# Patient Record
Sex: FEMALE | Race: WHITE | NOT HISPANIC OR LATINO | Employment: OTHER | URBAN - NONMETROPOLITAN AREA
[De-identification: names, ages, dates, MRNs, and addresses within clinical notes are randomized per-mention and may not be internally consistent; named-entity substitution may affect disease eponyms.]

---

## 2023-02-24 LAB
BACTERIA, URINE: ABNORMAL /HPF
RBC, URINE: 2 /HPF (ref 0–5)
SQUAMOUS EPITHELIAL CELLS, URINE: 3 /HPF
WBC, URINE: 2 /HPF (ref 0–5)

## 2023-03-08 LAB
ANION GAP IN SER/PLAS: 14 MMOL/L (ref 10–20)
CALCIUM (MG/DL) IN SER/PLAS: 9.1 MG/DL (ref 8.6–10.6)
CARBON DIOXIDE, TOTAL (MMOL/L) IN SER/PLAS: 29 MMOL/L (ref 21–32)
CHLORIDE (MMOL/L) IN SER/PLAS: 101 MMOL/L (ref 98–107)
CREATININE (MG/DL) IN SER/PLAS: 0.84 MG/DL (ref 0.5–1.05)
GFR FEMALE: 76 ML/MIN/1.73M2
GLUCOSE (MG/DL) IN SER/PLAS: 215 MG/DL (ref 74–99)
POTASSIUM (MMOL/L) IN SER/PLAS: 4.2 MMOL/L (ref 3.5–5.3)
SODIUM (MMOL/L) IN SER/PLAS: 140 MMOL/L (ref 136–145)
UREA NITROGEN (MG/DL) IN SER/PLAS: 17 MG/DL (ref 6–23)

## 2023-03-18 PROBLEM — M25.562 PAIN IN JOINT INVOLVING LEFT LOWER LEG: Status: ACTIVE | Noted: 2023-03-18

## 2023-03-18 PROBLEM — L40.9 PSORIASIS: Status: ACTIVE | Noted: 2023-03-18

## 2023-03-18 PROBLEM — D75.1 POLYCYTHEMIA: Status: ACTIVE | Noted: 2023-03-18

## 2023-03-18 PROBLEM — L71.9 ROSACEA: Status: ACTIVE | Noted: 2023-03-18

## 2023-03-18 PROBLEM — F41.9 RECURRENT MODERATE MAJOR DEPRESSIVE DISORDER WITH ANXIETY (MULTI): Status: ACTIVE | Noted: 2023-03-18

## 2023-03-18 PROBLEM — N39.0 RECURRENT UTI (URINARY TRACT INFECTION): Status: ACTIVE | Noted: 2023-03-18

## 2023-03-18 PROBLEM — M79.605 PAIN OF LEFT LOWER EXTREMITY: Status: ACTIVE | Noted: 2023-03-18

## 2023-03-18 PROBLEM — M17.11 ARTHRITIS OF RIGHT KNEE: Status: ACTIVE | Noted: 2023-03-18

## 2023-03-18 PROBLEM — R41.3 MEMORY LOSS: Status: ACTIVE | Noted: 2023-03-18

## 2023-03-18 PROBLEM — E55.9 VITAMIN D DEFICIENCY: Status: ACTIVE | Noted: 2023-03-18

## 2023-03-18 PROBLEM — M25.569 KNEE PAIN: Status: ACTIVE | Noted: 2023-03-18

## 2023-03-18 PROBLEM — F33.1 RECURRENT MODERATE MAJOR DEPRESSIVE DISORDER WITH ANXIETY (MULTI): Status: ACTIVE | Noted: 2023-03-18
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
## 2023-03-18 PROBLEM — R32 URINARY INCONTINENCE: Status: ACTIVE | Noted: 2023-03-18

## 2023-03-18 PROBLEM — R58 ECCHYMOSIS: Status: ACTIVE | Noted: 2023-03-18

## 2023-03-18 PROBLEM — G47.33 OSA (OBSTRUCTIVE SLEEP APNEA): Status: ACTIVE | Noted: 2023-03-18

## 2023-03-18 PROBLEM — F17.200 NICOTINE DEPENDENCE: Status: ACTIVE | Noted: 2023-03-18

## 2023-03-18 PROBLEM — E78.5 HYPERLIPIDEMIA: Status: ACTIVE | Noted: 2023-03-18

## 2023-03-18 PROBLEM — J44.9 COPD (CHRONIC OBSTRUCTIVE PULMONARY DISEASE) (MULTI): Status: ACTIVE | Noted: 2023-03-18

## 2023-03-18 PROBLEM — I25.10 CAD, MULTIPLE VESSEL: Status: ACTIVE | Noted: 2023-03-18

## 2023-03-18 PROBLEM — R31.21 ASYMPTOMATIC MICROSCOPIC HEMATURIA: Status: ACTIVE | Noted: 2023-03-18

## 2023-03-18 PROBLEM — M25.561 PAIN IN JOINT INVOLVING RIGHT LOWER LEG: Status: ACTIVE | Noted: 2023-03-18

## 2023-03-18 PROBLEM — M85.80 OSTEOPENIA: Status: ACTIVE | Noted: 2023-03-18

## 2023-03-18 PROBLEM — E11.9 DIABETES MELLITUS, TYPE 2 (MULTI): Status: ACTIVE | Noted: 2023-03-18

## 2023-03-18 PROBLEM — Z95.2 H/O AORTIC VALVE REPLACEMENT: Status: ACTIVE | Noted: 2023-03-18

## 2023-03-18 PROBLEM — N32.81 OVERACTIVE BLADDER: Status: ACTIVE | Noted: 2023-03-18

## 2023-03-18 PROBLEM — G91.9 HYDROCEPHALUS (MULTI): Status: ACTIVE | Noted: 2023-03-18

## 2023-03-18 PROBLEM — M25.552 LEFT HIP PAIN: Status: ACTIVE | Noted: 2023-03-18

## 2023-03-18 PROBLEM — I71.9 AORTIC ANEURYSM WITHOUT RUPTURE (CMS-HCC): Status: ACTIVE | Noted: 2023-03-18

## 2023-03-18 PROBLEM — F41.9 ANXIETY: Status: ACTIVE | Noted: 2023-03-18

## 2023-03-18 PROBLEM — G45.9 TIA (TRANSIENT ISCHEMIC ATTACK): Status: ACTIVE | Noted: 2023-03-18

## 2023-03-18 PROBLEM — R91.1 LUNG NODULE: Status: ACTIVE | Noted: 2023-03-18

## 2023-03-18 PROBLEM — I10 BENIGN ESSENTIAL HYPERTENSION: Status: ACTIVE | Noted: 2023-03-18

## 2023-03-18 PROBLEM — E66.811 CLASS 1 OBESITY WITH BODY MASS INDEX (BMI) OF 31.0 TO 31.9 IN ADULT: Status: ACTIVE | Noted: 2023-03-18

## 2023-03-18 PROBLEM — E66.9 CLASS 1 OBESITY WITH BODY MASS INDEX (BMI) OF 31.0 TO 31.9 IN ADULT: Status: ACTIVE | Noted: 2023-03-18

## 2023-03-18 PROBLEM — K86.89 PANCREATIC MASS (HHS-HCC): Status: ACTIVE | Noted: 2023-03-18

## 2023-03-18 PROBLEM — M19.049 CMC ARTHRITIS: Status: ACTIVE | Noted: 2023-03-18

## 2023-03-18 PROBLEM — R16.0 HEPATOMEGALY: Status: ACTIVE | Noted: 2023-03-18

## 2023-03-18 PROBLEM — R31.0 HEMATURIA, GROSS: Status: ACTIVE | Noted: 2023-03-18

## 2023-03-18 PROBLEM — Z98.890 HISTORY OF COLONOSCOPY: Status: ACTIVE | Noted: 2023-03-18

## 2023-03-18 PROBLEM — R47.01 APHASIA OF UNKNOWN ORIGIN: Status: ACTIVE | Noted: 2023-03-18

## 2023-03-18 PROBLEM — R94.2 ABNORMAL PULMONARY FUNCTION TEST: Status: ACTIVE | Noted: 2023-03-18

## 2023-03-18 PROBLEM — R21 PERIANAL RASH: Status: ACTIVE | Noted: 2023-03-18

## 2023-03-18 PROBLEM — N39.3 FEMALE STRESS INCONTINENCE: Status: ACTIVE | Noted: 2023-03-18

## 2023-03-18 RX ORDER — VIBEGRON 75 MG/1
1 TABLET, FILM COATED ORAL DAILY
COMMUNITY
End: 2023-12-18 | Stop reason: ALTCHOICE

## 2023-03-18 RX ORDER — METOPROLOL SUCCINATE 25 MG/1
1 TABLET, EXTENDED RELEASE ORAL DAILY
COMMUNITY
Start: 2021-01-04 | End: 2023-04-03 | Stop reason: SDUPTHER

## 2023-03-18 RX ORDER — FLUOXETINE HYDROCHLORIDE 40 MG/1
1 CAPSULE ORAL DAILY
COMMUNITY
End: 2023-04-03 | Stop reason: SDUPTHER

## 2023-03-18 RX ORDER — WARFARIN 6 MG/1
TABLET ORAL
COMMUNITY
Start: 2017-01-09 | End: 2023-06-05 | Stop reason: SDUPTHER

## 2023-03-18 RX ORDER — METFORMIN HYDROCHLORIDE 500 MG/1
1 TABLET, EXTENDED RELEASE ORAL DAILY
COMMUNITY
Start: 2020-03-17 | End: 2023-04-03 | Stop reason: SDUPTHER

## 2023-03-18 RX ORDER — ROSUVASTATIN CALCIUM 40 MG/1
1 TABLET, COATED ORAL DAILY
COMMUNITY
Start: 2017-05-07 | End: 2023-04-03 | Stop reason: SDUPTHER

## 2023-03-18 RX ORDER — ACETAMINOPHEN 325 MG/1
1-2 TABLET ORAL EVERY 4 HOURS PRN
Status: ON HOLD | COMMUNITY
End: 2023-10-18 | Stop reason: SDUPTHER

## 2023-03-18 RX ORDER — DM/PE/ACETAMINOPHEN/CHLORPHENR 10-5-325-2
1 TABLET, SEQUENTIAL ORAL DAILY
COMMUNITY
End: 2023-07-27 | Stop reason: ALTCHOICE

## 2023-03-18 RX ORDER — LOSARTAN POTASSIUM 100 MG/1
1 TABLET ORAL DAILY
COMMUNITY
Start: 2019-01-07 | End: 2023-04-03 | Stop reason: SDUPTHER

## 2023-03-18 RX ORDER — VIT C/E/ZN/COPPR/LUTEIN/ZEAXAN 250MG-90MG
25 CAPSULE ORAL DAILY
COMMUNITY
End: 2023-07-27 | Stop reason: ALTCHOICE

## 2023-03-18 RX ORDER — AMLODIPINE BESYLATE 5 MG/1
1 TABLET ORAL DAILY
COMMUNITY
End: 2023-04-03 | Stop reason: SDUPTHER

## 2023-03-20 ENCOUNTER — TELEPHONE (OUTPATIENT)
Dept: PRIMARY CARE | Facility: CLINIC | Age: 68
End: 2023-03-20

## 2023-03-20 NOTE — TELEPHONE ENCOUNTER
The patient is calling to request a referral for Dementia testing. The patient states her dementia is getting worse. Please call her at the number listed

## 2023-03-23 ENCOUNTER — APPOINTMENT (OUTPATIENT)
Dept: PRIMARY CARE | Facility: CLINIC | Age: 68
End: 2023-03-23

## 2023-04-28 DIAGNOSIS — I10 BENIGN ESSENTIAL HYPERTENSION: ICD-10-CM

## 2023-05-01 RX ORDER — LOSARTAN POTASSIUM 100 MG/1
100 TABLET ORAL DAILY
Qty: 90 TABLET | Refills: 0 | Status: SHIPPED | OUTPATIENT
Start: 2023-05-01 | End: 2023-06-05 | Stop reason: SDUPTHER

## 2023-05-22 ENCOUNTER — TELEPHONE (OUTPATIENT)
Dept: PRIMARY CARE | Facility: CLINIC | Age: 68
End: 2023-05-22

## 2023-05-22 NOTE — TELEPHONE ENCOUNTER
Please call and tell her I am so sorry to hear this.  Definitely recommend counseling.  We can see her for an office visit to discuss medication change if she would like.  Please schedule.

## 2023-05-22 NOTE — TELEPHONE ENCOUNTER
The patient feels she needs a change in her anti-depressant.  They received news that her fiance will not live to see the end of the year- they received this at the end of last week.  She is seeking counseling as well.

## 2023-06-05 ENCOUNTER — TELEPHONE (OUTPATIENT)
Dept: PRIMARY CARE | Facility: CLINIC | Age: 68
End: 2023-06-05
Payer: MEDICARE

## 2023-06-05 DIAGNOSIS — E66.9 CLASS 1 OBESITY WITH BODY MASS INDEX (BMI) OF 31.0 TO 31.9 IN ADULT, UNSPECIFIED OBESITY TYPE, UNSPECIFIED WHETHER SERIOUS COMORBIDITY PRESENT: ICD-10-CM

## 2023-06-05 DIAGNOSIS — F41.9 ANXIETY: ICD-10-CM

## 2023-06-05 DIAGNOSIS — Z95.2 H/O AORTIC VALVE REPLACEMENT: Primary | ICD-10-CM

## 2023-06-05 DIAGNOSIS — E11.69 TYPE 2 DIABETES MELLITUS WITH OTHER SPECIFIED COMPLICATION, WITH LONG-TERM CURRENT USE OF INSULIN (MULTI): ICD-10-CM

## 2023-06-05 DIAGNOSIS — Z79.4 TYPE 2 DIABETES MELLITUS WITH OTHER SPECIFIED COMPLICATION, WITH LONG-TERM CURRENT USE OF INSULIN (MULTI): ICD-10-CM

## 2023-06-05 DIAGNOSIS — I10 BENIGN ESSENTIAL HYPERTENSION: ICD-10-CM

## 2023-06-05 RX ORDER — AMLODIPINE BESYLATE 5 MG/1
5 TABLET ORAL DAILY
Qty: 90 TABLET | Refills: 1 | Status: SHIPPED | OUTPATIENT
Start: 2023-06-05 | End: 2023-10-18 | Stop reason: HOSPADM

## 2023-06-05 RX ORDER — ROSUVASTATIN CALCIUM 40 MG/1
40 TABLET, COATED ORAL DAILY
Qty: 90 TABLET | Refills: 1 | Status: SHIPPED | OUTPATIENT
Start: 2023-06-05 | End: 2023-10-24

## 2023-06-05 RX ORDER — METFORMIN HYDROCHLORIDE 500 MG/1
500 TABLET, EXTENDED RELEASE ORAL DAILY
Qty: 90 TABLET | Refills: 1 | Status: SHIPPED | OUTPATIENT
Start: 2023-06-05 | End: 2023-07-29 | Stop reason: SDUPTHER

## 2023-06-05 RX ORDER — METOPROLOL SUCCINATE 25 MG/1
25 TABLET, EXTENDED RELEASE ORAL DAILY
Qty: 90 TABLET | Refills: 1 | Status: ON HOLD | OUTPATIENT
Start: 2023-06-05 | End: 2023-10-18 | Stop reason: DRUGHIGH

## 2023-06-05 RX ORDER — WARFARIN 3 MG/1
TABLET ORAL
Qty: 90 TABLET | Refills: 1 | Status: SHIPPED | OUTPATIENT
Start: 2023-06-05 | End: 2023-08-16

## 2023-06-05 RX ORDER — FLUOXETINE HYDROCHLORIDE 40 MG/1
40 CAPSULE ORAL DAILY
Qty: 90 CAPSULE | Refills: 1 | Status: SHIPPED | OUTPATIENT
Start: 2023-06-05 | End: 2023-10-24

## 2023-06-05 RX ORDER — LOSARTAN POTASSIUM 100 MG/1
100 TABLET ORAL DAILY
Qty: 90 TABLET | Refills: 1 | Status: SHIPPED | OUTPATIENT
Start: 2023-06-05 | End: 2023-07-19

## 2023-06-06 ENCOUNTER — APPOINTMENT (OUTPATIENT)
Dept: PRIMARY CARE | Facility: CLINIC | Age: 68
End: 2023-06-06
Payer: MEDICARE

## 2023-06-06 NOTE — TELEPHONE ENCOUNTER
Please call Rayne and let her know I sent in her medication refills.  Specifically for coumadin I changed the tablets from 6 mg to 3 mg tablets since she is mostly taking 3 mg daily now.  She should follow the instructions from the coumadin clinic but on days she is supposed to take 3 mg she will just take 1 pill, if she is supposed to take 6 mg then she will take 2 pills.

## 2023-07-10 PROBLEM — R58 ECCHYMOSIS: Status: RESOLVED | Noted: 2023-03-18 | Resolved: 2023-07-10
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
## 2023-07-10 PROBLEM — I10 HYPERTENSION: Status: ACTIVE | Noted: 2018-08-02

## 2023-07-10 PROBLEM — Z87.898 HISTORY OF SEIZURE: Status: ACTIVE | Noted: 2023-07-10

## 2023-07-10 PROBLEM — I77.810 ASCENDING AORTA DILATATION (CMS-HCC): Status: ACTIVE | Noted: 2018-08-02

## 2023-07-10 PROBLEM — M79.605 PAIN OF LEFT LOWER EXTREMITY: Status: RESOLVED | Noted: 2023-03-18 | Resolved: 2023-07-10

## 2023-07-10 PROBLEM — M25.561 PAIN IN JOINT INVOLVING RIGHT LOWER LEG: Status: RESOLVED | Noted: 2023-03-18 | Resolved: 2023-07-10

## 2023-07-10 PROBLEM — M25.562 PAIN IN JOINT INVOLVING LEFT LOWER LEG: Status: RESOLVED | Noted: 2023-03-18 | Resolved: 2023-07-10

## 2023-07-10 PROBLEM — R21 PERIANAL RASH: Status: RESOLVED | Noted: 2023-03-18 | Resolved: 2023-07-10

## 2023-07-10 PROBLEM — Q23.81 BICUSPID AORTIC VALVE: Status: ACTIVE | Noted: 2018-08-02

## 2023-07-10 PROBLEM — Z86.19 HISTORY OF CLOSTRIDIOIDES DIFFICILE INFECTION: Status: ACTIVE | Noted: 2023-07-10

## 2023-07-10 PROBLEM — Z98.890 HISTORY OF COLONOSCOPY: Status: RESOLVED | Noted: 2023-03-18 | Resolved: 2023-07-10

## 2023-07-10 PROBLEM — Q23.1 BICUSPID AORTIC VALVE (HHS-HCC): Status: ACTIVE | Noted: 2018-08-02

## 2023-07-19 DIAGNOSIS — I10 BENIGN ESSENTIAL HYPERTENSION: ICD-10-CM

## 2023-07-19 RX ORDER — LOSARTAN POTASSIUM 100 MG/1
100 TABLET ORAL DAILY
Qty: 90 TABLET | Refills: 0 | Status: ON HOLD | OUTPATIENT
Start: 2023-07-19 | End: 2023-10-18 | Stop reason: DRUGHIGH

## 2023-07-26 NOTE — PROGRESS NOTES
Subjective   Patient ID: Suze Najera is a 68 y.o. female who presents for Follow-up and Depression (She is here with her daughter ).    HPI  Here for routine follow up-      She was seen in Dec 2022 for memory loss- SLUMS was 25/30   Referred to geriatrics- saw Brent Diaz NP at [facility] 7/19/23- MOCA 20/30- has follow up in 1 month to review results   Labs neg   CT head neg      DM, type 2-  Last A1c 7.5 in Dec 2022  Meds include metformin 500 mg daily   Doesn't think she ever started the tradjenta  She had side effects to jardiance (UTIs)   Eye doctor- needs to schedule      Depression and anxiety- on prozac 40 mg daily   She had formal neuropsychological testing done by Dr. Natty Foreman in 2017 which confirmed major depression, anxiety, anger management issues - and recommended psychotherapy in addition to medication management.    Her boyfriend is an alcoholic (does not live with him) - this causes a lot of the issues   Daughter finally got her into counseling - this is helping quite a bit- [facility]      She has a hx of stroke/ruptured aneurysm / SAH with craniotomy 2014, ventriculostomy placed due to hydrocephalus, seizures, aphasia, and memory loss- was seeing Dr. Raymond previously but she failed to follow up thereafter.   EEG was abnormal 2017; she stopped her seizure meds on her own without recurrence since   MRI showed right frontal large surgical resection cavity 2018      Sleep study showed mild GEOFF     She is on warfarin for mechanical aortic valve replacement for prior bicuspid valve   Cardiology- was seeing Dr. Manzano, now seeing Pricila SOLIS stress neg Dec 2022   Ascending aortic aneurysm 5.3 cm- being followed with serial CT scans by cardiology - last March 2023- needs to see Dr. Hayden to discuss surgery - reports she saw Dr. Shearer in March and told her he didn't want to do the surgery yet      Osteopenia- Dexa 2018- not taking vit D      HTN- on losartan, amlodipine,  "metoprolol  Not checking BP at home   Patient denies symptoms including headaches, dizziness, vision changes, syncope, chest pain, palpitations, and edema.      HPL- on crestor     OAB, Recurrent UTI ; hx microscopic hematuria  Seeing Dr. Gaming    She is on gemtasa  CT urogram and cystoscopy still pending      Seeing hematology for leukocytosis and polycythemia- told can follow up prn      Hx melanoma right arm- [facility] dermatology - also has scalp psoriasis and rosacea      COPD- on trelegy with albuterol prn - maybe uses trelegy once every 3 weeks, not really needing albuterol   Occasional cough and dyspnea   Saw Dr. Russell previously   She quit smoking 2 weeks ago, now vaping      Other medical specialists are involved in patient's care.    Any recent notes that were available were reviewed.    All conditions are monitored, evaluated and assessed regularly.      Review of Systems   Constitutional:  Negative for chills, fatigue and fever.   Respiratory:  Positive for cough. Negative for shortness of breath.    Cardiovascular:  Negative for chest pain and palpitations.       Objective   /83 (BP Location: Right arm, Patient Position: Sitting, BP Cuff Size: Adult)   Pulse 68   Temp 36.3 °C (97.3 °F) (Temporal)   Resp 16   Ht 1.562 m (5' 1.5\")   Wt 80 kg (176 lb 6.4 oz)   SpO2 96%   BMI 32.79 kg/m²     Physical Exam    Constitutional: Well developed, well nourished, alert and in no acute distress   Eyes: Normal external exam. Pupils equally round and reactive to light with normal accommodation and extraocular movements intact.  Neck: Supple, no lymphadenopathy or masses.   Cardiovascular: Regular rate and rhythm, normal S1 and S2, no murmurs, gallops, or rubs. Radial pulses normal. No peripheral edema.  Pulmonary: No respiratory distress, lungs clear to auscultation bilaterally but diminished. No wheezes, rhonchi, rales.  Skin: Warm, well perfused, normal skin turgor and color.   Neurologic: " Cranial nerves II-XII grossly intact.   Psychiatric: Mood calm and affect normal.      Assessment/Plan   Problem List Items Addressed This Visit       Anxiety     Continue prozac 40 mg daily  Great job starting counseling!          Aortic aneurysm without rupture (CMS/HCC)     Follow up with Dr. Romy Cuevas and Dr. Hayden to discuss when surgery will be needed          Benign essential hypertension     Controlled- continue losartan, amlodipine, metoprolol         COPD (chronic obstructive pulmonary disease) (CMS/HCC)     Fantastic job quitting smoking!   Follow up with Dr. Russell on lung function   Continue trelegy with albuterol prn          Diabetes mellitus, type 2 (CMS/HCC)     Check A1c, can increase metformin if needed or add on Tradjenta   Schedule with eye doctor          Relevant Orders    Hemoglobin A1C    Basic Metabolic Panel    H/O mechanical aortic valve replacement     Continue coumadin- managed by coumadin clinic  Cardiology - Pricila Ruth           Hyperlipidemia     Continue crestor          Memory loss - Primary     Follow up with Brent Diaz NP - [facility] geriatrics next month          GEOFF (obstructive sleep apnea)    Osteopenia    Overactive bladder     Due for follow up with Dr Gaming- on vinita          Recurrent moderate major depressive disorder with anxiety (CMS/HCC)    Vitamin D deficiency    Class 1 obesity with body mass index (BMI) of 32.0 to 32.9 in adult    History of seizure    History of stroke    Subarachnoid hemorrhage due to ruptured aneurysm (CMS/HCC)    History of melanoma     Please follow up with dermatology for full skin examination           Other Visit Diagnoses       Screening mammogram, encounter for        Relevant Orders    BI mammo bilateral screening tomosynthesis          Follow up 6 months   Jo Catalan,   7/27/2023

## 2023-07-27 ENCOUNTER — OFFICE VISIT (OUTPATIENT)
Dept: PRIMARY CARE | Facility: CLINIC | Age: 68
End: 2023-07-27
Payer: MEDICARE

## 2023-07-27 ENCOUNTER — LAB (OUTPATIENT)
Dept: LAB | Facility: LAB | Age: 68
End: 2023-07-27
Payer: MEDICARE

## 2023-07-27 VITALS
RESPIRATION RATE: 16 BRPM | HEART RATE: 68 BPM | TEMPERATURE: 97.3 F | DIASTOLIC BLOOD PRESSURE: 83 MMHG | BODY MASS INDEX: 32.46 KG/M2 | HEIGHT: 62 IN | WEIGHT: 176.4 LBS | OXYGEN SATURATION: 96 % | SYSTOLIC BLOOD PRESSURE: 130 MMHG

## 2023-07-27 DIAGNOSIS — F41.9 RECURRENT MODERATE MAJOR DEPRESSIVE DISORDER WITH ANXIETY (MULTI): ICD-10-CM

## 2023-07-27 DIAGNOSIS — Z85.820 HISTORY OF MELANOMA: ICD-10-CM

## 2023-07-27 DIAGNOSIS — R41.3 MEMORY LOSS: Primary | ICD-10-CM

## 2023-07-27 DIAGNOSIS — E11.9 TYPE 2 DIABETES MELLITUS WITHOUT COMPLICATION, WITHOUT LONG-TERM CURRENT USE OF INSULIN (MULTI): ICD-10-CM

## 2023-07-27 DIAGNOSIS — F41.9 ANXIETY: ICD-10-CM

## 2023-07-27 DIAGNOSIS — I71.21 ANEURYSM OF ASCENDING AORTA WITHOUT RUPTURE (CMS-HCC): ICD-10-CM

## 2023-07-27 DIAGNOSIS — Z95.2 H/O MECHANICAL AORTIC VALVE REPLACEMENT: ICD-10-CM

## 2023-07-27 DIAGNOSIS — E55.9 VITAMIN D DEFICIENCY: ICD-10-CM

## 2023-07-27 DIAGNOSIS — Z12.31 SCREENING MAMMOGRAM, ENCOUNTER FOR: ICD-10-CM

## 2023-07-27 DIAGNOSIS — N32.81 OVERACTIVE BLADDER: ICD-10-CM

## 2023-07-27 DIAGNOSIS — I10 BENIGN ESSENTIAL HYPERTENSION: ICD-10-CM

## 2023-07-27 DIAGNOSIS — G47.33 OSA (OBSTRUCTIVE SLEEP APNEA): ICD-10-CM

## 2023-07-27 DIAGNOSIS — E78.5 HYPERLIPIDEMIA, UNSPECIFIED HYPERLIPIDEMIA TYPE: ICD-10-CM

## 2023-07-27 DIAGNOSIS — F33.1 RECURRENT MODERATE MAJOR DEPRESSIVE DISORDER WITH ANXIETY (MULTI): ICD-10-CM

## 2023-07-27 DIAGNOSIS — Z87.898 HISTORY OF SEIZURE: ICD-10-CM

## 2023-07-27 DIAGNOSIS — J44.9 CHRONIC OBSTRUCTIVE PULMONARY DISEASE, UNSPECIFIED COPD TYPE (MULTI): ICD-10-CM

## 2023-07-27 DIAGNOSIS — M85.80 OSTEOPENIA, UNSPECIFIED LOCATION: ICD-10-CM

## 2023-07-27 DIAGNOSIS — Z86.73 HISTORY OF STROKE: ICD-10-CM

## 2023-07-27 DIAGNOSIS — E66.9 CLASS 1 OBESITY WITHOUT SERIOUS COMORBIDITY WITH BODY MASS INDEX (BMI) OF 32.0 TO 32.9 IN ADULT, UNSPECIFIED OBESITY TYPE: ICD-10-CM

## 2023-07-27 DIAGNOSIS — I60.8 SUBARACHNOID HEMORRHAGE DUE TO RUPTURED ANEURYSM (MULTI): ICD-10-CM

## 2023-07-27 PROBLEM — Q23.1 BICUSPID AORTIC VALVE (HHS-HCC): Status: RESOLVED | Noted: 2018-08-02 | Resolved: 2023-07-27

## 2023-07-27 PROBLEM — D75.1 POLYCYTHEMIA: Status: RESOLVED | Noted: 2023-03-18 | Resolved: 2023-07-27

## 2023-07-27 PROBLEM — R32 URINARY INCONTINENCE: Status: RESOLVED | Noted: 2023-03-18 | Resolved: 2023-07-27

## 2023-07-27 PROBLEM — Q23.81 BICUSPID AORTIC VALVE: Status: RESOLVED | Noted: 2018-08-02 | Resolved: 2023-07-27

## 2023-07-27 PROBLEM — I77.810 ASCENDING AORTA DILATATION (CMS-HCC): Status: RESOLVED | Noted: 2018-08-02 | Resolved: 2023-07-27

## 2023-07-27 PROBLEM — F17.200 NICOTINE DEPENDENCE: Status: RESOLVED | Noted: 2023-03-18 | Resolved: 2023-07-27

## 2023-07-27 PROBLEM — R31.0 HEMATURIA, GROSS: Status: RESOLVED | Noted: 2023-03-18 | Resolved: 2023-07-27

## 2023-07-27 PROCEDURE — 36415 COLL VENOUS BLD VENIPUNCTURE: CPT

## 2023-07-27 PROCEDURE — 3008F BODY MASS INDEX DOCD: CPT | Performed by: FAMILY MEDICINE

## 2023-07-27 PROCEDURE — 83036 HEMOGLOBIN GLYCOSYLATED A1C: CPT

## 2023-07-27 PROCEDURE — 80048 BASIC METABOLIC PNL TOTAL CA: CPT

## 2023-07-27 PROCEDURE — 99215 OFFICE O/P EST HI 40 MIN: CPT | Performed by: FAMILY MEDICINE

## 2023-07-27 PROCEDURE — 1160F RVW MEDS BY RX/DR IN RCRD: CPT | Performed by: FAMILY MEDICINE

## 2023-07-27 PROCEDURE — 1125F AMNT PAIN NOTED PAIN PRSNT: CPT | Performed by: FAMILY MEDICINE

## 2023-07-27 PROCEDURE — 3079F DIAST BP 80-89 MM HG: CPT | Performed by: FAMILY MEDICINE

## 2023-07-27 PROCEDURE — 3075F SYST BP GE 130 - 139MM HG: CPT | Performed by: FAMILY MEDICINE

## 2023-07-27 PROCEDURE — 4010F ACE/ARB THERAPY RXD/TAKEN: CPT | Performed by: FAMILY MEDICINE

## 2023-07-27 PROCEDURE — 1159F MED LIST DOCD IN RCRD: CPT | Performed by: FAMILY MEDICINE

## 2023-07-27 ASSESSMENT — ENCOUNTER SYMPTOMS
PALPITATIONS: 0
FATIGUE: 0
CHILLS: 0
FEVER: 0
SHORTNESS OF BREATH: 0
COUGH: 1

## 2023-07-27 ASSESSMENT — PATIENT HEALTH QUESTIONNAIRE - PHQ9
4. FEELING TIRED OR HAVING LITTLE ENERGY: MORE THAN HALF THE DAYS
2. FEELING DOWN, DEPRESSED OR HOPELESS: MORE THAN HALF THE DAYS
6. FEELING BAD ABOUT YOURSELF - OR THAT YOU ARE A FAILURE OR HAVE LET YOURSELF OR YOUR FAMILY DOWN: SEVERAL DAYS
7. TROUBLE CONCENTRATING ON THINGS, SUCH AS READING THE NEWSPAPER OR WATCHING TELEVISION: SEVERAL DAYS
SUM OF ALL RESPONSES TO PHQ9 QUESTIONS 1 AND 2: 5
3. TROUBLE FALLING OR STAYING ASLEEP OR SLEEPING TOO MUCH: MORE THAN HALF THE DAYS
9. THOUGHTS THAT YOU WOULD BE BETTER OFF DEAD, OR OF HURTING YOURSELF: NOT AT ALL
10. IF YOU CHECKED OFF ANY PROBLEMS, HOW DIFFICULT HAVE THESE PROBLEMS MADE IT FOR YOU TO DO YOUR WORK, TAKE CARE OF THINGS AT HOME, OR GET ALONG WITH OTHER PEOPLE: SOMEWHAT DIFFICULT
1. LITTLE INTEREST OR PLEASURE IN DOING THINGS: NEARLY EVERY DAY
5. POOR APPETITE OR OVEREATING: MORE THAN HALF THE DAYS
8. MOVING OR SPEAKING SO SLOWLY THAT OTHER PEOPLE COULD HAVE NOTICED. OR THE OPPOSITE, BEING SO FIGETY OR RESTLESS THAT YOU HAVE BEEN MOVING AROUND A LOT MORE THAN USUAL: SEVERAL DAYS
SUM OF ALL RESPONSES TO PHQ QUESTIONS 1-9: 14

## 2023-07-27 ASSESSMENT — ANXIETY QUESTIONNAIRES
3. WORRYING TOO MUCH ABOUT DIFFERENT THINGS: MORE THAN HALF THE DAYS
5. BEING SO RESTLESS THAT IT IS HARD TO SIT STILL: SEVERAL DAYS
6. BECOMING EASILY ANNOYED OR IRRITABLE: SEVERAL DAYS
1. FEELING NERVOUS, ANXIOUS, OR ON EDGE: SEVERAL DAYS
7. FEELING AFRAID AS IF SOMETHING AWFUL MIGHT HAPPEN: SEVERAL DAYS
4. TROUBLE RELAXING: SEVERAL DAYS
2. NOT BEING ABLE TO STOP OR CONTROL WORRYING: MORE THAN HALF THE DAYS
GAD7 TOTAL SCORE: 9
IF YOU CHECKED OFF ANY PROBLEMS ON THIS QUESTIONNAIRE, HOW DIFFICULT HAVE THESE PROBLEMS MADE IT FOR YOU TO DO YOUR WORK, TAKE CARE OF THINGS AT HOME, OR GET ALONG WITH OTHER PEOPLE: SOMEWHAT DIFFICULT

## 2023-07-27 NOTE — PATIENT INSTRUCTIONS
Team Member Role and Specialty Contact Info Address Start End Comments   Christopher Hayden MD Surgeon (Cardiothoracic Surgery) Phone: [phone]  Fax: [phone] [address]  Department of Surgery-Cardiac  [facility] [address] 7/26/2023 - -     Team Member Role and Specialty Contact Info Address Start End Comments   Martin Cuevas MD Surgeon (Cardiothoracic Surgery) Phone: [phone] [address]  [facility] [address]  [facility] [address] 7/27/2023 - -     Team Member Role and Specialty Contact Info Address Start End Comments   Elise Gaming MD Surgeon (Obstetrics and Gynecology) Phone: [phone]  Fax: [phone] [address] [facility] [address]  [facility] [address] 7/10/2023 - -     Follow up with Dr. Gaming - needs CT urogram and cystoscopy

## 2023-07-27 NOTE — ASSESSMENT & PLAN NOTE
Fantastic job quitting smoking!   Follow up with Dr. Russell on lung function   Continue trelegy with albuterol prn

## 2023-07-28 LAB
ANION GAP IN SER/PLAS: 17 MMOL/L (ref 10–20)
CALCIUM (MG/DL) IN SER/PLAS: 9.5 MG/DL (ref 8.6–10.6)
CARBON DIOXIDE, TOTAL (MMOL/L) IN SER/PLAS: 24 MMOL/L (ref 21–32)
CHLORIDE (MMOL/L) IN SER/PLAS: 102 MMOL/L (ref 98–107)
CREATININE (MG/DL) IN SER/PLAS: 0.93 MG/DL (ref 0.5–1.05)
GFR FEMALE: 67 ML/MIN/1.73M2
GLUCOSE (MG/DL) IN SER/PLAS: 238 MG/DL (ref 74–99)
POTASSIUM (MMOL/L) IN SER/PLAS: 4.5 MMOL/L (ref 3.5–5.3)
SODIUM (MMOL/L) IN SER/PLAS: 138 MMOL/L (ref 136–145)
UREA NITROGEN (MG/DL) IN SER/PLAS: 16 MG/DL (ref 6–23)

## 2023-07-29 DIAGNOSIS — E11.69 TYPE 2 DIABETES MELLITUS WITH OTHER SPECIFIED COMPLICATION, WITH LONG-TERM CURRENT USE OF INSULIN (MULTI): ICD-10-CM

## 2023-07-29 DIAGNOSIS — Z79.4 TYPE 2 DIABETES MELLITUS WITH OTHER SPECIFIED COMPLICATION, WITH LONG-TERM CURRENT USE OF INSULIN (MULTI): ICD-10-CM

## 2023-07-29 LAB
ESTIMATED AVERAGE GLUCOSE FOR HBA1C: 209 MG/DL
HEMOGLOBIN A1C/HEMOGLOBIN TOTAL IN BLOOD: 8.9 %

## 2023-07-29 RX ORDER — LINAGLIPTIN 5 MG/1
5 TABLET, FILM COATED ORAL DAILY
Qty: 90 TABLET | Refills: 3 | Status: SHIPPED | OUTPATIENT
Start: 2023-07-29 | End: 2023-09-14

## 2023-07-29 RX ORDER — METFORMIN HYDROCHLORIDE 500 MG/1
500 TABLET, EXTENDED RELEASE ORAL 2 TIMES DAILY
Qty: 180 TABLET | Refills: 3 | Status: SHIPPED | OUTPATIENT
Start: 2023-07-29 | End: 2024-07-28

## 2023-08-11 ENCOUNTER — TELEPHONE (OUTPATIENT)
Dept: PRIMARY CARE | Facility: CLINIC | Age: 68
End: 2023-08-11
Payer: MEDICARE

## 2023-08-11 DIAGNOSIS — I10 BENIGN ESSENTIAL HYPERTENSION: Primary | ICD-10-CM

## 2023-08-11 NOTE — TELEPHONE ENCOUNTER
Pt states she has a bladder ct scheduled for 8/15. Was told she needed to have labs done prior to getting IV contrast. Needs to have at least 4 days prior to  the scan. Can you please place order .    No call back necessary

## 2023-08-15 LAB
CREATININE (MG/DL) IN SER/PLAS: 0.93 MG/DL (ref 0.5–1.05)
GFR FEMALE: 67 ML/MIN/1.73M2
UREA NITROGEN (MG/DL) IN SER/PLAS: 15 MG/DL (ref 6–23)

## 2023-08-16 DIAGNOSIS — Z95.2 H/O AORTIC VALVE REPLACEMENT: ICD-10-CM

## 2023-08-16 RX ORDER — WARFARIN 3 MG/1
TABLET ORAL
Qty: 90 TABLET | Refills: 1 | Status: SHIPPED | OUTPATIENT
Start: 2023-08-16 | End: 2023-09-07 | Stop reason: SDUPTHER

## 2023-08-27 ENCOUNTER — TELEPHONE (OUTPATIENT)
Dept: PRIMARY CARE | Facility: CLINIC | Age: 68
End: 2023-08-27
Payer: MEDICARE

## 2023-08-28 ENCOUNTER — TELEPHONE (OUTPATIENT)
Dept: PRIMARY CARE | Facility: CLINIC | Age: 68
End: 2023-08-28

## 2023-08-28 NOTE — TELEPHONE ENCOUNTER
Records have been obtained. PCP reviewed.  PCP and I spoke, she needs to have information on Coumadin per previous msg.  PCP states she needs to either go to ED if there are concerns of DVT on patients behalf or make a follow up with any provider in the office for UC visit.  Coumadin needs addressed as well.    I called and left a message for pt to call the office

## 2023-08-28 NOTE — TELEPHONE ENCOUNTER
The patient spoke to the office in regards to swelling on her left side.  The patient was directed to Urgent Care as the office was full.    The patient was seen at [facility] ( records have been requested) and called to request advice on follow up.  The patient states she was given the wrong advice- that the provider at Urgent Care states that for a DVT she should have gone to the ED.  I explained I spoke with her this morning and that if she did indeed think she had/has a DVT we would have told her the ED, but she spoke of swelling.    I explained I would obtain records- as she states she was told to watch the swelling and if it didn't get better to go to the ED. She wants to know what her PCP suggests.

## 2023-08-31 ENCOUNTER — APPOINTMENT (OUTPATIENT)
Dept: PRIMARY CARE | Facility: CLINIC | Age: 68
End: 2023-08-31
Payer: MEDICARE

## 2023-09-05 NOTE — PROGRESS NOTES
Subjective        Suze Najera. Is 68 y.o.. female. Here for follow up office visit-       Chief Complaint   Patient presents with    Hospital Follow-up     INR 4.9    Diarrhea     X approx 3 days    Medication Question     Tradjenta    Immunizations     PT agreed to flu vaccine   On tradjenta- had 90 days in July with 3 refills      Dr Catalan pt       HPI:    Diarrhea- on and off in am mostly    - mild - no fever, no blood in stool , no nausea and vomiting , no abd pain   Continue po fluids  No stool studies at this time       How is patient doing today? OK  PT saw cardiologist (Dr. Hayden)  09/06/2023-aortic aneurism repair planned and metal valve evaluation      Follow up for ED/UC/Hospital admission:  Date(s):  8/29/23      Dx:  Edema LUE/LLE  CVA  On Coumadin- managed by coumadin clinic - 3.7- 2010- cardiac valve placed - metal   HTN  HLD  COPD  Dilated ascending aorta- planned surgery per pt       Testing:  Ultrasound LUE/LLE- neg for DVT      Seeing coumadin clinic       Pt on prozac per Dr Catalan     Patient Active Problem List    Diagnosis Date Noted    History of stroke 07/27/2023    Subarachnoid hemorrhage due to ruptured aneurysm (CMS/HCC) 07/27/2023    History of melanoma 07/27/2023    History of seizure 07/10/2023    Abnormal pulmonary function test 03/18/2023    Anxiety 03/18/2023    Aortic aneurysm without rupture (CMS/HCC) 03/18/2023    Aphasia of unknown origin 03/18/2023    Arthritis of right knee 03/18/2023    Asymptomatic microscopic hematuria 03/18/2023    Benign essential hypertension 03/18/2023    CAD, multiple vessel 03/18/2023    CMC arthritis 03/18/2023    COPD (chronic obstructive pulmonary disease) (CMS/HCC) 03/18/2023    Diabetes mellitus, type 2 (CMS/HCC) 03/18/2023    Female stress incontinence 03/18/2023    Hepatomegaly 03/18/2023    H/O mechanical aortic valve replacement 03/18/2023    Hydrocephalus (CMS/HCC) 03/18/2023    Hyperlipidemia 03/18/2023    Knee pain 03/18/2023  "   Left hip pain 03/18/2023    Lung nodule 03/18/2023    Memory loss 03/18/2023    GEOFF (obstructive sleep apnea) 03/18/2023    Osteopenia 03/18/2023    Overactive bladder 03/18/2023    Pancreatic mass 03/18/2023    Psoriasis 03/18/2023    Recurrent moderate major depressive disorder with anxiety (CMS/HCC) 03/18/2023    Recurrent UTI (urinary tract infection) 03/18/2023    Rosacea 03/18/2023    TIA (transient ischemic attack) 03/18/2023    Vitamin D deficiency 03/18/2023    Class 1 obesity with body mass index (BMI) of 32.0 to 32.9 in adult 03/18/2023                     Patient Care Team:  Jo Catalan DO as PCP - General  CORWIN Reid as Nurse Practitioner (Hematology and Oncology)  Elise Gaming MD as Surgeon (Obstetrics and Gynecology)  Darrel Russell MD as Referring Physician (Internal Medicine)  CORWIN Sidhu as Referring Physician  Christopher Hayden MD as Surgeon (Cardiothoracic Surgery)  Martin Cuevas MD as Surgeon (Cardiothoracic Surgery)    REVIEW OF SYSTEMS:        Objective      Vitals:    09/07/23 0641   BP: 127/78   BP Location: Left arm   Patient Position: Sitting   BP Cuff Size: Adult   Pulse: 75   Temp: 36.4 °C (97.5 °F)   SpO2: 96%   Weight: 80.8 kg (178 lb 1.6 oz)   Height: 1.568 m (5' 1.75\")       PHYSICAL:      Patient is alert and oriented x 3 , NAD  HEAD- normocephalic and atraumatic   EYES-conjunctiva-normal, lids - normal  EARS/NOSE- normal external exam   NECK-supple,FROM  CV- RRR , click heard from artificial valve   PULM- CTA bilaterally, normal respiratory effort  RESPIRATORY EFFORT- normal , no retractions or nasal flaring   ABD- normoactive BS's   EXT- LLE slight edema compare to  RLE at ankle ,NT  SKIN- no abnormal skin lesions noted  NEURO- no focal deficits  PSYCH- pleasant, normal judgement and insight      BP Readings from Last 3 Encounters:   09/07/23 127/78   07/27/23 130/83   02/24/23 147/88         Wt Readings from Last " 3 Encounters:   09/07/23 80.8 kg (178 lb 1.6 oz)   07/27/23 80 kg (176 lb 6.4 oz)   02/24/23 77.1 kg (170 lb)       BMI Readings from Last 3 Encounters:   09/07/23 32.84 kg/m²   07/27/23 32.79 kg/m²   02/24/23 31.60 kg/m²           The number and complexity of problems addressed is considered moderate.  The amount and/or complexity of data reviewed and analyzed is considered moderate. The risk of complications and/or morbidity/mortality of patient is considered moderate. Overall, this patient encounter is considered a moderate risk visit.    The patient is here for a hospital follow up.  Hospital records were reviewed prior to visit, including relevant labs, imaging findings, consultant notes, and discharge summary.  Medications were reconciled and are current, reviewed today.    Time spent reviewing hospital records prior to today's face-to-face office visit=    10     minutes    I             Assessment/Plan      Problem List Items Addressed This Visit          Active Problems    Benign essential hypertension    COPD (chronic obstructive pulmonary disease) (CMS/[facility])     Other Visit Diagnoses       Hospital discharge follow-up    -  Primary    Edema, unspecified type        Cerebrovascular accident (CVA), unspecified mechanism (CMS/[facility])        Anticoagulant long-term use        Ascending aorta dilatation (CMS/[facility])        H/O aortic valve replacement        Relevant Medications    warfarin (Coumadin) 3 mg tablet            Orders Placed This Encounter   Procedures    Flu vaccine, quadrivalent, high-dose, preservative free, age 65y+ (FLUZONE)               Continue medication      Current Outpatient Medications:     ACETAMINOPHEN ORAL, Take 1-2 tablets by mouth every 4 hours if needed., Disp: , Rfl:     amLODIPine (Norvasc) 5 mg tablet, Take 1 tablet (5 mg) by mouth once daily., Disp: 90 tablet, Rfl: 1    FLUoxetine (PROzac) 40 mg capsule, Take 1 capsule (40 mg) by mouth once daily., Disp: 90 capsule, Rfl: 1     fluticasone-umeclidin-vilanter (TRELEGY-ELLIPTA) 100-62.5-25 mcg blister with device, Inhale 1 puff once daily., Disp: , Rfl:     linaGLIPtin (Tradjenta) 5 mg tablet, Take 1 tablet (5 mg) by mouth once daily., Disp: 90 tablet, Rfl: 3    losartan (Cozaar) 100 mg tablet, Take 1 tablet (100 mg) by mouth once daily., Disp: 90 tablet, Rfl: 0    metFORMIN XR (Glucophage-XR) 500 mg 24 hr tablet, Take 1 tablet (500 mg) by mouth 2 times a day., Disp: 180 tablet, Rfl: 3    metoprolol succinate XL (Toprol-XL) 25 mg 24 hr tablet, Take 1 tablet (25 mg) by mouth once daily., Disp: 90 tablet, Rfl: 1    rosuvastatin (Crestor) 40 mg tablet, Take 1 tablet (40 mg) by mouth once daily., Disp: 90 tablet, Rfl: 1    vibegron (Gemtesa) 75 mg tablet, Take 1 tablet (75 mg) by mouth once daily., Disp: , Rfl:     warfarin (Coumadin) 3 mg tablet, TAKE 1.5 TABLETS BY MOUTH DAILY AS DIRECTED, Disp: 90 tablet, Rfl: 1    Go to ED if diarrhea worsens, abdominal pain, fever, vomiting, unable to drink fluids, blood in stool         Follow up with PCP Dr Catalan

## 2023-09-07 ENCOUNTER — OFFICE VISIT (OUTPATIENT)
Dept: PRIMARY CARE | Facility: CLINIC | Age: 68
End: 2023-09-07
Payer: MEDICARE

## 2023-09-07 VITALS
DIASTOLIC BLOOD PRESSURE: 78 MMHG | TEMPERATURE: 97.5 F | HEIGHT: 62 IN | WEIGHT: 178.1 LBS | SYSTOLIC BLOOD PRESSURE: 127 MMHG | HEART RATE: 75 BPM | BODY MASS INDEX: 32.77 KG/M2 | OXYGEN SATURATION: 96 %

## 2023-09-07 DIAGNOSIS — I77.810 ASCENDING AORTA DILATATION (CMS-HCC): ICD-10-CM

## 2023-09-07 DIAGNOSIS — R19.7 DIARRHEA, UNSPECIFIED TYPE: ICD-10-CM

## 2023-09-07 DIAGNOSIS — I10 BENIGN ESSENTIAL HYPERTENSION: ICD-10-CM

## 2023-09-07 DIAGNOSIS — Z79.01 ANTICOAGULANT LONG-TERM USE: ICD-10-CM

## 2023-09-07 DIAGNOSIS — J44.9 CHRONIC OBSTRUCTIVE PULMONARY DISEASE, UNSPECIFIED COPD TYPE (MULTI): ICD-10-CM

## 2023-09-07 DIAGNOSIS — Z09 HOSPITAL DISCHARGE FOLLOW-UP: Primary | ICD-10-CM

## 2023-09-07 DIAGNOSIS — Z95.2 H/O AORTIC VALVE REPLACEMENT: ICD-10-CM

## 2023-09-07 DIAGNOSIS — I63.9 CEREBROVASCULAR ACCIDENT (CVA), UNSPECIFIED MECHANISM (MULTI): ICD-10-CM

## 2023-09-07 DIAGNOSIS — R60.9 EDEMA, UNSPECIFIED TYPE: ICD-10-CM

## 2023-09-07 PROCEDURE — 99214 OFFICE O/P EST MOD 30 MIN: CPT | Performed by: FAMILY MEDICINE

## 2023-09-07 PROCEDURE — 3052F HG A1C>EQUAL 8.0%<EQUAL 9.0%: CPT | Performed by: FAMILY MEDICINE

## 2023-09-07 PROCEDURE — 90662 IIV NO PRSV INCREASED AG IM: CPT | Performed by: FAMILY MEDICINE

## 2023-09-07 PROCEDURE — 1160F RVW MEDS BY RX/DR IN RCRD: CPT | Performed by: FAMILY MEDICINE

## 2023-09-07 PROCEDURE — 3078F DIAST BP <80 MM HG: CPT | Performed by: FAMILY MEDICINE

## 2023-09-07 PROCEDURE — 1159F MED LIST DOCD IN RCRD: CPT | Performed by: FAMILY MEDICINE

## 2023-09-07 PROCEDURE — G0008 ADMIN INFLUENZA VIRUS VAC: HCPCS | Performed by: FAMILY MEDICINE

## 2023-09-07 PROCEDURE — 1125F AMNT PAIN NOTED PAIN PRSNT: CPT | Performed by: FAMILY MEDICINE

## 2023-09-07 PROCEDURE — 4010F ACE/ARB THERAPY RXD/TAKEN: CPT | Performed by: FAMILY MEDICINE

## 2023-09-07 PROCEDURE — 3008F BODY MASS INDEX DOCD: CPT | Performed by: FAMILY MEDICINE

## 2023-09-07 PROCEDURE — 3074F SYST BP LT 130 MM HG: CPT | Performed by: FAMILY MEDICINE

## 2023-09-07 RX ORDER — LANCETS 30 GAUGE
EACH MISCELLANEOUS
COMMUNITY
Start: 2023-07-31 | End: 2023-09-07 | Stop reason: WASHOUT

## 2023-09-07 RX ORDER — BLOOD-GLUCOSE CONTROL, NORMAL
EACH MISCELLANEOUS
COMMUNITY
Start: 2023-07-31 | End: 2023-09-07 | Stop reason: WASHOUT

## 2023-09-07 RX ORDER — MEDICAL SUPPLY, MISCELLANEOUS
EACH MISCELLANEOUS
COMMUNITY
Start: 2023-07-31 | End: 2023-09-07 | Stop reason: WASHOUT

## 2023-09-07 RX ORDER — WARFARIN 3 MG/1
TABLET ORAL
Qty: 90 TABLET | Refills: 1 | Status: ON HOLD | OUTPATIENT
Start: 2023-09-07 | End: 2023-10-06 | Stop reason: SDUPTHER

## 2023-09-07 RX ORDER — BLOOD SUGAR DIAGNOSTIC
STRIP MISCELLANEOUS
COMMUNITY
Start: 2023-07-31 | End: 2023-09-07 | Stop reason: WASHOUT

## 2023-09-07 RX ORDER — BLOOD-GLUCOSE METER
EACH MISCELLANEOUS
COMMUNITY
Start: 2023-07-31 | End: 2023-09-07 | Stop reason: WASHOUT

## 2023-09-07 ASSESSMENT — PATIENT HEALTH QUESTIONNAIRE - PHQ9
SUM OF ALL RESPONSES TO PHQ9 QUESTIONS 1 AND 2: 5
8. MOVING OR SPEAKING SO SLOWLY THAT OTHER PEOPLE COULD HAVE NOTICED. OR THE OPPOSITE, BEING SO FIGETY OR RESTLESS THAT YOU HAVE BEEN MOVING AROUND A LOT MORE THAN USUAL: NOT AT ALL
2. FEELING DOWN, DEPRESSED OR HOPELESS: MORE THAN HALF THE DAYS
4. FEELING TIRED OR HAVING LITTLE ENERGY: NEARLY EVERY DAY
SUM OF ALL RESPONSES TO PHQ QUESTIONS 1-9: 12
5. POOR APPETITE OR OVEREATING: SEVERAL DAYS
6. FEELING BAD ABOUT YOURSELF - OR THAT YOU ARE A FAILURE OR HAVE LET YOURSELF OR YOUR FAMILY DOWN: SEVERAL DAYS
10. IF YOU CHECKED OFF ANY PROBLEMS, HOW DIFFICULT HAVE THESE PROBLEMS MADE IT FOR YOU TO DO YOUR WORK, TAKE CARE OF THINGS AT HOME, OR GET ALONG WITH OTHER PEOPLE: NOT DIFFICULT AT ALL
7. TROUBLE CONCENTRATING ON THINGS, SUCH AS READING THE NEWSPAPER OR WATCHING TELEVISION: NOT AT ALL
9. THOUGHTS THAT YOU WOULD BE BETTER OFF DEAD, OR OF HURTING YOURSELF: NOT AT ALL
3. TROUBLE FALLING OR STAYING ASLEEP OR SLEEPING TOO MUCH: MORE THAN HALF THE DAYS
1. LITTLE INTEREST OR PLEASURE IN DOING THINGS: NEARLY EVERY DAY

## 2023-09-14 ENCOUNTER — TELEPHONE (OUTPATIENT)
Dept: PRIMARY CARE | Facility: CLINIC | Age: 68
End: 2023-09-14
Payer: MEDICARE

## 2023-09-14 DIAGNOSIS — E11.9 TYPE 2 DIABETES MELLITUS WITHOUT COMPLICATION, WITHOUT LONG-TERM CURRENT USE OF INSULIN (MULTI): Primary | ICD-10-CM

## 2023-09-14 RX ORDER — GLIPIZIDE 5 MG/1
5 TABLET ORAL DAILY
Qty: 90 TABLET | Refills: 3 | Status: SHIPPED | OUTPATIENT
Start: 2023-09-14 | End: 2023-10-07 | Stop reason: HOSPADM

## 2023-09-14 NOTE — TELEPHONE ENCOUNTER
Patient states Tradjenta is too expensive at $148 for a 30 day supply    She wants to know if something more affordable can be sent in to [facility]    Please call her with response

## 2023-09-14 NOTE — TELEPHONE ENCOUNTER
I will send in glipizide instead.  This should be cheap.  It needs taken with FOOD daily in morning with breakfast.

## 2023-09-26 DIAGNOSIS — Z95.2 AORTIC VALVE REPLACED: ICD-10-CM

## 2023-09-26 DIAGNOSIS — Z79.01 LONG TERM (CURRENT) USE OF ANTICOAGULANTS: ICD-10-CM

## 2023-09-26 LAB
POC INR: 2.9
POC PROTHROMBIN TIME: NORMAL

## 2023-09-28 ENCOUNTER — HOSPITAL ENCOUNTER (INPATIENT)
Dept: DATA CONVERSION | Facility: HOSPITAL | Age: 68
LOS: 7 days | Discharge: HOME | DRG: 287 | End: 2023-10-07
Attending: INTERNAL MEDICINE | Admitting: NURSE PRACTITIONER
Payer: MEDICARE

## 2023-09-28 ENCOUNTER — ANTICOAGULATION - WARFARIN VISIT (OUTPATIENT)
Dept: CARDIOLOGY | Facility: CLINIC | Age: 68
End: 2023-09-28
Payer: MEDICARE

## 2023-09-28 DIAGNOSIS — T82.09XD: ICD-10-CM

## 2023-09-28 DIAGNOSIS — I71.21 ANEURYSM OF ASCENDING AORTA WITHOUT RUPTURE (CMS-HCC): ICD-10-CM

## 2023-09-28 DIAGNOSIS — I25.10 ATHEROSCLEROTIC HEART DISEASE OF NATIVE CORONARY ARTERY WITHOUT ANGINA PECTORIS: ICD-10-CM

## 2023-09-28 DIAGNOSIS — Z95.2 AORTIC VALVE REPLACED: ICD-10-CM

## 2023-09-28 DIAGNOSIS — T82.09XA OTHER MECHANICAL COMPLICATION OF HEART VALVE PROSTHESIS, INITIAL ENCOUNTER: Primary | ICD-10-CM

## 2023-09-28 DIAGNOSIS — Z95.2 H/O AORTIC VALVE REPLACEMENT: ICD-10-CM

## 2023-09-28 DIAGNOSIS — Z79.01 LONG TERM (CURRENT) USE OF ANTICOAGULANTS: ICD-10-CM

## 2023-09-28 LAB
ACTIVATED PARTIAL THROMBOPLASTIN TIME IN PPP BY COAGULATION ASSAY: 39 SEC (ref 27–38)
ALBUMIN (G/DL) IN SER/PLAS: 3.7 G/DL (ref 3.4–5)
ANION GAP IN SER/PLAS: 12 MMOL/L (ref 10–20)
ANION GAP IN SER/PLAS: 14 MMOL/L (ref 10–20)
CALCIUM (MG/DL) IN SER/PLAS: 9 MG/DL (ref 8.6–10.6)
CALCIUM (MG/DL) IN SER/PLAS: 9.1 MG/DL (ref 8.6–10.6)
CARBON DIOXIDE, TOTAL (MMOL/L) IN SER/PLAS: 23 MMOL/L (ref 21–32)
CARBON DIOXIDE, TOTAL (MMOL/L) IN SER/PLAS: 25 MMOL/L (ref 21–32)
CHLORIDE (MMOL/L) IN SER/PLAS: 106 MMOL/L (ref 98–107)
CHLORIDE (MMOL/L) IN SER/PLAS: 107 MMOL/L (ref 98–107)
CREATININE (MG/DL) IN SER/PLAS: 0.72 MG/DL (ref 0.5–1.05)
CREATININE (MG/DL) IN SER/PLAS: 0.78 MG/DL (ref 0.5–1.05)
ERYTHROCYTE DISTRIBUTION WIDTH (RATIO) BY AUTOMATED COUNT: 14.5 % (ref 11.5–14.5)
ERYTHROCYTE DISTRIBUTION WIDTH (RATIO) BY AUTOMATED COUNT: 14.6 % (ref 11.5–14.5)
ERYTHROCYTE MEAN CORPUSCULAR HEMOGLOBIN CONCENTRATION (G/DL) BY AUTOMATED: 31 G/DL (ref 32–36)
ERYTHROCYTE MEAN CORPUSCULAR HEMOGLOBIN CONCENTRATION (G/DL) BY AUTOMATED: 31.1 G/DL (ref 32–36)
ERYTHROCYTE MEAN CORPUSCULAR VOLUME (FL) BY AUTOMATED COUNT: 93 FL (ref 80–100)
ERYTHROCYTE MEAN CORPUSCULAR VOLUME (FL) BY AUTOMATED COUNT: 96 FL (ref 80–100)
ERYTHROCYTES (10*6/UL) IN BLOOD BY AUTOMATED COUNT: 4.36 X10E12/L (ref 4–5.2)
ERYTHROCYTES (10*6/UL) IN BLOOD BY AUTOMATED COUNT: 4.77 X10E12/L (ref 4–5.2)
ESTIMATED AVERAGE GLUCOSE FOR HBA1C: 192 MG/DL
GFR FEMALE: 83 ML/MIN/1.73M2
GFR FEMALE: >90 ML/MIN/1.73M2
GLUCOSE (MG/DL) IN SER/PLAS: 131 MG/DL (ref 74–99)
GLUCOSE (MG/DL) IN SER/PLAS: 183 MG/DL (ref 74–99)
HEMATOCRIT (%) IN BLOOD BY AUTOMATED COUNT: 41.9 % (ref 36–46)
HEMATOCRIT (%) IN BLOOD BY AUTOMATED COUNT: 44.4 % (ref 36–46)
HEMOGLOBIN (G/DL) IN BLOOD: 13 G/DL (ref 12–16)
HEMOGLOBIN (G/DL) IN BLOOD: 13.8 G/DL (ref 12–16)
HEMOGLOBIN A1C/HEMOGLOBIN TOTAL IN BLOOD: 8.3 %
INR IN PPP BY COAGULATION ASSAY: 2.5 (ref 0.9–1.1)
LEUKOCYTES (10*3/UL) IN BLOOD BY AUTOMATED COUNT: 10.1 X10E9/L (ref 4.4–11.3)
LEUKOCYTES (10*3/UL) IN BLOOD BY AUTOMATED COUNT: 11 X10E9/L (ref 4.4–11.3)
NRBC (PER 100 WBCS) BY AUTOMATED COUNT: 0 /100 WBC (ref 0–0)
NRBC (PER 100 WBCS) BY AUTOMATED COUNT: 0 /100 WBC (ref 0–0)
PHOSPHATE (MG/DL) IN SER/PLAS: 3 MG/DL (ref 2.5–4.9)
PLATELETS (10*3/UL) IN BLOOD AUTOMATED COUNT: 234 X10E9/L (ref 150–450)
PLATELETS (10*3/UL) IN BLOOD AUTOMATED COUNT: 267 X10E9/L (ref 150–450)
POCT GLUCOSE: 144 MG/DL (ref 74–99)
POCT GLUCOSE: 156 MG/DL (ref 74–99)
POCT GLUCOSE: 191 MG/DL (ref 74–99)
POTASSIUM (MMOL/L) IN SER/PLAS: 4.1 MMOL/L (ref 3.5–5.3)
POTASSIUM (MMOL/L) IN SER/PLAS: 4.2 MMOL/L (ref 3.5–5.3)
PROTHROMBIN TIME (PT) IN PPP BY COAGULATION ASSAY: 28.4 SEC (ref 9.8–12.8)
SODIUM (MMOL/L) IN SER/PLAS: 139 MMOL/L (ref 136–145)
SODIUM (MMOL/L) IN SER/PLAS: 140 MMOL/L (ref 136–145)
THYROTROPIN (MIU/L) IN SER/PLAS BY DETECTION LIMIT <= 0.05 MIU/L: 2.21 MIU/L (ref 0.44–3.98)
UREA NITROGEN (MG/DL) IN SER/PLAS: 16 MG/DL (ref 6–23)
UREA NITROGEN (MG/DL) IN SER/PLAS: 17 MG/DL (ref 6–23)

## 2023-09-28 PROCEDURE — 80069 RENAL FUNCTION PANEL: CPT

## 2023-09-28 PROCEDURE — 83036 HEMOGLOBIN GLYCOSYLATED A1C: CPT

## 2023-09-28 PROCEDURE — 93458 L HRT ARTERY/VENTRICLE ANGIO: CPT | Mod: 53

## 2023-09-28 PROCEDURE — 85730 THROMBOPLASTIN TIME PARTIAL: CPT

## 2023-09-28 PROCEDURE — 9990 CHARGE CONVERSION

## 2023-09-28 PROCEDURE — 82947 ASSAY GLUCOSE BLOOD QUANT: CPT | Mod: 91

## 2023-09-28 PROCEDURE — C1887 CATHETER, GUIDING: HCPCS

## 2023-09-28 PROCEDURE — 84443 ASSAY THYROID STIM HORMONE: CPT

## 2023-09-28 PROCEDURE — 93005 ELECTROCARDIOGRAM TRACING: CPT

## 2023-09-28 PROCEDURE — 87081 CULTURE SCREEN ONLY: CPT

## 2023-09-28 PROCEDURE — 36415 COLL VENOUS BLD VENIPUNCTURE: CPT | Mod: 91

## 2023-09-28 PROCEDURE — C1894 INTRO/SHEATH, NON-LASER: HCPCS

## 2023-09-28 PROCEDURE — 85610 PROTHROMBIN TIME: CPT

## 2023-09-28 PROCEDURE — 85027 COMPLETE CBC AUTOMATED: CPT

## 2023-09-28 NOTE — Clinical Note
Patient Clipped and Prepped: groin. Prepped with ChloraPrep, a minimum of 3 minute dry time, longer if needed, no pooling noted, patient draped in sterile fashion.

## 2023-09-29 LAB
ACTIVATED PARTIAL THROMBOPLASTIN TIME IN PPP BY COAGULATION ASSAY: 36 SEC (ref 27–38)
ALBUMIN (G/DL) IN SER/PLAS: 3.8 G/DL (ref 3.4–5)
ANION GAP IN SER/PLAS: 12 MMOL/L (ref 10–20)
CALCIUM (MG/DL) IN SER/PLAS: 9.1 MG/DL (ref 8.6–10.6)
CARBON DIOXIDE, TOTAL (MMOL/L) IN SER/PLAS: 25 MMOL/L (ref 21–32)
CHLORIDE (MMOL/L) IN SER/PLAS: 105 MMOL/L (ref 98–107)
CREATININE (MG/DL) IN SER/PLAS: 0.82 MG/DL (ref 0.5–1.05)
GFR FEMALE: 78 ML/MIN/1.73M2
GLUCOSE (MG/DL) IN SER/PLAS: 164 MG/DL (ref 74–99)
HEPARIN UNFRACTIONATED IN PPP BY CHROMOGENIC MET: 0.3 IU/ML
HEPARIN UNFRACTIONATED IN PPP BY CHROMOGENIC MET: 0.5 IU/ML
INR IN PPP BY COAGULATION ASSAY: 1.6 (ref 0.9–1.1)
INR IN PPP BY COAGULATION ASSAY: 1.8 (ref 0.9–1.1)
MAGNESIUM (MG/DL) IN SER/PLAS: 2.05 MG/DL (ref 1.6–2.4)
PATIENT TEMPERATURE: 37 DEGREES C
PHOSPHATE (MG/DL) IN SER/PLAS: 3.3 MG/DL (ref 2.5–4.9)
POCT BASE EXCESS, ARTERIAL: -0.7 MMOL/L (ref -2–3)
POCT CARBOXYHEMOGLOBIN, ARTERIAL: 1.6 %
POCT DEOXY HEMOGLOBIN, ARTERIAL: 1.2 % (ref 0–5)
POCT GLUCOSE: 119 MG/DL (ref 74–99)
POCT GLUCOSE: 129 MG/DL (ref 74–99)
POCT GLUCOSE: 149 MG/DL (ref 74–99)
POCT GLUCOSE: 159 MG/DL (ref 74–99)
POCT GLUCOSE: 211 MG/DL (ref 74–99)
POCT HCO3, ARTERIAL: 23.4 MMOL/L (ref 22–26)
POCT HEMOGLOBIN, ARTERIAL: 14.2 G/DL (ref 12–16)
POCT METHEMOGLOBIN, ARTERIAL: 0.8 % (ref 0–1.5)
POCT OXY HEMOGLOBIN, ARTERIAL: 96.4 % (ref 94–98)
POCT PCO2, ARTERIAL: 36 MMHG (ref 38–42)
POCT PH, ARTERIAL: 7.42 (ref 7.38–7.42)
POCT PO2, ARTERIAL: 99 MMHG (ref 85–95)
POCT SO2, ARTERIAL: 99 % (ref 94–100)
POTASSIUM (MMOL/L) IN SER/PLAS: 4.4 MMOL/L (ref 3.5–5.3)
PROTHROMBIN TIME (PT) IN PPP BY COAGULATION ASSAY: 17.9 SEC (ref 9.8–12.8)
PROTHROMBIN TIME (PT) IN PPP BY COAGULATION ASSAY: 20.7 SEC (ref 9.8–12.8)
SODIUM (MMOL/L) IN SER/PLAS: 138 MMOL/L (ref 136–145)
UREA NITROGEN (MG/DL) IN SER/PLAS: 16 MG/DL (ref 6–23)

## 2023-09-29 PROCEDURE — 82947 ASSAY GLUCOSE BLOOD QUANT: CPT | Mod: 91

## 2023-09-29 PROCEDURE — 93005 ELECTROCARDIOGRAM TRACING: CPT | Mod: 59

## 2023-09-29 PROCEDURE — 85018 HEMOGLOBIN: CPT

## 2023-09-29 PROCEDURE — 93880 EXTRACRANIAL BILAT STUDY: CPT

## 2023-09-29 PROCEDURE — 85730 THROMBOPLASTIN TIME PARTIAL: CPT

## 2023-09-29 PROCEDURE — 82375 ASSAY CARBOXYHB QUANT: CPT

## 2023-09-29 PROCEDURE — C1894 INTRO/SHEATH, NON-LASER: HCPCS

## 2023-09-29 PROCEDURE — 93458 L HRT ARTERY/VENTRICLE ANGIO: CPT | Mod: 53

## 2023-09-29 PROCEDURE — 85610 PROTHROMBIN TIME: CPT

## 2023-09-29 PROCEDURE — 83050 HGB METHEMOGLOBIN QUAN: CPT

## 2023-09-29 PROCEDURE — 84443 ASSAY THYROID STIM HORMONE: CPT

## 2023-09-29 PROCEDURE — 87081 CULTURE SCREEN ONLY: CPT

## 2023-09-29 PROCEDURE — 9990 CHARGE CONVERSION: Mod: 53

## 2023-09-29 PROCEDURE — 83036 HEMOGLOBIN GLYCOSYLATED A1C: CPT

## 2023-09-29 PROCEDURE — 85520 HEPARIN ASSAY: CPT | Mod: 91

## 2023-09-29 PROCEDURE — 36415 COLL VENOUS BLD VENIPUNCTURE: CPT

## 2023-09-29 PROCEDURE — 82805 BLOOD GASES W/O2 SATURATION: CPT

## 2023-09-29 PROCEDURE — 36600 WITHDRAWAL OF ARTERIAL BLOOD: CPT

## 2023-09-29 PROCEDURE — C1887 CATHETER, GUIDING: HCPCS

## 2023-09-29 PROCEDURE — 83735 ASSAY OF MAGNESIUM: CPT

## 2023-09-29 PROCEDURE — 94010 BREATHING CAPACITY TEST: CPT

## 2023-09-29 PROCEDURE — 85027 COMPLETE CBC AUTOMATED: CPT | Mod: 91

## 2023-09-29 PROCEDURE — 80069 RENAL FUNCTION PANEL: CPT

## 2023-09-29 RX ORDER — FLUOXETINE HYDROCHLORIDE 20 MG/1
40 CAPSULE ORAL DAILY
Status: DISCONTINUED | OUTPATIENT
Start: 2023-09-30 | End: 2023-10-07 | Stop reason: HOSPADM

## 2023-09-29 RX ORDER — ACETAMINOPHEN 325 MG/1
650 TABLET ORAL EVERY 6 HOURS PRN
Status: DISCONTINUED | OUTPATIENT
Start: 2023-09-30 | End: 2023-10-07 | Stop reason: HOSPADM

## 2023-09-29 RX ORDER — METOPROLOL SUCCINATE 25 MG/1
25 TABLET, EXTENDED RELEASE ORAL DAILY
Status: DISCONTINUED | OUTPATIENT
Start: 2023-09-30 | End: 2023-10-07 | Stop reason: HOSPADM

## 2023-09-29 RX ORDER — NICOTINE 7MG/24HR
1 PATCH, TRANSDERMAL 24 HOURS TRANSDERMAL DAILY
Status: DISCONTINUED | OUTPATIENT
Start: 2023-09-30 | End: 2023-10-07 | Stop reason: HOSPADM

## 2023-09-29 RX ORDER — ALBUTEROL SULFATE 90 UG/1
2 AEROSOL, METERED RESPIRATORY (INHALATION) EVERY 6 HOURS PRN
Status: DISCONTINUED | OUTPATIENT
Start: 2023-09-30 | End: 2023-10-07 | Stop reason: HOSPADM

## 2023-09-29 RX ORDER — OXYCODONE HYDROCHLORIDE 5 MG/1
5 TABLET ORAL EVERY 4 HOURS PRN
Status: DISCONTINUED | OUTPATIENT
Start: 2023-09-30 | End: 2023-10-06

## 2023-09-29 RX ORDER — ROSUVASTATIN CALCIUM 40 MG/1
40 TABLET, COATED ORAL DAILY
Status: DISCONTINUED | OUTPATIENT
Start: 2023-09-30 | End: 2023-10-07 | Stop reason: HOSPADM

## 2023-09-29 RX ORDER — AMLODIPINE BESYLATE 5 MG/1
5 TABLET ORAL DAILY
Status: DISCONTINUED | OUTPATIENT
Start: 2023-09-30 | End: 2023-10-07 | Stop reason: HOSPADM

## 2023-09-29 RX ORDER — INSULIN LISPRO 100 [IU]/ML
0-10 INJECTION, SOLUTION INTRAVENOUS; SUBCUTANEOUS
Status: DISCONTINUED | OUTPATIENT
Start: 2023-09-30 | End: 2023-09-30

## 2023-09-29 RX ORDER — OXYCODONE HYDROCHLORIDE 5 MG/1
2.5 TABLET ORAL EVERY 4 HOURS PRN
Status: DISCONTINUED | OUTPATIENT
Start: 2023-09-30 | End: 2023-10-06

## 2023-09-29 RX ORDER — PANTOPRAZOLE SODIUM 40 MG/1
40 TABLET, DELAYED RELEASE ORAL
Status: DISCONTINUED | OUTPATIENT
Start: 2023-09-30 | End: 2023-10-07 | Stop reason: HOSPADM

## 2023-09-29 RX ORDER — LOSARTAN POTASSIUM 50 MG/1
100 TABLET ORAL DAILY
Status: DISCONTINUED | OUTPATIENT
Start: 2023-09-30 | End: 2023-10-07 | Stop reason: HOSPADM

## 2023-09-29 RX ORDER — DEXTROSE 50 % IN WATER (D50W) INTRAVENOUS SYRINGE
25
Status: DISCONTINUED | OUTPATIENT
Start: 2023-09-30 | End: 2023-10-07 | Stop reason: HOSPADM

## 2023-09-29 RX ORDER — HEPARIN SODIUM 5000 [USP'U]/ML
2000-4000 INJECTION, SOLUTION INTRAVENOUS; SUBCUTANEOUS EVERY 4 HOURS PRN
Status: DISCONTINUED | OUTPATIENT
Start: 2023-09-30 | End: 2023-10-07

## 2023-09-29 RX ORDER — HEPARIN SODIUM 10000 [USP'U]/100ML
0-4000 INJECTION, SOLUTION INTRAVENOUS CONTINUOUS
Status: DISCONTINUED | OUTPATIENT
Start: 2023-09-30 | End: 2023-10-07

## 2023-09-30 LAB
ALBUMIN SERPL BCP-MCNC: 3.7 G/DL (ref 3.4–5)
ANION GAP SERPL CALC-SCNC: 17 MMOL/L (ref 10–20)
BASOPHILS # BLD AUTO: 0.09 X10*3/UL (ref 0–0.1)
BASOPHILS NFR BLD AUTO: 1 %
BUN SERPL-MCNC: 20 MG/DL (ref 6–23)
CALCIUM SERPL-MCNC: 9.2 MG/DL (ref 8.6–10.6)
CHLORIDE SERPL-SCNC: 104 MMOL/L (ref 98–107)
CO2 SERPL-SCNC: 20 MMOL/L (ref 21–32)
CREAT SERPL-MCNC: 0.89 MG/DL (ref 0.5–1.05)
EOSINOPHIL # BLD AUTO: 0.2 X10*3/UL (ref 0–0.7)
EOSINOPHIL NFR BLD AUTO: 2.2 %
ERYTHROCYTE [DISTWIDTH] IN BLOOD BY AUTOMATED COUNT: 14.1 % (ref 11.5–14.5)
ERYTHROCYTE [DISTWIDTH] IN BLOOD BY AUTOMATED COUNT: 14.6 % (ref 11.5–14.5)
GFR SERPL CREATININE-BSD FRML MDRD: 71 ML/MIN/1.73M*2
GLUCOSE BLD MANUAL STRIP-MCNC: 182 MG/DL (ref 74–99)
GLUCOSE BLD MANUAL STRIP-MCNC: 184 MG/DL (ref 74–99)
GLUCOSE BLD MANUAL STRIP-MCNC: 186 MG/DL (ref 74–99)
GLUCOSE SERPL-MCNC: 230 MG/DL (ref 74–99)
HCT VFR BLD AUTO: 41 % (ref 36–46)
HCT VFR BLD AUTO: 43.9 % (ref 36–46)
HGB BLD-MCNC: 12.9 G/DL (ref 12–16)
HGB BLD-MCNC: 14.1 G/DL (ref 12–16)
IMM GRANULOCYTES # BLD AUTO: 0.02 X10*3/UL (ref 0–0.7)
IMM GRANULOCYTES NFR BLD AUTO: 0.2 % (ref 0–0.9)
LYMPHOCYTES # BLD AUTO: 2.39 X10*3/UL (ref 1.2–4.8)
LYMPHOCYTES NFR BLD AUTO: 26 %
MCH RBC QN AUTO: 29.9 PG (ref 26–34)
MCH RBC QN AUTO: 30.3 PG (ref 26–34)
MCHC RBC AUTO-ENTMCNC: 31.5 G/DL (ref 32–36)
MCHC RBC AUTO-ENTMCNC: 32.1 G/DL (ref 32–36)
MCV RBC AUTO: 94 FL (ref 80–100)
MCV RBC AUTO: 95 FL (ref 80–100)
MONOCYTES # BLD AUTO: 0.64 X10*3/UL (ref 0.1–1)
MONOCYTES NFR BLD AUTO: 7 %
NEUTROPHILS # BLD AUTO: 5.85 X10*3/UL (ref 1.2–7.7)
NEUTROPHILS NFR BLD AUTO: 63.6 %
NRBC BLD-RTO: 0 /100 WBCS (ref 0–0)
NRBC BLD-RTO: 0 /100 WBCS (ref 0–0)
PHOSPHATE SERPL-MCNC: 4.2 MG/DL (ref 2.5–4.9)
PLATELET # BLD AUTO: 237 X10*3/UL (ref 150–450)
PLATELET # BLD AUTO: 267 X10*3/UL (ref 150–450)
PMV BLD AUTO: 10.8 FL (ref 7.5–11.5)
PMV BLD AUTO: 10.9 FL (ref 7.5–11.5)
POTASSIUM SERPL-SCNC: 3.9 MMOL/L (ref 3.5–5.3)
RBC # BLD AUTO: 4.31 X10*6/UL (ref 4–5.2)
RBC # BLD AUTO: 4.65 X10*6/UL (ref 4–5.2)
SODIUM SERPL-SCNC: 137 MMOL/L (ref 136–145)
STAPH/MRSA SCREEN, CULTURE: NORMAL
WBC # BLD AUTO: 9.1 X10*3/UL (ref 4.4–11.3)
WBC # BLD AUTO: 9.2 X10*3/UL (ref 4.4–11.3)

## 2023-09-30 PROCEDURE — 85025 COMPLETE CBC W/AUTO DIFF WBC: CPT | Performed by: NURSE PRACTITIONER

## 2023-09-30 PROCEDURE — 82947 ASSAY GLUCOSE BLOOD QUANT: CPT | Mod: 91

## 2023-09-30 PROCEDURE — 2500000004 HC RX 250 GENERAL PHARMACY W/ HCPCS (ALT 636 FOR OP/ED): Performed by: INTERNAL MEDICINE

## 2023-09-30 PROCEDURE — 85018 HEMOGLOBIN: CPT

## 2023-09-30 PROCEDURE — 85027 COMPLETE CBC AUTOMATED: CPT | Performed by: INTERNAL MEDICINE

## 2023-09-30 PROCEDURE — 9990 CHARGE CONVERSION: Mod: 91

## 2023-09-30 PROCEDURE — 2500000002 HC RX 250 W HCPCS SELF ADMINISTERED DRUGS (ALT 637 FOR MEDICARE OP, ALT 636 FOR OP/ED): Performed by: INTERNAL MEDICINE

## 2023-09-30 PROCEDURE — 80069 RENAL FUNCTION PANEL: CPT

## 2023-09-30 PROCEDURE — 85027 COMPLETE CBC AUTOMATED: CPT

## 2023-09-30 PROCEDURE — 83050 HGB METHEMOGLOBIN QUAN: CPT

## 2023-09-30 PROCEDURE — 82947 ASSAY GLUCOSE BLOOD QUANT: CPT

## 2023-09-30 PROCEDURE — 82805 BLOOD GASES W/O2 SATURATION: CPT

## 2023-09-30 PROCEDURE — 80069 RENAL FUNCTION PANEL: CPT | Performed by: NURSE PRACTITIONER

## 2023-09-30 PROCEDURE — 1200000002 HC GENERAL ROOM WITH TELEMETRY DAILY

## 2023-09-30 PROCEDURE — 83735 ASSAY OF MAGNESIUM: CPT

## 2023-09-30 PROCEDURE — 36415 COLL VENOUS BLD VENIPUNCTURE: CPT

## 2023-09-30 PROCEDURE — 85520 HEPARIN ASSAY: CPT | Mod: 91

## 2023-09-30 PROCEDURE — S4991 NICOTINE PATCH NONLEGEND: HCPCS | Performed by: INTERNAL MEDICINE

## 2023-09-30 PROCEDURE — 36415 COLL VENOUS BLD VENIPUNCTURE: CPT | Performed by: INTERNAL MEDICINE

## 2023-09-30 PROCEDURE — 85730 THROMBOPLASTIN TIME PARTIAL: CPT

## 2023-09-30 PROCEDURE — 99233 SBSQ HOSP IP/OBS HIGH 50: CPT | Performed by: STUDENT IN AN ORGANIZED HEALTH CARE EDUCATION/TRAINING PROGRAM

## 2023-09-30 PROCEDURE — 2500000001 HC RX 250 WO HCPCS SELF ADMINISTERED DRUGS (ALT 637 FOR MEDICARE OP): Performed by: INTERNAL MEDICINE

## 2023-09-30 PROCEDURE — 82375 ASSAY CARBOXYHB QUANT: CPT

## 2023-09-30 PROCEDURE — 85610 PROTHROMBIN TIME: CPT | Mod: 91

## 2023-09-30 RX ORDER — INSULIN LISPRO 100 [IU]/ML
0-5 INJECTION, SOLUTION INTRAVENOUS; SUBCUTANEOUS
Status: DISCONTINUED | OUTPATIENT
Start: 2023-09-30 | End: 2023-10-07 | Stop reason: HOSPADM

## 2023-09-30 RX ORDER — ACETAMINOPHEN 500 MG
1 TABLET ORAL DAILY
Status: ON HOLD | COMMUNITY
End: 2023-10-13 | Stop reason: ALTCHOICE

## 2023-09-30 RX ORDER — GLUCOSAMINE HCL/CHONDROITIN SU 500-400 MG
1 CAPSULE ORAL DAILY
COMMUNITY

## 2023-09-30 RX ADMIN — LOSARTAN POTASSIUM 100 MG: 100 TABLET, FILM COATED ORAL at 09:07

## 2023-09-30 RX ADMIN — ROSUVASTATIN CALCIUM 40 MG: 40 TABLET, FILM COATED ORAL at 21:58

## 2023-09-30 RX ADMIN — FLUOXETINE HYDROCHLORIDE 40 MG: 40 CAPSULE ORAL at 09:07

## 2023-09-30 RX ADMIN — NICOTINE 7 MG/24 HR DAILY TRANSDERMAL PATCH 1 PATCH: at 21:56

## 2023-09-30 RX ADMIN — HEPARIN SODIUM 1000 UNITS/HR: 10000 INJECTION, SOLUTION INTRAVENOUS at 09:16

## 2023-09-30 RX ADMIN — METOPROLOL SUCCINATE 25 MG: 25 TABLET, EXTENDED RELEASE ORAL at 09:08

## 2023-09-30 RX ADMIN — PANTOPRAZOLE SODIUM 40 MG: 40 TABLET, DELAYED RELEASE ORAL at 09:06

## 2023-09-30 RX ADMIN — AMLODIPINE BESYLATE 5 MG: 5 TABLET ORAL at 09:07

## 2023-09-30 NOTE — H&P
History of Present Illness:   HPI:    SHANEL NAJERA is a 68 year old Female with PMH significant for aortic valve replacement in 2014 for bicuspid valve, HTN, DM, COPD, and brain aneurysm rupture  who presents as a direct admit from the Cath Lab after pre-operative LHC was aborted. Plan was for a radial approach however physician unable to pass guide wire, unable to attempt femoral approach  secondary to INR 2.7. Pt is anticoagulated on coumadin for mechanical aortic valve and is being worked up by Dr. Hayden for ascending aortic aneurysm and possible repeat valve replacement. She was admitted  to allow INR to drift down so we can initiate heparin bridge and perform femoral approach LHC. She denies chest pain, SOB, palpitations, syncope.     Quit smoking 1 month ago  Denies alcohol use   Smokes marijuana 2x/week     Confirms full code on admission    Emergency contact Veronica Najera (daughter) [phone]    Comorbidities:   Comorbidites:  ·  Comorbid Conditions chronic obstructive pulmonary disease, diabetes, hypertension   ·  COPD unknown   ·  Diabetes Type Type 2   ·  Insulin Dependent no   ·  DM Acuity or Status unknown   ·  DM Complications unknown     Social History:   Social History:  Smoking Status former smoker (1)              Allergies:  ·  penicillin : Unknown  ·  acetaZOLAMIDE : Unknown  ·  cefuroxime : Unknown  ·  ibuprofen : Unknown  ·  ceftizoxime : Hives/Urticaria  ·  erythromycin : Other  ·  sulfa  drugs: Hives/Urticaria    Medications Prior to Admission:     metoprolol succinate 25 mg oral tablet, extended release: 1 tab(s) orally once a day  amLODIPine 5 mg oral tablet: 1 tab(s) orally once a day  losartan 100 mg oral tablet: 1 tab(s) orally once a day  rosuvastatin 40 mg oral tablet: 1 tab(s) orally once a day  Trelegy Ellipta 100 mcg-62.5 mcg-25 mcg/inh inhalation powder: 1 puff(s) inhaled once a day, As Needed  metFORMIN 500 mg oral tablet, extended release: 1 tab(s) orally 2 times a  day  Gemtesa 75 mg oral tablet: 1 tab(s) orally once a day  acetaminophen 500 mg oral tablet: 1 tab(s) orally every 6 hours, As Needed pain/headache  warfarin 3 mg oral tablet: 1 tab(s) orally once a day on monday, wednesday, and saturday. 1.5 tablets (4.5 mg) all other days  FLUoxetine 40 mg oral capsule: 1 cap(s) orally once a day  cetirizine 10 mg oral tablet: 1 tab(s) orally once a day.    Review of Systems:   Constitutional: NEGATIVE: Fever, Chills, Anorexia,  Weight Loss, Malaise     Eyes: NEGATIVE: Blurry Vision, Drainage, Diploplia,  Redness, Vision Loss/ Change     ENMT: NEGATIVE: Nasal Discharge, Nasal Congestion,  Ear Pain, Mouth Pain, Throat Pain     Respiratory: NEGATIVE: Dry Cough, Productive Cough,  Hemoptysis, Wheezing, Shortness of Breath     Cardiac: NEGATIVE: Chest Pain, Dyspnea on Exertion,  Orthopnea, Palpitations, Syncope     Gastrointestinal: NEGATIVE: Nausea, Vomiting, Diarrhea,  Constipation, Abdominal Pain     Genitourinary: NEGATIVE: Discharge, Dysuria, Flank  Pain, Frequency, Hematuria     Musculoskeletal: NEGATIVE: Decreased ROM, Pain, Swelling,  Stiffness, Weakness     Neurological: NEGATIVE: Dizziness, Confusion, Headache,  Seizures, Syncope     Psychiatric: NEGATIVE: Mood Changes, Anxiety, Hallucinations,  Sleep Changes, Suicidal Ideas     Skin: NEGATIVE: Mass, Pain, Pruritus, Rash, Ulcer     Endocrine: NEGATIVE: Heat Intolerance, Cold Intolerance,  Sweat, Polyuria, Thirst     Hematologic/Lymph: NEGATIVE: Anemia, Bruising, Easy  Bleeding, Night Sweats, Petechiae     Allergic/Immunologic: NEGATIVE: Anaphylaxis, Itchy/  Teary Eyes, Itching, Sneezing, Swelling     Breast: NEGATIVE: Pain, Mass, Discharge, Nipple Itching,  Gynecomastia       Objective:     Objective Information:        T   P  R  BP   MAP  SpO2   Value  36.8  64  18  146/86      94%  Date/Time 9/28 12:21 9/28 12:21 9/28 12:21 9/28 12:21    9/28 12:21  Range  (36.8C - 36.8C )  (64 - 64 )  (18 - 18 )  (146 - 146 )/ (86 - 86  )    (94% - 94% )         Weights   9/28 12:25: Weight in kg (Weight (kg))  77.3  9/28 12:25: Weight in lbs ((lbs))  170.4  9/28 12:25: BMI (kg/m2) (BMI (kg/m2))  31.201    Physical Exam by System:    Constitutional: laying in bed, NAD   Eyes: clear sclera   Head/Neck: unable to appreciate JVD   Respiratory/Thorax: CTA   Cardiovascular: S1S2   Gastrointestinal: soft, NT   Musculoskeletal: LOZADA x4   Extremities: no LE edema   Neurological: A/Ox3, no focal deficits   Psychological: Appropriate mood and behavior   Skin: warm and dry     Medications:    Medications:          Continuous Medications       --------------------------------  No continuous medications are active       Scheduled Medications       --------------------------------    1. amLODIPine (NORVASC):  5  mg  Oral  Daily    2. FLUoxetine:  40  mg  Oral  Daily    3. Losartan:  100  mg  Oral  Daily    4. Metoprolol Succinate Extended Release:  25  mg  Oral  Daily    5. Nicotine 7 mg/ 24 hour TransDermal:  1  patch  TransDermal  Every 24 Hours    6. Rosuvastatin:  40  mg  Oral  Daily         PRN Medications       --------------------------------    1. Acetaminophen:  650  mg  Oral  Every 6 Hours    2. Sodium Chloride 0.9% Injectable Flush:  10  mL  IntraVenous Flush  Every 8 Hours and as Needed        Recent Lab Results:    Results:        I have reviewed these laboratory results:    Complete Blood Count  28-Sep-2023 08:44:00      Result Value    White Blood Cell Count  10.1    Nucleated Erythrocyte Count  0.0    Red Blood Cell Count  4.77    HGB  13.8    HCT  44.4    MCV  93    MCHC  31.1   L   PLT  267    RDW-CV  14.5      Basic Metabolic Panel  28-Sep-2023 08:44:00      Result Value    Glucose, Serum  183   H   NA  139    K  4.1    CL  106    Bicarbonate, Serum  23    Anion Gap, Serum  14    BUN  16    CREAT  0.78    GFR Female  83    Calcium, Serum  9.1      Glucose_POCT  28-Sep-2023 08:40:00      Result Value    Glucose-POCT  191   H       Radiology  Results:    Results:        Conclusion:  CONCLUSIONS:  1. Unsuccessful attempt at left heart catheterization from a right radial arterial approach.    ____________________________________________________________________________________  CPT Codes:  Left Heart Cath (visualization of coronaries) and LV-93543; Moderate Sedation Services initial 15 minutes patient >5 years-89550; Moderate Sedation Services 1st additional 15 minutes patient >5 years-53160    ICD 10 Codes:  I25.10-Atherosclerotic heart disease of native coronary artery without angina pectoris; I71.20-Thoracic aortic aneurysm, without rupture, unspecified    [id number] Piyush Jordan MD  Performing Physician  Electronically signed by [id number] Piyush Jordan MD on 9/28/2023 at 11:18:41 AM      cc Report to: Yoseph Mcadams MD    cc Report to: [id number] Gian Lr MD    cc Report to: [id number] Christopher Hayden MD          *** Final ***     Cardiac Catheterization Lab Procedures [Sep 28 2023 11:18AM]      Assessment and Plan:   Assessment:    SHANEL ESCOBEDO is a 68 year old Female with PMH significant for aortic valve replacement in 2014 for bicuspid valve, HTN, DM, COPD, and brain aneurysm rupture  who presents as a direct admit from the Cath Lab after pre-operative LHC was aborted. Plan was for a radial approach however physician unable to pass guide wire, unable to attempt femoral approach secondary to INR 2.7. Pt is anticoagulated on coumadin  for mechanical aortic valve and is being worked up by Dr. Hayden for ascending aortic aneurysm and possible repeat valve replacement. She was admitted to allow INR to drift down so we can initiate heparin bridge and perform femoral approach LHC.    Mechanical aortic valve  Ascending aortic aneurysm   - hx of bicuspid valve, s/p AVR 2014   - holding home coumadin, goal INR 2.5-3.5   - start heparin gtt when INR <2.5   - CT chest 3/2023 measured aneurysm at 5.3 x 5.2cm   - OP w/u with Catracho for possible aneurysm repair and  valve replacement   - LHC pending, need INR <2 for femoral approach   - PFTs, TSH, MRSA screen, carotids ordered as pre-op  - CTS and Dr. Hayden notified of admission, not a formal consult but update after LHC     HTN  - admit /86  - cont home amlodipine, losartan     DM  - HgbA1c pending   - hold home metformin   - accuchecks, SSI, hypoglycemia protocol  - diabetic diet     COPD  - no home 02, uses Trilogy PRN   - albuterol PRN while in hospital     Dispo  Pending INR <2.0 for femoral approach LHC       To be discussed with Dr. Lr     Attestation:   Note Completion:  Provider/Team Pager # [phone]   I am a:  Advanced Practice Provider   Attending Only - Shared Visit with Advanced Practice Provider This is a shared visit.  I have reviewed the Advanced Practice Provider?s encounter note, approve the Advanced Practice Provider?s documentation,  and provide the following additional information from my personal encounter.    Comments/ Additional Findings    Patient seen and examined. Agree with the assessment and plan as stated.   67yo F direct admission from the cath lab, was to have a routine LHC via radial approach (as prior mAVR for bicuspid aortic valve and INR >2) ahead of planned aortic surgery, however, could not engage beyond subclavian. Hold coumadin and commence heparin  when INR<2.5. For attempt at femoral-approach LHC when INR satisfactory. Patient has no concerning symptoms. On examination she is euvolemic, crisp 2nd heart sound/nil added, warm/well perfused, stable hemodynamics. Right radial access site is clear.               Electronic Signatures:  Karissa Vera (APRN-CNP)  (Signed 28-Sep-2023 16:29)   Authored: History of Present Illness, Comorbidities,  Social History, Allergies, Medications Prior to Admission, Review of Systems, Objective, Assessment and Plan, Note Completion  Gian Lr)  (Signed 29-Sep-2023 15:24)   Authored: Note Completion   Co-Signer: History of Present Illness,  "Comorbidities, Social History, Allergies, Medications Prior to Admission, Review of Systems, Objective,  Assessment and Plan, Note Completion      Last Updated: 29-Sep-2023 15:24 by Gian Lr)    References:  1.  Data Referenced From \"Patient Profile - Adult v2\" 28-Sep-2023 12:25   "

## 2023-09-30 NOTE — PROGRESS NOTES
Pharmacy Medication History Review    Suze Najera is a 68 y.o. female admitted for No Principal Problem: There is no principal problem currently on the Problem List. Please update the Problem List and refresh.. Pharmacy reviewed the patient's [id number] medications and allergies for accuracy.    The list below reflectives the updated PTA list. Please review each medication in order reconciliation for additional clarification and justification.  Medications Prior to Admission   Medication Sig Dispense Refill Last Dose    acetaminophen (Tylenol) 325 mg tablet Take 1-2 tablets (325-650 mg) by mouth every 4 hours if needed.   Past Week    amLODIPine (Norvasc) 5 mg tablet Take 1 tablet (5 mg) by mouth once daily. 90 tablet 1 Past Week    FLUoxetine (PROzac) 40 mg capsule Take 1 capsule (40 mg) by mouth once daily. 90 capsule 1 Past Week    fluticasone-umeclidin-vilanter (TRELEGY-ELLIPTA) 100-62.5-25 mcg blister with device Inhale 1 puff once daily.   Past Week    glipiZIDE (Glucotrol) 5 mg tablet Take 1 tablet (5 mg) by mouth once daily. (Patient not taking: Reported on 9/30/2023) 90 tablet 3 Unknown    glucosamine sulfate 1,000 mg tablet Take 1 tablet by mouth once daily.   More than a month    losartan (Cozaar) 100 mg tablet Take 1 tablet (100 mg) by mouth once daily. 90 tablet 0 Past Week    metFORMIN XR (Glucophage-XR) 500 mg 24 hr tablet Take 1 tablet (500 mg) by mouth 2 times a day. 180 tablet 3 Past Week    metoprolol succinate XL (Toprol-XL) 25 mg 24 hr tablet Take 1 tablet (25 mg) by mouth once daily. 90 tablet 1 Past Week    omega-3 (Fish Oil) 300-1,000 mg capsule Take 1 capsule (1,000 mg) by mouth once daily.   More than a month    rosuvastatin (Crestor) 40 mg tablet Take 1 tablet (40 mg) by mouth once daily. 90 tablet 1 Past Week    vibegron (Gemtesa) 75 mg tablet Take 1 tablet (75 mg) by mouth once daily.   Past Week    warfarin (Coumadin) 3 mg tablet TAKE 1.5 TABLETS BY MOUTH DAILY AS DIRECTED  90 tablet 1 Past Week        The list below reflectives the updated allergy list. Please review each documented allergy for additional clarification and justification.  Allergies  Reviewed by Lucretia Martinez, SammiD on 9/29/2023        Severity Reactions Comments    Acetazolamide Not Specified Unknown     Ceftizoxime Not Specified Hives     Cefuroxime Not Specified Hives     Erythromycin Not Specified Other Vomiting     Ibuprofen Not Specified Hives     Penicillins Not Specified Hives     Sulfa (sulfonamide Antibiotics) Not Specified Hives         The patient was a great historian who knew medications and dosages . The sure scripts and oarrs used also. The clinic pharmacy note from 9/28 also used    Below are additional concerns with the patient's PTA list.      Donovan Mcrae [facility]

## 2023-09-30 NOTE — PROGRESS NOTES
Service: Cardiology     Subjective Data:   SHANEL ESCOBEDO is a 68 year old Female who is Hospital Day # 2.    Additional Information:    - INR decreased to 1.8 this am  - started heparin gtt bridging for AVR  - NPO this afternoon in case can be added on for cardiac cath  - PFTs completed in case CTS srurgery    Objective Data:     Objective Information:      T   P  R  BP   MAP  SpO2   Value  36.6  58  12  130/73   92  95%  Date/Time 9/29 15:06 9/29 15:06 9/29 15:06 9/29 15:06  9/29 15:06 9/29 15:06  Range  (36.3C - 36.8C )  (58 - 68 )  (12 - 18 )  (130 - 151 )/ (73 - 95 )  (92 - 110 )  (93% - 96% )      Pain reported at 9/29 9:15: 1 = Mild    ---- Intake and Output  -----  Mn/Dy/Year Time  Intake   Output  Net  Sep 29, 2023 2:00 pm  270   400  -130           Weights   9/29 5:52: Weight in kg (Weight (kg))  81.3  9/29 5:52: Weight in lbs ((lbs))  179.2  9/28 12:25: BMI (kg/m2) (BMI (kg/m2))  31.201    Physical Exam by System:    Constitutional: pt laying in bed in no apparent distress   Eyes: sclera non icteric   ENMT: tongue moist, no oral lesions seen   Head/Neck: no evidence of JVD   Respiratory/Thorax: respiration even, unlabored on  room air;  lungs CTA   Cardiovascular: RRR S1S2   Gastrointestinal: ND. NT + bs   Extremities: no edema of lower ext; extremities are  warm to touch with 2+ radial  and pedal pulses;  right wrist without ecchymosis or hematoma   Neurological: speech clear, A & O x3   Psychological: pleasant and cooperative   Skin: warm and dry, visible skin intact     Medication:    Medications:          Continuous Medications       --------------------------------    1. Heparin 25,000 units/ D5W 250 mL Infusion:  1000  units/hr  IntraVenous  <Continuous>         Scheduled Medications       --------------------------------    1. amLODIPine (NORVASC):  5  mg  Oral  Daily    2. FLUoxetine:  40  mg  Oral  Daily    3. Heparin (Repeat Bolus) Injectable:  4000  unit(s)  IntraVenous Push  Every 4  Hours    4. Insulin Lispro Mild Corrective Scale:  unit(s)  SubCutaneous  3 Times a Day Before Meals    5. Losartan:  100  mg  Oral  Daily    6. Metoprolol Succinate Extended Release:  25  mg  Oral  Daily    7. Nicotine 7 mg/ 24 hour TransDermal:  1  patch  TransDermal  Every 24 Hours    8. Rosuvastatin:  40  mg  Oral  Daily         PRN Medications       --------------------------------    1. Acetaminophen:  650  mg  Oral  Every 6 Hours    2. Albuterol 90 micrograms/ Inhalation MDI:  2  inhalation  Inhalation  Every 6 Hours    3. Dextrose 50% in Water Injectable:  25  gram(s)  IntraVenous Push  Every 15 Minutes    4. Glucagon Injectable:  1  mg  IntraMuscular  Every 15 Minutes    5. Sodium Chloride 0.9% Injectable Flush:  10  mL  IntraVenous Flush  Every 8 Hours and as Needed        Recent Lab Results:    Results:    CBC: 9/28/2023 17:32              \     Hgb     /                              \     13.0       /  WBC  ----------------  Plt               11.0       ----------------    234              /     Hct     \                              /     41.9       \            RBC: 4.36     MCV: 96           RFP: 9/29/2023 07:56  NA+        Cl-     BUN  /                         138    105    16  /  --------------------------------  Glucose                ---------------------------  164 H    K+     HCO3-   Creat \                         4.4    25    0.82  \  Calcium : 9.1Anion Gap : 12          Albumin : 3.8     Phos : 3.3      Coagulation: 9/29/2023 07:56  PT  /                    20.7 H /  -------<    INR          ----------<      1.8 H  PTT\                    36  \                       ---------- Recent Arterial Blood Gas Results----------     9/29/2023 11:48  pO2 99  pH 7.42  pCO2 36  SO2 99  Base Excess -0.7null      I have reviewed these laboratory results:    TSH with Reflex to Free T4 if Abnormal  28-Sep-2023 17:32:00      Result Value    Thyroid Stimulating Hormone, Serum  2.21      Hemoglobin  A1C, Level  28-Sep-2023 17:32:00      Result Value    Estimated Average Glucose  192    Hemoglobin A1C, Level  8.3 Diagnosis of Diabetes-Adults   Non-Diabetic: < or = 5.6%   Increased risk for developing diabetes: 5.7-6.4%   Diagnostic of diabetes: > or = 6.5%  .       Monitoring of Diabetes                Age (y)     Therapeutic Goal (%)   Adults:          >1   A         I have reviewed these laboratory results:    Glucose_POCT  Trending View      Result 29-Sep-2023 14:10:00  29-Sep-2023 08:35:00    Glucose-POCT 149   H   211   H          Assessment and Plan:   Code Status:  ·  Code Status Full Code     Assessment:    SHANEL ESCOBEDO is a 68 year old Female with PMH significant for aortic valve replacement in 2014 for bicuspid valve, HTN, DM, COPD, and brain aneurysm rupture  who presents as a direct admit from the Cath Lab after pre-operative LHC was aborted. Plan was for a radial approach however physician unable to pass guide wire, unable to attempt femoral approach secondary to INR 2.7. Pt is anticoagulated on coumadin  for mechanical aortic valve and is being worked up by Dr. Hayden for ascending aortic aneurysm and possible repeat valve replacement. She was admitted to allow INR to drift down so we can initiate heparin bridge and perform femoral approach LHC.    Mechanical aortic valve  Ascending aortic aneurysm   - hx of bicuspid valve, s/p AVR 2014   - holding home coumadin, goal INR 2.5-3.5   - start heparin gtt when INR <2.5   - yesterday evening INR was 2.5; today 1.8  - heparin gtt started  - CT chest 3/2023 measured aneurysm at 5.3 x 5.2cm   - OP w/u with Catracho for possible aneurysm repair and valve replacement   - [facility] pending, need INR <2 for femoral approach  - Pt is NPO; cardiology fellow in cath lab notified of INR and NPO in case time for [facility] today  - PFTs done, pending; TSH =2.21; YAAV6C=9.3  - CTS and Dr. Hayden notified of admission, not a formal consult but update after C     HTN  - admit BP  146/86  - SBP range = 134-141  - cont home amlodipine, losartan, metop     DM  - HgbA1c 8.3  - hold home metformin   - accuchecks, SSI, hypoglycemia protocol  - cont sliding scale insulin  - diabetic diet     COPD  - no home 02, uses Trilogy PRN   - albuterol PRN while in hospital     Cognitive decline  hx SAH/ aneurysm rupture    Dispo  will need Pt/OT eval  pt lives with daughter who help make sure pt takes meds    Daughter Veronica updated       pt seen and examined and plan d/w Dr. Lr     Attestation:   Note Completion:  Provider/Team Pager # [phone]   I am a:  Advanced Practice Provider   Attending Only - Shared Visit with Advanced Practice Provider This is a shared visit.  I have reviewed the Advanced Practice Provider?s encounter note, approve the Advanced Practice Provider?s documentation,  and provide the following additional information from my personal encounter.    Comments/ Additional Findings    Patient seen and examined. Agree with the assessment and plan as stated.   INR 1.8, commenced heparin gtt for mAVR.   Clinically euvolemic, R radial access site satisfactory.   For [facility] with femoral access.           Electronic Signatures:  Preeti Sood (APRN-CNP)  (Signed 29-Sep-2023 15:54)   Authored: Service, Subjective Data, Objective Data, Assessment  and Plan, Note Completion  Gian Lr)  (Signed 29-Sep-2023 23:04)   Authored: Note Completion   Co-Signer: Service, Subjective Data, Objective Data, Assessment and Plan, Note Completion      Last Updated: 29-Sep-2023 23:04 by Gian Lr)

## 2023-09-30 NOTE — PROGRESS NOTES
Subjective Data:  No complaints though per daughter pt is unhappy and missed breakfast    Overnight Events:      Uneventful night     Objective Data:  Last Recorded Vitals:  Vitals:    09/30/23 0515 09/30/23 0744 09/30/23 0858 09/30/23 1115   BP: 131/69 164/89 118/68 140/83   Pulse: 70 63  67   Resp: 18 18  18   Temp: 36.6 °C (97.9 °F) 36.7 °C (98.1 °F)  36.5 °C (97.7 °F)   SpO2: 95% 93%  95%   Weight:       Height:           Last Labs:  CBC - 9/30/2023:  8:58 AM  9.1 14.1 267    43.9      CMP - 9/29/2023:  7:56 AM  9.1 7.1 32 --- 0.4   3.3 3.8 38 102      PTT - 9/29/2023:  7:56 AM  1.6   17.9 36     HGBA1C   Date/Time Value Ref Range Status   09/28/2023 05:32 PM 8.3 % Final     Comment:          Diagnosis of Diabetes-Adults   Non-Diabetic: < or = 5.6%   Increased risk for developing diabetes: 5.7-6.4%   Diagnostic of diabetes: > or = 6.5%  .       Monitoring of Diabetes                Age (y)     Therapeutic Goal (%)   Adults:          >18           <7.0   Pediatrics:    13-18           <7.5                   7-12           <8.0                   0- 6            7.5-8.5   American Diabetes Association. Diabetes Care 33(S1), Jan 2010.     07/27/2023 02:31 PM 8.9 % Final     Comment:          Diagnosis of Diabetes-Adults   Non-Diabetic: < or = 5.6%   Increased risk for developing diabetes: 5.7-6.4%   Diagnostic of diabetes: > or = 6.5%  .       Monitoring of Diabetes                Age (y)     Therapeutic Goal (%)   Adults:          >18           <7.0   Pediatrics:    13-18           <7.5                   7-12           <8.0                   0- 6            7.5-8.5   American Diabetes Association. Diabetes Care 33(S1), Jan 2010.       VLDL   Date/Time Value Ref Range Status   10/28/2022 09:19 AM 43 0 - 40 mg/dL Final   12/29/2021 01:41 PM SEE COMMENT 0 - 40 mg/dL Final     Comment:       Unable to calculate VLDL.   08/14/2020 11:29 AM 39 0 - 40 mg/dL Final      Last I/O:  No intake/output data recorded.    Past  "Cardiology Tests (Last 3 Years):  EKG:  No results found for this or any previous visit from the past 1095 days.    Echo:  No results found for this or any previous visit from the past 1095 days.    Ejection Fractions:  No results found for: \"EF\"  Cath:  No results found for this or any previous visit from the past 1095 days.    Stress Test:  Nuclear Stress Test 3/7/2023    Cardiac Imaging:  No results found for this or any previous visit from the past 1095 days.      Inpatient Medications:  Scheduled medications   Medication Dose Route Frequency    amLODIPine  5 mg oral Daily    flu vacc [id number] (65yr up)-PF  0.7 mL intramuscular Once    FLUoxetine  40 mg oral Daily    losartan  100 mg oral Daily    metoprolol succinate XL  25 mg oral Daily    nicotine  1 patch transdermal Daily    pantoprazole  40 mg oral Daily before breakfast    rosuvastatin  40 mg oral Daily     PRN medications   Medication    acetaminophen    albuterol    dextrose    glucagon    heparin    oxyCODONE    oxyCODONE     Continuous Medications   Medication Dose Last Rate    heparin  0-4,000 Units/hr 1,000 Units/hr (09/30/23 0916)       Physical Exam:  Gen: no apparent distress  Neuro: A&O but some cognitive issues per conversation, no focal deficits  Skin: warm and dry, visible skin intact  Lungs respirations even unlabored on room air, lungs CTA  Cardiac: RRR S1S2, no murmur or gallop noted  Abd: soft, NT, ND, + BS  Ext: 2+ radial amd pedal pulses, warm to touch       Assessment/Plan     SHANEL ESCOBEDO is a 68 year old Female with PMH significant for aortic valve replacement in 2014 for bicuspid valve, HTN, DM, COPD, and brain aneurysm rupture who presents as a direct admit from the Cath Lab after pre-operative LHC was aborted. Plan was for a radial approach however physician unable to pass guide wire, unable to attempt femoral approach secondary to INR 2.7. Pt is anticoagulated on coumadin for mechanical aortic valve and is being worked up " by Dr. Hayden for ascending aortic aneurysm and possible repeat valve replacement. She was admitted to allow INR to drift down so we can initiate heparin bridge and perform femoral approach LHC.    Mechanical aortic valve  Ascending aortic aneurysm   - hx of bicuspid valve, s/p AVR 2014   - holding home coumadin, goal INR 2.5-3.5   - start heparin gtt when INR <2.5   - yesterday evening INR was 2.5; yesterday 1.8 so heparin gtt started  - CT chest 3/2023 measured aneurysm at 5.3 x 5.2cm   - OP w/u with Catracho for possible aneurysm repair and valve replacement   - LHC pending, need INR <2 for femoral approach  - LHC was unable to be done yesterday so plan for monday  - PFTs done, pending; TSH =2.21; ZURA3T=9.3  - CTS and Dr. Hayden notified of admission, not a formal consult but update after LHC     HTN  - admit /86  - SBP range = 118-164   - cont home amlodipine, losartan, metop     DM  - HgbA1c 8.3  - hold home metformin   - accuchecks, SSI, hypoglycemia protocol  - cont sliding scale insulin  - diabetic diet     COPD  - no home 02, uses Trilogy PRN   - albuterol PRN while in hospital     Cognitive decline  hx SAH/ aneurysm rupture  - no current issues    Dispo  will need Pt/OT eval  pt lives with daughter who help make sure pt takes meds    Daughter Veronica updated       pt seen and examined and plan d/w Dr. Sanchez  Peripheral IV 09/27/23 20 G Right Antecubital (Active)   Site Assessment Clean 09/30/23 1305   Dressing Status Clean 09/30/23 1305   Number of days: 3       Peripheral IV 09/29/23 22 G Right;Posterior Hand (Active)   Site Assessment Clean 09/30/23 1306   Dressing Status Clean 09/30/23 1306   Number of days: 1       Code Status:  Full code    I spent 35 minutes in the professional and overall care of this patient.        Preeti Sood, APRN-CNP

## 2023-09-30 NOTE — H&P
History & Physical Reviewed:   I have reviewed the History and Physical dated:  06-Sep-2023   History and Physical reviewed and relevant findings noted. Patient examined to review pertinent physical  findings.: No significant changes   Home Medications Reviewed: no changes noted   Allergies Reviewed: no changes noted       Airway/Sedation Assessment:  ·  Emotional Status calm   ·  Neurologic alert & oriented x 3   ·  Respiratory clear to auscultation   ·  Cardiovascular rhythm & rate regular  mechanical valve click heard   ·  GI/ soft, nontender     · Pulses present: Pedal Left, Pedal Right, Radial Left, Radial Right     ·  Mouth Opening OK yes   ·  Neck Flexibility OK yes   ·  Loose Teeth no   ·  Oropharyngeal Classification Class II   ·  ASA PS Classification ASA II   ·  Sedation Plan moderate sedation       ERAS (Enhanced Recovery After Surgery):  ·  ERAS Patient: no     Consent:   COVID-19 Consent:  ·  COVID-19 Risk Consent Surgeon has reviewed key risks related to the risk of edward COVID-19 and if they contract COVID-19 what the risks are.     Coronavirus Attestation of Need for Surgery:  ·  COVID-19 Surgery / Procedure Attestation: Select all criteria that apply Medically necessary with no anticipated overnight stay       Electronic Signatures:  Jayshree Bales (PAC)  (Signed 28-Sep-2023 09:05)   Authored: History & Physical Reviewed, Airway/Sedation,  ERAS, Consent, Note Completion      Last Updated: 28-Sep-2023 09:05 by Jayshree Bales (PAC)

## 2023-10-01 LAB
ALBUMIN SERPL BCP-MCNC: 4 G/DL (ref 3.4–5)
ANION GAP SERPL CALC-SCNC: 12 MMOL/L (ref 10–20)
BUN SERPL-MCNC: 18 MG/DL (ref 6–23)
CALCIUM SERPL-MCNC: 9.1 MG/DL (ref 8.6–10.6)
CHLORIDE SERPL-SCNC: 107 MMOL/L (ref 98–107)
CO2 SERPL-SCNC: 22 MMOL/L (ref 21–32)
CREAT SERPL-MCNC: 0.96 MG/DL (ref 0.5–1.05)
ERYTHROCYTE [DISTWIDTH] IN BLOOD BY AUTOMATED COUNT: 14.3 % (ref 11.5–14.5)
GFR SERPL CREATININE-BSD FRML MDRD: 65 ML/MIN/1.73M*2
GLUCOSE BLD MANUAL STRIP-MCNC: 178 MG/DL (ref 74–99)
GLUCOSE BLD MANUAL STRIP-MCNC: 178 MG/DL (ref 74–99)
GLUCOSE BLD MANUAL STRIP-MCNC: 189 MG/DL (ref 74–99)
GLUCOSE BLD MANUAL STRIP-MCNC: 217 MG/DL (ref 74–99)
GLUCOSE SERPL-MCNC: 187 MG/DL (ref 74–99)
HCT VFR BLD AUTO: 44.4 % (ref 36–46)
HGB BLD-MCNC: 13.4 G/DL (ref 12–16)
MCH RBC QN AUTO: 28.9 PG (ref 26–34)
MCHC RBC AUTO-ENTMCNC: 30.2 G/DL (ref 32–36)
MCV RBC AUTO: 96 FL (ref 80–100)
NRBC BLD-RTO: 0 /100 WBCS (ref 0–0)
PHOSPHATE SERPL-MCNC: 3.4 MG/DL (ref 2.5–4.9)
PLATELET # BLD AUTO: 277 X10*3/UL (ref 150–450)
PMV BLD AUTO: 10.5 FL (ref 7.5–11.5)
POTASSIUM SERPL-SCNC: 4.1 MMOL/L (ref 3.5–5.3)
RBC # BLD AUTO: 4.64 X10*6/UL (ref 4–5.2)
SODIUM SERPL-SCNC: 137 MMOL/L (ref 136–145)
UFH PPP CHRO-ACNC: 0.5 IU/ML
UFH PPP CHRO-ACNC: 0.6 IU/ML
UFH PPP CHRO-ACNC: 0.6 IU/ML
UFH PPP CHRO-ACNC: 0.9 IU/ML
WBC # BLD AUTO: 10.3 X10*3/UL (ref 4.4–11.3)

## 2023-10-01 PROCEDURE — 2500000001 HC RX 250 WO HCPCS SELF ADMINISTERED DRUGS (ALT 637 FOR MEDICARE OP): Performed by: INTERNAL MEDICINE

## 2023-10-01 PROCEDURE — 85520 HEPARIN ASSAY: CPT | Performed by: STUDENT IN AN ORGANIZED HEALTH CARE EDUCATION/TRAINING PROGRAM

## 2023-10-01 PROCEDURE — 36415 COLL VENOUS BLD VENIPUNCTURE: CPT | Performed by: INTERNAL MEDICINE

## 2023-10-01 PROCEDURE — 85027 COMPLETE CBC AUTOMATED: CPT | Performed by: NURSE PRACTITIONER

## 2023-10-01 PROCEDURE — 2500000004 HC RX 250 GENERAL PHARMACY W/ HCPCS (ALT 636 FOR OP/ED): Performed by: INTERNAL MEDICINE

## 2023-10-01 PROCEDURE — S4991 NICOTINE PATCH NONLEGEND: HCPCS | Performed by: INTERNAL MEDICINE

## 2023-10-01 PROCEDURE — 85520 HEPARIN ASSAY: CPT | Performed by: INTERNAL MEDICINE

## 2023-10-01 PROCEDURE — 82947 ASSAY GLUCOSE BLOOD QUANT: CPT

## 2023-10-01 PROCEDURE — 1200000002 HC GENERAL ROOM WITH TELEMETRY DAILY

## 2023-10-01 PROCEDURE — 80069 RENAL FUNCTION PANEL: CPT | Performed by: NURSE PRACTITIONER

## 2023-10-01 PROCEDURE — 2500000002 HC RX 250 W HCPCS SELF ADMINISTERED DRUGS (ALT 637 FOR MEDICARE OP, ALT 636 FOR OP/ED): Performed by: INTERNAL MEDICINE

## 2023-10-01 PROCEDURE — 2500000002 HC RX 250 W HCPCS SELF ADMINISTERED DRUGS (ALT 637 FOR MEDICARE OP, ALT 636 FOR OP/ED): Performed by: STUDENT IN AN ORGANIZED HEALTH CARE EDUCATION/TRAINING PROGRAM

## 2023-10-01 PROCEDURE — 99233 SBSQ HOSP IP/OBS HIGH 50: CPT | Performed by: STUDENT IN AN ORGANIZED HEALTH CARE EDUCATION/TRAINING PROGRAM

## 2023-10-01 RX ADMIN — METOPROLOL SUCCINATE 25 MG: 25 TABLET, EXTENDED RELEASE ORAL at 09:18

## 2023-10-01 RX ADMIN — INSULIN LISPRO 2 UNITS: 100 INJECTION, SOLUTION INTRAVENOUS; SUBCUTANEOUS at 22:40

## 2023-10-01 RX ADMIN — INSULIN LISPRO 1 UNITS: 100 INJECTION, SOLUTION INTRAVENOUS; SUBCUTANEOUS at 10:37

## 2023-10-01 RX ADMIN — HEPARIN SODIUM 800 UNITS/HR: 10000 INJECTION, SOLUTION INTRAVENOUS at 10:53

## 2023-10-01 RX ADMIN — INSULIN LISPRO 1 UNITS: 100 INJECTION, SOLUTION INTRAVENOUS; SUBCUTANEOUS at 18:12

## 2023-10-01 RX ADMIN — INSULIN LISPRO 1 UNITS: 100 INJECTION, SOLUTION INTRAVENOUS; SUBCUTANEOUS at 12:42

## 2023-10-01 RX ADMIN — PANTOPRAZOLE SODIUM 40 MG: 40 TABLET, DELAYED RELEASE ORAL at 09:17

## 2023-10-01 RX ADMIN — FLUOXETINE HYDROCHLORIDE 40 MG: 40 CAPSULE ORAL at 09:18

## 2023-10-01 RX ADMIN — NICOTINE 7 MG/24 HR DAILY TRANSDERMAL PATCH 1 PATCH: at 22:47

## 2023-10-01 RX ADMIN — AMLODIPINE BESYLATE 5 MG: 5 TABLET ORAL at 09:19

## 2023-10-01 RX ADMIN — ACETAMINOPHEN 650 MG: 325 TABLET, FILM COATED ORAL at 22:51

## 2023-10-01 RX ADMIN — LOSARTAN POTASSIUM 100 MG: 100 TABLET, FILM COATED ORAL at 09:18

## 2023-10-01 ASSESSMENT — PAIN DESCRIPTION - DESCRIPTORS: DESCRIPTORS: HEADACHE

## 2023-10-01 ASSESSMENT — COGNITIVE AND FUNCTIONAL STATUS - GENERAL
DAILY ACTIVITIY SCORE: 24
MOBILITY SCORE: 24

## 2023-10-01 ASSESSMENT — PAIN - FUNCTIONAL ASSESSMENT
PAIN_FUNCTIONAL_ASSESSMENT: 0-10
PAIN_FUNCTIONAL_ASSESSMENT: 0-10

## 2023-10-01 ASSESSMENT — PAIN SCALES - GENERAL
PAINLEVEL_OUTOF10: 0 - NO PAIN
PAINLEVEL_OUTOF10: 5 - MODERATE PAIN
PAINLEVEL_OUTOF10: 0 - NO PAIN
PAINLEVEL_OUTOF10: 0 - NO PAIN

## 2023-10-01 NOTE — PROGRESS NOTES
Subjective Data:  No complaints this am    Overnight Events:    Uneventful night     Objective Data:  Last Recorded Vitals:  Vitals:    10/01/23 0515 10/01/23 0537 10/01/23 0818 10/01/23 1106   BP: 116/65  135/71 123/60   Pulse: 58  63 68   Resp: 19  18 18   Temp: 36.5 °C (97.7 °F)  36.5 °C (97.7 °F) 36.6 °C (97.9 °F)   TempSrc:       SpO2: 95%  96% 93%   Weight:  80.5 kg (177 lb 7.5 oz)     Height:           Last Labs:  CBC - 9/30/2023:  2:53 PM  9.2 12.9 237    41.0      CMP - 9/30/2023:  2:53 PM  9.2 7.1 32 --- 0.4   4.2 3.7 38 102      PTT - 9/29/2023:  7:56 AM  1.6   17.9 36     RFP: 137/3.9/104/28/20/0.89/ glu 230    HGBA1C   Date/Time Value Ref Range Status   09/28/2023 05:32 PM 8.3 % Final     Comment:          Diagnosis of Diabetes-Adults   Non-Diabetic: < or = 5.6%   Increased risk for developing diabetes: 5.7-6.4%   Diagnostic of diabetes: > or = 6.5%  .       Monitoring of Diabetes                Age (y)     Therapeutic Goal (%)   Adults:          >18           <7.0   Pediatrics:    13-18           <7.5                   7-12           <8.0                   0- 6            7.5-8.5   American Diabetes Association. Diabetes Care 33(S1), Jan 2010.     07/27/2023 02:31 PM 8.9 % Final     Comment:          Diagnosis of Diabetes-Adults   Non-Diabetic: < or = 5.6%   Increased risk for developing diabetes: 5.7-6.4%   Diagnostic of diabetes: > or = 6.5%  .       Monitoring of Diabetes                Age (y)     Therapeutic Goal (%)   Adults:          >18           <7.0   Pediatrics:    13-18           <7.5                   7-12           <8.0                   0- 6            7.5-8.5   American Diabetes Association. Diabetes Care 33(S1), Jan 2010.       VLDL   Date/Time Value Ref Range Status   10/28/2022 09:19 AM 43 0 - 40 mg/dL Final   12/29/2021 01:41 PM SEE COMMENT 0 - 40 mg/dL Final     Comment:       Unable to calculate VLDL.   08/14/2020 11:29 AM 39 0 - 40 mg/dL Final      Last I/O:  I/O last 3 completed  "shifts:  In: 1116 (13.9 mL/kg) [P.O.:900; I.V.:216 (2.7 mL/kg)]  Out: 1102 (13.7 mL/kg) [Urine:1100 (0.4 mL/kg/hr); Stool:2]  Weight: 80.5 kg     Past Cardiology Tests (Last 3 Years):  EKG:  No results found for this or any previous visit from the past 1095 days.    Echo:  No results found for this or any previous visit from the past 1095 days.    Ejection Fractions:  No results found for: \"EF\"  Cath:  No results found for this or any previous visit from the past 1095 days.    Stress Test:  Nuclear Stress Test 3/7/2023    Cardiac Imaging:  No results found for this or any previous visit from the past 1095 days.      Inpatient Medications:  Scheduled medications   Medication Dose Route Frequency    amLODIPine  5 mg oral Daily    flu vacc [id number] (65yr up)-PF  0.7 mL intramuscular Once    FLUoxetine  40 mg oral Daily    insulin lispro  0-5 Units subcutaneous With meals & nightly    losartan  100 mg oral Daily    metoprolol succinate XL  25 mg oral Daily    nicotine  1 patch transdermal Daily    pantoprazole  40 mg oral Daily before breakfast    rosuvastatin  40 mg oral Daily     PRN medications   Medication    acetaminophen    albuterol    dextrose    glucagon    heparin    oxyCODONE    oxyCODONE     Continuous Medications   Medication Dose Last Rate    heparin  0-4,000 Units/hr 800 Units/hr (10/01/23 1053)       Physical Exam:  Gen: no apparent distress  Neuro: A&O but some cognitive issues per conversation, no focal deficits  Skin: warm and dry, visible skin intact  Lungs respirations even unlabored on room air, lungs CTA  Cardiac: RRR S1S2, no murmur or gallop noted  Abd: soft, NT, ND, + BS  Ext: 2+ radial amd pedal pulses, warm to touch       Assessment/Plan     SHANEL ESCOBEDO is a 68 year old Female with PMH significant for aortic valve replacement in 2014 for bicuspid valve, HTN, DM, COPD, and brain aneurysm rupture who presents as a direct admit from the Cath Lab after pre-operative LHC was aborted. Plan " was for a radial approach however physician unable to pass guide wire, unable to attempt femoral approach secondary to INR 2.7. Pt is anticoagulated on coumadin for mechanical aortic valve and is being worked up by Dr. Hayden for ascending aortic aneurysm and possible repeat valve replacement. She was admitted to allow INR to drift down so we can initiate heparin bridge and perform femoral approach [facility] on Monday 10/2    Mechanical aortic valve  Ascending aortic aneurysm   - hx of bicuspid valve, s/p AVR 2014   - holding home coumadin, goal INR 2.5-3.5   - cont heparin gtt  - CT chest 3/2023 measured aneurysm at 5.3 x 5.2cm   - OP w/u with Catracho for possible aneurysm repair and valve replacement   - [facility] plan for Monday as unable to do 9/30  - NPO after mid  - PFTs done, pending; TSH =2.21; WEEM4I=6.3  - CTS and Dr. Hayden notified of admission, not a formal consult but update after [facility]     HTN  - admit /86  - SBP range =123-135  - cont home amlodipine, losartan, metop     DM  - HgbA1c 8.3  - today finger glucose sticks = 178-189  - yesterday 182-186  - hold home metformin   - accuchecks, SSI, hypoglycemia protocol  - cont sliding scale insulin  - diabetic diet     COPD  - no home 02, uses Trilogy PRN   - albuterol PRN while in hospital     Cognitive decline  hx SAH/ aneurysm rupture  - no current issues    Dispo  will need Pt/OT eval  pt lives with daughter who help make sure pt takes meds      pt seen and examined and plan d/w Dr. Sanchez  Peripheral IV 09/27/23 20 G Right Antecubital (Active)   Site Assessment Clean 09/30/23 1305   Dressing Status Clean 09/30/23 1305   Number of days: 3       Peripheral IV 09/29/23 22 G Right;Posterior Hand (Active)   Site Assessment Clean 09/30/23 1306   Dressing Status Clean 09/30/23 1306   Number of days: 1       Code Status:  Full code        Preeti Sood, APRN-CNP

## 2023-10-02 PROBLEM — T82.09XA: Status: ACTIVE | Noted: 2023-10-02

## 2023-10-02 PROBLEM — T82.09XA: Status: ACTIVE | Noted: 2023-09-28

## 2023-10-02 LAB
ALBUMIN SERPL BCP-MCNC: 3.6 G/DL (ref 3.4–5)
ANION GAP SERPL CALC-SCNC: 14 MMOL/L (ref 10–20)
ATRIAL RATE: 61 BPM
BUN SERPL-MCNC: 16 MG/DL (ref 6–23)
CALCIUM SERPL-MCNC: 8.5 MG/DL (ref 8.6–10.6)
CHLORIDE SERPL-SCNC: 107 MMOL/L (ref 98–107)
CO2 SERPL-SCNC: 25 MMOL/L (ref 21–32)
CREAT SERPL-MCNC: 0.7 MG/DL (ref 0.5–1.05)
ERYTHROCYTE [DISTWIDTH] IN BLOOD BY AUTOMATED COUNT: 14.5 % (ref 11.5–14.5)
GFR SERPL CREATININE-BSD FRML MDRD: >90 ML/MIN/1.73M*2
GLUCOSE BLD MANUAL STRIP-MCNC: 127 MG/DL (ref 74–99)
GLUCOSE BLD MANUAL STRIP-MCNC: 178 MG/DL (ref 74–99)
GLUCOSE BLD MANUAL STRIP-MCNC: 197 MG/DL (ref 74–99)
GLUCOSE BLD MANUAL STRIP-MCNC: 225 MG/DL (ref 74–99)
GLUCOSE SERPL-MCNC: 154 MG/DL (ref 74–99)
HCT VFR BLD AUTO: 41 % (ref 36–46)
HGB BLD-MCNC: 12.8 G/DL (ref 12–16)
MCH RBC QN AUTO: 29 PG (ref 26–34)
MCHC RBC AUTO-ENTMCNC: 31.2 G/DL (ref 32–36)
MCV RBC AUTO: 93 FL (ref 80–100)
NRBC BLD-RTO: 0 /100 WBCS (ref 0–0)
P AXIS: 65 DEGREES
P OFFSET: 174 MS
P ONSET: 120 MS
PHOSPHATE SERPL-MCNC: 3.6 MG/DL (ref 2.5–4.9)
PLATELET # BLD AUTO: 222 X10*3/UL (ref 150–450)
PMV BLD AUTO: 10.9 FL (ref 7.5–11.5)
POTASSIUM SERPL-SCNC: 4 MMOL/L (ref 3.5–5.3)
PR INTERVAL: 208 MS
Q ONSET: 224 MS
QRS COUNT: 10 BEATS
QRS DURATION: 100 MS
QT INTERVAL: 436 MS
QTC CALCULATION(BAZETT): 438 MS
QTC FREDERICIA: 438 MS
R AXIS: 67 DEGREES
RBC # BLD AUTO: 4.42 X10*6/UL (ref 4–5.2)
SODIUM SERPL-SCNC: 142 MMOL/L (ref 136–145)
T AXIS: 57 DEGREES
T OFFSET: 442 MS
UFH PPP CHRO-ACNC: 0.2 IU/ML
UFH PPP CHRO-ACNC: 0.9 IU/ML
VENTRICULAR RATE: 61 BPM
WBC # BLD AUTO: 7.9 X10*3/UL (ref 4.4–11.3)

## 2023-10-02 PROCEDURE — S4991 NICOTINE PATCH NONLEGEND: HCPCS | Performed by: INTERNAL MEDICINE

## 2023-10-02 PROCEDURE — 36415 COLL VENOUS BLD VENIPUNCTURE: CPT | Performed by: INTERNAL MEDICINE

## 2023-10-02 PROCEDURE — 36415 COLL VENOUS BLD VENIPUNCTURE: CPT | Performed by: STUDENT IN AN ORGANIZED HEALTH CARE EDUCATION/TRAINING PROGRAM

## 2023-10-02 PROCEDURE — 99153 MOD SED SAME PHYS/QHP EA: CPT | Performed by: INTERNAL MEDICINE

## 2023-10-02 PROCEDURE — 1200000002 HC GENERAL ROOM WITH TELEMETRY DAILY

## 2023-10-02 PROCEDURE — 2500000004 HC RX 250 GENERAL PHARMACY W/ HCPCS (ALT 636 FOR OP/ED): Performed by: INTERNAL MEDICINE

## 2023-10-02 PROCEDURE — 93454 CORONARY ARTERY ANGIO S&I: CPT | Performed by: INTERNAL MEDICINE

## 2023-10-02 PROCEDURE — 85347 COAGULATION TIME ACTIVATED: CPT

## 2023-10-02 PROCEDURE — 2500000001 HC RX 250 WO HCPCS SELF ADMINISTERED DRUGS (ALT 637 FOR MEDICARE OP): Performed by: INTERNAL MEDICINE

## 2023-10-02 PROCEDURE — 2720000007 HC OR 272 NO HCPCS: Performed by: INTERNAL MEDICINE

## 2023-10-02 PROCEDURE — 96372 THER/PROPH/DIAG INJ SC/IM: CPT | Performed by: STUDENT IN AN ORGANIZED HEALTH CARE EDUCATION/TRAINING PROGRAM

## 2023-10-02 PROCEDURE — B2111ZZ FLUOROSCOPY OF MULTIPLE CORONARY ARTERIES USING LOW OSMOLAR CONTRAST: ICD-10-PCS | Performed by: INTERNAL MEDICINE

## 2023-10-02 PROCEDURE — 80069 RENAL FUNCTION PANEL: CPT | Performed by: NURSE PRACTITIONER

## 2023-10-02 PROCEDURE — 99152 MOD SED SAME PHYS/QHP 5/>YRS: CPT | Performed by: INTERNAL MEDICINE

## 2023-10-02 PROCEDURE — 4A023N7 MEASUREMENT OF CARDIAC SAMPLING AND PRESSURE, LEFT HEART, PERCUTANEOUS APPROACH: ICD-10-PCS | Performed by: INTERNAL MEDICINE

## 2023-10-02 PROCEDURE — 85520 HEPARIN ASSAY: CPT | Performed by: INTERNAL MEDICINE

## 2023-10-02 PROCEDURE — 85347 COAGULATION TIME ACTIVATED: CPT | Performed by: INTERNAL MEDICINE

## 2023-10-02 PROCEDURE — 99233 SBSQ HOSP IP/OBS HIGH 50: CPT | Performed by: STUDENT IN AN ORGANIZED HEALTH CARE EDUCATION/TRAINING PROGRAM

## 2023-10-02 PROCEDURE — 2500000001 HC RX 250 WO HCPCS SELF ADMINISTERED DRUGS (ALT 637 FOR MEDICARE OP): Performed by: NURSE PRACTITIONER

## 2023-10-02 PROCEDURE — C1894 INTRO/SHEATH, NON-LASER: HCPCS | Performed by: INTERNAL MEDICINE

## 2023-10-02 PROCEDURE — 85520 HEPARIN ASSAY: CPT | Performed by: STUDENT IN AN ORGANIZED HEALTH CARE EDUCATION/TRAINING PROGRAM

## 2023-10-02 PROCEDURE — 82947 ASSAY GLUCOSE BLOOD QUANT: CPT

## 2023-10-02 PROCEDURE — 2500000005 HC RX 250 GENERAL PHARMACY W/O HCPCS: Performed by: INTERNAL MEDICINE

## 2023-10-02 PROCEDURE — 2550000001 HC RX 255 CONTRASTS: Performed by: INTERNAL MEDICINE

## 2023-10-02 PROCEDURE — 2500000002 HC RX 250 W HCPCS SELF ADMINISTERED DRUGS (ALT 637 FOR MEDICARE OP, ALT 636 FOR OP/ED): Performed by: INTERNAL MEDICINE

## 2023-10-02 PROCEDURE — 2500000004 HC RX 250 GENERAL PHARMACY W/ HCPCS (ALT 636 FOR OP/ED): Performed by: NURSE PRACTITIONER

## 2023-10-02 PROCEDURE — 2500000002 HC RX 250 W HCPCS SELF ADMINISTERED DRUGS (ALT 637 FOR MEDICARE OP, ALT 636 FOR OP/ED): Performed by: STUDENT IN AN ORGANIZED HEALTH CARE EDUCATION/TRAINING PROGRAM

## 2023-10-02 PROCEDURE — 85027 COMPLETE CBC AUTOMATED: CPT | Performed by: NURSE PRACTITIONER

## 2023-10-02 RX ORDER — ATROPINE SULFATE 0.1 MG/ML
INJECTION INTRAVENOUS AS NEEDED
Status: DISCONTINUED | OUTPATIENT
Start: 2023-10-02 | End: 2023-10-02 | Stop reason: HOSPADM

## 2023-10-02 RX ORDER — FENTANYL CITRATE 50 UG/ML
INJECTION, SOLUTION INTRAMUSCULAR; INTRAVENOUS AS NEEDED
Status: DISCONTINUED | OUTPATIENT
Start: 2023-10-02 | End: 2023-10-02 | Stop reason: HOSPADM

## 2023-10-02 RX ORDER — LIDOCAINE HYDROCHLORIDE 10 MG/ML
INJECTION, SOLUTION EPIDURAL; INFILTRATION; INTRACAUDAL; PERINEURAL AS NEEDED
Status: DISCONTINUED | OUTPATIENT
Start: 2023-10-02 | End: 2023-10-02 | Stop reason: HOSPADM

## 2023-10-02 RX ORDER — MIDAZOLAM HYDROCHLORIDE 1 MG/ML
INJECTION INTRAMUSCULAR; INTRAVENOUS AS NEEDED
Status: DISCONTINUED | OUTPATIENT
Start: 2023-10-02 | End: 2023-10-02 | Stop reason: HOSPADM

## 2023-10-02 RX ORDER — WARFARIN SODIUM 5 MG/1
5 TABLET ORAL DAILY
Status: COMPLETED | OUTPATIENT
Start: 2023-10-02 | End: 2023-10-04

## 2023-10-02 RX ADMIN — FLUOXETINE HYDROCHLORIDE 40 MG: 40 CAPSULE ORAL at 09:09

## 2023-10-02 RX ADMIN — INSULIN LISPRO 1 UNITS: 100 INJECTION, SOLUTION INTRAVENOUS; SUBCUTANEOUS at 13:49

## 2023-10-02 RX ADMIN — METOPROLOL SUCCINATE 25 MG: 25 TABLET, EXTENDED RELEASE ORAL at 09:08

## 2023-10-02 RX ADMIN — PANTOPRAZOLE SODIUM 40 MG: 40 TABLET, DELAYED RELEASE ORAL at 06:46

## 2023-10-02 RX ADMIN — ACETAMINOPHEN 650 MG: 325 TABLET, FILM COATED ORAL at 21:49

## 2023-10-02 RX ADMIN — AMLODIPINE BESYLATE 5 MG: 5 TABLET ORAL at 09:08

## 2023-10-02 RX ADMIN — WARFARIN SODIUM 5 MG: 5 TABLET ORAL at 18:11

## 2023-10-02 RX ADMIN — ROSUVASTATIN CALCIUM 40 MG: 40 TABLET, FILM COATED ORAL at 09:08

## 2023-10-02 RX ADMIN — NICOTINE 7 MG/24 HR DAILY TRANSDERMAL PATCH 1 PATCH: at 09:00

## 2023-10-02 RX ADMIN — HEPARIN SODIUM 2000 UNITS: 5000 INJECTION, SOLUTION INTRAVENOUS; SUBCUTANEOUS at 21:21

## 2023-10-02 RX ADMIN — INSULIN LISPRO 2 UNITS: 100 INJECTION, SOLUTION INTRAVENOUS; SUBCUTANEOUS at 18:12

## 2023-10-02 RX ADMIN — HEPARIN SODIUM 600 UNITS/HR: 10000 INJECTION, SOLUTION INTRAVENOUS at 16:17

## 2023-10-02 RX ADMIN — LOSARTAN POTASSIUM 100 MG: 100 TABLET, FILM COATED ORAL at 09:08

## 2023-10-02 ASSESSMENT — COGNITIVE AND FUNCTIONAL STATUS - GENERAL
MOBILITY SCORE: 24
DAILY ACTIVITIY SCORE: 24

## 2023-10-02 ASSESSMENT — PAIN SCALES - GENERAL
PAINLEVEL_OUTOF10: 5 - MODERATE PAIN
PAINLEVEL_OUTOF10: 0 - NO PAIN
PAINLEVEL_OUTOF10: 0 - NO PAIN

## 2023-10-02 ASSESSMENT — PAIN - FUNCTIONAL ASSESSMENT: PAIN_FUNCTIONAL_ASSESSMENT: 0-10

## 2023-10-02 NOTE — PRE-SEDATION DOCUMENTATION
Patient: Suze Najera  MRN: [id number]    Pre-sedation Evaluation:  Sedation necessary for: Immobility and Analgesia  Requesting service: [facility]    History of Present Illness: see admission H&P    Past Medical History:   Diagnosis Date    Bicuspid aortic valve 08/02/2018    Concussion with loss of consciousness status unknown, initial encounter 08/10/2018    Closed head injury with concussion    Conjunctival hemorrhage, left eye 01/04/2021    Subconjunctival hemorrhage of left eye    History of colonoscopy 01/10/2017    1/10/17 Dr. Donaldson-Normal colonoscopy     History of mammogram 09/09/2023 9/9/23 neg    Major depressive disorder, single episode, moderate (CMS/HCC) 12/23/2019    Depression, major, single episode, moderate    Nicotine dependence 03/18/2023    Nontraumatic subarachnoid hemorrhage, unspecified (CMS/HCC) 09/20/2017    Subarachnoid hemorrhage    Other amnesia 10/18/2017    Memory change    Other hypersomnia 11/13/2017    Excessive daytime sleepiness    Other specified disorders of adrenal gland (CMS/[facility]) 06/15/2017    Adrenal hyperplasia    Personal history of (corrected) 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     History of bicuspid aortic valve    Personal history of COVID-19 04/22/2021    History of severe acute respiratory syndrome coronavirus 2 (SARS-CoV-2) disease    Personal history of diseases of the skin and subcutaneous tissue 06/26/2017    History of cellulitis    Personal history of malignant melanoma of skin 01/18/2018    History of malignant melanoma    Personal history of other benign neoplasm 08/21/2018    History of meningioma    Personal history of other diseases of the circulatory system     History of intracranial hemorrhage    Personal history of other diseases of urinary system 01/04/2021    History of hematuria    Personal history of other diseases of urinary system 01/26/2018    History of hematuria    Personal history of other infectious and parasitic diseases  05/21/2022    History of Clostridioides difficile infection    Personal history of other specified conditions 12/15/2022    History of seizure    Personal history of transient ischemic attack (TIA), and cerebral infarction without residual deficits 01/18/2018    History of stroke    Personal history of urinary (tract) infections 03/27/2018    History of urinary tract infection    Polycythemia 03/18/2023    [facility]- Dr. Anne     Screening for cervical cancer 07/29/2020 7/29/20 NILM HPV neg    Unspecified speech disturbances 09/20/2017    Transient speech disturbance    Urinary tract infection, site not specified 09/29/2020    Acute UTI       Principle problems:  Patient Active Problem List    Diagnosis Date Noted    Other mechanical complication of heart valve prosthesis, initial encounter 10/02/2023    Long term (current) use of anticoagulants 09/26/2023    History of stroke 07/27/2023    Subarachnoid hemorrhage due to ruptured aneurysm (CMS/HCC) 07/27/2023    History of melanoma 07/27/2023    History of seizure 07/10/2023    Abnormal pulmonary function test 03/18/2023    Anxiety 03/18/2023    Aortic aneurysm without rupture (CMS/HCC) 03/18/2023    Aphasia of unknown origin 03/18/2023    Arthritis of right knee 03/18/2023    Asymptomatic microscopic hematuria 03/18/2023    Benign essential hypertension 03/18/2023    CAD, multiple vessel 03/18/2023    CMC arthritis 03/18/2023    COPD (chronic obstructive pulmonary disease) (CMS/HCC) 03/18/2023    Diabetes mellitus, type 2 (CMS/HCC) 03/18/2023    Female stress incontinence 03/18/2023    Hepatomegaly 03/18/2023    Aortic valve replaced 03/18/2023    Hydrocephalus (CMS/HCC) 03/18/2023    Hyperlipidemia 03/18/2023    Knee pain 03/18/2023    Left hip pain 03/18/2023    Lung nodule 03/18/2023    Memory loss 03/18/2023    GEOFF (obstructive sleep apnea) 03/18/2023    Osteopenia 03/18/2023    Overactive bladder 03/18/2023    Pancreatic mass 03/18/2023    Psoriasis 03/18/2023     Recurrent moderate major depressive disorder with anxiety (CMS/[facility]) 03/18/2023    Recurrent UTI (urinary tract infection) 03/18/2023    Rosacea 03/18/2023    TIA (transient ischemic attack) 03/18/2023    Vitamin D deficiency 03/18/2023    Class 1 obesity with body mass index (BMI) of 32.0 to 32.9 in adult 03/18/2023    Mechanical complication of heart valve prosthesis 09/28/2023     Allergies:  Allergies   Allergen Reactions    Acetazolamide Unknown    Ceftizoxime Hives    Cefuroxime Hives    Erythromycin Other     Vomiting     Ibuprofen Hives    Penicillins Hives    Sulfa (Sulfonamide Antibiotics) Hives     PTA/Current Medications:  Medications Prior to Admission   Medication Sig Dispense Refill Last Dose    acetaminophen (Tylenol) 325 mg tablet Take 1-2 tablets (325-650 mg) by mouth every 4 hours if needed.   Past Week    amLODIPine (Norvasc) 5 mg tablet Take 1 tablet (5 mg) by mouth once daily. 90 tablet 1 Past Week    FLUoxetine (PROzac) 40 mg capsule Take 1 capsule (40 mg) by mouth once daily. 90 capsule 1 Past Week    fluticasone-umeclidin-vilanter (TRELEGY-ELLIPTA) 100-62.5-25 mcg blister with device Inhale 1 puff once daily.   Past Week    glipiZIDE (Glucotrol) 5 mg tablet Take 1 tablet (5 mg) by mouth once daily. (Patient not taking: Reported on 9/30/2023) 90 tablet 3 Unknown    glucosamine sulfate 1,000 mg tablet Take 1 tablet by mouth once daily.   More than a month    losartan (Cozaar) 100 mg tablet Take 1 tablet (100 mg) by mouth once daily. 90 tablet 0 Past Week    metFORMIN XR (Glucophage-XR) 500 mg 24 hr tablet Take 1 tablet (500 mg) by mouth 2 times a day. 180 tablet 3 Past Week    metoprolol succinate XL (Toprol-XL) 25 mg 24 hr tablet Take 1 tablet (25 mg) by mouth once daily. 90 tablet 1 Past Week    omega-3 (Fish Oil) 300-1,000 mg capsule Take 1 capsule (1,000 mg) by mouth once daily.   More than a month    rosuvastatin (Crestor) 40 mg tablet Take 1 tablet (40 mg) by mouth once daily. 90  tablet 1 Past Week    vibegron (Gemtesa) 75 mg tablet Take 1 tablet (75 mg) by mouth once daily.   Past Week    warfarin (Coumadin) 3 mg tablet TAKE 1.5 TABLETS BY MOUTH DAILY AS DIRECTED 90 tablet 1 Past Week     Current Facility-Administered Medications   Medication Dose Route Frequency Provider Last Rate Last Admin    acetaminophen (Tylenol) tablet 650 mg  650 mg oral q6h PRN Gian Lr MD   650 mg at 10/01/23 2251    albuterol 90 mcg/actuation inhaler 2 puff  2 puff inhalation q6h PRN Gian Lr MD        amLODIPine (Norvasc) tablet 5 mg  5 mg oral Daily Gian Lr MD   5 mg at 10/01/23 0919    dextrose 50 % injection 25 g  25 g intravenous q15 min PRN Gian Lr MD        flu vacc [id number] (65yr up)-PF (Fluzone High-Dose Quad) syringe 0.7 mL  0.7 mL intramuscular Once Gian Lr MD        FLUoxetine (PROzac) capsule 40 mg  40 mg oral Daily Gian Lr MD   40 mg at 10/01/23 0918    glucagon (Glucagen) injection 1 mg  1 mg intramuscular q15 min PRN Gian Lr MD        heparin (porcine) injection 2,000-4,000 Units  2,000-4,000 Units intravenous q4h PRN Gian Lr MD        heparin 25,000 Units in dextrose 5% 250 mL (100 Units/mL) infusion (premix)  0-4,000 Units/hr intravenous Continuous Gian Lr MD 0.1 mL/hr at 10/02/23 0753 6 Units/hr at 10/02/23 0753    insulin lispro (HumaLOG) injection 0-5 Units  0-5 Units subcutaneous With meals & nightly Andre Blue MD   2 Units at 10/01/23 2240    losartan (Cozaar) tablet 100 mg  100 mg oral Daily Gian Lr MD   100 mg at 10/01/23 0918    metoprolol succinate XL (Toprol-XL) 24 hr tablet 25 mg  25 mg oral Daily Gian Lr MD   25 mg at 10/01/23 0918    nicotine (Nicoderm CQ) 7 mg/24 hr patch 1 patch  1 patch transdermal Daily Gian Lr MD   1 patch at 10/01/23 5278    oxyCODONE (Roxicodone) immediate release tablet 2.5 mg  2.5 mg oral q4h PRN Gian Lr MD        oxyCODONE (Roxicodone) immediate release tablet 5 mg  5 mg oral  q4h PRN Gian Lr MD        pantoprazole (ProtoNix) EC tablet 40 mg  40 mg oral Daily before breakfast Gian Lr MD   40 mg at 10/02/23 0646    rosuvastatin (Crestor) tablet 40 mg  40 mg oral Daily Gian Lr MD   40 mg at 09/30/23 5308     Past Surgical History:   has a past surgical history that includes Cardiac surgery (01/09/2017); Other surgical history (03/13/2017); Other surgical history (01/28/2019); Aortic valve replacement (04/20/2017); Tubal ligation (03/13/2017); and Other surgical history (03/13/2017).    Recent sedation/surgery (24 hours) No    Review of Systems:  Please check all that apply: Cardiac Disease and Obesity    Pregnancy test completed prior to procedure on any menstruating female: none        NPO guidelines met: Yes    Physical Exam    Airway  Mallampati: I  Neck ROM: full     Cardiovascular   Rhythm: regular  Rate: normal     Dental    Pulmonary   Breath sounds clear to auscultation         Plan    ASA 3     Moderate

## 2023-10-02 NOTE — POST-PROCEDURE NOTE
Procedure:  [facility]    Indication:  Preop TAA +/- AVR    Attending:  Dr. Rebolledo    [place]:  Michael Glidden Bart Gillombardo    Access:  Arterial: 6F RCFA->manual    Findings:  [facility]  LM patent  LAD 20% prox, 50-60% mid   Lcx small nondominant  RCA dominant 90% prox, 40% mid    Minimal blood loss, no procedural complications.    Recommendations:  -Routing post-cath care  -Continued workup for TAA +/- AVR per CTS. Consider bypass of RCA  -Maximized medical management of CAD (ASA, statin, beta blocker)  -Rest of care per primary team

## 2023-10-02 NOTE — PROGRESS NOTES
Physical Therapy                 Therapy Communication Note    Patient Name: Suze Najera  MRN: [id number]  Today's Date: 10/2/2023     Discipline: Physical Therapy    Missed Visit Reason: Missed Visit Reason:  (pt off floor)    Missed Time: Attempt    Comment:

## 2023-10-02 NOTE — PROGRESS NOTES
Subjective Data:      Overnight Events:         Objective Data:  Last Recorded Vitals:  Vitals:    10/02/23 0048 10/02/23 0501 10/02/23 0504 10/02/23 0817   BP: 139/64  138/72 145/83   Pulse: 59  63 64   Resp: 19 19 18   Temp: 36.4 °C (97.5 °F)  36.4 °C (97.5 °F) 36.3 °C (97.3 °F)   TempSrc:       SpO2: 95%  94% 95%   Weight:  80.3 kg (177 lb 0.5 oz)     Height:           Last Labs:  CBC - 10/2/2023:  6:06 AM  7.9 12.8 222    41.0      CMP - 10/2/2023:  6:06 AM  8.5 7.1 32 --- 0.4   3.6 3.6 38 102      PTT - 9/29/2023:  7:56 AM  1.6   17.9 36     HGBA1C   Date/Time Value Ref Range Status   09/28/2023 05:32 PM 8.3 % Final     Comment:          Diagnosis of Diabetes-Adults   Non-Diabetic: < or = 5.6%   Increased risk for developing diabetes: 5.7-6.4%   Diagnostic of diabetes: > or = 6.5%  .       Monitoring of Diabetes                Age (y)     Therapeutic Goal (%)   Adults:          >18           <7.0   Pediatrics:    13-18           <7.5                   7-12           <8.0                   0- 6            7.5-8.5   American Diabetes Association. Diabetes Care 33(S1), Jan 2010.     07/27/2023 02:31 PM 8.9 % Final     Comment:          Diagnosis of Diabetes-Adults   Non-Diabetic: < or = 5.6%   Increased risk for developing diabetes: 5.7-6.4%   Diagnostic of diabetes: > or = 6.5%  .       Monitoring of Diabetes                Age (y)     Therapeutic Goal (%)   Adults:          >18           <7.0   Pediatrics:    13-18           <7.5                   7-12           <8.0                   0- 6            7.5-8.5   American Diabetes Association. Diabetes Care 33(S1), Jan 2010.       VLDL   Date/Time Value Ref Range Status   10/28/2022 09:19 AM 43 0 - 40 mg/dL Final   12/29/2021 01:41 PM SEE COMMENT 0 - 40 mg/dL Final     Comment:       Unable to calculate VLDL.   08/14/2020 11:29 AM 39 0 - 40 mg/dL Final      Last I/O:  I/O last 3 completed shifts:  In: 1433 (17.8 mL/kg) [P.O.:1140; I.V.:293 (3.6 mL/kg)]  Out: 700  "(8.7 mL/kg) [Urine:700 (0.2 mL/kg/hr)]  Weight: 80.3 kg     Past Cardiology Tests (Last 3 Years):  EKG:  No results found for this or any previous visit from the past 1095 days.    Echo:  No results found for this or any previous visit from the past 1095 days.    Ejection Fractions:  No results found for: \"EF\"  Cath:  No results found for this or any previous visit from the past 1095 days.    Stress Test:  Nuclear Stress Test 3/7/2023    Cardiac Imaging:  No results found for this or any previous visit from the past 1095 days.      Inpatient Medications:  Scheduled medications   Medication Dose Route Frequency    amLODIPine  5 mg oral Daily    flu vacc [id number] (65yr up)-PF  0.7 mL intramuscular Once    FLUoxetine  40 mg oral Daily    insulin lispro  0-5 Units subcutaneous With meals & nightly    losartan  100 mg oral Daily    metoprolol succinate XL  25 mg oral Daily    nicotine  1 patch transdermal Daily    pantoprazole  40 mg oral Daily before breakfast    rosuvastatin  40 mg oral Daily     PRN medications   Medication    acetaminophen    albuterol    dextrose    fentaNYL PF    glucagon    heparin    lidocaine PF    midazolam    oxyCODONE    oxyCODONE     Continuous Medications   Medication Dose Last Rate    heparin  0-4,000 Units/hr 6 Units/hr (10/02/23 0753)       Physical Exam:       Assessment/Plan     Peripheral IV 09/27/23 20 G Right Antecubital (Active)   Site Assessment Clean;Dry;Intact 10/02/23 0505   Dressing Status Clean;Dry 10/02/23 0505   Number of days: 5       Peripheral IV 22 G Left;Posterior Hand (Active)   Site Assessment Clean;Dry;Intact 10/02/23 0909   Dressing Status Clean;Dry 10/02/23 0909   Number of days:        Code Status:  Full Code    I spent minutes in the professional and overall care of this patient.        Ramy Rebolledo MD  "

## 2023-10-02 NOTE — CARE PLAN
Problem: Daily Care  Goal: Daily care needs are met  Outcome: Progressing     Problem: Psychosocial Needs  Goal: Demonstrates ability to cope with hospitalization/illness  Outcome: Progressing  Goal: Collaborate with me, my family, and caregiver to identify my specific goals  Outcome: Progressing     Problem: Respiratory  Goal: No signs of respiratory distress (eg. Use of accessory muscles. Peds grunting)  Outcome: Progressing     Problem: Safety - Adult  Goal: Free from fall injury  Outcome: Progressing   The patient's goals for the shift include      The clinical goals for the shift include comfort/sleep

## 2023-10-02 NOTE — NURSING NOTE
Pt is aware of npo after midnight for cardiac cath.  Pt resting in bed, resp even and unlabored, .  Heparin infusion con't at 8 units/hr with daily heparin assay levels (therapuetic)

## 2023-10-02 NOTE — PROGRESS NOTES
Subjective Data:  No complaints this am    Overnight Events:    NPO for [facility]     Objective Data:  Last Recorded Vitals:  Vitals:    10/02/23 0501 10/02/23 0504 10/02/23 0817 10/02/23 1140   BP:  138/72 145/83 105/63   Pulse:  63 64 75   Resp:  19 18 14   Temp:  36.4 °C (97.5 °F) 36.3 °C (97.3 °F)    TempSrc:       SpO2:  94% 95% 98%   Weight: 80.3 kg (177 lb 0.5 oz)      Height:           Last Labs:  Results for orders placed or performed during the hospital encounter of 09/28/23 (from the past 24 hour(s))   POCT GLUCOSE   Result Value Ref Range    POCT Glucose 189 (H) 74 - 99 mg/dL   Heparin Assay   Result Value Ref Range    Heparin Unfractionated 0.6 See Comment Below for Therapeutic Ranges IU/mL   CBC   Result Value Ref Range    WBC 10.3 4.4 - 11.3 x10*3/uL    nRBC 0.0 0.0 - 0.0 /100 WBCs    RBC 4.64 4.00 - 5.20 x10*6/uL    Hemoglobin 13.4 12.0 - 16.0 g/dL    Hematocrit 44.4 36.0 - 46.0 %    MCV 96 80 - 100 fL    MCH 28.9 26.0 - 34.0 pg    MCHC 30.2 (L) 32.0 - 36.0 g/dL    RDW 14.3 11.5 - 14.5 %    Platelets 277 150 - 450 x10*3/uL    MPV 10.5 7.5 - 11.5 fL   Renal Function Panel   Result Value Ref Range    Glucose 187 (H) 74 - 99 mg/dL    Sodium 137 136 - 145 mmol/L    Potassium 4.1 3.5 - 5.3 mmol/L    Chloride 107 98 - 107 mmol/L    Bicarbonate 22 21 - 32 mmol/L    Anion Gap 12 10 - 20 mmol/L    Urea Nitrogen 18 6 - 23 mg/dL    Creatinine 0.96 0.50 - 1.05 mg/dL    eGFR 65 >60 mL/min/1.73m*2    Calcium 9.1 8.6 - 10.6 mg/dL    Phosphorus 3.4 2.5 - 4.9 mg/dL    Albumin 4.0 3.4 - 5.0 g/dL   POCT GLUCOSE   Result Value Ref Range    POCT Glucose 178 (H) 74 - 99 mg/dL   Heparin Assay   Result Value Ref Range    Heparin Unfractionated 0.6 See Comment Below for Therapeutic Ranges IU/mL   POCT GLUCOSE   Result Value Ref Range    POCT Glucose 217 (H) 74 - 99 mg/dL   CBC   Result Value Ref Range    WBC 7.9 4.4 - 11.3 x10*3/uL    nRBC 0.0 0.0 - 0.0 /100 WBCs    RBC 4.42 4.00 - 5.20 x10*6/uL    Hemoglobin 12.8 12.0 - 16.0 g/dL     Hematocrit 41.0 36.0 - 46.0 %    MCV 93 80 - 100 fL    MCH 29.0 26.0 - 34.0 pg    MCHC 31.2 (L) 32.0 - 36.0 g/dL    RDW 14.5 11.5 - 14.5 %    Platelets 222 150 - 450 x10*3/uL    MPV 10.9 7.5 - 11.5 fL   Renal Function Panel   Result Value Ref Range    Glucose 154 (H) 74 - 99 mg/dL    Sodium 142 136 - 145 mmol/L    Potassium 4.0 3.5 - 5.3 mmol/L    Chloride 107 98 - 107 mmol/L    Bicarbonate 25 21 - 32 mmol/L    Anion Gap 14 10 - 20 mmol/L    Urea Nitrogen 16 6 - 23 mg/dL    Creatinine 0.70 0.50 - 1.05 mg/dL    eGFR >90 >60 mL/min/1.73m*2    Calcium 8.5 (L) 8.6 - 10.6 mg/dL    Phosphorus 3.6 2.5 - 4.9 mg/dL    Albumin 3.6 3.4 - 5.0 g/dL   Heparin Assay   Result Value Ref Range    Heparin Unfractionated 0.9 See Comment Below for Therapeutic Ranges IU/mL   POCT GLUCOSE   Result Value Ref Range    POCT Glucose 178 (H) 74 - 99 mg/dL          Last I/O:  I/O last 3 completed shifts:  In: 1433 (17.8 mL/kg) [P.O.:1140; I.V.:293 (3.6 mL/kg)]  Out: 700 (8.7 mL/kg) [Urine:700 (0.2 mL/kg/hr)]  Weight: 80.3 kg     Stress Test:  Nuclear Stress Test 3/7/2023    [facility]  LM patent  % prox. 50-60% mid  Lcx small non dominant   RCA 90% prox, 40% mid      Inpatient Medications:  Scheduled medications   Medication Dose Route Frequency    amLODIPine  5 mg oral Daily    flu vacc [id number] (65yr up)-PF  0.7 mL intramuscular Once    FLUoxetine  40 mg oral Daily    insulin lispro  0-5 Units subcutaneous With meals & nightly    losartan  100 mg oral Daily    metoprolol succinate XL  25 mg oral Daily    nicotine  1 patch transdermal Daily    pantoprazole  40 mg oral Daily before breakfast    rosuvastatin  40 mg oral Daily     PRN medications   Medication    acetaminophen    albuterol    dextrose    glucagon    heparin    oxyCODONE    oxyCODONE     Continuous Medications   Medication Dose Last Rate    heparin  0-4,000 Units/hr 6 Units/hr (10/02/23 8690)       Physical Exam:  Gen: no apparent distress  Neuro: A&O but some cognitive issues  per conversation, no focal deficits  Skin: warm and dry, visible skin intact  Lungs respirations even unlabored on room air, lungs CTA  Cardiac: RRR S1S2, no murmur or gallop noted  Abd: soft, NT, ND, + BS  Ext: 2+ radial amd pedal pulses, warm to touch       Assessment/Plan     SHANEL ESCOBEDO is a 68 year old Female with PMH significant for aortic valve replacement in 2014 for bicuspid valve, HTN, DM, COPD, and brain aneurysm rupture who presents as a direct admit from the Cath Lab after pre-operative LHC was aborted. Plan was for a radial approach however physician unable to pass guide wire, unable to attempt femoral approach secondary to INR 2.7. Pt is anticoagulated on coumadin for mechanical aortic valve and is being worked up by Dr. Hayden for ascending aortic aneurysm and possible repeat valve replacement. She was admitted to allow INR to drift down so we can initiate heparin bridge and perform femoral approach LHC on Monday 10/2.     Mechanical aortic valve  Ascending aortic aneurysm   - hx of bicuspid valve, s/p AVR 2014   - holding home coumadin, ( previous goal INR 2.5-3.5) but restart tonight as LHC complete  - she is usually on 3mg on M/W/S/S; 4 mg on T/Th/F  - cont heparin gtt when able (checking with cath lab fellow)  - CT chest 3/2023 measured aneurysm at 5.3 x 5.2cm   - OP w/u with Catracho for possible aneurysm repair and valve replacement   - C plan done 10/2: LM patent, % prox. 50-60% mid, Lcx small non dominant, RCA 90% prox, 40% mid  - PFTs done, pending; TSH =2.21; TUFF4Y=1.3  - CTS and Dr. Hayden notified of admission, not a formal consult but update after LHC     HTN  - admit /86  - SBP range =105 - 145  - cont home amlodipine, losartan, metop     DM  - HgbA1c 8.3  - today finger glucose sticks = 178.    - yesterday 178-217  - hold home metformin   - accuchecks, SSI, hypoglycemia protocol  - cont sliding scale insulin  - diabetic diet     COPD  - no home 02, uses Trilogy PRN   -  albuterol PRN while in hospital     Cognitive decline  hx SAH/ aneurysm rupture  - no current issues    Dispo:  pt lives with daughter who help make sure pt takes meds  Await PT/OT      Pt seen and examined  and plan discussed with Dr. Sanchez     Code Status:  Full code    Preeti Sood, APRN-CNP

## 2023-10-03 ENCOUNTER — APPOINTMENT (OUTPATIENT)
Dept: CARDIOLOGY | Facility: CLINIC | Age: 68
End: 2023-10-03
Payer: MEDICARE

## 2023-10-03 LAB
ALBUMIN SERPL BCP-MCNC: 3.6 G/DL (ref 3.4–5)
ANION GAP SERPL CALC-SCNC: 13 MMOL/L (ref 10–20)
BUN SERPL-MCNC: 21 MG/DL (ref 6–23)
CALCIUM SERPL-MCNC: 8.9 MG/DL (ref 8.6–10.6)
CHLORIDE SERPL-SCNC: 106 MMOL/L (ref 98–107)
CO2 SERPL-SCNC: 24 MMOL/L (ref 21–32)
CREAT SERPL-MCNC: 0.79 MG/DL (ref 0.5–1.05)
ERYTHROCYTE [DISTWIDTH] IN BLOOD BY AUTOMATED COUNT: 14.4 % (ref 11.5–14.5)
GFR SERPL CREATININE-BSD FRML MDRD: 82 ML/MIN/1.73M*2
GLUCOSE BLD MANUAL STRIP-MCNC: 175 MG/DL (ref 74–99)
GLUCOSE BLD MANUAL STRIP-MCNC: 186 MG/DL (ref 74–99)
GLUCOSE BLD MANUAL STRIP-MCNC: 189 MG/DL (ref 74–99)
GLUCOSE BLD MANUAL STRIP-MCNC: 193 MG/DL (ref 74–99)
GLUCOSE SERPL-MCNC: 168 MG/DL (ref 74–99)
HCT VFR BLD AUTO: 38.5 % (ref 36–46)
HGB BLD-MCNC: 12 G/DL (ref 12–16)
INR PPP: 1.3 (ref 0.9–1.1)
INR PPP: 1.3 (ref 0.9–1.1)
MCH RBC QN AUTO: 29.5 PG (ref 26–34)
MCHC RBC AUTO-ENTMCNC: 31.2 G/DL (ref 32–36)
MCV RBC AUTO: 95 FL (ref 80–100)
NRBC BLD-RTO: 0 /100 WBCS (ref 0–0)
PHOSPHATE SERPL-MCNC: 3.9 MG/DL (ref 2.5–4.9)
PLATELET # BLD AUTO: 230 X10*3/UL (ref 150–450)
PMV BLD AUTO: 10.6 FL (ref 7.5–11.5)
POTASSIUM SERPL-SCNC: 3.8 MMOL/L (ref 3.5–5.3)
PROTHROMBIN TIME: 14.3 SECONDS (ref 9.8–12.8)
PROTHROMBIN TIME: 14.7 SECONDS (ref 9.8–12.8)
RBC # BLD AUTO: 4.07 X10*6/UL (ref 4–5.2)
SODIUM SERPL-SCNC: 139 MMOL/L (ref 136–145)
UFH PPP CHRO-ACNC: 0.3 IU/ML
UFH PPP CHRO-ACNC: 0.4 IU/ML
UFH PPP CHRO-ACNC: 0.6 IU/ML
UFH PPP CHRO-ACNC: 0.7 IU/ML
UFH PPP CHRO-ACNC: 0.7 IU/ML
WBC # BLD AUTO: 9.7 X10*3/UL (ref 4.4–11.3)

## 2023-10-03 PROCEDURE — 80069 RENAL FUNCTION PANEL: CPT | Performed by: NURSE PRACTITIONER

## 2023-10-03 PROCEDURE — 85610 PROTHROMBIN TIME: CPT | Performed by: NURSE PRACTITIONER

## 2023-10-03 PROCEDURE — 2500000002 HC RX 250 W HCPCS SELF ADMINISTERED DRUGS (ALT 637 FOR MEDICARE OP, ALT 636 FOR OP/ED): Performed by: NURSE PRACTITIONER

## 2023-10-03 PROCEDURE — 99233 SBSQ HOSP IP/OBS HIGH 50: CPT | Performed by: STUDENT IN AN ORGANIZED HEALTH CARE EDUCATION/TRAINING PROGRAM

## 2023-10-03 PROCEDURE — 85520 HEPARIN ASSAY: CPT | Performed by: STUDENT IN AN ORGANIZED HEALTH CARE EDUCATION/TRAINING PROGRAM

## 2023-10-03 PROCEDURE — 85520 HEPARIN ASSAY: CPT | Performed by: NURSE PRACTITIONER

## 2023-10-03 PROCEDURE — 36415 COLL VENOUS BLD VENIPUNCTURE: CPT | Performed by: STUDENT IN AN ORGANIZED HEALTH CARE EDUCATION/TRAINING PROGRAM

## 2023-10-03 PROCEDURE — 2500000001 HC RX 250 WO HCPCS SELF ADMINISTERED DRUGS (ALT 637 FOR MEDICARE OP): Performed by: NURSE PRACTITIONER

## 2023-10-03 PROCEDURE — 2500000004 HC RX 250 GENERAL PHARMACY W/ HCPCS (ALT 636 FOR OP/ED): Performed by: NURSE PRACTITIONER

## 2023-10-03 PROCEDURE — S4991 NICOTINE PATCH NONLEGEND: HCPCS | Performed by: NURSE PRACTITIONER

## 2023-10-03 PROCEDURE — 97161 PT EVAL LOW COMPLEX 20 MIN: CPT | Mod: GP

## 2023-10-03 PROCEDURE — 85027 COMPLETE CBC AUTOMATED: CPT | Performed by: NURSE PRACTITIONER

## 2023-10-03 PROCEDURE — 1200000002 HC GENERAL ROOM WITH TELEMETRY DAILY

## 2023-10-03 PROCEDURE — 96372 THER/PROPH/DIAG INJ SC/IM: CPT | Performed by: NURSE PRACTITIONER

## 2023-10-03 PROCEDURE — 82947 ASSAY GLUCOSE BLOOD QUANT: CPT

## 2023-10-03 RX ADMIN — INSULIN LISPRO 1 UNITS: 100 INJECTION, SOLUTION INTRAVENOUS; SUBCUTANEOUS at 18:24

## 2023-10-03 RX ADMIN — PANTOPRAZOLE SODIUM 40 MG: 40 TABLET, DELAYED RELEASE ORAL at 06:03

## 2023-10-03 RX ADMIN — LOSARTAN POTASSIUM 100 MG: 100 TABLET, FILM COATED ORAL at 08:28

## 2023-10-03 RX ADMIN — INSULIN LISPRO 1 UNITS: 100 INJECTION, SOLUTION INTRAVENOUS; SUBCUTANEOUS at 08:33

## 2023-10-03 RX ADMIN — ROSUVASTATIN CALCIUM 40 MG: 40 TABLET, FILM COATED ORAL at 08:28

## 2023-10-03 RX ADMIN — WARFARIN SODIUM 5 MG: 5 TABLET ORAL at 18:24

## 2023-10-03 RX ADMIN — HEPARIN SODIUM 400 UNITS/HR: 10000 INJECTION, SOLUTION INTRAVENOUS at 12:59

## 2023-10-03 RX ADMIN — INSULIN LISPRO 1 UNITS: 100 INJECTION, SOLUTION INTRAVENOUS; SUBCUTANEOUS at 14:15

## 2023-10-03 RX ADMIN — METOPROLOL SUCCINATE 25 MG: 25 TABLET, EXTENDED RELEASE ORAL at 08:28

## 2023-10-03 RX ADMIN — FLUOXETINE HYDROCHLORIDE 40 MG: 40 CAPSULE ORAL at 08:28

## 2023-10-03 RX ADMIN — AMLODIPINE BESYLATE 5 MG: 5 TABLET ORAL at 08:28

## 2023-10-03 RX ADMIN — INSULIN LISPRO 1 UNITS: 100 INJECTION, SOLUTION INTRAVENOUS; SUBCUTANEOUS at 21:42

## 2023-10-03 RX ADMIN — NICOTINE 7 MG/24 HR DAILY TRANSDERMAL PATCH 1 PATCH: at 08:28

## 2023-10-03 ASSESSMENT — COGNITIVE AND FUNCTIONAL STATUS - GENERAL
STANDING UP FROM CHAIR USING ARMS: A LITTLE
MOBILITY SCORE: 22
WALKING IN HOSPITAL ROOM: A LITTLE
MOBILITY SCORE: 21
DAILY ACTIVITIY SCORE: 24
CLIMB 3 TO 5 STEPS WITH RAILING: A LITTLE
CLIMB 3 TO 5 STEPS WITH RAILING: A LITTLE
WALKING IN HOSPITAL ROOM: A LITTLE

## 2023-10-03 ASSESSMENT — ACTIVITIES OF DAILY LIVING (ADL): ADL_ASSISTANCE: INDEPENDENT

## 2023-10-03 ASSESSMENT — PAIN - FUNCTIONAL ASSESSMENT: PAIN_FUNCTIONAL_ASSESSMENT: 0-10

## 2023-10-03 ASSESSMENT — PAIN SCALES - GENERAL: PAINLEVEL_OUTOF10: 0 - NO PAIN

## 2023-10-03 NOTE — PROGRESS NOTES
SW received message from pt's daughter stating pt has Jury Duty, however pt can't attend Jury Duty due to being admitted. SW faex this note to [phone] per daughter's request.    KRYSTIN Arechiga  Socail Worker   Secure Chat

## 2023-10-03 NOTE — CARE PLAN
Problem: Mobility  Goal: STG - Patient will ambulate 200 feet independent  Outcome: Progressing  Goal: STG - Patient will ascend and descend 1 step independent  Outcome: Progressing

## 2023-10-03 NOTE — PROGRESS NOTES
Physical Therapy    Physical Therapy Evaluation    Patient Name: Suze Najera  MRN: [id number]  Today's Date: 10/3/2023   Time Calculation  Start Time: 0851  Stop Time: 0905  Time Calculation (min): 14 min    Assessment/Plan   PT Assessment  PT Assessment Results: Decreased mobility  Rehab Prognosis: Excellent  Evaluation/Treatment Tolerance: Patient tolerated treatment well  Medical Staff Made Aware: Yes  End of Session Communication: Bedside nurse  End of Session Patient Position: Alarm off, not on at start of session (seated EOB)  IP OR SWING BED PT PLAN  Inpatient or Swing Bed: Inpatient  PT Plan  Treatment/Interventions: Gait training, Stair training  PT Plan: Skilled PT  PT Frequency: 2 times per week  PT Discharge Recommendations:  (No needs)      Subjective   General Visit Information:  General  Reason for Referral:  (presents as a direct admit from the Cath Lab after pre-operative LHC was aborted)  Past Medical History Relevant to Rehab:  (aortic valve replacement in 2014 for bicuspid valve, HTN, DM, COPD, and brain aneurysm rupture)  Missed Visit: Yes  Missed Visit Reason:  (pt off floor)  Prior to Session Communication: Bedside nurse  Patient Position Received: Bed, 3 rail up, Alarm off, not on at start of session  Home Living:  Home Living  Type of Home: House  Lives With:  (dtr)  Home Adaptive Equipment: None  Home Layout: Laundry in basement (bed/bath on first floor)  Home Access:  (1 step to enter)  Prior Level of Function:  Prior Function Per Pt/Caregiver Report  Level of [place]:  (independent)  Receives Help From:  (dtr)  ADL Assistance: Independent  Homemaking Assistance: Needs assistance  Laundry:  (dtr assists)  Driving/Transportation: Independent  Ambulatory Assistance: Independent  Precautions:  Precautions  Medical Precautions: Fall precautions  Vital Signs:  Vital Signs  Heart Rate:  (HR 69-80 during session)  Heart Rate Source: Monitor    Objective   Pain:  Pain Assessment  Pain  Assessment: 0-10  Pain Score: 0 - No pain  Cognition:  Cognition  Overall Cognitive Status: Within Functional Limits    General Assessments:         Sensation  Light Touch: No apparent deficits    Strength  Strength Comments:  (BLE 5/5)           Static Sitting Balance  Static Sitting-Level of Assistance: Independent  Dynamic Sitting Balance  Dynamic Sitting-Comments:  (independent)  Static Standing Balance  Static Standing-Level of Assistance:  (SBA)  Dynamic Standing Balance  Dynamic Standing-Comments:  (SBA)  Functional Assessments:     Bed Mobility  Bed Mobility: Yes  Bed Mobility 1  Bed Mobility 1: Supine to sitting  Level of Assistance 1:  (SBA)  Bed Mobility Comments 1:  (HOB elevated)    Transfers  Transfer: Yes  Transfer 1  Technique 1: Sit to stand, Stand to sit  Transfer Level of Assistance 1:  (SBA)  Trials/Comments 1:  (stood 2x)    Ambulation/Gait Training  Ambulation/Gait Training Performed: Yes  Ambulation/Gait Training 1  Assistance 1:  (SBA)  Quality of Gait 1:  (oc decreased)  Comments/Distance (ft) 1:  (150 feet)        Extremity/Trunk Assessments:              RLE   RLE : Within Functional Limits  LLE   LLE : Within Functional Limits  Outcome Measures:  [facility] Basic Mobility  Turning from your back to your side while in a flat bed without using bedrails: None  Moving from lying on your back to sitting on the side of a flat bed without using bedrails: None  Moving to and from bed to chair (including a wheelchair): None  Standing up from a chair using your arms (e.g. wheelchair or bedside chair): A little  To walk in hospital room: A little  Climbing 3-5 steps with railing: A little  Basic Mobility - Total Score: 21    Encounter Problems       Encounter Problems (Active)       Mobility       STG - Patient will ambulate 200 feet independent (Progressing)       Start:  10/03/23    Expected End:  10/17/23            STG - Patient will ascend and descend 1 step independent (Progressing)        Start:  10/03/23    Expected End:  10/17/23                   Education Documentation  Mobility Training, taught by Priti Hernandez, PT at 10/3/2023  9:41 AM.  Learner: Patient  Readiness: Acceptance  Method: Explanation  Response: Verbalizes Understanding    Education Comments  No comments found.

## 2023-10-03 NOTE — CARE PLAN
The patient's goals for the shift include      The clinical goals for the shift include comfort/sleep    Problem: Daily Care  Goal: Daily care needs are met  Outcome: Progressing     Problem: Psychosocial Needs  Goal: Demonstrates ability to cope with hospitalization/illness  Outcome: Progressing  Goal: Collaborate with me, my family, and caregiver to identify my specific goals  Outcome: Progressing     Problem: Respiratory  Goal: No signs of respiratory distress (eg. Use of accessory muscles. Peds grunting)  Outcome: Progressing     Problem: Safety - Adult  Goal: Free from fall injury  Outcome: Progressing

## 2023-10-03 NOTE — HOSPITAL COURSE
SHANEL ESCOBEDO is a 68 year old Female with PMH significant for aortic valve replacement in 2014 for bicuspid valve, HTN, DM, COPD, and brain aneurysm rupture who presents as a direct admit from the Cath Lab after pre-operative LHC was aborted. Plan was for a radial approach however physician unable to pass guide wire, unable to attempt femoral approach secondary to INR 2.7. Pt is anticoagulated on coumadin for mechanical aortic valve and is being worked up by Dr. Hayden for ascending aortic aneurysm and possible repeat valve replacement. Patient was not instructed to stop taking her coumadin prior to scheduled LHC. She was admitted to allow INR to drift down so we can initiate heparin bridge and perform femoral approach LHC.     Floor Course  LHC completed 10/2: LM patent, LAD 20% prox, 50-60% mid, LCx small nondominant, RCA dominant 90% prox, 40% mid    Redo AVR scheduled for 10/13. Preop orders outside of type and screen were collected prior to discharge. Patient instructed to get type and screen drawn next week prior to OR date at any  lab.  Dr. Pacheco saw patient while in house for preop evaluation. He noted that patient is scheduled for a bladder cystoscopy with Dr. Elise Gaming on 10/31. Team reached out to Dr. Gaming who noted that patient's cystoscopy is not urgent. Her suspicion for bladder cancer is low as the cystoscopy was scheduled to work up hematuria as there has been no suspicious findings on imaging. Patient is okay to proceed with redo AVR 10/13. Will require further diabetes education and medication optimization following procedure.   PFTs completed while in house. She remained admitted until INR reached 2.5. Discharge INR 2.6.      Patient was instructed to stop taking her coumadin Tuesday (last dose being evening of 10/9) with anticipation of redo AVR and possible aneurysm repair with Dr. Hayden on 10/13.     Discharge weight: 80.8 kg    After all labs and VS were reviewed the decision  was made that the patient was medically stable for discharge.  The patient was discharged in satisfactory condition.    More than 30 minutes were spent in coordinating patient discharge.

## 2023-10-03 NOTE — PROGRESS NOTES
Subjective Data:  LHC yesterday with 90% prox RCA and 40% mid RCA, LAD 20% proximal and 50-60% mid. Dr. Hayden notified of results. Pending INR 2.5 for discharge.     Overnight Events:         Objective Data:  Last Recorded Vitals:  Vitals:    10/02/23 2345 10/03/23 0408 10/03/23 0732 10/03/23 1152   BP: 125/60 134/68 137/75 132/74   Pulse:  62 62 58   Resp: 16 18 18 18   Temp: 36.3 °C (97.3 °F) 36.3 °C (97.3 °F) 36.3 °C (97.3 °F) 36.2 °C (97.2 °F)   TempSrc:       SpO2: 94% 93% 96% 96%   Weight:       Height:           Last Labs:  Results for orders placed or performed during the hospital encounter of 09/28/23 (from the past 24 hour(s))   POCT GLUCOSE   Result Value Ref Range    POCT Glucose 197 (H) 74 - 99 mg/dL   POCT GLUCOSE   Result Value Ref Range    POCT Glucose 225 (H) 74 - 99 mg/dL   Heparin Assay   Result Value Ref Range    Heparin Unfractionated 0.2 See Comment Below for Therapeutic Ranges IU/mL   POCT GLUCOSE   Result Value Ref Range    POCT Glucose 127 (H) 74 - 99 mg/dL   Heparin Assay   Result Value Ref Range    Heparin Unfractionated 0.6 See Comment Below for Therapeutic Ranges IU/mL   CBC   Result Value Ref Range    WBC 9.7 4.4 - 11.3 x10*3/uL    nRBC 0.0 0.0 - 0.0 /100 WBCs    RBC 4.07 4.00 - 5.20 x10*6/uL    Hemoglobin 12.0 12.0 - 16.0 g/dL    Hematocrit 38.5 36.0 - 46.0 %    MCV 95 80 - 100 fL    MCH 29.5 26.0 - 34.0 pg    MCHC 31.2 (L) 32.0 - 36.0 g/dL    RDW 14.4 11.5 - 14.5 %    Platelets 230 150 - 450 x10*3/uL    MPV 10.6 7.5 - 11.5 fL   Renal Function Panel   Result Value Ref Range    Glucose 168 (H) 74 - 99 mg/dL    Sodium 139 136 - 145 mmol/L    Potassium 3.8 3.5 - 5.3 mmol/L    Chloride 106 98 - 107 mmol/L    Bicarbonate 24 21 - 32 mmol/L    Anion Gap 13 10 - 20 mmol/L    Urea Nitrogen 21 6 - 23 mg/dL    Creatinine 0.79 0.50 - 1.05 mg/dL    eGFR 82 >60 mL/min/1.73m*2    Calcium 8.9 8.6 - 10.6 mg/dL    Phosphorus 3.9 2.5 - 4.9 mg/dL    Albumin 3.6 3.4 - 5.0 g/dL   Heparin Assay   Result  Value Ref Range    Heparin Unfractionated 0.7 See Comment Below for Therapeutic Ranges IU/mL   Protime-INR   Result Value Ref Range    Protime 14.7 (H) 9.8 - 12.8 seconds    INR 1.3 (H) 0.9 - 1.1   POCT GLUCOSE   Result Value Ref Range    POCT Glucose 193 (H) 74 - 99 mg/dL   POCT GLUCOSE   Result Value Ref Range    POCT Glucose 175 (H) 74 - 99 mg/dL          Last I/O:  I/O last 3 completed shifts:  In: 892.5 (11.1 mL/kg) [P.O.:600; I.V.:292.5 (3.6 mL/kg)]  Out: 20 (0.2 mL/kg) [Blood:20]  Weight: 80.3 kg     Stress Test:  Nuclear Stress Test 3/7/2023    [facility]  LM patent  % prox. 50-60% mid  Lcx small non dominant   RCA 90% prox, 40% mid      Inpatient Medications:  Scheduled medications   Medication Dose Route Frequency    amLODIPine  5 mg oral Daily    flu vacc [id number] (65yr up)-PF  0.7 mL intramuscular Once    FLUoxetine  40 mg oral Daily    insulin lispro  0-5 Units subcutaneous With meals & nightly    losartan  100 mg oral Daily    metoprolol succinate XL  25 mg oral Daily    nicotine  1 patch transdermal Daily    pantoprazole  40 mg oral Daily before breakfast    rosuvastatin  40 mg oral Daily    warfarin  5 mg oral Daily     PRN medications   Medication    acetaminophen    albuterol    dextrose    glucagon    heparin    oxyCODONE    oxyCODONE     Continuous Medications   Medication Dose Last Rate    heparin  0-4,000 Units/hr 600 Units/hr (10/03/23 0633)       Physical Exam:  Gen: laying in bed, NAD  Neuro: A/O but some short term memory deficits   Skin: warm and dry  Lungs: respirations even unlabored on room air, lungs CTA  Cardiac: RRR S1S2, no murmur or gallop noted  Abd: soft, NT, ND, + BS  Ext: 2+ radial amd pedal pulses, warm to touch       Assessment/Plan     SHANEL ESCOBEDO BHAVANI is a 68 year old Female with PMH significant for aortic valve replacement in 2014 for bicuspid valve, HTN, DM, COPD, and brain aneurysm rupture who presents as a direct admit from the Cath Lab after pre-operative [facility] was  aborted. Plan was for a radial approach however physician unable to pass guide wire, unable to attempt femoral approach secondary to INR 2.7. Pt is anticoagulated on coumadin for mechanical aortic valve and is being worked up by Dr. Hayden for ascending aortic aneurysm and possible repeat valve replacement. She was admitted to allow INR to drift down so we can initiate heparin bridge and perform femoral approach LHC on Monday 10/2.     Mechanical aortic valve  Ascending aortic aneurysm   - hx of bicuspid valve, s/p AVR 2014   - OP w/u with Catracho for possible aneurysm repair and valve replacement   - home coumadin 3mg on M/W/S/S; 4 mg on T/Th/F  - CT chest 3/2023 measured aneurysm at 5.3 x 5.2cm   - [facility] 10/2: LM patent, % prox. 50-60% mid, Lcx small non dominant, RCA 90% prox, 40% mid  - PFTs completed  - TSH =2.21; RBGZ7T=9.3  - CTS and Dr. Hayden notified of admission, not a formal consult   - Dr. Hayden updated regarding C results   - cont heparin gtt     HTN  - admit /86  - SBP range 105-137   - cont home amlodipine, losartan, metop     DM  - HgbA1c 8.3%  - hold home metformin   - accuchecks, SSI, hypoglycemia protocol  - diabetic diet     COPD  - no home 02, uses Trilogy PRN   - albuterol PRN while in hospital     Cognitive decline  hx SAH/ aneurysm rupture  - no current issues    Dispo  pt lives with daughter who help make sure pt takes meds  PT/OT rec no needs       Seen and discussed with Dr. Sanchez     Code Status:  Full code    Karissa Vera, APRN-CNP

## 2023-10-04 ENCOUNTER — APPOINTMENT (OUTPATIENT)
Dept: PREADMISSION TESTING | Facility: HOSPITAL | Age: 68
End: 2023-10-04
Payer: MEDICARE

## 2023-10-04 LAB
ACT BLD: 155 SEC (ref 89–169)
ALBUMIN (G/DL) IN SER/PLAS: NORMAL
ALBUMIN SERPL BCP-MCNC: 3.9 G/DL (ref 3.4–5)
ANION GAP IN SER/PLAS: NORMAL
ANION GAP SERPL CALC-SCNC: 14 MMOL/L (ref 10–20)
APTT PPP: 43 SECONDS (ref 27–38)
APTT PPP: 68 SECONDS (ref 27–38)
BUN SERPL-MCNC: 13 MG/DL (ref 6–23)
CALCIUM (MG/DL) IN SER/PLAS: NORMAL
CALCIUM SERPL-MCNC: 9.3 MG/DL (ref 8.6–10.6)
CARBON DIOXIDE, TOTAL (MMOL/L) IN SER/PLAS: NORMAL
CHLORIDE (MMOL/L) IN SER/PLAS: NORMAL
CHLORIDE SERPL-SCNC: 106 MMOL/L (ref 98–107)
CO2 SERPL-SCNC: 24 MMOL/L (ref 21–32)
CREAT SERPL-MCNC: 0.85 MG/DL (ref 0.5–1.05)
CREATININE (MG/DL) IN SER/PLAS: NORMAL
ERYTHROCYTE DISTRIBUTION WIDTH (RATIO) BY AUTOMATED COUNT: NORMAL
ERYTHROCYTE MEAN CORPUSCULAR HEMOGLOBIN CONCENTRATION (G/DL) BY AUTOMATED: NORMAL
ERYTHROCYTE MEAN CORPUSCULAR VOLUME (FL) BY AUTOMATED COUNT: NORMAL
ERYTHROCYTE [DISTWIDTH] IN BLOOD BY AUTOMATED COUNT: 14.6 % (ref 11.5–14.5)
ERYTHROCYTES (10*6/UL) IN BLOOD BY AUTOMATED COUNT: NORMAL
GFR FEMALE: NORMAL
GFR MALE: NORMAL
GFR SERPL CREATININE-BSD FRML MDRD: 75 ML/MIN/1.73M*2
GLOMERULAR FILTRATION RATE-AFRICAN AMERICAN: NORMAL ML/MIN/1.73M2
GLOMERULAR FILTRATION RATE-NON AFRICAN AMERICAN: NORMAL ML/MIN/1.73M2
GLUCOSE (MG/DL) IN SER/PLAS: NORMAL
GLUCOSE BLD MANUAL STRIP-MCNC: 166 MG/DL (ref 74–99)
GLUCOSE BLD MANUAL STRIP-MCNC: 186 MG/DL (ref 74–99)
GLUCOSE BLD MANUAL STRIP-MCNC: 221 MG/DL (ref 74–99)
GLUCOSE BLD MANUAL STRIP-MCNC: 239 MG/DL (ref 74–99)
GLUCOSE SERPL-MCNC: 187 MG/DL (ref 74–99)
HCT VFR BLD AUTO: 41.4 % (ref 36–46)
HEMATOCRIT (%) IN BLOOD BY AUTOMATED COUNT: NORMAL
HEMOGLOBIN (G/DL) IN BLOOD: NORMAL
HGB BLD-MCNC: 12.7 G/DL (ref 12–16)
INR PPP: 1.4 (ref 0.9–1.1)
LEUKOCYTES (10*3/UL) IN BLOOD BY AUTOMATED COUNT: NORMAL
MCH RBC QN AUTO: 29.1 PG (ref 26–34)
MCHC RBC AUTO-ENTMCNC: 30.7 G/DL (ref 32–36)
MCV RBC AUTO: 95 FL (ref 80–100)
NRBC (PER 100 WBCS) BY AUTOMATED COUNT: NORMAL
NRBC BLD-RTO: 0 /100 WBCS (ref 0–0)
PHOSPHATE (MG/DL) IN SER/PLAS: NORMAL
PHOSPHATE SERPL-MCNC: 3.3 MG/DL (ref 2.5–4.9)
PLATELET # BLD AUTO: 209 X10*3/UL (ref 150–450)
PLATELETS (10*3/UL) IN BLOOD AUTOMATED COUNT: NORMAL
PMV BLD AUTO: 10.7 FL (ref 7.5–11.5)
POTASSIUM (MMOL/L) IN SER/PLAS: NORMAL
POTASSIUM SERPL-SCNC: 4 MMOL/L (ref 3.5–5.3)
PROTHROMBIN TIME: 15.7 SECONDS (ref 9.8–12.8)
RBC # BLD AUTO: 4.36 X10*6/UL (ref 4–5.2)
SODIUM (MMOL/L) IN SER/PLAS: NORMAL
SODIUM SERPL-SCNC: 140 MMOL/L (ref 136–145)
UFH PPP CHRO-ACNC: 0.2 IU/ML
UFH PPP CHRO-ACNC: 0.4 IU/ML
UREA NITROGEN (MG/DL) IN SER/PLAS: NORMAL
WBC # BLD AUTO: 7.9 X10*3/UL (ref 4.4–11.3)

## 2023-10-04 PROCEDURE — 96372 THER/PROPH/DIAG INJ SC/IM: CPT | Performed by: NURSE PRACTITIONER

## 2023-10-04 PROCEDURE — 2500000002 HC RX 250 W HCPCS SELF ADMINISTERED DRUGS (ALT 637 FOR MEDICARE OP, ALT 636 FOR OP/ED): Performed by: NURSE PRACTITIONER

## 2023-10-04 PROCEDURE — 99233 SBSQ HOSP IP/OBS HIGH 50: CPT | Performed by: STUDENT IN AN ORGANIZED HEALTH CARE EDUCATION/TRAINING PROGRAM

## 2023-10-04 PROCEDURE — 2500000004 HC RX 250 GENERAL PHARMACY W/ HCPCS (ALT 636 FOR OP/ED): Performed by: NURSE PRACTITIONER

## 2023-10-04 PROCEDURE — 85520 HEPARIN ASSAY: CPT | Performed by: NURSE PRACTITIONER

## 2023-10-04 PROCEDURE — 1200000002 HC GENERAL ROOM WITH TELEMETRY DAILY

## 2023-10-04 PROCEDURE — 93005 ELECTROCARDIOGRAM TRACING: CPT

## 2023-10-04 PROCEDURE — 36415 COLL VENOUS BLD VENIPUNCTURE: CPT | Performed by: NURSE PRACTITIONER

## 2023-10-04 PROCEDURE — 2500000001 HC RX 250 WO HCPCS SELF ADMINISTERED DRUGS (ALT 637 FOR MEDICARE OP): Performed by: NURSE PRACTITIONER

## 2023-10-04 PROCEDURE — 80069 RENAL FUNCTION PANEL: CPT | Performed by: NURSE PRACTITIONER

## 2023-10-04 PROCEDURE — 85730 THROMBOPLASTIN TIME PARTIAL: CPT | Performed by: NURSE PRACTITIONER

## 2023-10-04 PROCEDURE — 85027 COMPLETE CBC AUTOMATED: CPT | Performed by: NURSE PRACTITIONER

## 2023-10-04 PROCEDURE — 82947 ASSAY GLUCOSE BLOOD QUANT: CPT

## 2023-10-04 PROCEDURE — S4991 NICOTINE PATCH NONLEGEND: HCPCS | Performed by: NURSE PRACTITIONER

## 2023-10-04 PROCEDURE — 9990 CHARGE CONVERSION

## 2023-10-04 RX ADMIN — HEPARIN SODIUM 600 UNITS/HR: 10000 INJECTION, SOLUTION INTRAVENOUS at 11:15

## 2023-10-04 RX ADMIN — LOSARTAN POTASSIUM 100 MG: 100 TABLET, FILM COATED ORAL at 09:29

## 2023-10-04 RX ADMIN — ROSUVASTATIN CALCIUM 40 MG: 40 TABLET, FILM COATED ORAL at 09:33

## 2023-10-04 RX ADMIN — INSULIN LISPRO 1 UNITS: 100 INJECTION, SOLUTION INTRAVENOUS; SUBCUTANEOUS at 22:19

## 2023-10-04 RX ADMIN — INSULIN LISPRO 1 UNITS: 100 INJECTION, SOLUTION INTRAVENOUS; SUBCUTANEOUS at 07:51

## 2023-10-04 RX ADMIN — AMLODIPINE BESYLATE 5 MG: 5 TABLET ORAL at 09:29

## 2023-10-04 RX ADMIN — HEPARIN SODIUM 400 UNITS/HR: 10000 INJECTION, SOLUTION INTRAVENOUS at 07:48

## 2023-10-04 RX ADMIN — METOPROLOL SUCCINATE 25 MG: 25 TABLET, EXTENDED RELEASE ORAL at 09:30

## 2023-10-04 RX ADMIN — INSULIN LISPRO 2 UNITS: 100 INJECTION, SOLUTION INTRAVENOUS; SUBCUTANEOUS at 12:15

## 2023-10-04 RX ADMIN — NICOTINE 7 MG/24 HR DAILY TRANSDERMAL PATCH 1 PATCH: at 09:30

## 2023-10-04 RX ADMIN — PANTOPRAZOLE SODIUM 40 MG: 40 TABLET, DELAYED RELEASE ORAL at 06:24

## 2023-10-04 RX ADMIN — ACETAMINOPHEN 325 MG: 325 TABLET, FILM COATED ORAL at 22:33

## 2023-10-04 RX ADMIN — FLUOXETINE HYDROCHLORIDE 40 MG: 40 CAPSULE ORAL at 09:30

## 2023-10-04 RX ADMIN — WARFARIN SODIUM 5 MG: 5 TABLET ORAL at 17:37

## 2023-10-04 RX ADMIN — INSULIN LISPRO 2 UNITS: 100 INJECTION, SOLUTION INTRAVENOUS; SUBCUTANEOUS at 17:37

## 2023-10-04 ASSESSMENT — COGNITIVE AND FUNCTIONAL STATUS - GENERAL
DAILY ACTIVITIY SCORE: 24
DAILY ACTIVITIY SCORE: 24
MOBILITY SCORE: 24
MOBILITY SCORE: 24

## 2023-10-04 ASSESSMENT — PAIN - FUNCTIONAL ASSESSMENT
PAIN_FUNCTIONAL_ASSESSMENT: 0-10

## 2023-10-04 ASSESSMENT — PAIN SCALES - GENERAL
PAINLEVEL_OUTOF10: 5 - MODERATE PAIN
PAINLEVEL_OUTOF10: 0 - NO PAIN
PAINLEVEL_OUTOF10: 0 - NO PAIN

## 2023-10-04 NOTE — PROGRESS NOTES
Subjective Data:  Mechanical valve on heparin to coumadin bridge, pending therapeutic INR. Today's 1.4.     Overnight Events:         Objective Data:  Last Recorded Vitals:  Vitals:    10/04/23 0340 10/04/23 0349 10/04/23 0835 10/04/23 1127   BP:  144/74 110/81 142/74   Pulse:  60 62 61   Resp:  18 18 18   Temp:  36.5 °C (97.7 °F) 36.4 °C (97.5 °F) 36 °C (96.8 °F)   TempSrc:       SpO2:  95% 96% 95%   Weight: 81.2 kg (179 lb 0.2 oz)      Height:           Last Labs:  Results for orders placed or performed during the hospital encounter of 09/28/23 (from the past 24 hour(s))   Heparin Assay   Result Value Ref Range    Heparin Unfractionated 0.4 See Comment Below for Therapeutic Ranges IU/mL   POCT GLUCOSE   Result Value Ref Range    POCT Glucose 186 (H) 74 - 99 mg/dL   POCT GLUCOSE   Result Value Ref Range    POCT Glucose 189 (H) 74 - 99 mg/dL   Heparin Assay   Result Value Ref Range    Heparin Unfractionated 0.3 See Comment Below for Therapeutic Ranges IU/mL   CBC   Result Value Ref Range    WBC CANCELED     nRBC CANCELED     RBC CANCELED     Hemoglobin CANCELED     Hematocrit CANCELED     MCV CANCELED     MCHC CANCELED     Platelets CANCELED     RDW CANCELED    Renal Function Panel   Result Value Ref Range    Glucose CANCELED     Sodium CANCELED     Potassium CANCELED     Chloride CANCELED     Bicarbonate CANCELED     Anion Gap CANCELED     Urea Nitrogen CANCELED     Creatinine CANCELED     GFR Female CANCELED     GFR MALE CANCELED     GLOMERULAR FILTRATION RATE-NON  CANCELED mL/min/1.73m2    GLOMERULAR FILTRATION RATE- CANCELED mL/min/1.73m2    Calcium CANCELED     Phosphorus CANCELED     Albumin CANCELED    POCT GLUCOSE   Result Value Ref Range    POCT Glucose 166 (H) 74 - 99 mg/dL   Heparin Assay   Result Value Ref Range    Heparin Unfractionated 0.2 See Comment Below for Therapeutic Ranges IU/mL   CBC   Result Value Ref Range    WBC 7.9 4.4 - 11.3 x10*3/uL    nRBC 0.0 0.0 - 0.0  /100 WBCs    RBC 4.36 4.00 - 5.20 x10*6/uL    Hemoglobin 12.7 12.0 - 16.0 g/dL    Hematocrit 41.4 36.0 - 46.0 %    MCV 95 80 - 100 fL    MCH 29.1 26.0 - 34.0 pg    MCHC 30.7 (L) 32.0 - 36.0 g/dL    RDW 14.6 (H) 11.5 - 14.5 %    Platelets 209 150 - 450 x10*3/uL    MPV 10.7 7.5 - 11.5 fL   Renal Function Panel   Result Value Ref Range    Glucose 187 (H) 74 - 99 mg/dL    Sodium 140 136 - 145 mmol/L    Potassium 4.0 3.5 - 5.3 mmol/L    Chloride 106 98 - 107 mmol/L    Bicarbonate 24 21 - 32 mmol/L    Anion Gap 14 10 - 20 mmol/L    Urea Nitrogen 13 6 - 23 mg/dL    Creatinine 0.85 0.50 - 1.05 mg/dL    eGFR 75 >60 mL/min/1.73m*2    Calcium 9.3 8.6 - 10.6 mg/dL    Phosphorus 3.3 2.5 - 4.9 mg/dL    Albumin 3.9 3.4 - 5.0 g/dL   Coagulation Screen   Result Value Ref Range    Protime 15.7 (H) 9.8 - 12.8 seconds    INR 1.4 (H) 0.9 - 1.1    aPTT 43 (H) 27 - 38 seconds   POCT GLUCOSE   Result Value Ref Range    POCT Glucose 239 (H) 74 - 99 mg/dL          Last I/O:  I/O last 3 completed shifts:  In: 1415.5 (17.4 mL/kg) [P.O.:1190; I.V.:225.5 (2.8 mL/kg)]  Out: 0 (0 mL/kg)   Weight: 81.2 kg     Stress Test:  Nuclear Stress Test 3/7/2023    [facility]  LM patent  % prox. 50-60% mid  Lcx small non dominant   RCA 90% prox, 40% mid      Inpatient Medications:  Scheduled medications   Medication Dose Route Frequency    amLODIPine  5 mg oral Daily    flu vacc [id number] (65yr up)-PF  0.7 mL intramuscular Once    FLUoxetine  40 mg oral Daily    insulin lispro  0-5 Units subcutaneous With meals & nightly    losartan  100 mg oral Daily    metoprolol succinate XL  25 mg oral Daily    nicotine  1 patch transdermal Daily    pantoprazole  40 mg oral Daily before breakfast    rosuvastatin  40 mg oral Daily    warfarin  5 mg oral Daily     PRN medications   Medication    acetaminophen    albuterol    dextrose    glucagon    heparin    oxyCODONE    oxyCODONE     Continuous Medications   Medication Dose Last Rate    heparin  0-4,000 Units/hr 600  Units/hr (10/04/23 1200)       Physical Exam:  General: NAD, lying in bed  Skin: warm and dry   Head/ neck: no JVD seen at 90 degrees  Cardiac: RRR, S1, S2   Pulm: CTAB, room air   GI: soft, nontender   Extremities: no LE edema   Neuro: no focal neuro deficits   Psych: appropriate mod and behavior         Assessment/Plan     SHANEL ESCOBEDO is a 68 year old Female with PMH significant for aortic valve replacement in 2014 for bicuspid valve, HTN, DM, COPD, and brain aneurysm rupture who presents as a direct admit from the Cath Lab after pre-operative LHC was aborted. Plan was for a radial approach however physician unable to pass guide wire, unable to attempt femoral approach secondary to INR 2.7. Pt is anticoagulated on coumadin for mechanical aortic valve and is being worked up by Dr. Hayden for ascending aortic aneurysm and possible repeat valve replacement. She was admitted to allow INR to drift down so we can initiate heparin bridge and perform femoral approach LHC on Monday 10/2.     Mechanical aortic valve  Ascending aortic aneurysm   - hx of bicuspid valve, s/p AVR 2014   - OP w/u with Catracho for possible aneurysm repair and valve replacement   - CT chest 3/2023 measured aneurysm at 5.3 x 5.2cm   - [facility] 10/2: LM patent, % prox. 50-60% mid, Lcx small non dominant, RCA 90% prox, 40% mid  - PFTs completed  - TSH =2.21; EdgT7c=7.3  - CTS and Dr. Hayden notified of admission, not a formal consult   - Dr. Hayden updated regarding C results   - cont heparin gtt   - cont coumadin 5mg for now (home 3mg M/W/S/S, 4mg T/Th/F)   - today's INR 1.4 (goal 2.5-3.5)     HTN  - admit /86  - SBP range 130s-140s   - cont home amlodipine, losartan, metop     DM  - HgbA1c 8.3%  - hold home metformin   - accuchecks, SSI, hypoglycemia protocol  - diabetic diet     COPD  - no home 02, uses Trilogy PRN   - albuterol PRN while in hospital     Cognitive decline  hx SAH/ aneurysm rupture  - no current issues    Dispo  pt lives  with daughter who help make sure pt takes meds  PT/OT rec no needs       Seen and discussed with Dr. Sanchez     Code Status:  Full code    Karissa Vera, APRN-CNP

## 2023-10-05 LAB
APTT PPP: 81 SECONDS (ref 27–38)
GLUCOSE BLD MANUAL STRIP-MCNC: 146 MG/DL (ref 74–99)
GLUCOSE BLD MANUAL STRIP-MCNC: 163 MG/DL (ref 74–99)
GLUCOSE BLD MANUAL STRIP-MCNC: 169 MG/DL (ref 74–99)
GLUCOSE BLD MANUAL STRIP-MCNC: 217 MG/DL (ref 74–99)
INR PPP: 1.8 (ref 0.9–1.1)
PROTHROMBIN TIME: 20.2 SECONDS (ref 9.8–12.8)
UFH PPP CHRO-ACNC: 0.3 IU/ML
UFH PPP CHRO-ACNC: 0.5 IU/ML
UFH PPP CHRO-ACNC: 0.5 IU/ML

## 2023-10-05 PROCEDURE — S4991 NICOTINE PATCH NONLEGEND: HCPCS | Performed by: NURSE PRACTITIONER

## 2023-10-05 PROCEDURE — 2500000004 HC RX 250 GENERAL PHARMACY W/ HCPCS (ALT 636 FOR OP/ED): Performed by: NURSE PRACTITIONER

## 2023-10-05 PROCEDURE — 1200000002 HC GENERAL ROOM WITH TELEMETRY DAILY

## 2023-10-05 PROCEDURE — 99232 SBSQ HOSP IP/OBS MODERATE 35: CPT | Performed by: STUDENT IN AN ORGANIZED HEALTH CARE EDUCATION/TRAINING PROGRAM

## 2023-10-05 PROCEDURE — 2500000001 HC RX 250 WO HCPCS SELF ADMINISTERED DRUGS (ALT 637 FOR MEDICARE OP): Performed by: NURSE PRACTITIONER

## 2023-10-05 PROCEDURE — 2500000002 HC RX 250 W HCPCS SELF ADMINISTERED DRUGS (ALT 637 FOR MEDICARE OP, ALT 636 FOR OP/ED): Performed by: NURSE PRACTITIONER

## 2023-10-05 PROCEDURE — 96372 THER/PROPH/DIAG INJ SC/IM: CPT | Performed by: NURSE PRACTITIONER

## 2023-10-05 PROCEDURE — 85520 HEPARIN ASSAY: CPT | Performed by: NURSE PRACTITIONER

## 2023-10-05 PROCEDURE — 36415 COLL VENOUS BLD VENIPUNCTURE: CPT | Performed by: NURSE PRACTITIONER

## 2023-10-05 PROCEDURE — 82947 ASSAY GLUCOSE BLOOD QUANT: CPT

## 2023-10-05 PROCEDURE — 85730 THROMBOPLASTIN TIME PARTIAL: CPT | Performed by: NURSE PRACTITIONER

## 2023-10-05 RX ORDER — WARFARIN SODIUM 5 MG/1
5 TABLET ORAL DAILY
Status: DISCONTINUED | OUTPATIENT
Start: 2023-10-05 | End: 2023-10-06

## 2023-10-05 RX ADMIN — ROSUVASTATIN CALCIUM 40 MG: 40 TABLET, FILM COATED ORAL at 09:00

## 2023-10-05 RX ADMIN — INSULIN LISPRO 2 UNITS: 100 INJECTION, SOLUTION INTRAVENOUS; SUBCUTANEOUS at 21:01

## 2023-10-05 RX ADMIN — FLUOXETINE HYDROCHLORIDE 40 MG: 40 CAPSULE ORAL at 08:59

## 2023-10-05 RX ADMIN — METOPROLOL SUCCINATE 25 MG: 25 TABLET, EXTENDED RELEASE ORAL at 08:59

## 2023-10-05 RX ADMIN — INSULIN LISPRO 1 UNITS: 100 INJECTION, SOLUTION INTRAVENOUS; SUBCUTANEOUS at 08:00

## 2023-10-05 RX ADMIN — NICOTINE 7 MG/24 HR DAILY TRANSDERMAL PATCH 1 PATCH: at 09:00

## 2023-10-05 RX ADMIN — WARFARIN SODIUM 5 MG: 5 TABLET ORAL at 18:06

## 2023-10-05 RX ADMIN — AMLODIPINE BESYLATE 5 MG: 5 TABLET ORAL at 08:59

## 2023-10-05 RX ADMIN — PANTOPRAZOLE SODIUM 40 MG: 40 TABLET, DELAYED RELEASE ORAL at 06:10

## 2023-10-05 RX ADMIN — INSULIN LISPRO 1 UNITS: 100 INJECTION, SOLUTION INTRAVENOUS; SUBCUTANEOUS at 11:57

## 2023-10-05 RX ADMIN — ACETAMINOPHEN 650 MG: 325 TABLET, FILM COATED ORAL at 21:06

## 2023-10-05 RX ADMIN — LOSARTAN POTASSIUM 100 MG: 100 TABLET, FILM COATED ORAL at 08:59

## 2023-10-05 ASSESSMENT — PAIN SCALES - GENERAL
PAINLEVEL_OUTOF10: 0 - NO PAIN
PAINLEVEL_OUTOF10: 0 - NO PAIN
PAINLEVEL_OUTOF10: 5 - MODERATE PAIN

## 2023-10-05 ASSESSMENT — COGNITIVE AND FUNCTIONAL STATUS - GENERAL
MOBILITY SCORE: 24
MOBILITY SCORE: 24
DAILY ACTIVITIY SCORE: 24
DAILY ACTIVITIY SCORE: 24

## 2023-10-05 ASSESSMENT — PAIN - FUNCTIONAL ASSESSMENT: PAIN_FUNCTIONAL_ASSESSMENT: 0-10

## 2023-10-05 NOTE — PROGRESS NOTES
Subjective Data:  - Mechanical valve on heparin to coumadin bridge, pending therapeutic INR. Today's 1.8 (1.4).  -  Will continue 5mg Coumadin daily.   - Per CTS patient will rich repeat t/s as outpatient (in the outpatient lab) for upcoming procedure, outpatient t/s is good for 30 days. If patient is discharged on a day the lab is closed she will need to come back to have drawn after discharge. Will see if she is able to have outpatient order rec printed and sent to lab from floor.     Overnight Events:         Objective Data:  Last Recorded Vitals:  Vitals:    10/04/23 1716 10/04/23 2018 10/05/23 0131 10/05/23 0738   BP: 118/67 113/73 132/65 151/89   Pulse: 59 62 63 59   Resp: 18 17  18   Temp: 36.5 °C (97.7 °F) 36.4 °C (97.5 °F) 36.4 °C (97.5 °F) 36.6 °C (97.9 °F)   TempSrc:       SpO2: 96% 95% 94% 94%   Weight:       Height:            Last Labs:  Results for orders placed or performed during the hospital encounter of 09/28/23 (from the past 24 hour(s))   POCT GLUCOSE   Result Value Ref Range    POCT Glucose 239 (H) 74 - 99 mg/dL   APTT   Result Value Ref Range    aPTT 68 (H) 27 - 38 seconds   Heparin Assay   Result Value Ref Range    Heparin Unfractionated 0.4 See Comment Below for Therapeutic Ranges IU/mL   POCT GLUCOSE   Result Value Ref Range    POCT Glucose 221 (H) 74 - 99 mg/dL   POCT GLUCOSE   Result Value Ref Range    POCT Glucose 186 (H) 74 - 99 mg/dL   Heparin Assay   Result Value Ref Range    Heparin Unfractionated 0.3 See Comment Below for Therapeutic Ranges IU/mL   Heparin Assay   Result Value Ref Range    Heparin Unfractionated 0.5 See Comment Below for Therapeutic Ranges IU/mL   Coagulation Screen   Result Value Ref Range    Protime 20.2 (H) 9.8 - 12.8 seconds    INR 1.8 (H) 0.9 - 1.1    aPTT 81 (H) 27 - 38 seconds   Heparin Assay   Result Value Ref Range    Heparin Unfractionated 0.5 See Comment Below for Therapeutic Ranges IU/mL   POCT GLUCOSE   Result Value Ref Range    POCT Glucose 169 (H) 74 -  99 mg/dL           Last I/O:  I/O last 3 completed shifts:  In: 848 (10.4 mL/kg) [P.O.:840; I.V.:8 (0.1 mL/kg)]  Out: 1 (0 mL/kg) [Stool:1]  Weight: 81.2 kg     Stress Test:  Nuclear Stress Test 3/7/2023    [facility]  LM patent  % prox. 50-60% mid  Lcx small non dominant   RCA 90% prox, 40% mid      Inpatient Medications:  Scheduled medications   Medication Dose Route Frequency    amLODIPine  5 mg oral Daily    flu vacc [id number] (65yr up)-PF  0.7 mL intramuscular Once    FLUoxetine  40 mg oral Daily    insulin lispro  0-5 Units subcutaneous With meals & nightly    losartan  100 mg oral Daily    metoprolol succinate XL  25 mg oral Daily    nicotine  1 patch transdermal Daily    pantoprazole  40 mg oral Daily before breakfast    rosuvastatin  40 mg oral Daily     PRN medications   Medication    acetaminophen    albuterol    dextrose    glucagon    heparin    oxyCODONE    oxyCODONE     Continuous Medications   Medication Dose Last Rate    heparin  0-4,000 Units/hr 600 Units/hr (10/04/23 1200)       Physical Exam:  General: NAD, lying in bed  Skin: warm and dry   Head/ neck: no JVD seen at 90 degrees  Cardiac: RRR, S1, S2, mechanical valve click   Pulm: CTAB, room air   GI: soft, nontender   Extremities: no LE edema   Neuro: no focal neuro deficits   Psych: appropriate mod and behavior         Assessment/Plan     SHANEL ESCOBEDO is a 68 year old Female with PMH significant for aortic valve replacement in 2014 for bicuspid valve, HTN, DM, COPD, and brain aneurysm rupture who presents as a direct admit from the Cath Lab after pre-operative [facility] was aborted. Plan was for a radial approach however physician unable to pass guide wire, unable to attempt femoral approach secondary to INR 2.7. Pt is anticoagulated on coumadin for mechanical aortic valve and is being worked up by Dr. Hayden for ascending aortic aneurysm and possible repeat valve replacement. She was admitted to allow INR to drift down so we can initiate  heparin bridge and perform femoral approach [facility] on Monday 10/2.     Mechanical aortic valve  Ascending aortic aneurysm   - hx of bicuspid valve, s/p AVR 2014   - OP w/u with Catracho for possible aneurysm repair and valve replacement   - CT chest 3/2023 measured aneurysm at 5.3 x 5.2cm   - [facility] 10/2: LM patent, % prox. 50-60% mid, Lcx small non dominant, RCA 90% prox, 40% mid  - PFTs completed  - TSH =2.21; RwqZ0l=8.3  - CTS and Dr. Hayden notified of admission, not a formal consult   - Dr. Hyaden updated regarding [facility] results   - cont heparin gtt   - cont coumadin 5mg for now (home 3mg M/W/S/S, 4mg T/Th/F)   - today's INR 1.8 (1.4) (goal 2.5-3.5)   - Per CTS patient will rich repeat t/s as outpatient (in the outpatient lab) for upcoming procedure, outpatient t/s is good for 30 days. If patient is discharged on a day the lab is closed she will need to come back to have drawn after discharge. Will see if she is able to have outpatient order rec printed and sent to lab from floor.     HTN  - admit /86  - SBP range 120s-150s last 24 hours   - cont home amlodipine, losartan, metop     DM  - HgbA1c 8.3%  - hold home metformin   - accuchecks, SSI, hypoglycemia protocol  - diabetic diet     COPD  - no home 02, uses Trilogy PRN   - albuterol PRN while in hospital     Cognitive decline  hx SAH/ aneurysm rupture  - no current issues    Dispo  pt lives with daughter who help make sure pt takes meds  PT/OT rec no needs       Seen and discussed with Dr. Sanchez     Code Status:  Full code

## 2023-10-06 ENCOUNTER — APPOINTMENT (OUTPATIENT)
Dept: PRIMARY CARE | Facility: CLINIC | Age: 68
End: 2023-10-06
Payer: MEDICARE

## 2023-10-06 ENCOUNTER — APPOINTMENT (OUTPATIENT)
Dept: PREADMISSION TESTING | Facility: HOSPITAL | Age: 68
End: 2023-10-06
Payer: MEDICARE

## 2023-10-06 LAB
APTT PPP: 98 SECONDS (ref 27–38)
GLUCOSE BLD MANUAL STRIP-MCNC: 161 MG/DL (ref 74–99)
GLUCOSE BLD MANUAL STRIP-MCNC: 176 MG/DL (ref 74–99)
GLUCOSE BLD MANUAL STRIP-MCNC: 188 MG/DL (ref 74–99)
GLUCOSE BLD MANUAL STRIP-MCNC: 206 MG/DL (ref 74–99)
INR PPP: 2.3 (ref 0.9–1.1)
PROTHROMBIN TIME: 25.7 SECONDS (ref 9.8–12.8)
UFH PPP CHRO-ACNC: 0.5 IU/ML

## 2023-10-06 PROCEDURE — 2500000002 HC RX 250 W HCPCS SELF ADMINISTERED DRUGS (ALT 637 FOR MEDICARE OP, ALT 636 FOR OP/ED): Performed by: NURSE PRACTITIONER

## 2023-10-06 PROCEDURE — 2500000004 HC RX 250 GENERAL PHARMACY W/ HCPCS (ALT 636 FOR OP/ED): Performed by: NURSE PRACTITIONER

## 2023-10-06 PROCEDURE — 85610 PROTHROMBIN TIME: CPT | Performed by: NURSE PRACTITIONER

## 2023-10-06 PROCEDURE — 36415 COLL VENOUS BLD VENIPUNCTURE: CPT | Performed by: NURSE PRACTITIONER

## 2023-10-06 PROCEDURE — 1200000002 HC GENERAL ROOM WITH TELEMETRY DAILY

## 2023-10-06 PROCEDURE — 82947 ASSAY GLUCOSE BLOOD QUANT: CPT

## 2023-10-06 PROCEDURE — 96372 THER/PROPH/DIAG INJ SC/IM: CPT | Performed by: NURSE PRACTITIONER

## 2023-10-06 PROCEDURE — 85520 HEPARIN ASSAY: CPT | Performed by: NURSE PRACTITIONER

## 2023-10-06 PROCEDURE — 2500000001 HC RX 250 WO HCPCS SELF ADMINISTERED DRUGS (ALT 637 FOR MEDICARE OP): Performed by: NURSE PRACTITIONER

## 2023-10-06 PROCEDURE — 2500000001 HC RX 250 WO HCPCS SELF ADMINISTERED DRUGS (ALT 637 FOR MEDICARE OP)

## 2023-10-06 PROCEDURE — S4991 NICOTINE PATCH NONLEGEND: HCPCS | Performed by: NURSE PRACTITIONER

## 2023-10-06 PROCEDURE — 99232 SBSQ HOSP IP/OBS MODERATE 35: CPT | Performed by: STUDENT IN AN ORGANIZED HEALTH CARE EDUCATION/TRAINING PROGRAM

## 2023-10-06 RX ORDER — WARFARIN 3 MG/1
TABLET ORAL
Qty: 90 TABLET | Refills: 1
Start: 2023-10-06 | End: 2024-01-09 | Stop reason: SDUPTHER

## 2023-10-06 RX ORDER — WARFARIN 2 MG/1
4 TABLET ORAL DAILY
Status: DISCONTINUED | OUTPATIENT
Start: 2023-10-06 | End: 2023-10-07 | Stop reason: HOSPADM

## 2023-10-06 RX ADMIN — WARFARIN SODIUM 4 MG: 2 TABLET ORAL at 17:53

## 2023-10-06 RX ADMIN — INSULIN LISPRO 1 UNITS: 100 INJECTION, SOLUTION INTRAVENOUS; SUBCUTANEOUS at 13:08

## 2023-10-06 RX ADMIN — NICOTINE 7 MG/24 HR DAILY TRANSDERMAL PATCH 1 PATCH: at 08:55

## 2023-10-06 RX ADMIN — INSULIN LISPRO 1 UNITS: 100 INJECTION, SOLUTION INTRAVENOUS; SUBCUTANEOUS at 21:49

## 2023-10-06 RX ADMIN — AMLODIPINE BESYLATE 5 MG: 5 TABLET ORAL at 08:59

## 2023-10-06 RX ADMIN — METOPROLOL SUCCINATE 25 MG: 25 TABLET, EXTENDED RELEASE ORAL at 09:00

## 2023-10-06 RX ADMIN — LOSARTAN POTASSIUM 100 MG: 100 TABLET, FILM COATED ORAL at 09:00

## 2023-10-06 RX ADMIN — INSULIN LISPRO 1 UNITS: 100 INJECTION, SOLUTION INTRAVENOUS; SUBCUTANEOUS at 09:02

## 2023-10-06 RX ADMIN — PANTOPRAZOLE SODIUM 40 MG: 40 TABLET, DELAYED RELEASE ORAL at 06:18

## 2023-10-06 RX ADMIN — INSULIN LISPRO 2 UNITS: 100 INJECTION, SOLUTION INTRAVENOUS; SUBCUTANEOUS at 17:51

## 2023-10-06 RX ADMIN — ROSUVASTATIN CALCIUM 40 MG: 40 TABLET, FILM COATED ORAL at 09:00

## 2023-10-06 RX ADMIN — FLUOXETINE HYDROCHLORIDE 40 MG: 40 CAPSULE ORAL at 09:00

## 2023-10-06 ASSESSMENT — PAIN SCALES - GENERAL
PAINLEVEL_OUTOF10: 0 - NO PAIN

## 2023-10-06 ASSESSMENT — PAIN - FUNCTIONAL ASSESSMENT
PAIN_FUNCTIONAL_ASSESSMENT: 0-10

## 2023-10-06 NOTE — PROGRESS NOTES
Subjective Data:  Patient continues to feel well, anxiously awaiting discharge. Daughter updated at bedside, would prefer early discharge tomorrow    - Mechanical valve on heparin to coumadin bridge, pending therapeutic INR. Today's 2.3 (1.8, 1.4).  - decreasing this evening's Coumadin to 4mg   - anticipate early discharge tomorrow pending AM INR, to be drawn by night nursing staff for early result in AM  - redo AVR with Dr. Hayden scheduled 10/13-- preop lab work to be completed prior to discharge. Type and screen to be drawn as an outpatient    Overnight Events:    No acute events overnight     Objective Data:  Last Recorded Vitals:  Vitals:    10/05/23 2043 10/06/23 0057 10/06/23 0518 10/06/23 0825   BP: 124/57 142/65 146/78 143/81   BP Location:  Right arm Left arm    Patient Position:  Lying Lying    Pulse: 65 66 72 59   Resp: 18 18 18 18   Temp: 36.8 °C (98.2 °F) 36.3 °C (97.3 °F) 36.4 °C (97.5 °F) 36.5 °C (97.7 °F)   TempSrc: Temporal Temporal Temporal    SpO2: 92% 95% 92% 93%   Weight:   80.4 kg (177 lb 4 oz)    Height:            Last Labs:  Results for orders placed or performed during the hospital encounter of 09/28/23 (from the past 24 hour(s))   POCT GLUCOSE   Result Value Ref Range    POCT Glucose 146 (H) 74 - 99 mg/dL   POCT GLUCOSE   Result Value Ref Range    POCT Glucose 217 (H) 74 - 99 mg/dL   POCT GLUCOSE   Result Value Ref Range    POCT Glucose 161 (H) 74 - 99 mg/dL   Coagulation Screen   Result Value Ref Range    Protime 25.7 (H) 9.8 - 12.8 seconds    INR 2.3 (H) 0.9 - 1.1    aPTT 98 (H) 27 - 38 seconds   Heparin Assay   Result Value Ref Range    Heparin Unfractionated 0.5 See Comment Below for Therapeutic Ranges IU/mL   POCT GLUCOSE   Result Value Ref Range    POCT Glucose 188 (H) 74 - 99 mg/dL           Last I/O:  I/O last 3 completed shifts:  In: 300 (3.7 mL/kg) [P.O.:300]  Out: - (0 mL/kg)   Weight: 80.4 kg     Stress Test:  Nuclear Stress Test 3/7/2023    [facility]  LM patent  % prox.  50-60% mid  Lcx small non dominant   RCA 90% prox, 40% mid      Inpatient Medications:  Scheduled medications   Medication Dose Route Frequency    amLODIPine  5 mg oral Daily    flu vacc [id number] (65yr up)-PF  0.7 mL intramuscular Once    FLUoxetine  40 mg oral Daily    insulin lispro  0-5 Units subcutaneous With meals & nightly    losartan  100 mg oral Daily    metoprolol succinate XL  25 mg oral Daily    nicotine  1 patch transdermal Daily    pantoprazole  40 mg oral Daily before breakfast    rosuvastatin  40 mg oral Daily    warfarin  4 mg oral Daily     PRN medications   Medication    acetaminophen    albuterol    dextrose    glucagon    heparin    oxyCODONE    oxyCODONE     Continuous Medications   Medication Dose Last Rate    heparin  0-4,000 Units/hr 600 Units/hr (10/05/23 1200)       Physical Exam:  General: NAD, lying in bed  Skin: warm and dry   Head/ neck: no JVD seen at 90 degrees  Cardiac: RRR, S1, S2, mechanical valve click   Pulm: CTAB, room air   GI: soft, nontender   Extremities: no LE edema   Neuro: no focal neuro deficits   Psych: appropriate mod and behavior         Assessment/Plan     SHANEL ESCOBEDO is a 68 year old Female with PMH significant for aortic valve replacement in 2014 for bicuspid valve, HTN, DM, COPD, and brain aneurysm rupture who presents as a direct admit from the Cath Lab after pre-operative LHC was aborted. Plan was for a radial approach however physician unable to pass guide wire, unable to attempt femoral approach secondary to INR 2.7. Pt is anticoagulated on coumadin for mechanical aortic valve and is being worked up by Dr. Hayden for ascending aortic aneurysm and possible repeat valve replacement. She was admitted to allow INR to drift down so we can initiate heparin bridge and perform femoral approach LHC on Monday 10/2.     Mechanical aortic valve  Ascending aortic aneurysm   - hx of bicuspid valve, s/p AVR 2014   - OP w/u with Catracho for possible aneurysm repair and  valve replacement   - CT chest 3/2023 measured aneurysm at 5.3 x 5.2cm   - [facility] 10/2: LM patent, % prox. 50-60% mid, Lcx small non dominant, RCA 90% prox, 40% mid  - PFTs completed  - TSH =2.21; KmgV3l=9.3  - CTS and Dr. Hayden notified of admission, not a formal consult   - Dr. Hayden updated regarding [facility] results -- plan for redo AVR 10/13  - cont heparin gtt   - decreasing evening dose to 4mg (home 3mg M/W/Sat, 4.5mg Sun/T/Th/F)   - today's INR 2.3 (1.8, 1.4) (goal 2.5-3.5)   - Per CTS patient will rich repeat t/s as outpatient (in the outpatient lab) for upcoming procedure, outpatient t/s is good for 30 days. If patient is discharged on a day the lab is closed she will need to come back to have drawn after discharge. Will see if she is able to have outpatient order rec printed and sent to lab from floor.   - Dr. Pacheco to see patient today for preop evaluation    HTN  - admit /86  - SBP range 110s-140s last 24 hours   - cont home amlodipine, losartan, metop     DM  - HgbA1c 8.3%  - hold home metformin   - accuchecks, SSI, hypoglycemia protocol  - diabetic diet     COPD  - no home 02, uses Trilogy PRN   - albuterol PRN while in hospital     Cognitive decline  hx SAH/ aneurysm rupture  - no current issues    Dispo  pt lives with daughter who help make sure pt takes meds  PT/OT rec no needs   Likely early discharge tomorrow AM pending therapeutic INR    Seen and discussed with Dr. Sanchez     Code Status:  Full code

## 2023-10-07 ENCOUNTER — DOCUMENTATION (OUTPATIENT)
Dept: ANESTHESIOLOGY | Facility: HOSPITAL | Age: 68
End: 2023-10-07
Payer: MEDICARE

## 2023-10-07 VITALS
DIASTOLIC BLOOD PRESSURE: 92 MMHG | TEMPERATURE: 97.3 F | OXYGEN SATURATION: 93 % | HEART RATE: 70 BPM | RESPIRATION RATE: 16 BRPM | HEIGHT: 62 IN | WEIGHT: 178.13 LBS | SYSTOLIC BLOOD PRESSURE: 145 MMHG | BODY MASS INDEX: 32.78 KG/M2

## 2023-10-07 LAB
ALBUMIN SERPL BCP-MCNC: 4 G/DL (ref 3.4–5)
ALP SERPL-CCNC: 64 U/L (ref 33–136)
ALT SERPL W P-5'-P-CCNC: 36 U/L (ref 7–45)
ANION GAP SERPL CALC-SCNC: 18 MMOL/L (ref 10–20)
APTT PPP: 104 SECONDS (ref 27–38)
APTT PPP: 88 SECONDS (ref 27–38)
AST SERPL W P-5'-P-CCNC: 29 U/L (ref 9–39)
BILIRUB SERPL-MCNC: 0.5 MG/DL (ref 0–1.2)
BUN SERPL-MCNC: 23 MG/DL (ref 6–23)
CALCIUM SERPL-MCNC: 9.4 MG/DL (ref 8.6–10.6)
CHLORIDE SERPL-SCNC: 104 MMOL/L (ref 98–107)
CO2 SERPL-SCNC: 22 MMOL/L (ref 21–32)
CREAT SERPL-MCNC: 1.11 MG/DL (ref 0.5–1.05)
ERYTHROCYTE [DISTWIDTH] IN BLOOD BY AUTOMATED COUNT: 14.4 % (ref 11.5–14.5)
GFR SERPL CREATININE-BSD FRML MDRD: 55 ML/MIN/1.73M*2
GLUCOSE BLD MANUAL STRIP-MCNC: 175 MG/DL (ref 74–99)
GLUCOSE BLD MANUAL STRIP-MCNC: 191 MG/DL (ref 74–99)
GLUCOSE SERPL-MCNC: 185 MG/DL (ref 74–99)
HCT VFR BLD AUTO: 43 % (ref 36–46)
HGB BLD-MCNC: 13.3 G/DL (ref 12–16)
INR PPP: 2.3 (ref 0.9–1.1)
INR PPP: 2.6 (ref 0.9–1.1)
MCH RBC QN AUTO: 29.1 PG (ref 26–34)
MCHC RBC AUTO-ENTMCNC: 30.9 G/DL (ref 32–36)
MCV RBC AUTO: 94 FL (ref 80–100)
NRBC BLD-RTO: 0 /100 WBCS (ref 0–0)
PLATELET # BLD AUTO: 228 X10*3/UL (ref 150–450)
PMV BLD AUTO: 10.6 FL (ref 7.5–11.5)
POTASSIUM SERPL-SCNC: 4.2 MMOL/L (ref 3.5–5.3)
PROT SERPL-MCNC: 6.9 G/DL (ref 6.4–8.2)
PROTHROMBIN TIME: 26.7 SECONDS (ref 9.8–12.8)
PROTHROMBIN TIME: 29.6 SECONDS (ref 9.8–12.8)
RBC # BLD AUTO: 4.57 X10*6/UL (ref 4–5.2)
SODIUM SERPL-SCNC: 140 MMOL/L (ref 136–145)
UFH PPP CHRO-ACNC: 0.4 IU/ML
WBC # BLD AUTO: 8.7 X10*3/UL (ref 4.4–11.3)

## 2023-10-07 PROCEDURE — 80053 COMPREHEN METABOLIC PANEL: CPT

## 2023-10-07 PROCEDURE — 85027 COMPLETE CBC AUTOMATED: CPT

## 2023-10-07 PROCEDURE — 99255 IP/OBS CONSLTJ NEW/EST HI 80: CPT | Performed by: ANESTHESIOLOGY

## 2023-10-07 PROCEDURE — 85520 HEPARIN ASSAY: CPT | Performed by: NURSE PRACTITIONER

## 2023-10-07 PROCEDURE — S4991 NICOTINE PATCH NONLEGEND: HCPCS | Performed by: NURSE PRACTITIONER

## 2023-10-07 PROCEDURE — 85610 PROTHROMBIN TIME: CPT

## 2023-10-07 PROCEDURE — 2500000004 HC RX 250 GENERAL PHARMACY W/ HCPCS (ALT 636 FOR OP/ED): Performed by: NURSE PRACTITIONER

## 2023-10-07 PROCEDURE — 85730 THROMBOPLASTIN TIME PARTIAL: CPT

## 2023-10-07 PROCEDURE — 2500000001 HC RX 250 WO HCPCS SELF ADMINISTERED DRUGS (ALT 637 FOR MEDICARE OP)

## 2023-10-07 PROCEDURE — 2500000002 HC RX 250 W HCPCS SELF ADMINISTERED DRUGS (ALT 637 FOR MEDICARE OP, ALT 636 FOR OP/ED): Performed by: NURSE PRACTITIONER

## 2023-10-07 PROCEDURE — 36415 COLL VENOUS BLD VENIPUNCTURE: CPT

## 2023-10-07 PROCEDURE — 96372 THER/PROPH/DIAG INJ SC/IM: CPT | Performed by: NURSE PRACTITIONER

## 2023-10-07 PROCEDURE — 99239 HOSP IP/OBS DSCHRG MGMT >30: CPT | Performed by: INTERNAL MEDICINE

## 2023-10-07 PROCEDURE — 82947 ASSAY GLUCOSE BLOOD QUANT: CPT

## 2023-10-07 PROCEDURE — 2500000001 HC RX 250 WO HCPCS SELF ADMINISTERED DRUGS (ALT 637 FOR MEDICARE OP): Performed by: NURSE PRACTITIONER

## 2023-10-07 RX ORDER — HYDROXYZINE HYDROCHLORIDE 25 MG/1
50 TABLET, FILM COATED ORAL EVERY 6 HOURS PRN
Status: DISCONTINUED | OUTPATIENT
Start: 2023-10-07 | End: 2023-10-07 | Stop reason: HOSPADM

## 2023-10-07 RX ADMIN — FLUOXETINE HYDROCHLORIDE 40 MG: 40 CAPSULE ORAL at 08:51

## 2023-10-07 RX ADMIN — PANTOPRAZOLE SODIUM 40 MG: 40 TABLET, DELAYED RELEASE ORAL at 06:43

## 2023-10-07 RX ADMIN — ROSUVASTATIN CALCIUM 40 MG: 40 TABLET, FILM COATED ORAL at 08:50

## 2023-10-07 RX ADMIN — LOSARTAN POTASSIUM 100 MG: 100 TABLET, FILM COATED ORAL at 08:51

## 2023-10-07 RX ADMIN — AMLODIPINE BESYLATE 5 MG: 5 TABLET ORAL at 08:52

## 2023-10-07 RX ADMIN — HYDROXYZINE HYDROCHLORIDE 50 MG: 25 TABLET, FILM COATED ORAL at 08:51

## 2023-10-07 RX ADMIN — METOPROLOL SUCCINATE 25 MG: 25 TABLET, EXTENDED RELEASE ORAL at 08:52

## 2023-10-07 RX ADMIN — INSULIN LISPRO 1 UNITS: 100 INJECTION, SOLUTION INTRAVENOUS; SUBCUTANEOUS at 08:52

## 2023-10-07 RX ADMIN — NICOTINE 7 MG/24 HR DAILY TRANSDERMAL PATCH 1 PATCH: at 08:51

## 2023-10-07 ASSESSMENT — PAIN SCALES - GENERAL: PAINLEVEL_OUTOF10: 0 - NO PAIN

## 2023-10-07 NOTE — DISCHARGE SUMMARY
Discharge Diagnosis  Mechanical complication of heart valve prosthesis    Issues Requiring Follow-Up  Pending redo AVR with Dr. Hayden scheduled 10/13  Will require further DM education & medication optimization following AVR    Test Results Pending At Discharge  Pending Labs       Order Current Status    Coagulation Screen Collected (09/30/23 1450)    Heparin Assay Collected (09/30/23 1450)    Heparin Assay Collected (10/01/23 1452)    Magnesium Collected (09/30/23 1450)    Renal Function Panel Collected (09/30/23 1450)            Hospital Course  SHANEL ESCOBEDO is a 68 year old Female with PMH significant for aortic valve replacement in 2014 for bicuspid valve, HTN, DM, COPD, and brain aneurysm rupture who presents as a direct admit from the Cath Lab after pre-operative LHC was aborted. Plan was for a radial approach however physician unable to pass guide wire, unable to attempt femoral approach secondary to INR 2.7. Pt is anticoagulated on coumadin for mechanical aortic valve and is being worked up by Dr. Hayden for ascending aortic aneurysm and possible repeat valve replacement. Patient was not instructed to stop taking her coumadin prior to scheduled LHC. She was admitted to allow INR to drift down so we can initiate heparin bridge and perform femoral approach LHC.     Floor Course  LHC completed 10/2: LM patent, LAD 20% prox, 50-60% mid, LCx small nondominant, RCA dominant 90% prox, 40% mid    Redo AVR scheduled for 10/13. Preop orders outside of type and screen were collected prior to discharge. Patient instructed to get type and screen drawn next week prior to OR date at any  lab.  Dr. Pacheco saw patient while in house for preop evaluation. He noted that patient is scheduled for a bladder cystoscopy with Dr. Elise Gaming on 10/31. Team reached out to Dr. Gaming who noted that patient's cystoscopy is not urgent. Her suspicion for bladder cancer is low as the cystoscopy was scheduled to work up hematuria  as there has been no suspicious findings on imaging. Patient is okay to proceed with redo AVR 10/13. Will require further diabetes education and medication optimization following procedure.   PFTs completed while in house. She remained admitted until INR reached 2.5. Discharge INR 2.6.      Patient was instructed to stop taking her coumadin Tuesday (last dose being evening of 10/9) with anticipation of redo AVR and possible aneurysm repair with Dr. Hayden on 10/13.     Discharge weight: 80.8 kg    After all labs and VS were reviewed the decision was made that the patient was medically stable for discharge.  The patient was discharged in satisfactory condition.    More than 30 minutes were spent in coordinating patient discharge.      Home Medications     Medication List      CHANGE how you take these medications     warfarin 3 mg tablet; Commonly known as: Coumadin; Take as directed. If   you are unsure how to take this medication, talk to your nurse or doctor.;   Original instructions: Take 3 mg (1 tablet) Monday, Wednesday and Saturday   Take 4.5 mg (1.5 tablets) Sunday, Tuesday, Thursday and Friday; What   changed: additional instructions     CONTINUE taking these medications     acetaminophen 325 mg tablet; Commonly known as: Tylenol   amLODIPine 5 mg tablet; Commonly known as: Norvasc; Take 1 tablet (5 mg)   by mouth once daily.   FLUoxetine 40 mg capsule; Commonly known as: PROzac; Take 1 capsule (40   mg) by mouth once daily.   fluticasone-umeclidin-vilanter 100-62.5-25 mcg blister with device;   Commonly known as: TRELEGY-ELLIPTA   Gemtesa 75 mg tablet; Generic drug: vibegron   glucosamine sulfate 1,000 mg tablet   losartan 100 mg tablet; Commonly known as: Cozaar; Take 1 tablet (100   mg) by mouth once daily.   metFORMIN  mg 24 hr tablet; Commonly known as: Glucophage-XR; Take   1 tablet (500 mg) by mouth 2 times a day.   metoprolol succinate XL 25 mg 24 hr tablet; Commonly known as:   Toprol-XL; Take  1 tablet (25 mg) by mouth once daily.   omega-3 300-1,000 mg capsule; Commonly known as: Fish Oil   rosuvastatin 40 mg tablet; Commonly known as: Crestor; Take 1 tablet (40   mg) by mouth once daily.     STOP taking these medications     glipiZIDE 5 mg tablet; Commonly known as: Glucotrol       Outpatient Follow-Up  Future Appointments   Date Time Provider Department [place]   10/31/2023  3:30 PM Elise Gaming MD [id number] [place]   1/26/2024  1:30 PM Jo Catalan DO [id number] [facility]       Brent Navarro PA-C

## 2023-10-07 NOTE — CARE PLAN
The patient's goals for the shift include      The clinical goals for the shift include Would like to remain comfortable by end of shift.    Pt has denied pain this evening. Ambulating around room and in hallway. Therapeutic on heparin drip. Has remained free from fall and injury during the shift.

## 2023-10-07 NOTE — DISCHARGE INSTRUCTIONS
Please get your type and screen drawn at any  lab prior to your procedure.     You last dose of coumadin prior to your procedure will be the evening of 10/9, please stop taking your coumadin starting Tuesday 10/10 with anticipation of your procedure scheduled 10/13 with Dr. Hayden.

## 2023-10-07 NOTE — CONSULTS
Reason For Consult  68yr old lad admitted for enalrging Ascedning arotic aneurysm as noted on follow up CT Chest.    Perioperatice medicine consulted for Preop eval. She was due in [facility] but could not be seen as she was inpatient.    History Of Present Illness  Suze Najera is a 68 y.o. female presenting for enalrging Ascedning arotic aneurysm as noted on follow up CT Chest.    At [facility] ahs the following medicla issues    H/O AVR with Mechanical valve in 2010 on Coumadin  H/o Aneurysmal SAH treated in [facility] 2014  T2DM on Metformin alone recent HbA1C was 8.3%  COPD with normal PFT in 2017 and not O2 dependent  HTN.    She was admitted to allow INR to drift down initiated heparin bridge and perform femoral approach LHC.      LM patent,   LAD 20% prox, 50-60% mid,   LCx small nondominant,   RCA dominant 90% prox, 40% mid    Redo AVR is scheulded in Oct but primary team unsure if she is also going to have CABG given 2 vessel CAD on LHC.  On further questioning pt and her daughter mention that sheis scheduled for a bladder cystoscopy with Dr. Elise Gaming on 10/31. Team reached out to Dr. Gaming who noted that patient's cystoscopy is not urgent. Her suspicion for bladder cancer is low as the cystoscopy was scheduled to work up hematuria as there has been no suspicious findings on imaging.   PFTs completed while in house but no results available     Patient was instructed to stop taking her coumadin Tuesday (last dose being evening of 10/9) with anticipation of redo AVR and possible aneurysm repair with Dr. Hayden on 10/13.             Past Medical History  As above      Surgical History  She has a past surgical history that includes Cardiac surgery (01/09/2017); Other surgical history (03/13/2017); Other surgical history (01/28/2019); Aortic valve replacement (04/20/2017); Tubal ligation (03/13/2017); Other surgical history (03/13/2017); and Cardiac catheterization (N/A, 10/2/2023).     Social  "History  She reports that she quit smoking about 3 months ago. Her smoking use included cigarettes. She has a 51.00 pack-year smoking history. She has been exposed to tobacco smoke. She has never used smokeless tobacco. She reports current alcohol use. She reports that she does not use drugs.    Family History  Family History   Problem Relation Name Age of Onset    Alzheimer's disease Mother      Breast cancer Mother          age 52    Other (Brain tumor) Father      Breast cancer Mother's Sister          Allergies  Acetazolamide, Ceftizoxime, Cefuroxime, Erythromycin, Ibuprofen, Penicillins, and Sulfa (sulfonamide antibiotics)    Review of Systems  Review of Systems - Neg for other symptoms     Physical Exam  AOX 3  No pedal edema, no pallor, no icterus   SIS2 prosthetic S2 sound+  Lungs clear without wheeze  Abd soft nontender  Sternotomy scar+ , no carotid bruits     Last Recorded Vitals  Blood pressure (!) 145/92, pulse 70, temperature 36.3 °C (97.3 °F), resp. rate 16, height 1.574 m (5' 1.97\"), weight 80.8 kg (178 lb 2.1 oz), SpO2 93 %.    Relevant Results       Assessment/Plan     Surgery- Redo AVR + or - CABG  Tming- Elective  On my eval she does not have any evidence of ACS/Acute CHF/Fatal arrhythmias/ Severe valvualr disease  Recent Echo with normal EF and NO WALL MOTION ABN.  [facility] with atleast 2 vessel disease  No prior problems with GETA or bleeding diathesis during prior AVR in 2010  On furtehr communication with her Urologist, the Primary team confirmed that the Urologist has low suspicion for bladder Ca and that t should proceed for Cardiac surgery    Pt is optimal to proceed fr Redo AVR based on my eval at this time    Danny Pacheco MD    "

## 2023-10-10 ENCOUNTER — TELEPHONE (OUTPATIENT)
Dept: CARDIOLOGY | Facility: CLINIC | Age: 68
End: 2023-10-10
Payer: MEDICARE

## 2023-10-10 ENCOUNTER — TELEPHONE (OUTPATIENT)
Dept: PRIMARY CARE | Facility: CLINIC | Age: 68
End: 2023-10-10
Payer: MEDICARE

## 2023-10-10 NOTE — TELEPHONE ENCOUNTER
Patient is calling in and states that she had just tested her glucose and it is 398. Patient states that she had a full bagel with strawberry cream cheese along with ensure (19 grams of sugar but had a 1/4 of it) about an hour ago. Patient was recently discharged from the hospital a few days ago and just restarted the metformin and gipizide when she got home. Please advise

## 2023-10-10 NOTE — TELEPHONE ENCOUNTER
Pt called and confirmed that she is have AVR redo in three days 10/13/23 and is holding warfarin.  She expects a five day admission and she or daughter Veronica will call [facility] next week with update.

## 2023-10-10 NOTE — TELEPHONE ENCOUNTER
Pt informed info not having any of the mentioned symptoms and if she does she will go to the ER. She did start back on her Metformin and she has heart surgery scheduled Dr Livingston on Friday.

## 2023-10-10 NOTE — TELEPHONE ENCOUNTER
Needs hospital follow up scheduled    Restart her home diabetes medications     Unless having vomiting, abdominal pain, confusion, does not need to go to ER

## 2023-10-12 ASSESSMENT — ENCOUNTER SYMPTOMS
MUSCULOSKELETAL NEGATIVE: 1
RESPIRATORY NEGATIVE: 1
ENDOCRINE NEGATIVE: 1
HEMATOLOGIC/LYMPHATIC NEGATIVE: 1
EYES NEGATIVE: 1
GASTROINTESTINAL NEGATIVE: 1
CONSTITUTIONAL NEGATIVE: 1
PSYCHIATRIC NEGATIVE: 1
ALLERGIC/IMMUNOLOGIC NEGATIVE: 1
NEUROLOGICAL NEGATIVE: 1

## 2023-10-12 NOTE — H&P
History Of Present Illness  Suze Najera is a 68 y.o. female presenting with Ascending Aortic aneurism.     Past Medical History  Past Medical History:   Diagnosis Date    Bicuspid aortic valve 08/02/2018    Concussion with loss of consciousness status unknown, initial encounter 08/10/2018    Closed head injury with concussion    Conjunctival hemorrhage, left eye 01/04/2021    Subconjunctival hemorrhage of left eye    History of colonoscopy 01/10/2017    1/10/17 Dr. Donaldson-Normal colonoscopy     History of mammogram 09/09/2023 9/9/23 neg    Major depressive disorder, single episode, moderate (CMS/HCC) 12/23/2019    Depression, major, single episode, moderate    Nicotine dependence 03/18/2023    Nontraumatic subarachnoid hemorrhage, unspecified (CMS/HCC) 09/20/2017    Subarachnoid hemorrhage    Other amnesia 10/18/2017    Memory change    Other hypersomnia 11/13/2017    Excessive daytime sleepiness    Other specified disorders of adrenal gland (CMS/[facility]) 06/15/2017    Adrenal hyperplasia    Personal history of (corrected) 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     History of bicuspid aortic valve    Personal history of COVID-19 04/22/2021    History of severe acute respiratory syndrome coronavirus 2 (SARS-CoV-2) disease    Personal history of diseases of the skin and subcutaneous tissue 06/26/2017    History of cellulitis    Personal history of malignant melanoma of skin 01/18/2018    History of malignant melanoma    Personal history of other benign neoplasm 08/21/2018    History of meningioma    Personal history of other diseases of the circulatory system     History of intracranial hemorrhage    Personal history of other diseases of urinary system 01/04/2021    History of hematuria    Personal history of other diseases of urinary system 01/26/2018    History of hematuria    Personal history of other infectious and parasitic diseases 05/21/2022    History of Clostridioides difficile infection     Personal history of other specified conditions 12/15/2022    History of seizure    Personal history of transient ischemic attack (TIA), and cerebral infarction without residual deficits 01/18/2018    History of stroke    Personal history of urinary (tract) infections 03/27/2018    History of urinary tract infection    Polycythemia 03/18/2023    Heme- Dr. Anne     Screening for cervical cancer 07/29/2020 7/29/20 NILM HPV neg    Unspecified speech disturbances 09/20/2017    Transient speech disturbance    Urinary tract infection, site not specified 09/29/2020    Acute UTI       Surgical History  Past Surgical History:   Procedure Laterality Date    AORTIC VALVE REPLACEMENT  04/20/2017    Aortic Valve Replacement    CARDIAC CATHETERIZATION N/A 10/2/2023    Procedure: Left Heart Cath;  Surgeon: Ramy Rebolledo MD;  Location: [place] [address] Cardiac Cath Lab;  Service: Cardiovascular;  Laterality: N/A;  was unable to thread radial on 9/29 so need today,  on heparin gtt.    CARDIAC SURGERY  01/09/2017    Heart Surgery    OTHER SURGICAL HISTORY  03/13/2017    Brain Surgery    OTHER SURGICAL HISTORY  01/28/2019    Trigger finger repair    OTHER SURGICAL HISTORY  03/13/2017    Inferior Vena Cava Filter Placement W/ Fluorosc Angiogr Guidance    TUBAL LIGATION  03/13/2017    Tubal Ligation        Social History  She reports that she quit smoking about 3 months ago. Her smoking use included cigarettes. She has a 51.00 pack-year smoking history. She has been exposed to tobacco smoke. She has never used smokeless tobacco. She reports current alcohol use. She reports that she does not use drugs.    Family History  Family History   Problem Relation Name Age of Onset    Alzheimer's disease Mother      Breast cancer Mother          age 52    Other (Brain tumor) Father      Breast cancer Mother's Sister          Allergies  Acetazolamide, Ceftizoxime, Cefuroxime, Erythromycin, Ibuprofen, Penicillins, and Sulfa (sulfonamide  antibiotics)    Review of Systems   Constitutional: Negative.    HENT: Negative.     Eyes: Negative.    Respiratory: Negative.     Gastrointestinal: Negative.    Endocrine: Negative.    Genitourinary: Negative.    Musculoskeletal: Negative.    Skin: Negative.    Allergic/Immunologic: Negative.    Neurological: Negative.    Hematological: Negative.    Psychiatric/Behavioral: Negative.     All other systems reviewed and are negative.       Physical Exam  Vitals and nursing note reviewed.   Constitutional:       Appearance: She is normal weight.   HENT:      Head: Normocephalic and atraumatic.      Nose: Nose normal.      Mouth/Throat:      Mouth: Mucous membranes are moist.   Eyes:      Pupils: Pupils are equal, round, and reactive to light.   Pulmonary:      Effort: Pulmonary effort is normal.   Abdominal:      General: Abdomen is flat. Bowel sounds are normal.      Palpations: Abdomen is soft.   Musculoskeletal:         General: Normal range of motion.      Cervical back: Normal range of motion.   Skin:     General: Skin is warm.      Capillary Refill: Capillary refill takes less than 2 seconds.   Neurological:      General: No focal deficit present.      Mental Status: She is alert.   Psychiatric:         Mood and Affect: Mood normal.          Last Recorded Vitals  There were no vitals taken for this visit.    Relevant Results             Assessment/Plan   Active Problems:  There are no active Hospital Problems.             I spent 10 minutes in the professional and overall care of this patient.      Christopher Hayden MD

## 2023-10-13 ENCOUNTER — ANESTHESIA (OUTPATIENT)
Dept: OPERATING ROOM | Facility: HOSPITAL | Age: 68
DRG: 220 | End: 2023-10-13
Payer: MEDICARE

## 2023-10-13 ENCOUNTER — ANESTHESIA EVENT (OUTPATIENT)
Dept: OPERATING ROOM | Facility: HOSPITAL | Age: 68
DRG: 220 | End: 2023-10-13
Payer: MEDICARE

## 2023-10-13 ENCOUNTER — APPOINTMENT (OUTPATIENT)
Dept: RADIOLOGY | Facility: HOSPITAL | Age: 68
DRG: 220 | End: 2023-10-13
Payer: MEDICARE

## 2023-10-13 ENCOUNTER — HOSPITAL ENCOUNTER (INPATIENT)
Facility: HOSPITAL | Age: 68
LOS: 5 days | Discharge: HOME | DRG: 220 | End: 2023-10-18
Attending: THORACIC SURGERY (CARDIOTHORACIC VASCULAR SURGERY) | Admitting: NURSE PRACTITIONER
Payer: MEDICARE

## 2023-10-13 ENCOUNTER — APPOINTMENT (OUTPATIENT)
Dept: OPERATING ROOM | Facility: HOSPITAL | Age: 68
DRG: 220 | End: 2023-10-13
Payer: MEDICARE

## 2023-10-13 DIAGNOSIS — I71.20 THORACIC AORTIC ANEURYSM (CMS-HCC): ICD-10-CM

## 2023-10-13 DIAGNOSIS — I10 BENIGN ESSENTIAL HYPERTENSION: ICD-10-CM

## 2023-10-13 DIAGNOSIS — I71.21 ANEURYSM OF ASCENDING AORTA WITHOUT RUPTURE (CMS-HCC): ICD-10-CM

## 2023-10-13 DIAGNOSIS — Z95.2 AORTIC VALVE REPLACED: ICD-10-CM

## 2023-10-13 DIAGNOSIS — Z95.1 S/P CABG X 1: ICD-10-CM

## 2023-10-13 DIAGNOSIS — Z95.828 S/P ASCENDING AORTIC REPLACEMENT: Primary | ICD-10-CM

## 2023-10-13 DIAGNOSIS — R26.81 UNSTEADY GAIT: ICD-10-CM

## 2023-10-13 DIAGNOSIS — I25.811 ATHEROSCLEROSIS OF NATIVE CORONARY ARTERY OF TRANSPLANTED HEART WITHOUT ANGINA PECTORIS: ICD-10-CM

## 2023-10-13 LAB
ABO GROUP (TYPE) IN BLOOD: NORMAL
ABO GROUP (TYPE) IN BLOOD: NORMAL
ALBUMIN SERPL BCP-MCNC: 3.2 G/DL (ref 3.4–5)
ANION GAP BLDA CALCULATED.4IONS-SCNC: 10 MMO/L (ref 10–25)
ANION GAP BLDA CALCULATED.4IONS-SCNC: 12 MMO/L (ref 10–25)
ANION GAP BLDA CALCULATED.4IONS-SCNC: 12 MMO/L (ref 10–25)
ANION GAP BLDA CALCULATED.4IONS-SCNC: 13 MMO/L (ref 10–25)
ANION GAP BLDA CALCULATED.4IONS-SCNC: 14 MMO/L (ref 10–25)
ANION GAP BLDA CALCULATED.4IONS-SCNC: 7 MMO/L (ref 10–25)
ANION GAP BLDA CALCULATED.4IONS-SCNC: 8 MMO/L (ref 10–25)
ANION GAP BLDA CALCULATED.4IONS-SCNC: 9 MMO/L (ref 10–25)
ANION GAP BLDA CALCULATED.4IONS-SCNC: 9 MMO/L (ref 10–25)
ANION GAP SERPL CALC-SCNC: 19 MMOL/L (ref 10–20)
ANTIBODY SCREEN: NORMAL
APTT PPP: 25 SECONDS (ref 27–38)
BASE EXCESS BLDA CALC-SCNC: -0.1 MMOL/L (ref -2–3)
BASE EXCESS BLDA CALC-SCNC: -0.2 MMOL/L (ref -2–3)
BASE EXCESS BLDA CALC-SCNC: -0.4 MMOL/L (ref -2–3)
BASE EXCESS BLDA CALC-SCNC: -0.5 MMOL/L (ref -2–3)
BASE EXCESS BLDA CALC-SCNC: -0.5 MMOL/L (ref -2–3)
BASE EXCESS BLDA CALC-SCNC: -2.1 MMOL/L (ref -2–3)
BASE EXCESS BLDA CALC-SCNC: -2.1 MMOL/L (ref -2–3)
BASE EXCESS BLDA CALC-SCNC: -3 MMOL/L (ref -2–3)
BASE EXCESS BLDA CALC-SCNC: 0.1 MMOL/L (ref -2–3)
BASE EXCESS BLDA CALC-SCNC: 0.3 MMOL/L (ref -2–3)
BASE EXCESS BLDA CALC-SCNC: 0.8 MMOL/L (ref -2–3)
BASE EXCESS BLDA CALC-SCNC: 1.1 MMOL/L (ref -2–3)
BASE EXCESS BLDA CALC-SCNC: 1.4 MMOL/L (ref -2–3)
BASE EXCESS BLDA CALC-SCNC: 3.6 MMOL/L (ref -2–3)
BLOOD EXPIRATION DATE: NORMAL
BLOOD EXPIRATION DATE: NORMAL
BODY TEMPERATURE: 37 DEGREES CELSIUS
BUN SERPL-MCNC: 21 MG/DL (ref 6–23)
CA-I BLD-SCNC: 1.15 MMOL/L (ref 1.1–1.33)
CA-I BLDA-SCNC: 0.94 MMOL/L (ref 1.1–1.33)
CA-I BLDA-SCNC: 0.94 MMOL/L (ref 1.1–1.33)
CA-I BLDA-SCNC: 0.95 MMOL/L (ref 1.1–1.33)
CA-I BLDA-SCNC: 0.95 MMOL/L (ref 1.1–1.33)
CA-I BLDA-SCNC: 0.97 MMOL/L (ref 1.1–1.33)
CA-I BLDA-SCNC: 1.12 MMOL/L (ref 1.1–1.33)
CA-I BLDA-SCNC: 1.14 MMOL/L (ref 1.1–1.33)
CA-I BLDA-SCNC: 1.15 MMOL/L (ref 1.1–1.33)
CA-I BLDA-SCNC: 1.17 MMOL/L (ref 1.1–1.33)
CA-I BLDA-SCNC: 1.22 MMOL/L (ref 1.1–1.33)
CA-I BLDA-SCNC: 1.55 MMOL/L (ref 1.1–1.33)
CALCIUM SERPL-MCNC: 8.8 MG/DL (ref 8.6–10.6)
CFT BLD TEG: 1 MIN (ref 0.8–2.1)
CFT BLD TEG: 2.6 MIN (ref 0.8–2.1)
CHLORIDE BLDA-SCNC: 101 MMOL/L (ref 98–107)
CHLORIDE BLDA-SCNC: 102 MMOL/L (ref 98–107)
CHLORIDE BLDA-SCNC: 103 MMOL/L (ref 98–107)
CHLORIDE BLDA-SCNC: 103 MMOL/L (ref 98–107)
CHLORIDE BLDA-SCNC: 104 MMOL/L (ref 98–107)
CHLORIDE BLDA-SCNC: 105 MMOL/L (ref 98–107)
CHLORIDE BLDA-SCNC: 105 MMOL/L (ref 98–107)
CHLORIDE BLDA-SCNC: 106 MMOL/L (ref 98–107)
CHLORIDE BLDA-SCNC: 107 MMOL/L (ref 98–107)
CHLORIDE BLDA-SCNC: 109 MMOL/L (ref 98–107)
CHLORIDE SERPL-SCNC: 104 MMOL/L (ref 98–107)
CLOT ANGLE BLD TEG: 52 DEG (ref 63–78)
CLOT ANGLE BLD TEG: 75 DEG (ref 63–78)
CLOT INIT BLD TEG: 12.7 MIN (ref 4.6–9.1)
CLOT INIT BLD TEG: 2.8 MIN (ref 4.6–9.1)
CLOT INIT P HPASE BLD TEG: 12.1 MIN (ref 4.3–8.3)
CLOT INIT P HPASE BLD TEG: 2.6 MIN (ref 4.3–8.3)
CO2 SERPL-SCNC: 23 MMOL/L (ref 21–32)
COHGB MFR BLDA: 0.9 %
COHGB MFR BLDA: 0.9 %
COHGB MFR BLDA: 1 %
COHGB MFR BLDA: 1.1 %
COHGB MFR BLDA: 1.2 %
COHGB MFR BLDA: 1.2 %
COHGB MFR BLDA: 1.3 %
COHGB MFR BLDA: 1.4 %
COHGB MFR BLDA: 1.5 %
COHGB MFR BLDA: 1.7 %
CREAT SERPL-MCNC: 1.08 MG/DL (ref 0.5–1.05)
DISPENSE STATUS: NORMAL
DISPENSE STATUS: NORMAL
DO-HGB MFR BLDA: 0 % (ref 0–5)
DO-HGB MFR BLDA: 0 % (ref 0–5)
DO-HGB MFR BLDA: 0.1 % (ref 0–5)
DO-HGB MFR BLDA: 0.2 % (ref 0–5)
DO-HGB MFR BLDA: 0.2 % (ref 0–5)
DO-HGB MFR BLDA: 0.4 % (ref 0–5)
DO-HGB MFR BLDA: 0.5 % (ref 0–5)
DO-HGB MFR BLDA: 0.5 % (ref 0–5)
DO-HGB MFR BLDA: 0.6 % (ref 0–5)
DO-HGB MFR BLDA: 0.9 % (ref 0–5)
ERYTHROCYTE [DISTWIDTH] IN BLOOD BY AUTOMATED COUNT: 15.1 % (ref 11.5–14.5)
FIBRINOGEN BLD CALC-MCNC: 250 MG/DL (ref 278–581)
FIBRINOGEN BLD CALC-MCNC: 440 MG/DL (ref 278–581)
FIBRINOGEN PPP-MCNC: 187 MG/DL (ref 200–400)
GFR SERPL CREATININE-BSD FRML MDRD: 56 ML/MIN/1.73M*2
GLUCOSE BLD MANUAL STRIP-MCNC: 183 MG/DL (ref 74–99)
GLUCOSE BLD MANUAL STRIP-MCNC: 183 MG/DL (ref 74–99)
GLUCOSE BLD MANUAL STRIP-MCNC: 207 MG/DL (ref 74–99)
GLUCOSE BLDA-MCNC: 136 MG/DL (ref 74–99)
GLUCOSE BLDA-MCNC: 138 MG/DL (ref 74–99)
GLUCOSE BLDA-MCNC: 141 MG/DL (ref 74–99)
GLUCOSE BLDA-MCNC: 154 MG/DL (ref 74–99)
GLUCOSE BLDA-MCNC: 157 MG/DL (ref 74–99)
GLUCOSE BLDA-MCNC: 161 MG/DL (ref 74–99)
GLUCOSE BLDA-MCNC: 166 MG/DL (ref 74–99)
GLUCOSE BLDA-MCNC: 170 MG/DL (ref 74–99)
GLUCOSE BLDA-MCNC: 175 MG/DL (ref 74–99)
GLUCOSE BLDA-MCNC: 201 MG/DL (ref 74–99)
GLUCOSE BLDA-MCNC: 214 MG/DL (ref 74–99)
GLUCOSE BLDA-MCNC: 220 MG/DL (ref 74–99)
GLUCOSE BLDA-MCNC: 236 MG/DL (ref 74–99)
GLUCOSE BLDA-MCNC: 243 MG/DL (ref 74–99)
GLUCOSE SERPL-MCNC: 233 MG/DL (ref 74–99)
HCO3 BLDA-SCNC: 23.7 MMOL/L (ref 22–26)
HCO3 BLDA-SCNC: 24 MMOL/L (ref 22–26)
HCO3 BLDA-SCNC: 24.6 MMOL/L (ref 22–26)
HCO3 BLDA-SCNC: 24.6 MMOL/L (ref 22–26)
HCO3 BLDA-SCNC: 24.8 MMOL/L (ref 22–26)
HCO3 BLDA-SCNC: 24.9 MMOL/L (ref 22–26)
HCO3 BLDA-SCNC: 25.4 MMOL/L (ref 22–26)
HCO3 BLDA-SCNC: 25.9 MMOL/L (ref 22–26)
HCO3 BLDA-SCNC: 26 MMOL/L (ref 22–26)
HCO3 BLDA-SCNC: 26.3 MMOL/L (ref 22–26)
HCO3 BLDA-SCNC: 26.6 MMOL/L (ref 22–26)
HCO3 BLDA-SCNC: 26.6 MMOL/L (ref 22–26)
HCO3 BLDA-SCNC: 27.3 MMOL/L (ref 22–26)
HCO3 BLDA-SCNC: 28.5 MMOL/L (ref 22–26)
HCT VFR BLD AUTO: 32.9 % (ref 36–46)
HCT VFR BLD EST: 26 % (ref 36–46)
HCT VFR BLD EST: 27 % (ref 36–46)
HCT VFR BLD EST: 29 % (ref 36–46)
HCT VFR BLD EST: 30 % (ref 36–46)
HCT VFR BLD EST: 31 % (ref 36–46)
HCT VFR BLD EST: 32 % (ref 36–46)
HCT VFR BLD EST: 32 % (ref 36–46)
HCT VFR BLD EST: 34 % (ref 36–46)
HCT VFR BLD EST: 36 % (ref 36–46)
HCT VFR BLD EST: 38 % (ref 36–46)
HGB BLD-MCNC: 10.8 G/DL (ref 12–16)
HGB BLDA-MCNC: 10 G/DL (ref 12–16)
HGB BLDA-MCNC: 10.2 G/DL (ref 12–16)
HGB BLDA-MCNC: 10.2 G/DL (ref 12–16)
HGB BLDA-MCNC: 10.3 G/DL (ref 12–16)
HGB BLDA-MCNC: 10.5 G/DL (ref 12–16)
HGB BLDA-MCNC: 10.6 G/DL (ref 12–16)
HGB BLDA-MCNC: 10.6 G/DL (ref 12–16)
HGB BLDA-MCNC: 11.4 G/DL (ref 12–16)
HGB BLDA-MCNC: 12.1 G/DL (ref 12–16)
HGB BLDA-MCNC: 12.1 G/DL (ref 12–16)
HGB BLDA-MCNC: 12.8 G/DL (ref 12–16)
HGB BLDA-MCNC: 12.8 G/DL (ref 12–16)
HGB BLDA-MCNC: 8.5 G/DL (ref 12–16)
HGB BLDA-MCNC: 8.5 G/DL (ref 12–16)
HGB BLDA-MCNC: 9.1 G/DL (ref 12–16)
HGB BLDA-MCNC: 9.1 G/DL (ref 12–16)
HGB BLDA-MCNC: 9.5 G/DL (ref 12–16)
HGB BLDA-MCNC: 9.5 G/DL (ref 12–16)
INHALED O2 CONCENTRATION: 100 %
INHALED O2 CONCENTRATION: 21 %
INHALED O2 CONCENTRATION: 40 %
INHALED O2 CONCENTRATION: 50 %
INHALED O2 CONCENTRATION: 50 %
INHALED O2 CONCENTRATION: 60 %
INHALED O2 CONCENTRATION: 60 %
INHALED O2 CONCENTRATION: 80 %
INR PPP: 1.3 (ref 0.9–1.1)
LACTATE BLDA-SCNC: 1.2 MMOL/L (ref 0.4–2)
LACTATE BLDA-SCNC: 1.3 MMOL/L (ref 0.4–2)
LACTATE BLDA-SCNC: 1.4 MMOL/L (ref 0.4–2)
LACTATE BLDA-SCNC: 1.6 MMOL/L (ref 0.4–2)
LACTATE BLDA-SCNC: 2.2 MMOL/L (ref 0.4–2)
LACTATE BLDA-SCNC: 2.4 MMOL/L (ref 0.4–2)
LACTATE BLDA-SCNC: 3.4 MMOL/L (ref 0.4–2)
LACTATE BLDA-SCNC: 3.4 MMOL/L (ref 0.4–2)
LACTATE BLDA-SCNC: 3.8 MMOL/L (ref 0.4–2)
LACTATE BLDA-SCNC: 4.2 MMOL/L (ref 0.4–2)
LACTATE BLDA-SCNC: 4.9 MMOL/L (ref 0.4–2)
LACTATE BLDA-SCNC: 5.3 MMOL/L (ref 0.4–2)
MAGNESIUM SERPL-MCNC: 3.04 MG/DL (ref 1.6–2.4)
MCF BLD TEG: 14 MM (ref 15–32)
MCF BLD TEG: 24 MM (ref 15–32)
MCF BLD TEG: 42 MM (ref 52–70)
MCF BLD TEG: 47 MM (ref 52–69)
MCF BLD TEG: 65 MM (ref 52–70)
MCF BLD TEG: 66 MM (ref 52–69)
MCH RBC QN AUTO: 28.7 PG (ref 26–34)
MCHC RBC AUTO-ENTMCNC: 32.8 G/DL (ref 32–36)
MCV RBC AUTO: 88 FL (ref 80–100)
METHGB MFR BLDA: 0.6 % (ref 0–1.5)
METHGB MFR BLDA: 0.7 % (ref 0–1.5)
METHGB MFR BLDA: 0.9 % (ref 0–1.5)
METHGB MFR BLDA: 0.9 % (ref 0–1.5)
METHGB MFR BLDA: 1 % (ref 0–1.5)
METHGB MFR BLDA: 1.1 % (ref 0–1.5)
METHGB MFR BLDA: 1.2 % (ref 0–1.5)
METHGB MFR BLDA: 1.2 % (ref 0–1.5)
NRBC BLD-RTO: 0 /100 WBCS (ref 0–0)
OXYHGB MFR BLDA: 95.7 % (ref 94–98)
OXYHGB MFR BLDA: 96.1 % (ref 94–98)
OXYHGB MFR BLDA: 96.3 % (ref 94–98)
OXYHGB MFR BLDA: 96.8 % (ref 94–98)
OXYHGB MFR BLDA: 96.8 % (ref 94–98)
OXYHGB MFR BLDA: 97 % (ref 94–98)
OXYHGB MFR BLDA: 97.2 % (ref 94–98)
OXYHGB MFR BLDA: 97.2 % (ref 94–98)
OXYHGB MFR BLDA: 97.3 % (ref 94–98)
OXYHGB MFR BLDA: 97.3 % (ref 94–98)
OXYHGB MFR BLDA: 97.4 % (ref 94–98)
OXYHGB MFR BLDA: 97.4 % (ref 94–98)
OXYHGB MFR BLDA: 97.5 % (ref 94–98)
OXYHGB MFR BLDA: 97.6 % (ref 94–98)
OXYHGB MFR BLDA: 97.6 % (ref 94–98)
OXYHGB MFR BLDA: 97.7 % (ref 94–98)
OXYHGB MFR BLDA: 97.8 % (ref 94–98)
OXYHGB MFR BLDA: 97.8 % (ref 94–98)
PCO2 BLDA: 38 MM HG (ref 38–42)
PCO2 BLDA: 42 MM HG (ref 38–42)
PCO2 BLDA: 43 MM HG (ref 38–42)
PCO2 BLDA: 43 MM HG (ref 38–42)
PCO2 BLDA: 44 MM HG (ref 38–42)
PCO2 BLDA: 45 MM HG (ref 38–42)
PCO2 BLDA: 46 MM HG (ref 38–42)
PCO2 BLDA: 47 MM HG (ref 38–42)
PCO2 BLDA: 50 MM HG (ref 38–42)
PCO2 BLDA: 51 MM HG (ref 38–42)
PCO2 BLDA: 51 MM HG (ref 38–42)
PCO2 BLDA: 58 MM HG (ref 38–42)
PH BLDA: 7.28 PH (ref 7.38–7.42)
PH BLDA: 7.28 PH (ref 7.38–7.42)
PH BLDA: 7.3 PH (ref 7.38–7.42)
PH BLDA: 7.32 PH (ref 7.38–7.42)
PH BLDA: 7.34 PH (ref 7.38–7.42)
PH BLDA: 7.35 PH (ref 7.38–7.42)
PH BLDA: 7.35 PH (ref 7.38–7.42)
PH BLDA: 7.37 PH (ref 7.38–7.42)
PH BLDA: 7.38 PH (ref 7.38–7.42)
PH BLDA: 7.38 PH (ref 7.38–7.42)
PH BLDA: 7.39 PH (ref 7.38–7.42)
PH BLDA: 7.39 PH (ref 7.38–7.42)
PH BLDA: 7.42 PH (ref 7.38–7.42)
PH BLDA: 7.42 PH (ref 7.38–7.42)
PHOSPHATE SERPL-MCNC: 4.2 MG/DL (ref 2.5–4.9)
PLATELET # BLD AUTO: 151 X10*3/UL (ref 150–450)
PMV BLD AUTO: 10.7 FL (ref 7.5–11.5)
PO2 BLDA: 103 MM HG (ref 85–95)
PO2 BLDA: 116 MM HG (ref 85–95)
PO2 BLDA: 189 MM HG (ref 85–95)
PO2 BLDA: 209 MM HG (ref 85–95)
PO2 BLDA: 210 MM HG (ref 85–95)
PO2 BLDA: 293 MM HG (ref 85–95)
PO2 BLDA: 393 MM HG (ref 85–95)
PO2 BLDA: 397 MM HG (ref 85–95)
PO2 BLDA: 399 MM HG (ref 85–95)
PO2 BLDA: 400 MM HG (ref 85–95)
PO2 BLDA: 534 MM HG (ref 85–95)
PO2 BLDA: 92 MM HG (ref 85–95)
PO2 BLDA: 93 MM HG (ref 85–95)
PO2 BLDA: 96 MM HG (ref 85–95)
POTASSIUM BLDA-SCNC: 3.6 MMOL/L (ref 3.5–5.3)
POTASSIUM BLDA-SCNC: 3.7 MMOL/L (ref 3.5–5.3)
POTASSIUM BLDA-SCNC: 4.1 MMOL/L (ref 3.5–5.3)
POTASSIUM BLDA-SCNC: 4.1 MMOL/L (ref 3.5–5.3)
POTASSIUM BLDA-SCNC: 4.2 MMOL/L (ref 3.5–5.3)
POTASSIUM BLDA-SCNC: 4.3 MMOL/L (ref 3.5–5.3)
POTASSIUM BLDA-SCNC: 4.4 MMOL/L (ref 3.5–5.3)
POTASSIUM BLDA-SCNC: 4.7 MMOL/L (ref 3.5–5.3)
POTASSIUM BLDA-SCNC: 4.9 MMOL/L (ref 3.5–5.3)
POTASSIUM BLDA-SCNC: 5 MMOL/L (ref 3.5–5.3)
POTASSIUM BLDA-SCNC: 5.4 MMOL/L (ref 3.5–5.3)
POTASSIUM SERPL-SCNC: 4 MMOL/L (ref 3.5–5.3)
PRODUCT BLOOD TYPE: 6200
PRODUCT BLOOD TYPE: 6200
PRODUCT CODE: NORMAL
PRODUCT CODE: NORMAL
PROTHROMBIN TIME: 15.2 SECONDS (ref 9.8–12.8)
RBC # BLD AUTO: 3.76 X10*6/UL (ref 4–5.2)
RH FACTOR (ANTIGEN D): NORMAL
RH FACTOR (ANTIGEN D): NORMAL
SAO2 % BLDA: 100 % (ref 94–100)
SAO2 % BLDA: 98 % (ref 94–100)
SAO2 % BLDA: 99 % (ref 94–100)
SODIUM BLDA-SCNC: 132 MMOL/L (ref 136–145)
SODIUM BLDA-SCNC: 132 MMOL/L (ref 136–145)
SODIUM BLDA-SCNC: 133 MMOL/L (ref 136–145)
SODIUM BLDA-SCNC: 133 MMOL/L (ref 136–145)
SODIUM BLDA-SCNC: 134 MMOL/L (ref 136–145)
SODIUM BLDA-SCNC: 134 MMOL/L (ref 136–145)
SODIUM BLDA-SCNC: 135 MMOL/L (ref 136–145)
SODIUM BLDA-SCNC: 135 MMOL/L (ref 136–145)
SODIUM BLDA-SCNC: 137 MMOL/L (ref 136–145)
SODIUM BLDA-SCNC: 138 MMOL/L (ref 136–145)
SODIUM BLDA-SCNC: 140 MMOL/L (ref 136–145)
SODIUM BLDA-SCNC: 140 MMOL/L (ref 136–145)
SODIUM SERPL-SCNC: 142 MMOL/L (ref 136–145)
TEST COMMENT: ABNORMAL
UNIT ABO: NORMAL
UNIT ABO: NORMAL
UNIT NUMBER: NORMAL
UNIT NUMBER: NORMAL
UNIT RH: NORMAL
UNIT RH: NORMAL
UNIT VOLUME: 174
UNIT VOLUME: 341
WBC # BLD AUTO: 17.6 X10*3/UL (ref 4.4–11.3)

## 2023-10-13 PROCEDURE — A4217 STERILE WATER/SALINE, 500 ML: HCPCS | Performed by: THORACIC SURGERY (CARDIOTHORACIC VASCULAR SURGERY)

## 2023-10-13 PROCEDURE — 2500000004 HC RX 250 GENERAL PHARMACY W/ HCPCS (ALT 636 FOR OP/ED)

## 2023-10-13 PROCEDURE — 2720000007 HC OR 272 NO HCPCS: Performed by: THORACIC SURGERY (CARDIOTHORACIC VASCULAR SURGERY)

## 2023-10-13 PROCEDURE — 88304 TISSUE EXAM BY PATHOLOGIST: CPT | Mod: TC,SUR | Performed by: THORACIC SURGERY (CARDIOTHORACIC VASCULAR SURGERY)

## 2023-10-13 PROCEDURE — 3600000017 HC OR TIME - EACH INCREMENTAL 1 MINUTE - PROCEDURE LEVEL SIX: Performed by: THORACIC SURGERY (CARDIOTHORACIC VASCULAR SURGERY)

## 2023-10-13 PROCEDURE — 3600000012 HC PERFUSION TIME - EACH INCREMENTAL 1 MINUTE: Performed by: THORACIC SURGERY (CARDIOTHORACIC VASCULAR SURGERY)

## 2023-10-13 PROCEDURE — 33530 CORONARY ARTERY BYPASS/REOP: CPT | Performed by: THORACIC SURGERY (CARDIOTHORACIC VASCULAR SURGERY)

## 2023-10-13 PROCEDURE — 96372 THER/PROPH/DIAG INJ SC/IM: CPT

## 2023-10-13 PROCEDURE — 86920 COMPATIBILITY TEST SPIN: CPT

## 2023-10-13 PROCEDURE — 3600000011 HC PERFUSION TIME - INITIAL BASE CHARGE: Performed by: THORACIC SURGERY (CARDIOTHORACIC VASCULAR SURGERY)

## 2023-10-13 PROCEDURE — 33510 CABG VEIN SINGLE: CPT | Performed by: THORACIC SURGERY (CARDIOTHORACIC VASCULAR SURGERY)

## 2023-10-13 PROCEDURE — 2500000001 HC RX 250 WO HCPCS SELF ADMINISTERED DRUGS (ALT 637 FOR MEDICARE OP)

## 2023-10-13 PROCEDURE — 3700000002 HC GENERAL ANESTHESIA TIME - EACH INCREMENTAL 1 MINUTE: Performed by: THORACIC SURGERY (CARDIOTHORACIC VASCULAR SURGERY)

## 2023-10-13 PROCEDURE — 021049W BYPASS CORONARY ARTERY, ONE ARTERY FROM AORTA WITH AUTOLOGOUS VENOUS TISSUE, PERCUTANEOUS ENDOSCOPIC APPROACH: ICD-10-PCS | Performed by: THORACIC SURGERY (CARDIOTHORACIC VASCULAR SURGERY)

## 2023-10-13 PROCEDURE — 06BP4ZZ EXCISION OF RIGHT SAPHENOUS VEIN, PERCUTANEOUS ENDOSCOPIC APPROACH: ICD-10-PCS | Performed by: THORACIC SURGERY (CARDIOTHORACIC VASCULAR SURGERY)

## 2023-10-13 PROCEDURE — P9017 PLASMA 1 DONOR FRZ W/IN 8 HR: HCPCS

## 2023-10-13 PROCEDURE — 2500000004 HC RX 250 GENERAL PHARMACY W/ HCPCS (ALT 636 FOR OP/ED): Mod: JZ

## 2023-10-13 PROCEDURE — 82947 ASSAY GLUCOSE BLOOD QUANT: CPT

## 2023-10-13 PROCEDURE — 82805 BLOOD GASES W/O2 SATURATION: CPT | Mod: CMCLAB | Performed by: NURSE PRACTITIONER

## 2023-10-13 PROCEDURE — 33859 AS-AORT GRF F/DS OTH/THN DSJ: CPT | Performed by: THORACIC SURGERY (CARDIOTHORACIC VASCULAR SURGERY)

## 2023-10-13 PROCEDURE — 85610 PROTHROMBIN TIME: CPT | Mod: CMCLAB | Performed by: NURSE PRACTITIONER

## 2023-10-13 PROCEDURE — 33508 ENDOSCOPIC VEIN HARVEST: CPT | Performed by: THORACIC SURGERY (CARDIOTHORACIC VASCULAR SURGERY)

## 2023-10-13 PROCEDURE — 82330 ASSAY OF CALCIUM: CPT | Mod: CMCLAB | Performed by: NURSE PRACTITIONER

## 2023-10-13 PROCEDURE — 2500000004 HC RX 250 GENERAL PHARMACY W/ HCPCS (ALT 636 FOR OP/ED): Mod: JZ | Performed by: NURSE PRACTITIONER

## 2023-10-13 PROCEDURE — 2500000004 HC RX 250 GENERAL PHARMACY W/ HCPCS (ALT 636 FOR OP/ED): Performed by: NURSE PRACTITIONER

## 2023-10-13 PROCEDURE — 37799 UNLISTED PX VASCULAR SURGERY: CPT | Mod: CMCLAB | Performed by: NURSE PRACTITIONER

## 2023-10-13 PROCEDURE — 2780000003 HC OR 278 NO HCPCS: Performed by: THORACIC SURGERY (CARDIOTHORACIC VASCULAR SURGERY)

## 2023-10-13 PROCEDURE — 02RX0JZ REPLACEMENT OF THORACIC AORTA, ASCENDING/ARCH WITH SYNTHETIC SUBSTITUTE, OPEN APPROACH: ICD-10-PCS | Performed by: THORACIC SURGERY (CARDIOTHORACIC VASCULAR SURGERY)

## 2023-10-13 PROCEDURE — 88304 TISSUE EXAM BY PATHOLOGIST: CPT | Mod: TC | Performed by: THORACIC SURGERY (CARDIOTHORACIC VASCULAR SURGERY)

## 2023-10-13 PROCEDURE — 2500000005 HC RX 250 GENERAL PHARMACY W/O HCPCS

## 2023-10-13 PROCEDURE — 36620 INSERTION CATHETER ARTERY: CPT

## 2023-10-13 PROCEDURE — P9037 PLATE PHERES LEUKOREDU IRRAD: HCPCS

## 2023-10-13 PROCEDURE — 83050 HGB METHEMOGLOBIN QUAN: CPT | Mod: CMCLAB

## 2023-10-13 PROCEDURE — A33406 PR REPLACE AORT VALVE W/ALLOGRAFT VALVE

## 2023-10-13 PROCEDURE — P9012 CRYOPRECIPITATE EACH UNIT: HCPCS

## 2023-10-13 PROCEDURE — 2020000001 HC ICU ROOM DAILY

## 2023-10-13 PROCEDURE — 82947 ASSAY GLUCOSE BLOOD QUANT: CPT | Mod: CMCLAB | Performed by: NURSE PRACTITIONER

## 2023-10-13 PROCEDURE — P9045 ALBUMIN (HUMAN), 5%, 250 ML: HCPCS | Mod: JZ

## 2023-10-13 PROCEDURE — 86850 RBC ANTIBODY SCREEN: CPT

## 2023-10-13 PROCEDURE — 82805 BLOOD GASES W/O2 SATURATION: CPT | Mod: CMCLAB

## 2023-10-13 PROCEDURE — 88304 TISSUE EXAM BY PATHOLOGIST: CPT | Performed by: PATHOLOGY

## 2023-10-13 PROCEDURE — 71045 X-RAY EXAM CHEST 1 VIEW: CPT | Mod: FY

## 2023-10-13 PROCEDURE — 99291 CRITICAL CARE FIRST HOUR: CPT | Performed by: EMERGENCY MEDICINE

## 2023-10-13 PROCEDURE — 82375 ASSAY CARBOXYHB QUANT: CPT

## 2023-10-13 PROCEDURE — 85384 FIBRINOGEN ACTIVITY: CPT | Mod: CMCLAB | Performed by: NURSE PRACTITIONER

## 2023-10-13 PROCEDURE — 85576 BLOOD PLATELET AGGREGATION: CPT | Mod: CMCLAB

## 2023-10-13 PROCEDURE — 83735 ASSAY OF MAGNESIUM: CPT | Mod: CMCLAB | Performed by: NURSE PRACTITIONER

## 2023-10-13 PROCEDURE — 85347 COAGULATION TIME ACTIVATED: CPT

## 2023-10-13 PROCEDURE — 2500000001 HC RX 250 WO HCPCS SELF ADMINISTERED DRUGS (ALT 637 FOR MEDICARE OP): Performed by: NURSE PRACTITIONER

## 2023-10-13 PROCEDURE — 99291 CRITICAL CARE FIRST HOUR: CPT | Performed by: NURSE PRACTITIONER

## 2023-10-13 PROCEDURE — 36415 COLL VENOUS BLD VENIPUNCTURE: CPT

## 2023-10-13 PROCEDURE — 76937 US GUIDE VASCULAR ACCESS: CPT

## 2023-10-13 PROCEDURE — 36430 TRANSFUSION BLD/BLD COMPNT: CPT

## 2023-10-13 PROCEDURE — P9016 RBC LEUKOCYTES REDUCED: HCPCS

## 2023-10-13 PROCEDURE — 5A1221Z PERFORMANCE OF CARDIAC OUTPUT, CONTINUOUS: ICD-10-PCS | Performed by: THORACIC SURGERY (CARDIOTHORACIC VASCULAR SURGERY)

## 2023-10-13 PROCEDURE — 2500000004 HC RX 250 GENERAL PHARMACY W/ HCPCS (ALT 636 FOR OP/ED): Performed by: THORACIC SURGERY (CARDIOTHORACIC VASCULAR SURGERY)

## 2023-10-13 PROCEDURE — 71045 X-RAY EXAM CHEST 1 VIEW: CPT | Performed by: RADIOLOGY

## 2023-10-13 PROCEDURE — 2500000005 HC RX 250 GENERAL PHARMACY W/O HCPCS: Performed by: THORACIC SURGERY (CARDIOTHORACIC VASCULAR SURGERY)

## 2023-10-13 PROCEDURE — 84132 ASSAY OF SERUM POTASSIUM: CPT | Mod: CMCLAB | Performed by: NURSE PRACTITIONER

## 2023-10-13 PROCEDURE — C1768 GRAFT, VASCULAR: HCPCS | Performed by: THORACIC SURGERY (CARDIOTHORACIC VASCULAR SURGERY)

## 2023-10-13 PROCEDURE — 84132 ASSAY OF SERUM POTASSIUM: CPT

## 2023-10-13 PROCEDURE — A33406 PR REPLACE AORT VALVE W/ALLOGRAFT VALVE: Performed by: ANESTHESIOLOGY

## 2023-10-13 PROCEDURE — P9045 ALBUMIN (HUMAN), 5%, 250 ML: HCPCS | Mod: JZ | Performed by: NURSE PRACTITIONER

## 2023-10-13 PROCEDURE — 3600000018 HC OR TIME - INITIAL BASE CHARGE - PROCEDURE LEVEL SIX: Performed by: THORACIC SURGERY (CARDIOTHORACIC VASCULAR SURGERY)

## 2023-10-13 PROCEDURE — 82947 ASSAY GLUCOSE BLOOD QUANT: CPT | Mod: CMCLAB

## 2023-10-13 PROCEDURE — 2500000002 HC RX 250 W HCPCS SELF ADMINISTERED DRUGS (ALT 637 FOR MEDICARE OP, ALT 636 FOR OP/ED)

## 2023-10-13 PROCEDURE — 36556 INSERT NON-TUNNEL CV CATH: CPT

## 2023-10-13 PROCEDURE — P9045 ALBUMIN (HUMAN), 5%, 250 ML: HCPCS | Mod: JZ,JG

## 2023-10-13 PROCEDURE — 85027 COMPLETE CBC AUTOMATED: CPT | Mod: CMCLAB | Performed by: NURSE PRACTITIONER

## 2023-10-13 PROCEDURE — 3700000001 HC GENERAL ANESTHESIA TIME - INITIAL BASE CHARGE: Performed by: THORACIC SURGERY (CARDIOTHORACIC VASCULAR SURGERY)

## 2023-10-13 PROCEDURE — 2500000005 HC RX 250 GENERAL PHARMACY W/O HCPCS: Performed by: NURSE PRACTITIONER

## 2023-10-13 DEVICE — IMPLANTABLE DEVICE: Type: IMPLANTABLE DEVICE | Site: CHEST | Status: NON-FUNCTIONAL

## 2023-10-13 DEVICE — IMPLANTABLE DEVICE: Type: IMPLANTABLE DEVICE | Site: HEART | Status: FUNCTIONAL

## 2023-10-13 DEVICE — PACING WIRE, 1/2 CIRCLE, 26MM NEEDLE, WHITE: Type: IMPLANTABLE DEVICE | Site: CHEST | Status: FUNCTIONAL

## 2023-10-13 DEVICE — CANNULA, EZ GLIDE 21FR: Type: IMPLANTABLE DEVICE | Site: HEART | Status: NON-FUNCTIONAL

## 2023-10-13 DEVICE — LEAD, PACING, CARDIAC, TEMPORARY, W/SUTURE, FLEXON, 2-0, 24 IN, T5/CS6, DA, WHITE: Type: IMPLANTABLE DEVICE | Site: CHEST | Status: NON-FUNCTIONAL

## 2023-10-13 RX ORDER — LIDOCAINE HYDROCHLORIDE 20 MG/ML
INJECTION, SOLUTION INFILTRATION; PERINEURAL AS NEEDED
Status: DISCONTINUED | OUTPATIENT
Start: 2023-10-13 | End: 2023-10-13

## 2023-10-13 RX ORDER — DEXTROSE 50 % IN WATER (D50W) INTRAVENOUS SYRINGE
25
Status: DISCONTINUED | OUTPATIENT
Start: 2023-10-13 | End: 2023-10-15

## 2023-10-13 RX ORDER — KETAMINE HYDROCHLORIDE 10 MG/ML
INJECTION, SOLUTION INTRAMUSCULAR; INTRAVENOUS AS NEEDED
Status: DISCONTINUED | OUTPATIENT
Start: 2023-10-13 | End: 2023-10-13

## 2023-10-13 RX ORDER — VANCOMYCIN HYDROCHLORIDE 1 G/20ML
INJECTION, POWDER, LYOPHILIZED, FOR SOLUTION INTRAVENOUS AS NEEDED
Status: DISCONTINUED | OUTPATIENT
Start: 2023-10-13 | End: 2023-10-13 | Stop reason: HOSPADM

## 2023-10-13 RX ORDER — LIDOCAINE HYDROCHLORIDE 10 MG/ML
INJECTION INFILTRATION; PERINEURAL AS NEEDED
Status: DISCONTINUED | OUTPATIENT
Start: 2023-10-13 | End: 2023-10-13

## 2023-10-13 RX ORDER — SODIUM CHLORIDE, SODIUM GLUCONATE, SODIUM ACETATE, POTASSIUM CHLORIDE AND MAGNESIUM CHLORIDE 30; 37; 368; 526; 502 MG/100ML; MG/100ML; MG/100ML; MG/100ML; MG/100ML
INJECTION, SOLUTION INTRAVENOUS CONTINUOUS PRN
Status: DISCONTINUED | OUTPATIENT
Start: 2023-10-13 | End: 2023-10-13

## 2023-10-13 RX ORDER — AZTREONAM 2 G/1
2 INJECTION, POWDER, LYOPHILIZED, FOR SOLUTION INTRAMUSCULAR; INTRAVENOUS EVERY 8 HOURS
Status: DISCONTINUED | OUTPATIENT
Start: 2023-10-13 | End: 2023-10-15

## 2023-10-13 RX ORDER — LIDOCAINE HYDROCHLORIDE 10 MG/ML
0.1 INJECTION, SOLUTION EPIDURAL; INFILTRATION; INTRACAUDAL; PERINEURAL ONCE
Status: CANCELLED | OUTPATIENT
Start: 2023-10-13 | End: 2023-10-13

## 2023-10-13 RX ORDER — GLYCOPYRROLATE 0.2 MG/ML
INJECTION INTRAMUSCULAR; INTRAVENOUS
Status: COMPLETED
Start: 2023-10-13 | End: 2023-10-13

## 2023-10-13 RX ORDER — METOCLOPRAMIDE HYDROCHLORIDE 5 MG/ML
10 INJECTION INTRAMUSCULAR; INTRAVENOUS ONCE AS NEEDED
Status: CANCELLED | OUTPATIENT
Start: 2023-10-13

## 2023-10-13 RX ORDER — NALOXONE HYDROCHLORIDE 0.4 MG/ML
0.2 INJECTION, SOLUTION INTRAMUSCULAR; INTRAVENOUS; SUBCUTANEOUS EVERY 5 MIN PRN
Status: DISCONTINUED | OUTPATIENT
Start: 2023-10-13 | End: 2023-10-15

## 2023-10-13 RX ORDER — POLYETHYLENE GLYCOL 3350 17 G/17G
17 POWDER, FOR SOLUTION ORAL DAILY
Status: DISCONTINUED | OUTPATIENT
Start: 2023-10-13 | End: 2023-10-15

## 2023-10-13 RX ORDER — ASPIRIN 300 MG/1
150 SUPPOSITORY RECTAL ONCE
Status: COMPLETED | OUTPATIENT
Start: 2023-10-13 | End: 2023-10-13

## 2023-10-13 RX ORDER — ROCURONIUM BROMIDE 10 MG/ML
INJECTION, SOLUTION INTRAVENOUS AS NEEDED
Status: DISCONTINUED | OUTPATIENT
Start: 2023-10-13 | End: 2023-10-13

## 2023-10-13 RX ORDER — MAGNESIUM SULFATE HEPTAHYDRATE 40 MG/ML
2 INJECTION, SOLUTION INTRAVENOUS EVERY 6 HOURS PRN
Status: DISCONTINUED | OUTPATIENT
Start: 2023-10-13 | End: 2023-10-15

## 2023-10-13 RX ORDER — CALCIUM CHLORIDE INJECTION 100 MG/ML
INJECTION, SOLUTION INTRAVENOUS AS NEEDED
Status: DISCONTINUED | OUTPATIENT
Start: 2023-10-13 | End: 2023-10-13

## 2023-10-13 RX ORDER — HYDROMORPHONE HYDROCHLORIDE 1 MG/ML
0.2 INJECTION, SOLUTION INTRAMUSCULAR; INTRAVENOUS; SUBCUTANEOUS
Status: DISCONTINUED | OUTPATIENT
Start: 2023-10-13 | End: 2023-10-15

## 2023-10-13 RX ORDER — EPINEPHRINE HCL IN 0.9 % NACL 4MG/250ML
PLASTIC BAG, INJECTION (ML) INTRAVENOUS CONTINUOUS PRN
Status: DISCONTINUED | OUTPATIENT
Start: 2023-10-13 | End: 2023-10-13

## 2023-10-13 RX ORDER — NEOSTIGMINE METHYLSULFATE 1 MG/ML
3 INJECTION, SOLUTION INTRAVENOUS ONCE
Status: DISCONTINUED | OUTPATIENT
Start: 2023-10-13 | End: 2023-10-14

## 2023-10-13 RX ORDER — GLYCOPYRROLATE 0.2 MG/ML
0.6 INJECTION INTRAMUSCULAR; INTRAVENOUS ONCE
Status: COMPLETED | OUTPATIENT
Start: 2023-10-13 | End: 2023-10-13

## 2023-10-13 RX ORDER — NAPROXEN SODIUM 220 MG/1
81 TABLET, FILM COATED ORAL ONCE
Status: COMPLETED | OUTPATIENT
Start: 2023-10-13 | End: 2023-10-13

## 2023-10-13 RX ORDER — GABAPENTIN 300 MG/1
CAPSULE ORAL AS NEEDED
Status: DISCONTINUED | OUTPATIENT
Start: 2023-10-13 | End: 2023-10-13

## 2023-10-13 RX ORDER — VANCOMYCIN HYDROCHLORIDE 1 G/20ML
INJECTION, POWDER, LYOPHILIZED, FOR SOLUTION INTRAVENOUS AS NEEDED
Status: DISCONTINUED | OUTPATIENT
Start: 2023-10-13 | End: 2023-10-13

## 2023-10-13 RX ORDER — NEOSTIGMINE METHYLSULFATE 1 MG/ML
INJECTION, SOLUTION INTRAVENOUS
Status: COMPLETED
Start: 2023-10-13 | End: 2023-10-13

## 2023-10-13 RX ORDER — PROPOFOL 10 MG/ML
INJECTION, EMULSION INTRAVENOUS CONTINUOUS PRN
Status: DISCONTINUED | OUTPATIENT
Start: 2023-10-13 | End: 2023-10-13

## 2023-10-13 RX ORDER — CALCIUM GLUCONATE 20 MG/ML
2 INJECTION, SOLUTION INTRAVENOUS EVERY 6 HOURS PRN
Status: DISCONTINUED | OUTPATIENT
Start: 2023-10-13 | End: 2023-10-15

## 2023-10-13 RX ORDER — HYDRALAZINE HYDROCHLORIDE 20 MG/ML
10 INJECTION INTRAMUSCULAR; INTRAVENOUS EVERY 2 HOUR PRN
Status: DISCONTINUED | OUTPATIENT
Start: 2023-10-13 | End: 2023-10-14

## 2023-10-13 RX ORDER — ALBUMIN HUMAN 50 G/1000ML
SOLUTION INTRAVENOUS
Status: COMPLETED
Start: 2023-10-13 | End: 2023-10-13

## 2023-10-13 RX ORDER — ACETAMINOPHEN 325 MG/1
TABLET ORAL AS NEEDED
Status: DISCONTINUED | OUTPATIENT
Start: 2023-10-13 | End: 2023-10-13

## 2023-10-13 RX ORDER — NITROGLYCERIN 20 MG/100ML
5-200 INJECTION INTRAVENOUS CONTINUOUS
Status: DISCONTINUED | OUTPATIENT
Start: 2023-10-13 | End: 2023-10-15

## 2023-10-13 RX ORDER — ALBUMIN HUMAN 50 G/1000ML
12.5 SOLUTION INTRAVENOUS ONCE
Status: COMPLETED | OUTPATIENT
Start: 2023-10-13 | End: 2023-10-13

## 2023-10-13 RX ORDER — PROPOFOL 10 MG/ML
0-50 INJECTION, EMULSION INTRAVENOUS CONTINUOUS
Status: DISCONTINUED | OUTPATIENT
Start: 2023-10-13 | End: 2023-10-13

## 2023-10-13 RX ORDER — NITROGLYCERIN 40 MG/100ML
INJECTION INTRAVENOUS AS NEEDED
Status: DISCONTINUED | OUTPATIENT
Start: 2023-10-13 | End: 2023-10-13

## 2023-10-13 RX ORDER — SODIUM CHLORIDE, SODIUM LACTATE, POTASSIUM CHLORIDE, CALCIUM CHLORIDE 600; 310; 30; 20 MG/100ML; MG/100ML; MG/100ML; MG/100ML
5 INJECTION, SOLUTION INTRAVENOUS CONTINUOUS
Status: DISCONTINUED | OUTPATIENT
Start: 2023-10-13 | End: 2023-10-16

## 2023-10-13 RX ORDER — ACETAMINOPHEN 650 MG/1
650 SUPPOSITORY RECTAL EVERY 4 HOURS
Status: DISCONTINUED | OUTPATIENT
Start: 2023-10-13 | End: 2023-10-15

## 2023-10-13 RX ORDER — OXYCODONE HYDROCHLORIDE 5 MG/1
5 TABLET ORAL EVERY 4 HOURS PRN
Status: DISCONTINUED | OUTPATIENT
Start: 2023-10-13 | End: 2023-10-15

## 2023-10-13 RX ORDER — HEPARIN SODIUM 1000 [USP'U]/ML
INJECTION, SOLUTION INTRAVENOUS; SUBCUTANEOUS AS NEEDED
Status: DISCONTINUED | OUTPATIENT
Start: 2023-10-13 | End: 2023-10-13

## 2023-10-13 RX ORDER — ACETAMINOPHEN 325 MG/1
650 TABLET ORAL EVERY 4 HOURS
Status: DISCONTINUED | OUTPATIENT
Start: 2023-10-13 | End: 2023-10-18 | Stop reason: HOSPADM

## 2023-10-13 RX ORDER — ATORVASTATIN CALCIUM 80 MG/1
80 TABLET, FILM COATED ORAL NIGHTLY
Status: DISCONTINUED | OUTPATIENT
Start: 2023-10-13 | End: 2023-10-18 | Stop reason: HOSPADM

## 2023-10-13 RX ORDER — HEPARIN SODIUM 1000 [USP'U]/ML
INJECTION, SOLUTION INTRAVENOUS; SUBCUTANEOUS AS NEEDED
Status: DISCONTINUED | OUTPATIENT
Start: 2023-10-13 | End: 2023-10-13 | Stop reason: HOSPADM

## 2023-10-13 RX ORDER — DEXMEDETOMIDINE HYDROCHLORIDE 4 UG/ML
.1-1.5 INJECTION, SOLUTION INTRAVENOUS CONTINUOUS
Status: DISCONTINUED | OUTPATIENT
Start: 2023-10-13 | End: 2023-10-13

## 2023-10-13 RX ORDER — POTASSIUM CHLORIDE 14.9 MG/ML
20 INJECTION INTRAVENOUS EVERY 6 HOURS PRN
Status: DISCONTINUED | OUTPATIENT
Start: 2023-10-13 | End: 2023-10-15

## 2023-10-13 RX ORDER — PANTOPRAZOLE SODIUM 40 MG/1
40 TABLET, DELAYED RELEASE ORAL
Status: DISCONTINUED | OUTPATIENT
Start: 2023-10-14 | End: 2023-10-18 | Stop reason: HOSPADM

## 2023-10-13 RX ORDER — FENTANYL CITRATE 50 UG/ML
INJECTION, SOLUTION INTRAMUSCULAR; INTRAVENOUS AS NEEDED
Status: DISCONTINUED | OUTPATIENT
Start: 2023-10-13 | End: 2023-10-13

## 2023-10-13 RX ORDER — ALBUMIN HUMAN 50 G/1000ML
25 SOLUTION INTRAVENOUS ONCE
Status: COMPLETED | OUTPATIENT
Start: 2023-10-13 | End: 2023-10-13

## 2023-10-13 RX ORDER — ONDANSETRON HYDROCHLORIDE 2 MG/ML
4 INJECTION, SOLUTION INTRAVENOUS EVERY 8 HOURS PRN
Status: DISCONTINUED | OUTPATIENT
Start: 2023-10-13 | End: 2023-10-18 | Stop reason: HOSPADM

## 2023-10-13 RX ORDER — MIDAZOLAM HYDROCHLORIDE 1 MG/ML
INJECTION INTRAMUSCULAR; INTRAVENOUS AS NEEDED
Status: DISCONTINUED | OUTPATIENT
Start: 2023-10-13 | End: 2023-10-13

## 2023-10-13 RX ORDER — SODIUM CHLORIDE 0.9 G/100ML
IRRIGANT IRRIGATION AS NEEDED
Status: DISCONTINUED | OUTPATIENT
Start: 2023-10-13 | End: 2023-10-13 | Stop reason: HOSPADM

## 2023-10-13 RX ORDER — MAGNESIUM SULFATE HEPTAHYDRATE 40 MG/ML
4 INJECTION, SOLUTION INTRAVENOUS EVERY 6 HOURS PRN
Status: DISCONTINUED | OUTPATIENT
Start: 2023-10-13 | End: 2023-10-15

## 2023-10-13 RX ORDER — POTASSIUM CHLORIDE 29.8 MG/ML
40 INJECTION INTRAVENOUS EVERY 6 HOURS PRN
Status: DISCONTINUED | OUTPATIENT
Start: 2023-10-13 | End: 2023-10-15

## 2023-10-13 RX ORDER — ALBUMIN HUMAN 50 G/1000ML
SOLUTION INTRAVENOUS AS NEEDED
Status: DISCONTINUED | OUTPATIENT
Start: 2023-10-13 | End: 2023-10-13

## 2023-10-13 RX ORDER — AMOXICILLIN 250 MG
2 CAPSULE ORAL 2 TIMES DAILY
Status: DISCONTINUED | OUTPATIENT
Start: 2023-10-13 | End: 2023-10-16

## 2023-10-13 RX ORDER — SODIUM CHLORIDE, SODIUM LACTATE, POTASSIUM CHLORIDE, CALCIUM CHLORIDE 600; 310; 30; 20 MG/100ML; MG/100ML; MG/100ML; MG/100ML
100 INJECTION, SOLUTION INTRAVENOUS CONTINUOUS
Status: CANCELLED | OUTPATIENT
Start: 2023-10-13

## 2023-10-13 RX ORDER — ROPIVACAINE HYDROCHLORIDE 5 MG/ML
INJECTION, SOLUTION EPIDURAL; INFILTRATION; PERINEURAL AS NEEDED
Status: DISCONTINUED | OUTPATIENT
Start: 2023-10-13 | End: 2023-10-13

## 2023-10-13 RX ORDER — NOREPINEPHRINE BITARTRATE 0.03 MG/ML
INJECTION, SOLUTION INTRAVENOUS CONTINUOUS PRN
Status: DISCONTINUED | OUTPATIENT
Start: 2023-10-13 | End: 2023-10-13

## 2023-10-13 RX ORDER — PROPOFOL 10 MG/ML
INJECTION, EMULSION INTRAVENOUS AS NEEDED
Status: DISCONTINUED | OUTPATIENT
Start: 2023-10-13 | End: 2023-10-13

## 2023-10-13 RX ORDER — CALCIUM GLUCONATE 20 MG/ML
1 INJECTION, SOLUTION INTRAVENOUS EVERY 6 HOURS PRN
Status: DISCONTINUED | OUTPATIENT
Start: 2023-10-13 | End: 2023-10-15

## 2023-10-13 RX ORDER — ONDANSETRON 4 MG/1
4 TABLET, FILM COATED ORAL EVERY 8 HOURS PRN
Status: DISCONTINUED | OUTPATIENT
Start: 2023-10-13 | End: 2023-10-15

## 2023-10-13 RX ORDER — PROTAMINE SULFATE 10 MG/ML
INJECTION, SOLUTION INTRAVENOUS AS NEEDED
Status: DISCONTINUED | OUTPATIENT
Start: 2023-10-13 | End: 2023-10-13

## 2023-10-13 RX ORDER — PANTOPRAZOLE SODIUM 40 MG/10ML
40 INJECTION, POWDER, LYOPHILIZED, FOR SOLUTION INTRAVENOUS
Status: DISCONTINUED | OUTPATIENT
Start: 2023-10-14 | End: 2023-10-15

## 2023-10-13 RX ADMIN — Medication 4 MCG: at 14:23

## 2023-10-13 RX ADMIN — GLYCOPYRROLATE 0.6 MG: 0.2 INJECTION INTRAMUSCULAR; INTRAVENOUS at 16:52

## 2023-10-13 RX ADMIN — INSULIN HUMAN 2 UNITS/HR: 100 INJECTION, SOLUTION PARENTERAL at 10:56

## 2023-10-13 RX ADMIN — CALCIUM CHLORIDE 100 MG: 100 INJECTION INTRAVENOUS; INTRAVENTRICULAR at 12:45

## 2023-10-13 RX ADMIN — LIDOCAINE HYDROCHLORIDE 100 MG: 20 INJECTION, SOLUTION INFILTRATION; PERINEURAL at 08:40

## 2023-10-13 RX ADMIN — INSULIN HUMAN 2 UNITS: 100 INJECTION, SOLUTION PARENTERAL at 10:57

## 2023-10-13 RX ADMIN — NEOSTIGMINE METHYLSULFATE 10 MG: 1 INJECTION INTRAVENOUS at 16:52

## 2023-10-13 RX ADMIN — FENTANYL CITRATE 50 MCG: 50 INJECTION, SOLUTION INTRAMUSCULAR; INTRAVENOUS at 13:00

## 2023-10-13 RX ADMIN — PROTAMINE SULFATE 25 MG: 10 INJECTION, SOLUTION INTRAVENOUS at 13:02

## 2023-10-13 RX ADMIN — Medication 50 MCG: at 12:56

## 2023-10-13 RX ADMIN — PROPOFOL 30 MCG/KG/MIN: 10 INJECTION, EMULSION INTRAVENOUS at 16:35

## 2023-10-13 RX ADMIN — FENTANYL CITRATE 250 MCG: 50 INJECTION, SOLUTION INTRAMUSCULAR; INTRAVENOUS at 09:26

## 2023-10-13 RX ADMIN — FENTANYL CITRATE 250 MCG: 50 INJECTION, SOLUTION INTRAMUSCULAR; INTRAVENOUS at 08:40

## 2023-10-13 RX ADMIN — ROCURONIUM BROMIDE 100 MG: 50 INJECTION, SOLUTION INTRAVENOUS at 08:41

## 2023-10-13 RX ADMIN — VANCOMYCIN HYDROCHLORIDE 1000 MG: 1 INJECTION, POWDER, LYOPHILIZED, FOR SOLUTION INTRAVENOUS at 08:53

## 2023-10-13 RX ADMIN — SODIUM CHLORIDE 15 MG/KG/HR: 9 INJECTION, SOLUTION INTRAVENOUS at 09:02

## 2023-10-13 RX ADMIN — ALBUMIN (HUMAN) 250 ML: 12.5 SOLUTION INTRAVENOUS at 13:29

## 2023-10-13 RX ADMIN — KETAMINE HYDROCHLORIDE 50 MG: 10 INJECTION, SOLUTION INTRAMUSCULAR; INTRAVENOUS at 08:40

## 2023-10-13 RX ADMIN — PROTAMINE SULFATE 350 MG: 10 INJECTION, SOLUTION INTRAVENOUS at 12:49

## 2023-10-13 RX ADMIN — CALCIUM CHLORIDE INJECTION 250 MG: 100 INJECTION, SOLUTION INTRAVENOUS at 15:18

## 2023-10-13 RX ADMIN — ROCURONIUM BROMIDE 20 MG: 50 INJECTION, SOLUTION INTRAVENOUS at 11:50

## 2023-10-13 RX ADMIN — GABAPENTIN 300 MG: 300 CAPSULE ORAL at 07:41

## 2023-10-13 RX ADMIN — Medication 0.03 MCG/KG/MIN: at 12:45

## 2023-10-13 RX ADMIN — PROPOFOL 20 MCG/KG/MIN: 10 INJECTION, EMULSION INTRAVENOUS at 14:35

## 2023-10-13 RX ADMIN — HEPARIN SODIUM 32000 UNITS: 1000 INJECTION, SOLUTION INTRAVENOUS; SUBCUTANEOUS at 10:16

## 2023-10-13 RX ADMIN — SODIUM CHLORIDE, SODIUM GLUCONATE, SODIUM ACETATE, POTASSIUM CHLORIDE AND MAGNESIUM CHLORIDE: 526; 502; 368; 37; 30 INJECTION, SOLUTION INTRAVENOUS at 07:33

## 2023-10-13 RX ADMIN — Medication 25 MCG: at 13:53

## 2023-10-13 RX ADMIN — PROPOFOL 50 MG: 10 INJECTION, EMULSION INTRAVENOUS at 08:41

## 2023-10-13 RX ADMIN — SODIUM CHLORIDE, SODIUM GLUCONATE, SODIUM ACETATE, POTASSIUM CHLORIDE AND MAGNESIUM CHLORIDE: 30; 37; 368; 526; 502 INJECTION, SOLUTION INTRAVENOUS at 07:33

## 2023-10-13 RX ADMIN — ASPIRIN 81 MG CHEWABLE TABLET 81 MG: 81 TABLET CHEWABLE at 17:00

## 2023-10-13 RX ADMIN — PROTAMINE SULFATE 50 MG: 10 INJECTION, SOLUTION INTRAVENOUS at 13:15

## 2023-10-13 RX ADMIN — Medication 50 MCG: at 12:53

## 2023-10-13 RX ADMIN — HYDRALAZINE HYDROCHLORIDE 10 MG: 20 INJECTION INTRAMUSCULAR; INTRAVENOUS at 16:34

## 2023-10-13 RX ADMIN — ROCURONIUM BROMIDE 20 MG: 50 INJECTION, SOLUTION INTRAVENOUS at 10:00

## 2023-10-13 RX ADMIN — LIDOCAINE HYDROCHLORIDE 100 MG: 20 INJECTION, SOLUTION INFILTRATION; PERINEURAL at 09:42

## 2023-10-13 RX ADMIN — CALCIUM CHLORIDE INJECTION 250 MG: 100 INJECTION, SOLUTION INTRAVENOUS at 15:08

## 2023-10-13 RX ADMIN — ROPIVACAINE HYDROCHLORIDE 50 ML: 5 INJECTION, SOLUTION EPIDURAL; INFILTRATION; PERINEURAL at 15:00

## 2023-10-13 RX ADMIN — FENTANYL CITRATE 100 MCG: 50 INJECTION, SOLUTION INTRAMUSCULAR; INTRAVENOUS at 09:48

## 2023-10-13 RX ADMIN — INSULIN HUMAN 2 UNITS/HR: 1 INJECTION, SOLUTION INTRAVENOUS at 16:50

## 2023-10-13 RX ADMIN — ROCURONIUM BROMIDE 20 MG: 50 INJECTION, SOLUTION INTRAVENOUS at 13:42

## 2023-10-13 RX ADMIN — ALBUMIN HUMAN 25 G: 0.05 INJECTION, SOLUTION INTRAVENOUS at 18:52

## 2023-10-13 RX ADMIN — Medication 50 MCG: at 14:35

## 2023-10-13 RX ADMIN — PROTAMINE SULFATE 50 MG: 10 INJECTION, SOLUTION INTRAVENOUS at 13:26

## 2023-10-13 RX ADMIN — ALBUMIN HUMAN 12.5 G: 0.05 INJECTION, SOLUTION INTRAVENOUS at 16:49

## 2023-10-13 RX ADMIN — NOREPINEPHRINE BITARTRATE 0.02 MCG/KG/MIN: 0.03 INJECTION, SOLUTION INTRAVENOUS at 12:49

## 2023-10-13 RX ADMIN — ALBUMIN HUMAN 12.5 G: 50 SOLUTION INTRAVENOUS at 16:49

## 2023-10-13 RX ADMIN — MIDAZOLAM HYDROCHLORIDE 2 MG: 1 INJECTION, SOLUTION INTRAMUSCULAR; INTRAVENOUS at 08:26

## 2023-10-13 RX ADMIN — NITROGLYCERIN 10 MCG/MIN: 20 INJECTION INTRAVENOUS at 18:50

## 2023-10-13 RX ADMIN — Medication 100 MCG: at 10:11

## 2023-10-13 RX ADMIN — ACETAMINOPHEN 975 MG: 325 TABLET ORAL at 07:41

## 2023-10-13 RX ADMIN — PROPOFOL 50 MG: 10 INJECTION, EMULSION INTRAVENOUS at 08:40

## 2023-10-13 RX ADMIN — HEPARIN SODIUM 5000 UNITS: 1000 INJECTION, SOLUTION INTRAVENOUS; SUBCUTANEOUS at 13:06

## 2023-10-13 RX ADMIN — GLYCOPYRROLATE 0.6 MG: 0.2 INJECTION, SOLUTION INTRAMUSCULAR; INTRAVENOUS at 16:52

## 2023-10-13 RX ADMIN — FENTANYL CITRATE 50 MCG: 50 INJECTION, SOLUTION INTRAMUSCULAR; INTRAVENOUS at 09:58

## 2023-10-13 RX ADMIN — CALCIUM CHLORIDE 100 MG: 100 INJECTION INTRAVENOUS; INTRAVENTRICULAR at 12:37

## 2023-10-13 RX ADMIN — LIDOCAINE HYDROCHLORIDE 100 MG: 10 INJECTION, SOLUTION INFILTRATION; PERINEURAL at 12:17

## 2023-10-13 RX ADMIN — Medication 2 G: at 09:46

## 2023-10-13 ASSESSMENT — COLUMBIA-SUICIDE SEVERITY RATING SCALE - C-SSRS
6. HAVE YOU EVER DONE ANYTHING, STARTED TO DO ANYTHING, OR PREPARED TO DO ANYTHING TO END YOUR LIFE?: NO
2. HAVE YOU ACTUALLY HAD ANY THOUGHTS OF KILLING YOURSELF?: NO
1. IN THE PAST MONTH, HAVE YOU WISHED YOU WERE DEAD OR WISHED YOU COULD GO TO SLEEP AND NOT WAKE UP?: NO

## 2023-10-13 ASSESSMENT — PAIN - FUNCTIONAL ASSESSMENT
PAIN_FUNCTIONAL_ASSESSMENT: 0-10
PAIN_FUNCTIONAL_ASSESSMENT: CPOT (CRITICAL CARE PAIN OBSERVATION TOOL)
PAIN_FUNCTIONAL_ASSESSMENT: 0-10

## 2023-10-13 ASSESSMENT — PAIN SCALES - GENERAL
PAINLEVEL_OUTOF10: 0 - NO PAIN
PAINLEVEL_OUTOF10: 3

## 2023-10-13 NOTE — BRIEF OP NOTE
Date: 10/13/2023  OR Location: [facility] OR    Name: Suze Najera, : 1955, Age: 68 y.o., MRN: [id number], Sex: female    Diagnosis  Pre-op Diagnosis     * Thoracic aortic aneurysm (CMS/HCC) [I71.20] Post-op Diagnosis     * Thoracic aortic aneurysm (CMS/HCC) [I71.20]     * Coronary artery disease involving coronary bypass graft of native heart, unspecified whether angina present [I25.810]     Procedures    * REDO AVR TISSUE/ASC AORTA    * Redo sternotomy, ascending aorta replacement with 34 mm gelweave, coronary artery bypass x1 SVG- RCA, right leg endovascular harvest    Post-Op Diagnosis: Ascending Aortic Aneurysm     Procedure: Median Sternotomy; Standard Central Cannulation; Red-Arch; CABGx1: Vein-PDA; EVH Right Thigh    Anesthesia: General with BUZZ    Chest Tubes/Drains/Catheters: Bilateral Pleural CT, Mediastinal CTx2       Pacing Wires: A/V Wires: NOT PACING    Antibiotics: 2gm Meropanen; 1gm Vanco    XC: 88min    CPB: 134min    CIRC: 0    EBL: 500        Specimen:   ID Type Source Tests Collected by Time   1 : aorta Tissue AORTA SURGICAL PATHOLOGY EXAM Christopher Hayden MD 10/13/2023 1133           Surgeons   Panel 1:     * Christopher Hayden - Primary  Panel 2:     * Christopher Hayden - Primary    Resident/Fellow/Other Assistant:  Surgical Assistant: Roni Meza SA; Jewell Daniels SA; Piyush Garcia ; Vicki Jacome RN    Procedure Summary  Anesthesia: General  ASA: III  Anesthesia Staff: Anesthesiologist: Sayra Smallwood MD  CRNA: DAMIÁN Campos-CRNA  Anesthesia Resident: Desean Birch DO  Perfusionist: Donovan Willis  Estimated Blood Loss: 500mL  Intra-op Medications:   Medication Name Total Dose   heparin 1,000 unit/mL injection 10,000 Units   sodium chloride 0.9 % irrigation solution 3,000 mL   meropenem (Merrem) 2 g in sodium chloride 0.9 % 100 mL IV 2 g              Anesthesia Record               Intraprocedure I/O Totals          Intake    PLASMA 300.00 mL     PLATELETS 250.00 mL    Ketamine 5.00 mL    The total shown is the total volume documented since Anesthesia Start was filed.    electrolyte-A (Plasmalyte-A) 1800.00 mL    Norepinephrine Drip 0.00 mL    The total shown is the total volume documented since Anesthesia Start was filed.    Nitroglycerin Drip 2.75 mL    The total shown is the total volume documented since Anesthesia Start was filed.    Epinephrine Drip 24.35 mL    The total shown is the total volume documented since Anesthesia Start was filed.    Propofol Drip 11.18 mL    The total shown is the total volume documented since Anesthesia Start was filed.    albumin human bottle 5% 250.00 mL    meropenem (Merrem) 2 g in sodium chloride 0.9 % 100 mL .00 mL    Cell Saver 500 mL    Total Intake 3243.28 mL       Output    Urine 580 mL    Est. Blood Loss 250 mL    Total Output 830 mL       Net    Net Volume 2413.28 mL          Specimen:   ID Type Source Tests Collected by Time   1 : aorta Tissue AORTA SURGICAL PATHOLOGY EXAM Christopher Hayden MD 10/13/2023 1133        Staff:   Circulator: Veronica Parr RN; Ana Armstrong RN  Relief Circulator: Becca Cortes RN  Relief Scrub: Becca Cortes RN  Scrub Person: Becca Cortes RN; Kena Richards; Espinoza Parrish RN          Findings: ascending aortic Aneurysm    Complications:   *      Disposition: ICU - intubated and hemodynamically stable.  Condition: stable  Specimens Collected:   ID Type Source Tests Collected by Time   1 : aorta Tissue AORTA SURGICAL PATHOLOGY EXAM Christopher Hayden MD 10/13/2023 1133     Attending Attestation:     Christopher Hayden  Phone Number: [phone]

## 2023-10-13 NOTE — OP NOTE
Operation Note    Date of the Operation: October 13, 2023  Name: Suze Najera is a 68 y.o. year old female patient with ascending aortic aneurysm  referred for dilated ascending aorta .   MRN: [id number]    Preoperative Diagnosis:   #1 status previous mechanical aortic valve replacement  #2 severe dilatation of the ascending aorta  #3 single-vessel significant coronary disease    Postoperative Diagnosis:   The same  Operation Procedures:  #1 a standard median redo sternotomy  #2 innominate trunk arterial cannulation  #3 ascending aorta replacement with a Gelweave 34 mm  #4 aortic root noncoronary sinus plication  #5 CABG x1: SVG to RCA  Surgeon: Christopher Hayden MD    Assistants: Enrique Romo MD, iPyush Garcia    Anesthesia Type: General endotracheal anesthesia    Complications: None    Estimated Blood loss: 400 cc    Indication for Surgery: This is a 68 years old female patient with a history of a previous aortic valve replacement more than 10 years ago with a mechanical valve.  The patient has actually doing fairly well however she has been found with a progressive dilatation of the ascending aorta being now around 5.4 cm.  The patient was recommended to go for ascending aortic replacement, she agreed to proceed.  She underwent preoperative coronary angiogram showing significant proximal lesion of the RCA with no other significant lesions.  The preoperative echo shows a well working aortic valve with normal gradients and no regurgitation.  We talked about pros and cons of replacing the ascending aorta and also replacing the aortic valve in case it is needed.  The patient signed the consent.    Operative Findings:    BUZZ: Aortic valve is working fine, ascending aorta is 5.4 cm.  Normal LV function    Direct: Normal adhesions between the epicardial surface and the pericardium.  Ascending aorta is severely dilated with some calcium plaques and also some signs of localized previous healed dissections at the  level of the noncoronary sinus.  The mechanical valve is in position and is working fine.  There is some soft pannus above the valve which is removed.  The rest of the anatomy is unremarkable.  The saphenous vein is good for conduit and the RCA is good for for grafting.    Operation Description: The patient was taken to the operating room in a stable condition and then she was put on the table in a supine position.  After monitoring, general endotracheal anesthesia was induced and then she was prepped and draped as usual.  We continued with the standard redo median sternotomy with an oscillating saw.  At the same time a piece of saphenous vein was obtained from the right leg with endoscopic technique.  Once the mediastinum was dissected, the heparin was given and the innominate trunk was cannulated.  Antegrade cardioplegia cannula was placed, we went on bypass and the aorta was crossclamped.  1 dose of antegrade [place] cardioplegia was given and the heart was totally arrested.  We did not need a second dose of cardioplegia.  The ascending aorta was transected, the ascending aorta was resected and  from the surrounding tissues, the noncoronary sinus is somewhat dilated so I proceeded to plicate the sinus with 2 transverse 4 0 pledgeted Prolene stitches.  After that we measured the ST junction and we order at 34 mm Gelweave graft which was brought to the table.  Now we will continue with the distal anastomosis between the distal RCA and the inverted piece of saphenous vein.  The distal anastomosis was performed with a 7-0 running Prolene suture.  We continue now with the proximal anastomosis between the Gelweave graft and the ST junction with a running 4-0 Prolene suture.  The graft was measured and sized and cut.  We resected the ascending aorta all the way to the distal ascending aorta with a clamp as a hemiarch fashion.  The proximal anastomosis was performed with a continuous running 4-0 Prolene suture.   The vent was replaced in the Gelweave graft, then we proceeded to create a Gelweave opening with a cautery and then the vein was brought, sized and cut and the proximal anastomosis was performed with the 6-0 running prolene suture between the Gelweave graft and the vein graft.  The air was removed from the left ventricle, the Christopher was taken out, the heart promptly reassume sinus rhythm.  We placed for temporary pacemaker wires, we paced at 80/min, we reperfused and rewarming for 10 minutes and then we were able to come off bypass at the first attempt with noncommitted.  An echo shows a normal working LV function, the valve looks the same with no other significant findings.  Once the heart is ejecting and the right atrium is filled up with volume I realized that the vein graft its kinking at the origin so I decided to reposition in a higher and more central level on the Gelweave graft.  Since we are bypass I used a side-biting clamp on the Gelweave graft, I open 5 mm diameter perforation with a cautery and then reconnected the proximal vein with a Gelweave graft with a 6-0 running plain suture.  The protamine is given.  The graft was de-aired and the flow was reestablished.  The patient is a stable, all the counts are correct by circulating nurse, all the specimens were sent for pathology.  The hemostasis is achieved.  2 chest tube was placed into the midsternal cavity and 1 chest tube in each pleural cavity.  We continue with the closure of the sternum and the wound in standard fashion.  The patient is a stable so is transferred back to the CTICU to continue with her postoperative management.  Dictated at the Christopher Hayden.    Postoperative remark: During the operation and with no clear explanation the left brain saturation monitoring went down more than 20 points which is considered significant.  We decided to go a little lower on the temperature and maintain the means around 75.  Regardless these maneuvers  the brain saturation continue to be low compared to the base.  After we came to bypass the left brain saturation came higher but never reached back to the base.  I made a comment to the family about the potential risk for stroke and they understand.  We will continue to follow after the patient is awakened and extubated.    Dr. Enrique Romo is considered a first assistant since there is no available resident to help in this operation.  Dr. Romo helped with the opening of the chest, cannulation, throughout the entire operation and he performed decannulation on the chest closure.

## 2023-10-13 NOTE — PROGRESS NOTES
"Vancomycin Dosing by Pharmacy- INITIAL    Suze Najera is a 68 y.o. year old female who Pharmacy has been consulted for vancomycin dosing for surgical prophylaxis. Based on the patient's indication and renal status this patient will be dosed based on a goal AUC of 400-600.     Renal function is currently stable but most recent Scr of 10/7 slightly elevated.     Visit Vitals  /83   Pulse 74   Temp 36.8 °C (98.2 °F) (Bladder)   Resp 16        Lab Results   Component Value Date    CREATININE 1.11 (H) 10/07/2023    CREATININE 0.85 10/04/2023    CREATININE CANCELED 10/04/2023    CREATININE 0.79 10/03/2023    CREATININE 0.70 10/02/2023        Patient weight is 78.7kg    No results found for: \"CULTURE\"     No intake/output data recorded.      Lab Results   Component Value Date    PATIENTTEMP 37.0 10/13/2023    PATIENTTEMP 37.0 10/13/2023    PATIENTTEMP 37.0 10/13/2023          Assessment/Plan     Patient will not be given a loading dose.  Will initiate vancomycin maintenance,  1250 mg every 24 hours. Received 1000 mg in the OR on 10/13/23.    This dosing regimen is predicted by [employer] to result in the following pharmacokinetic parameters:  Exposure target: AUC24 (range)400-600 mg/L.hr   AUC24,ss: 454 mg/L.hr  Probability of AUC24 > 400: 65 %  Ctrough,ss: 13.4 mg/L  Probability of Ctrough,ss > 20: 15 %  Probability of nephrotoxicity (Lodise FRANKY 2009): 9 %    Follow-up level  and Scr will be ordered on 10/14 with AM labs unless clinically indicated sooner.  Will continue to monitor renal function daily while on vancomycin and order serum creatinine at least every 48 hours if not already ordered.  Follow for continued vancomycin needs, clinical response, and signs/symptoms of toxicity.       Patricia Barnard, PharmD       "

## 2023-10-13 NOTE — ANESTHESIA PREPROCEDURE EVALUATION
Patient: Suze Najera    Procedure Information       Date/Time: 10/13/23 5894    Procedures:       REDO AVR TISSUE/ASC AORTA      Aortic Valve Replacement REDO (Chest)    Location: [facility] OR 19 / [facility] OR    Surgeons: Christopher Hayden MD            Relevant Problems   Cardiovascular   (+) Aortic aneurysm without rupture (CMS/HCC)   (+) Benign essential hypertension   (+) CAD, multiple vessel   (+) Hyperlipidemia   (+) Mechanical complication of heart valve prosthesis   (+) Other mechanical complication of heart valve prosthesis, initial encounter      Endocrine   (+) Class 1 obesity with body mass index (BMI) of 32.0 to 32.9 in adult   (+) Diabetes mellitus, type 2 (CMS/HCC)      /Renal   (+) Hepatomegaly   (+) Recurrent UTI (urinary tract infection)      Neuro/Psych   (+) Anxiety   (+) Recurrent moderate major depressive disorder with anxiety (CMS/HCC)   (+) Subarachnoid hemorrhage due to ruptured aneurysm (CMS/HCC)      Pulmonary   (+) COPD (chronic obstructive pulmonary disease) (CMS/HCC)   (+) GEOFF (obstructive sleep apnea)      Hematology   (+) Long term (current) use of anticoagulants      Infectious Disease   (+) Recurrent UTI (urinary tract infection)      Other   (+) Arthritis of right knee       Clinical information reviewed:                   NPO Detail:  No data recorded     Physical Exam    Airway  Mallampati: II  TM distance: <3 FB  Neck ROM: full     Cardiovascular    Dental   (+) lower dentures, upper dentures     Pulmonary    Abdominal            Anesthesia Plan    ASA 3     general     intravenous induction   Anesthetic plan and risks discussed with patient.  Use of blood products discussed with patient who.    Plan discussed with attending.

## 2023-10-13 NOTE — H&P
CTICU History & Physical    Subjective   HPI:  This is a 68 year old female with a PMH significant for mechanical aortic valve replacement in 2014, HTN, DM, COPD and a brain aneurysm rupture. Today she presents s/p redo CABG x 1 (SVG-RCA) ascending aortic root replacement with Dr. Hayden.       TTE:  No echocardiogram results found for the past 12 months    LHC:  completed 10/2: LM patent, LAD 20% prox, 50-60% mid, LCx small nondominant, RCA dominant 90% prox, 40% mid      Procedure/Surgeon: redo CABG x 1 (SVG-RCA) AND ascending aortic root replacement with Dr. Hayden.   Frontliner/Anesthesia: Dr. Smallwood  Out of OR Time (document on ventilator card): 1520     OR Course/Issues: Low cerebral perfusion reading to the left side     CPB time: 130 minutes  Cross clamp time: 88 minutes  Echo Pre/Post: Normal BIV function  Chest Tubes/Drains: 2MS tubes   Temporary wires location/setting: AV wires VVI @ 50      Fluids  Crystalloid: 1800cc  Colloid: 250cc  Cellsaver: 500cc  Products: 1 PRBC, 1 FFP, 5pk cryo, 5pk platelets  EBL: 250cc     Anesthesia  Intubation: Grade 1 airway mac 4  Intravenous Access: RIJ CVC   Regional anesthesia: n/a  NMB dose/last administration: 20mg rocuronium at 1430  TOF/ reversal given: no  Antibiotic time: stan @ 0945, vanc @ 0750  Temperature on admission to ICU: 36.2c    Past Medical History:   Diagnosis Date    Bicuspid aortic valve 08/02/2018    Concussion with loss of consciousness status unknown, initial encounter 08/10/2018    Closed head injury with concussion    Conjunctival hemorrhage, left eye 01/04/2021    Subconjunctival hemorrhage of left eye    COPD (chronic obstructive pulmonary disease) (CMS/[facility])     Diabetes mellitus (CMS/[facility])     History of colonoscopy 01/10/2017    1/10/17 Dr. Donaldson-Normal colonoscopy     History of mammogram 09/09/2023 9/9/23 neg    Major depressive disorder, single episode, moderate (CMS/HCC) 12/23/2019    Depression, major, single episode, moderate     Nicotine dependence 03/18/2023    Nontraumatic subarachnoid hemorrhage, unspecified (CMS/[facility]) 09/20/2017    Subarachnoid hemorrhage    Other amnesia 10/18/2017    Memory change    Other hypersomnia 11/13/2017    Excessive daytime sleepiness    Other specified disorders of adrenal gland (CMS/[facility]) 06/15/2017    Adrenal hyperplasia    Personal history of (corrected) 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     History of bicuspid aortic valve    Personal history of COVID-19 04/22/2021    History of severe acute respiratory syndrome coronavirus 2 (SARS-CoV-2) disease    Personal history of diseases of the skin and subcutaneous tissue 06/26/2017    History of cellulitis    Personal history of malignant melanoma of skin 01/18/2018    History of malignant melanoma    Personal history of other benign neoplasm 08/21/2018    History of meningioma    Personal history of other diseases of the circulatory system     History of intracranial hemorrhage    Personal history of other diseases of urinary system 01/04/2021    History of hematuria    Personal history of other diseases of urinary system 01/26/2018    History of hematuria    Personal history of other infectious and parasitic diseases 05/21/2022    History of Clostridioides difficile infection    Personal history of other specified conditions 12/15/2022    History of seizure    Personal history of transient ischemic attack (TIA), and cerebral infarction without residual deficits 01/18/2018    History of stroke    Personal history of urinary (tract) infections 03/27/2018    History of urinary tract infection    Polycythemia 03/18/2023    Heme- Dr. Anne     Screening for cervical cancer 07/29/2020 7/29/20 NILM HPV neg    Sleep apnea     Unspecified speech disturbances 09/20/2017    Transient speech disturbance    Urinary tract infection, site not specified 09/29/2020    Acute UTI     Past Surgical History:   Procedure Laterality Date    AORTIC VALVE  REPLACEMENT  04/20/2017    Aortic Valve Replacement    CARDIAC CATHETERIZATION N/A 10/2/2023    Procedure: Left Heart Cath;  Surgeon: Ramy Rebolledo MD;  Location: [place] [address] Cardiac Cath Lab;  Service: Cardiovascular;  Laterality: N/A;  was unable to thread radial on 9/29 so need today,  on heparin gtt.    CARDIAC SURGERY  01/09/2017    Heart Surgery    OTHER SURGICAL HISTORY  03/13/2017    Brain Surgery    OTHER SURGICAL HISTORY  01/28/2019    Trigger finger repair    OTHER SURGICAL HISTORY  03/13/2017    Inferior Vena Cava Filter Placement W/ Fluorosc Angiogr Guidance    TUBAL LIGATION  03/13/2017    Tubal Ligation     Medications Prior to Admission   Medication Sig Dispense Refill Last Dose    acetaminophen (Tylenol) 325 mg tablet Take 1-2 tablets (325-650 mg) by mouth every 4 hours if needed.   Past Month    amLODIPine (Norvasc) 5 mg tablet Take 1 tablet (5 mg) by mouth once daily. 90 tablet 1 10/12/2023    FLUoxetine (PROzac) 40 mg capsule Take 1 capsule (40 mg) by mouth once daily. 90 capsule 1 10/12/2023    fluticasone-umeclidin-vilanter (TRELEGY-ELLIPTA) 100-62.5-25 mcg blister with device Inhale 1 puff once daily.   Past Month    glucosamine sulfate 1,000 mg tablet Take 1 tablet by mouth once daily.   Past Month    losartan (Cozaar) 100 mg tablet Take 1 tablet (100 mg) by mouth once daily. 90 tablet 0 10/12/2023    metFORMIN XR (Glucophage-XR) 500 mg 24 hr tablet Take 1 tablet (500 mg) by mouth 2 times a day. 180 tablet 3 10/12/2023    metoprolol succinate XL (Toprol-XL) 25 mg 24 hr tablet Take 1 tablet (25 mg) by mouth once daily. 90 tablet 1 10/12/2023    rosuvastatin (Crestor) 40 mg tablet Take 1 tablet (40 mg) by mouth once daily. 90 tablet 1 10/12/2023    vibegron (Gemtesa) 75 mg tablet Take 1 tablet (75 mg) by mouth once daily.   10/12/2023    warfarin (Coumadin) 3 mg tablet Take 3 mg (1 tablet) Monday, Wednesday and Saturday  Take 4.5 mg (1.5 tablets) Sunday, Tuesday, Thursday and Friday 90  tablet 1 10/9/2023     Acetazolamide, Ceftizoxime, Cefuroxime, Erythromycin, Ibuprofen, Penicillins, and Sulfa (sulfonamide antibiotics)  Social History     Tobacco Use    Smoking status: Former     Packs/day: 1.00     Years: 51.00     Additional pack years: 0.00     Total pack years: 51.00     Types: Cigarettes     Quit date: 2023     Years since quittin.2     Passive exposure: Past    Smokeless tobacco: Never   Vaping Use    Vaping Use: Never used   Substance Use Topics    Alcohol use: Not Currently    Drug use: Yes     Types: Marijuana     Comment: states sometimes use     Family History   Problem Relation Name Age of Onset    Alzheimer's disease Mother      Breast cancer Mother          age 52    Other (Brain tumor) Father      Breast cancer Mother's Sister         Review of Systems:  IESHA sedated and intubated    Scheduled Medications:   meropenem, 2 g, intravenous, q8h         Continuous Medications:         PRN Medications:   PRN medications: heparin, sodium chloride    Objective   Vitals:  Most Recent:  Vitals:    10/13/23 0737   BP: 149/83   Pulse: 71   Resp: 16   Temp: 36.1 °C (97 °F)   SpO2: 96%       24hr Min/Max:  Temp  Min: 36.1 °C (97 °F)  Max: 36.1 °C (97 °F)  Pulse  Min: 71  Max: 71  BP  Min: 149/83  Max: 149/83  Resp  Min: 16  Max: 16  SpO2  Min: 96 %  Max: 96 %    I/O:  No intake/output data recorded.    Hemodynamic parameters for last 24 hours:         Vent settings:  PEEP/CPAP (cm H2O):  [0 cm H20-5 cm H20] 5 cm H20      Physical Exam:   Physical Exam  Constitutional:       Comments: IESHA sedated and intubated   HENT:      Head: Normocephalic.   Eyes:      Pupils: Pupils are equal, round, and reactive to light.   Cardiovascular:      Rate and Rhythm: Normal rate and regular rhythm.   Pulmonary:      Breath sounds: Normal breath sounds.   Abdominal:      General: Abdomen is flat. Bowel sounds are normal.      Palpations: Abdomen is soft.   Musculoskeletal:         General: Swelling present.       Cervical back: Normal range of motion.   Skin:     General: Skin is warm and dry.      Capillary Refill: Capillary refill takes less than 2 seconds.   Neurological:      Comments: IESHA             This patient has a central line   Reason for the central line remaining today? Parenteral medication    This patient has a urinary catheter   Reason for the urinary catheter remaining today? critically ill patient who need accurate urinary output measurements               Assessment/Plan     Assessment:This is a 68 year old female with a PMH significant for mechanical aortic valve replacement in 2014, HTN, DM, COPD and a brain aneurysm rupture. Today she presents s/p redo CABG x 1 (SVG-RCA) scending aortic root replacement with Dr. Hayden.      Plan:  NEURO:  PMH of… Patient is intubated and sedated on propofol infusion. Acute post operative pain.   -->  - Serial neuro and pain assessments   - Continue propofol until NMB reversal, then daily sedation vacation at minimum   - NMB reversal when normothermic and hemodynamically stable.    - Scheduled Tylenol   - PRN oxycodone  - PRN dilaudid for pain   - PT Consult, OOB to chair as tolerated, chair position if not tolerated   - CAM ICU score qshift  - Sleep/wake cycle hygiene  - Resume home Prozac when extubated     CV:  Patient has a history of HTN, mechanical AVR in 2014, HPL.  Is now status post Ascending aortic root replacement and CABG x 1 on 10/13 with Dr. Hayden.  Pre/Post EF: Normal BIV. Arrived to CTICU on Epi 0.02 A/V epicardial wires set VVI @ 50. Lactic acidosis postop-->  - Maintain goal systolic <110 for first 24hrs  - Mixed venous and CI Q4H  - Volume resuscitate as clinically indicated  - PRN hydralazine  - Give ASA within 6hrs  - Start statin  - 500cc LR  - Start coumadin Wednesday per Dr. Hayden  - Hold home amlodipine, losartan, metop, coumadin     PULM:  HX of COPD.  Currently intubated on ventilator. Chest tubes 2MS and 2 left pleural.-->  - F/u post op  CXR  - Once reversed, wean ventilator settings towards CPAP & extubation   - Wean FiO2 maintaining SpO2 >92%.   - IS q1h and OOB to chair when extubated  - Chest tubes to wall suction.  - Resume home inhaled therapies when extubated    GI:  No pmhx.  OG in place.-->  - Continue PPI until extubated  - NPO, will perform bedside swallow eval post extubation   - Colace/senna BID and miralax BID     :  CSA-FAINA Risk Score Moderate.  No history of renal disease, baseline creatinine 0.85. had a mild FAINA preop of a creatinine 1.11. Creatinine stable post-op. Ramírez in place and making adequate UOP. -->  - Continue ramírez catheter for strict I/Os.  - Goal UOP 0.5ml/kg/hr  - RFP as clinically indicated  - Replete electrolytes per CTICU protocol     ENDO:  PMH of DM. Hyperglycemia  -->  - Maintain BG <180  - On insulin gtt postop  - Hold home metformin     HEME:  Acute blood loss anemia and thrombocytopenia. Received 1 PRBC, 1 FFP, 5pk platelets, 5pk cryo in the OR-->    - Monitor drain output volume and characteristics  - CBC, coags, and fibrinogen post op and as clinically indicated  - Start ASA 6hrs post-op for CABG  - SQH tomorrow   - Restart coumadin on Wednesday per Dr. Hayden. No need to bridge  - SCDs for DVT prophylaxis.  - Last type and screen: 10/13     ID:  Afebrile, no current indications of infection. MRSA negative.-->  - Trend temp q4h  - Allergy to penicillins so aztreonam q8hrs and Vanc for 48 hours postop     Skin:  No active skin issues.  - preventative Mepilex dressings in place on sacrum and heels  - change preventative Mepilex weekly or more frequently as indicated (when moist/soiled)   - every shift skin assessment per nursing and weekly ICU skin rounds  - moisture barrier to be applied with randy care  - active skin problems addressed with nursing on daily rounds     Proph:  SCDs  PPI     G:  Line  Right IJ MAC w Minimac placed 10/13  Left brachial a-line placed 10/13      F: Family: will update at bedside  postoperatively.     A,B,C,D,E,F,G: reviewed  The indications and risks/benefits of non-violent/non self-destructive restraints were discussed. Bilateral soft writst restraints indicated      I spent 120 minutes in the professional and overall care of this patient.     Dispo: CTICU care for now.    CTICU TEAM PHONE [phone]

## 2023-10-13 NOTE — PROGRESS NOTES
Pharmacy Medication History Review    Suze Najera is a 68 y.o. female admitted for No Principal Problem: There is no principal problem currently on the Problem List. Please update the Problem List and refresh.. Pharmacy reviewed the patient's [id number] medications and allergies for accuracy.    The list below reflects the updated PTA list. Please review each medication in order reconciliation for additional clarification and justification.  Medications Prior to Admission   Medication Sig Dispense Refill Last Dose    acetaminophen (Tylenol) 325 mg tablet Take 1-2 tablets (325-650 mg) by mouth every 4 hours if needed.   Past Month    amLODIPine (Norvasc) 5 mg tablet Take 1 tablet (5 mg) by mouth once daily. 90 tablet 1 10/12/2023    FLUoxetine (PROzac) 40 mg capsule Take 1 capsule (40 mg) by mouth once daily. 90 capsule 1 10/12/2023    fluticasone-umeclidin-vilanter (TRELEGY-ELLIPTA) 100-62.5-25 mcg blister with device Inhale 1 puff once daily.   Past Month    glucosamine sulfate 1,000 mg tablet Take 1 tablet by mouth once daily.   Past Month    losartan (Cozaar) 100 mg tablet Take 1 tablet (100 mg) by mouth once daily. 90 tablet 0 10/12/2023    metFORMIN XR (Glucophage-XR) 500 mg 24 hr tablet Take 1 tablet (500 mg) by mouth 2 times a day. 180 tablet 3 10/12/2023    metoprolol succinate XL (Toprol-XL) 25 mg 24 hr tablet Take 1 tablet (25 mg) by mouth once daily. 90 tablet 1 10/12/2023    rosuvastatin (Crestor) 40 mg tablet Take 1 tablet (40 mg) by mouth once daily. 90 tablet 1 10/12/2023    vibegron (Gemtesa) 75 mg tablet Take 1 tablet (75 mg) by mouth once daily.   10/12/2023    warfarin (Coumadin) 3 mg tablet Take 3 mg (1 tablet) Monday, Wednesday and Saturday  Take 4.5 mg (1.5 tablets) Sunday, Tuesday, Thursday and Friday 90 tablet 1 10/9/2023        The list below reflects the updated allergy list. Please review each documented allergy for additional clarification and justification.  Allergies   Reviewed by Becca Brito RN on 10/13/2023        Severity Reactions Comments    Acetazolamide Not Specified Unknown     Ceftizoxime Not Specified Hives     Cefuroxime Not Specified Hives     Erythromycin Not Specified Other Vomiting     Ibuprofen Not Specified Hives     Penicillins Not Specified Hives     Sulfa (sulfonamide Antibiotics) Not Specified Hives             Below are additional concerns with the patient's PTA list.  Patient/daughter had list of medications/ Pharmacy - [facility], [facility]/ Chart Review  Of note: patient/daughter verbally stated taking glipizide 5mg daily (last filled 10/5/23 for 30 days) however, this medication shows it was discontinued on 10/7 at discharge.      Yesenia Thurman, PharmD  Transitions of Care Pharmacist  [facility]  Please reach out via secure chat for questions, or if no response call [phone] or [facility]

## 2023-10-13 NOTE — PROGRESS NOTES
Suze Najera is a 68 y.o. female on day 0 of admission presenting with No Principal Problem: There is no principal problem currently on the Problem List. Please update the Problem List and refresh..    I have seen the patient either independently or with an associated resident physician or advanced practice provider    Pt s/p redo sternotomy with asc aorta replacement and cabg x1 with Dr Hayden    Neuro: Will perform SAT to assess neuro status. Left NIRS low early in case. IV pain medication  Cardiac: S/p redo AVR, asc replacement, CABG x1. Normal BiV function post op. Epi 03. Will initiate aspirin this evening. SBP <120. Pt mechanical valve remains, will need anticoagulation early.   Pulmonary: Will perform SBT, wean toward extubation. Post op CXR ordered. Pt with past smoking hx.   Gastrointestinal:NPO for intubation, continue PPI  Renal: Preop Cr 1.1. Has ramírez. Will trend urine output  Endocrine: Hx of DM, on insulin gtt with plan to transition to SSI  Hematology: Warfarin anticoagulation for mechanical valve. Will restart as soon as clinically appropriate. Last TEG postop with prolonged R, low K and MA. Received FFP, platelets and cryo as a result.   Infectious Disease: Periop abx  Musculoskeletal:No issues    Lines/Tubes/Drains:RIJ MAC, L brachial alissa, ramírez, chest tubes    Mechanical Circulatory Support: None    Prophylaxis: SCDs, PPI    Disposition: CTICU    ABCDEF Checklist  Analgesia: Spontaneous awakening trial to be pursued if clinically appropriate. RASS goal reviewed  Breathing: Spontaneous breathing trial to be pursued if clinically appropriate. Mechanical power of assisted ventilation reviewed  Choice of analgesia/sedation: Analgesic and sedative agents adjusted per clinical context.   Delirium assessed by CAM, will avoid exacerbating factors  Early mobility and exercise: Physical and occupational therapy engaged  Family: Plan of care, overall trajectory of patient shared with family. Questions  elicited and answered as appropriate.       Due to the high probability of life threatening clinical decompensation, the patient required critical care time evaluating and managing this patient.  Critical care time included obtaining a history, examining the patient, ordering and reviewing studies, discussing, developing, and implementing a management plan, evaluating the patient's response to treatment, and discussion with other care team providers. I saw and evaluated the patient myself.  Critical care time was performed exclusive of billable procedures.    Critical care time: 39             Elio Henry MD

## 2023-10-13 NOTE — ANESTHESIA PROCEDURE NOTES
Airway  Date/Time: 10/13/2023 9:40 AM  Urgency: elective    Airway not difficult    Staffing  Performed: CRNA   Authorized by: Sayra Smallwood MD    Performed by: DAMIÁN Campos-HUNTER  Patient location during procedure: OR    Indications and Patient Condition  Indications for airway management: anesthesia and airway protection  Spontaneous ventilation: present  Sedation level: deep  Preoxygenated: yes  Patient position: sniffing  Mask difficulty assessment: 1 - vent by mask  No planned trial extubation    Final Airway Details  Final airway type: endotracheal airway      Successful airway: ETT  Cuffed: yes   Successful intubation technique: direct laryngoscopy  Facilitating devices/methods: intubating stylet  Endotracheal tube insertion site: oral  Blade: Pippa  Blade size: #4  ETT size (mm): 7.0  Cormack-Lehane Classification: grade I - full view of glottis  Placement verified by: chest auscultation and capnometry   Cuff volume (mL): 7  Measured from: gums  ETT to gums (cm): 20  Number of attempts at approach: 1

## 2023-10-13 NOTE — ANESTHESIA PROCEDURE NOTES
Central Venous Line:    Date/Time: 10/13/2023 8:45 AM    A central venous line was placed in the OR for the following indication(s): central venous access and CVP monitoring.  Staffing  Performed: attending   Authorized by: Sayra Smallwood MD    Performed by: DAMIÁN Campos-CRNA    Sterility preparation included the following: provider hand hygiene performed prior to central venous catheter insertion, all 5 sterile barriers used (gloves, gown, cap, mask, large sterile drape) during central venous catheter insertion, antiseptic used during central venous catheter insertion and skin prep agent completely dried prior to procedure.  The patient was placed in Trendelenburg position.    Right internal jugular vein was prepped.    The site was prepped with Chlorhexidine.  Size: 9 Fr   Length: 20  Catheter type: introducer   Number of Lumens: single lumen      During the procedure, the following specific steps were taken: target vein identified, needle advanced into vein and blood aspirated and guidewire advanced into vein.  Seldinger technique used.  Procedure performed using ultrasound guidance.  Sterile gel and probe cover used in ultrasound-guided central venous catheter insertion.    Intravenous verification was obtained by ultrasound, venous blood return and manometry.      Post insertion care included: all ports aspirated, all ports flushed easily, guidewire removed intact, Biopatch applied, line sutured in place and dressing applied.    During the procedure the patient experienced: patient tolerated procedure well with no complications.

## 2023-10-13 NOTE — ANESTHESIA PROCEDURE NOTES
Arterial Line:    Date/Time: 10/13/2023 7:30 AM    Staffing  Performed: CRNA   Authorized by: Sayra Smallwood MD    Performed by: DAMIÁN Campos-CRNA    An arterial line was placed. Procedure performed using ultrasound guidance.in the PACU for the following indication(s): continuous blood pressure monitoring and blood sampling needed.    A 20 gauge (size), 1 and 3/4 inch (length), Angiocath (type) catheter was placed into the Left brachial artery, secured by Tegaderm,   Seldinger technique used.  Events:  patient tolerated procedure well with no complications.

## 2023-10-14 ENCOUNTER — APPOINTMENT (OUTPATIENT)
Dept: RADIOLOGY | Facility: HOSPITAL | Age: 68
DRG: 220 | End: 2023-10-14
Payer: MEDICARE

## 2023-10-14 LAB
ACT BLD: 104 SEC (ref 96–152)
ACT BLD: 105 SEC (ref 96–152)
ACT BLD: 129 SEC (ref 96–152)
ACT BLD: 135 SEC (ref 96–152)
ACT BLD: 164 SEC (ref 96–152)
ACT BLD: 505 SEC (ref 96–152)
ACT BLD: 566 SEC (ref 96–152)
ACT BLD: 744 SEC (ref 96–152)
ACT BLD: 810 SEC (ref 96–152)
ALBUMIN SERPL BCP-MCNC: 3.6 G/DL (ref 3.4–5)
ANION GAP BLDA CALCULATED.4IONS-SCNC: 8 MMO/L (ref 10–25)
ANION GAP SERPL CALC-SCNC: 12 MMOL/L (ref 10–20)
BASE EXCESS BLDA CALC-SCNC: 2.8 MMOL/L (ref -2–3)
BODY TEMPERATURE: 37 DEGREES CELSIUS
BUN SERPL-MCNC: 19 MG/DL (ref 6–23)
CA-I BLD-SCNC: 1.12 MMOL/L (ref 1.1–1.33)
CA-I BLDA-SCNC: 1.16 MMOL/L (ref 1.1–1.33)
CALCIUM SERPL-MCNC: 8.6 MG/DL (ref 8.6–10.6)
CHLORIDE BLDA-SCNC: 106 MMOL/L (ref 98–107)
CHLORIDE SERPL-SCNC: 106 MMOL/L (ref 98–107)
CO2 SERPL-SCNC: 28 MMOL/L (ref 21–32)
CREAT SERPL-MCNC: 0.88 MG/DL (ref 0.5–1.05)
ERYTHROCYTE [DISTWIDTH] IN BLOOD BY AUTOMATED COUNT: 15.4 % (ref 11.5–14.5)
GFR SERPL CREATININE-BSD FRML MDRD: 72 ML/MIN/1.73M*2
GLUCOSE BLD MANUAL STRIP-MCNC: 146 MG/DL (ref 74–99)
GLUCOSE BLD MANUAL STRIP-MCNC: 159 MG/DL (ref 74–99)
GLUCOSE BLD MANUAL STRIP-MCNC: 167 MG/DL (ref 74–99)
GLUCOSE BLD MANUAL STRIP-MCNC: 174 MG/DL (ref 74–99)
GLUCOSE BLD MANUAL STRIP-MCNC: 178 MG/DL (ref 74–99)
GLUCOSE BLD MANUAL STRIP-MCNC: 179 MG/DL (ref 74–99)
GLUCOSE BLD MANUAL STRIP-MCNC: 195 MG/DL (ref 74–99)
GLUCOSE BLDA-MCNC: 186 MG/DL (ref 74–99)
GLUCOSE SERPL-MCNC: 189 MG/DL (ref 74–99)
HCO3 BLDA-SCNC: 27.2 MMOL/L (ref 22–26)
HCT VFR BLD AUTO: 30.4 % (ref 36–46)
HCT VFR BLD EST: 30 % (ref 36–46)
HGB BLD-MCNC: 10.1 G/DL (ref 12–16)
HGB BLDA-MCNC: 10 G/DL (ref 12–16)
INHALED O2 CONCENTRATION: 36 %
LACTATE BLDA-SCNC: 1.3 MMOL/L (ref 0.4–2)
MAGNESIUM SERPL-MCNC: 2.23 MG/DL (ref 1.6–2.4)
MCH RBC QN AUTO: 29.8 PG (ref 26–34)
MCHC RBC AUTO-ENTMCNC: 33.2 G/DL (ref 32–36)
MCV RBC AUTO: 90 FL (ref 80–100)
NRBC BLD-RTO: 0 /100 WBCS (ref 0–0)
OXYHGB MFR BLDA: 95.7 % (ref 94–98)
PCO2 BLDA: 40 MM HG (ref 38–42)
PH BLDA: 7.44 PH (ref 7.38–7.42)
PHOSPHATE SERPL-MCNC: 3.9 MG/DL (ref 2.5–4.9)
PLATELET # BLD AUTO: 147 X10*3/UL (ref 150–450)
PMV BLD AUTO: 11 FL (ref 7.5–11.5)
PO2 BLDA: 75 MM HG (ref 85–95)
POTASSIUM BLDA-SCNC: 4.2 MMOL/L (ref 3.5–5.3)
POTASSIUM SERPL-SCNC: 4.3 MMOL/L (ref 3.5–5.3)
RBC # BLD AUTO: 3.39 X10*6/UL (ref 4–5.2)
SAO2 % BLDA: 98 % (ref 94–100)
SODIUM BLDA-SCNC: 137 MMOL/L (ref 136–145)
SODIUM SERPL-SCNC: 142 MMOL/L (ref 136–145)
VANCOMYCIN SERPL-MCNC: 7.2 UG/ML (ref 5–20)
WBC # BLD AUTO: 11.9 X10*3/UL (ref 4.4–11.3)

## 2023-10-14 PROCEDURE — 96372 THER/PROPH/DIAG INJ SC/IM: CPT | Performed by: STUDENT IN AN ORGANIZED HEALTH CARE EDUCATION/TRAINING PROGRAM

## 2023-10-14 PROCEDURE — 80202 ASSAY OF VANCOMYCIN: CPT | Performed by: NURSE PRACTITIONER

## 2023-10-14 PROCEDURE — 2500000004 HC RX 250 GENERAL PHARMACY W/ HCPCS (ALT 636 FOR OP/ED): Performed by: STUDENT IN AN ORGANIZED HEALTH CARE EDUCATION/TRAINING PROGRAM

## 2023-10-14 PROCEDURE — 2500000001 HC RX 250 WO HCPCS SELF ADMINISTERED DRUGS (ALT 637 FOR MEDICARE OP): Performed by: STUDENT IN AN ORGANIZED HEALTH CARE EDUCATION/TRAINING PROGRAM

## 2023-10-14 PROCEDURE — 2500000002 HC RX 250 W HCPCS SELF ADMINISTERED DRUGS (ALT 637 FOR MEDICARE OP, ALT 636 FOR OP/ED): Performed by: STUDENT IN AN ORGANIZED HEALTH CARE EDUCATION/TRAINING PROGRAM

## 2023-10-14 PROCEDURE — 82805 BLOOD GASES W/O2 SATURATION: CPT | Mod: CMCLAB | Performed by: NURSE PRACTITIONER

## 2023-10-14 PROCEDURE — C9113 INJ PANTOPRAZOLE SODIUM, VIA: HCPCS | Performed by: NURSE PRACTITIONER

## 2023-10-14 PROCEDURE — 85027 COMPLETE CBC AUTOMATED: CPT | Performed by: NURSE PRACTITIONER

## 2023-10-14 PROCEDURE — 2500000001 HC RX 250 WO HCPCS SELF ADMINISTERED DRUGS (ALT 637 FOR MEDICARE OP): Performed by: NURSE PRACTITIONER

## 2023-10-14 PROCEDURE — 84132 ASSAY OF SERUM POTASSIUM: CPT | Mod: CMCLAB | Performed by: NURSE PRACTITIONER

## 2023-10-14 PROCEDURE — 82947 ASSAY GLUCOSE BLOOD QUANT: CPT

## 2023-10-14 PROCEDURE — 71045 X-RAY EXAM CHEST 1 VIEW: CPT | Performed by: RADIOLOGY

## 2023-10-14 PROCEDURE — 2020000001 HC ICU ROOM DAILY

## 2023-10-14 PROCEDURE — 83735 ASSAY OF MAGNESIUM: CPT | Mod: CMCLAB | Performed by: NURSE PRACTITIONER

## 2023-10-14 PROCEDURE — 94640 AIRWAY INHALATION TREATMENT: CPT

## 2023-10-14 PROCEDURE — 99291 CRITICAL CARE FIRST HOUR: CPT | Performed by: EMERGENCY MEDICINE

## 2023-10-14 PROCEDURE — 37799 UNLISTED PX VASCULAR SURGERY: CPT | Mod: CMCLAB | Performed by: NURSE PRACTITIONER

## 2023-10-14 PROCEDURE — 82947 ASSAY GLUCOSE BLOOD QUANT: CPT | Mod: CMCLAB

## 2023-10-14 PROCEDURE — 2500000004 HC RX 250 GENERAL PHARMACY W/ HCPCS (ALT 636 FOR OP/ED): Performed by: NURSE PRACTITIONER

## 2023-10-14 PROCEDURE — 71045 X-RAY EXAM CHEST 1 VIEW: CPT | Mod: FY

## 2023-10-14 PROCEDURE — S0073 INJECTION, AZTREONAM, 500 MG: HCPCS

## 2023-10-14 PROCEDURE — 2500000004 HC RX 250 GENERAL PHARMACY W/ HCPCS (ALT 636 FOR OP/ED)

## 2023-10-14 PROCEDURE — 82330 ASSAY OF CALCIUM: CPT | Performed by: NURSE PRACTITIONER

## 2023-10-14 RX ORDER — HYDRALAZINE HYDROCHLORIDE 25 MG/1
25 TABLET, FILM COATED ORAL EVERY 8 HOURS
Status: DISCONTINUED | OUTPATIENT
Start: 2023-10-15 | End: 2023-10-15

## 2023-10-14 RX ORDER — METOPROLOL TARTRATE 25 MG/1
12.5 TABLET, FILM COATED ORAL 2 TIMES DAILY
Status: DISCONTINUED | OUTPATIENT
Start: 2023-10-14 | End: 2023-10-17

## 2023-10-14 RX ORDER — HYDRALAZINE HYDROCHLORIDE 25 MG/1
25 TABLET, FILM COATED ORAL EVERY 8 HOURS
Status: DISCONTINUED | OUTPATIENT
Start: 2023-10-15 | End: 2023-10-14

## 2023-10-14 RX ORDER — HYDRALAZINE HYDROCHLORIDE 20 MG/ML
10 INJECTION INTRAMUSCULAR; INTRAVENOUS EVERY 8 HOURS
Status: DISCONTINUED | OUTPATIENT
Start: 2023-10-14 | End: 2023-10-14

## 2023-10-14 RX ORDER — HYDRALAZINE HYDROCHLORIDE 10 MG/1
10 TABLET, FILM COATED ORAL ONCE
Status: COMPLETED | OUTPATIENT
Start: 2023-10-14 | End: 2023-10-14

## 2023-10-14 RX ORDER — HYDRALAZINE HYDROCHLORIDE 20 MG/ML
10 INJECTION INTRAMUSCULAR; INTRAVENOUS EVERY 2 HOUR PRN
Status: DISCONTINUED | OUTPATIENT
Start: 2023-10-14 | End: 2023-10-15

## 2023-10-14 RX ORDER — FLUOXETINE HYDROCHLORIDE 20 MG/1
40 CAPSULE ORAL DAILY
Status: DISCONTINUED | OUTPATIENT
Start: 2023-10-14 | End: 2023-10-18 | Stop reason: HOSPADM

## 2023-10-14 RX ORDER — FLUTICASONE FUROATE AND VILANTEROL 100; 25 UG/1; UG/1
1 POWDER RESPIRATORY (INHALATION)
Status: DISCONTINUED | OUTPATIENT
Start: 2023-10-14 | End: 2023-10-18 | Stop reason: HOSPADM

## 2023-10-14 RX ORDER — DEXTROSE 50 % IN WATER (D50W) INTRAVENOUS SYRINGE
25
Status: DISCONTINUED | OUTPATIENT
Start: 2023-10-14 | End: 2023-10-18 | Stop reason: HOSPADM

## 2023-10-14 RX ORDER — INSULIN LISPRO 100 [IU]/ML
0-15 INJECTION, SOLUTION INTRAVENOUS; SUBCUTANEOUS EVERY 4 HOURS
Status: DISCONTINUED | OUTPATIENT
Start: 2023-10-14 | End: 2023-10-15

## 2023-10-14 RX ORDER — HYDRALAZINE HYDROCHLORIDE 10 MG/1
10 TABLET, FILM COATED ORAL EVERY 8 HOURS
Status: DISCONTINUED | OUTPATIENT
Start: 2023-10-14 | End: 2023-10-14

## 2023-10-14 RX ORDER — HEPARIN SODIUM 5000 [USP'U]/ML
5000 INJECTION, SOLUTION INTRAVENOUS; SUBCUTANEOUS EVERY 8 HOURS
Status: DISCONTINUED | OUTPATIENT
Start: 2023-10-14 | End: 2023-10-15

## 2023-10-14 RX ADMIN — FLUTICASONE FUROATE AND VILANTEROL TRIFENATATE 1 PUFF: 100; 25 POWDER RESPIRATORY (INHALATION) at 10:28

## 2023-10-14 RX ADMIN — HYDRALAZINE HYDROCHLORIDE 10 MG: 10 TABLET, FILM COATED ORAL at 17:23

## 2023-10-14 RX ADMIN — OXYCODONE HYDROCHLORIDE 5 MG: 5 TABLET ORAL at 19:58

## 2023-10-14 RX ADMIN — ACETAMINOPHEN 650 MG: 325 TABLET ORAL at 05:04

## 2023-10-14 RX ADMIN — POLYETHYLENE GLYCOL 3350 17 G: 17 POWDER, FOR SOLUTION ORAL at 09:09

## 2023-10-14 RX ADMIN — HYDROMORPHONE HYDROCHLORIDE 0.2 MG: 1 INJECTION, SOLUTION INTRAMUSCULAR; INTRAVENOUS; SUBCUTANEOUS at 01:07

## 2023-10-14 RX ADMIN — ACETAMINOPHEN 650 MG: 325 TABLET ORAL at 11:45

## 2023-10-14 RX ADMIN — HYDRALAZINE HYDROCHLORIDE 10 MG: 20 INJECTION INTRAMUSCULAR; INTRAVENOUS at 11:49

## 2023-10-14 RX ADMIN — INSULIN LISPRO 5 UNITS: 100 INJECTION, SOLUTION INTRAVENOUS; SUBCUTANEOUS at 22:49

## 2023-10-14 RX ADMIN — AZTREONAM 2 G: 2 INJECTION, POWDER, LYOPHILIZED, FOR SOLUTION INTRAMUSCULAR; INTRAVENOUS at 00:00

## 2023-10-14 RX ADMIN — HEPARIN SODIUM 5000 UNITS: 5000 INJECTION INTRAVENOUS; SUBCUTANEOUS at 13:04

## 2023-10-14 RX ADMIN — METOPROLOL TARTRATE 12.5 MG: 25 TABLET, FILM COATED ORAL at 20:01

## 2023-10-14 RX ADMIN — OXYCODONE HYDROCHLORIDE 5 MG: 5 TABLET ORAL at 05:04

## 2023-10-14 RX ADMIN — VANCOMYCIN HYDROCHLORIDE 1250 MG: 1.25 INJECTION, POWDER, LYOPHILIZED, FOR SOLUTION INTRAVENOUS at 20:22

## 2023-10-14 RX ADMIN — AZTREONAM 2 G: 2 INJECTION, POWDER, LYOPHILIZED, FOR SOLUTION INTRAMUSCULAR; INTRAVENOUS at 16:00

## 2023-10-14 RX ADMIN — SENNOSIDES AND DOCUSATE SODIUM 2 TABLET: 8.6; 5 TABLET ORAL at 20:01

## 2023-10-14 RX ADMIN — HYDRALAZINE HYDROCHLORIDE 10 MG: 10 TABLET, FILM COATED ORAL at 13:05

## 2023-10-14 RX ADMIN — OXYCODONE HYDROCHLORIDE 5 MG: 5 TABLET ORAL at 11:45

## 2023-10-14 RX ADMIN — HYDROMORPHONE HYDROCHLORIDE 0.2 MG: 1 INJECTION, SOLUTION INTRAMUSCULAR; INTRAVENOUS; SUBCUTANEOUS at 15:02

## 2023-10-14 RX ADMIN — ACETAMINOPHEN 650 MG: 325 TABLET ORAL at 20:01

## 2023-10-14 RX ADMIN — SENNOSIDES AND DOCUSATE SODIUM 2 TABLET: 8.6; 5 TABLET ORAL at 09:09

## 2023-10-14 RX ADMIN — ACETAMINOPHEN 650 MG: 325 TABLET ORAL at 23:42

## 2023-10-14 RX ADMIN — HYDRALAZINE HYDROCHLORIDE 10 MG: 20 INJECTION INTRAMUSCULAR; INTRAVENOUS at 02:10

## 2023-10-14 RX ADMIN — ATORVASTATIN CALCIUM 80 MG: 80 TABLET, FILM COATED ORAL at 20:01

## 2023-10-14 RX ADMIN — HYDROMORPHONE HYDROCHLORIDE 0.2 MG: 1 INJECTION, SOLUTION INTRAMUSCULAR; INTRAVENOUS; SUBCUTANEOUS at 20:39

## 2023-10-14 RX ADMIN — ACETAMINOPHEN 650 MG: 325 TABLET ORAL at 09:07

## 2023-10-14 RX ADMIN — HYDRALAZINE HYDROCHLORIDE 10 MG: 20 INJECTION INTRAMUSCULAR; INTRAVENOUS at 22:35

## 2023-10-14 RX ADMIN — PANTOPRAZOLE SODIUM 40 MG: 40 INJECTION, POWDER, FOR SOLUTION INTRAVENOUS at 06:18

## 2023-10-14 RX ADMIN — HEPARIN SODIUM 5000 UNITS: 5000 INJECTION INTRAVENOUS; SUBCUTANEOUS at 22:49

## 2023-10-14 RX ADMIN — METOPROLOL TARTRATE 12.5 MG: 25 TABLET, FILM COATED ORAL at 13:05

## 2023-10-14 RX ADMIN — AZTREONAM 2 G: 2 INJECTION, POWDER, LYOPHILIZED, FOR SOLUTION INTRAMUSCULAR; INTRAVENOUS at 08:00

## 2023-10-14 RX ADMIN — HYDRALAZINE HYDROCHLORIDE 10 MG: 10 TABLET, FILM COATED ORAL at 17:45

## 2023-10-14 RX ADMIN — ACETAMINOPHEN 650 MG: 325 TABLET ORAL at 17:23

## 2023-10-14 ASSESSMENT — PAIN DESCRIPTION - DESCRIPTORS: DESCRIPTORS: ACHING

## 2023-10-14 ASSESSMENT — PAIN SCALES - WONG BAKER
WONGBAKER_NUMERICALRESPONSE: HURTS LITTLE BIT
WONGBAKER_NUMERICALRESPONSE: HURTS EVEN MORE
WONGBAKER_NUMERICALRESPONSE: HURTS LITTLE MORE

## 2023-10-14 ASSESSMENT — PAIN SCALES - GENERAL
PAINLEVEL_OUTOF10: 8
PAINLEVEL_OUTOF10: 4
PAINLEVEL_OUTOF10: 3
PAINLEVEL_OUTOF10: 5 - MODERATE PAIN
PAINLEVEL_OUTOF10: 4
PAINLEVEL_OUTOF10: 5 - MODERATE PAIN
PAINLEVEL_OUTOF10: 7
PAINLEVEL_OUTOF10: 5 - MODERATE PAIN
PAINLEVEL_OUTOF10: 10 - WORST POSSIBLE PAIN

## 2023-10-14 ASSESSMENT — PAIN - FUNCTIONAL ASSESSMENT
PAIN_FUNCTIONAL_ASSESSMENT: 0-10

## 2023-10-14 NOTE — PROGRESS NOTES
CTICU Progress Note      Subjective     Overnight events: extubated on 3L NC, on nitroglycerin drip to maintain systolic below 110, her MAP is in the 70's, vitally stable sitting on the chair.     Scheduled Medications:   acetaminophen, 650 mg, oral, q4h   Or  acetaminophen, 650 mg, rectal, q4h  atorvastatin, 80 mg, oral, Nightly  aztreonam, 2 g, intravenous, q8h  FLUoxetine, 40 mg, oral, Daily  fluticasone furoate-vilanteroL, 1 puff, inhalation, Daily  heparin (porcine), 5,000 Units, subcutaneous, q8h  hydrALAZINE, 10 mg, oral, q8h  insulin regular, 0-15 Units, intravenous, Once  lactated Ringer's, 500 mL, intravenous, Once  metoprolol tartrate, 12.5 mg, oral, BID  neostigmine, 3 mg, intravenous, Once  pantoprazole, 40 mg, oral, Daily before breakfast   Or  pantoprazole, 40 mg, intravenous, Daily before breakfast  polyethylene glycol, 17 g, oral, Daily  sennosides-docusate sodium, 2 tablet, oral, BID  vancomycin, 1,250 mg, intravenous, q24h         Continuous Medications:   insulin regular infusion for cardiac surgery, 0-15 Units/hr, Last Rate: 1.5 Units/hr (10/14/23 0640)  lactated Ringer's, 5 mL/hr, Last Rate: 5 mL/hr (10/14/23 0640)  nitroglycerin, 5-200 mcg/min, Last Rate: 10 mcg/min (10/14/23 0640)         PRN Medications:   PRN medications: calcium gluconate, calcium gluconate, dextrose **OR** glucagon, hydrALAZINE, HYDROmorphone, insulin regular, magnesium sulfate, magnesium sulfate, naloxone, ondansetron **OR** ondansetron, oxyCODONE, oxygen, potassium chloride, potassium chloride    Objective   Vitals:  Most Recent:  Vitals:    10/14/23 1031   BP:    Pulse: 90   Resp: 22   Temp:    SpO2:        24hr Min/Max:  Temp  Min: 36.2 °C (97.2 °F)  Max: 36.8 °C (98.2 °F)  Pulse  Min: 68  Max: 98  Resp  Min: 0  Max: 31  SpO2  Min: 97 %  Max: 100 %    I/O:  I/O last 2 completed shifts:  In: 6061.1 (77 mL/kg) [I.V.:239.1 (3 mL/kg); Blood:2090; IV Piggyback:2250]  Out: 4050 (51.5 mL/kg) [Urine:3105 (1.6 mL/kg/hr);  Other:240; Blood:250; Chest Tube:455]  Weight: 78.7 kg     Hemodynamic parameters for last 24 hours:         Vent settings:  Vent Mode: Pressure support  FiO2 (%):  [50 %] 50 %  S RR:  [16] 16  S VT:  [400 mL] 400 mL  PEEP/CPAP (cm H2O):  [0 cm H20-8 cm H20] 0 cm H20  WI SUP:  [0 cm H20-5 cm H20] 0 cm H20  MAP (cm H2O):  [12] 12    LDA:  CVC 10/13/23 Single lumen Right Internal jugular (Active)   Placement Date/Time: 10/13/23 (c) 0845   Hand Hygiene Performed Prior to CVC Insertion: Yes  Site Prep: Chlorhexidine   Site Prep Agent has Completely Dried Before Insertion: Yes  All 5 Sterile Barriers Used (Gloves, Gown, Cap, Mask, Large Sterile Chanelle...   Number of days: 1       Arterial Line 10/13/23 Left Brachial (Active)   Placement Date/Time: 10/13/23 (c) 0730   Size: 20 G  Orientation: Left  Location: Brachial  Securement Method: Transparent dressing  Patient Tolerance: Tolerated well   Number of days: 1       Urethral Catheter Non-latex;Double-lumen;Temperature probe 14 Fr. (Active)   Placement Date/Time: 10/13/23 0845   Placed by: Vicki Jacome  Hand Hygiene Completed: Yes  Catheter Type: Non-latex;Double-lumen;Temperature probe  Tube Size (Fr.): 14 Fr.  Catheter Balloon Size: 10 mL  Urine Returned: Yes   Number of days: 1       Y Chest Tube 1 and 2 Mediastinal Mediastinal (Active)   Placement Date/Time: 10/13/23 1500   Chest Tube Location 1: Mediastinal  Chest Tube Location 2: Mediastinal  Chest Tube Drainage System: Suction   Number of days: 0       Y Chest Tube 1 and 2 Mediastinal Mediastinal (Active)   Placement Date/Time: 10/13/23 1500   Chest Tube Location 1: Mediastinal  Chest Tube Location 2: Mediastinal   Number of days: 0         Physical Exam:   Physical Exam  Constitutional:       General: She is not in acute distress.     Appearance: Normal appearance. She is not ill-appearing.   Eyes:      Extraocular Movements: Extraocular movements intact.   Cardiovascular:      Rate and Rhythm: Normal rate and  regular rhythm.   Pulmonary:      Effort: No respiratory distress.      Breath sounds: Normal breath sounds. No wheezing.   Abdominal:      General: Abdomen is flat. There is no distension.      Tenderness: There is no abdominal tenderness.   Genitourinary:     Comments: Intact Santos   Musculoskeletal:         General: Normal range of motion.   Skin:     Coloration: Skin is not jaundiced or pale.      Findings: No erythema.   Neurological:      General: No focal deficit present.      Mental Status: She is alert and oriented to person, place, and time.   Psychiatric:         Behavior: Behavior normal.         Lab/Radiology/Diagnostic Review:  Results for orders placed or performed during the hospital encounter of 10/13/23 (from the past 24 hour(s))   Blood Gas Arterial Full Panel Unsolicited   Result Value Ref Range    POCT pH, Arterial 7.35 (L) 7.38 - 7.42 pH    POCT pCO2, Arterial 47 (H) 38 - 42 mm Hg    POCT pO2, Arterial 399 (H) 85 - 95 mm Hg    POCT SO2, Arterial 100 94 - 100 %    POCT Oxy Hemoglobin, Arterial 97.6 94.0 - 98.0 %    POCT Hematocrit Calculated, Arterial 26.0 (L) 36.0 - 46.0 %    POCT Sodium, Arterial 135 (L) 136 - 145 mmol/L    POCT Potassium, Arterial 4.9 3.5 - 5.3 mmol/L    POCT Chloride, Arterial 104 98 - 107 mmol/L    POCT Ionized Calcium, Arterial 0.94 (L) 1.10 - 1.33 mmol/L    POCT Glucose, Arterial 136 (H) 74 - 99 mg/dL    POCT Lactate, Arterial 1.4 0.4 - 2.0 mmol/L    POCT Base Excess, Arterial 0.1 -2.0 - 3.0 mmol/L    POCT HCO3 Calculated, Arterial 25.9 22.0 - 26.0 mmol/L    POCT Hemoglobin, Arterial 8.5 (L) 12.0 - 16.0 g/dL    POCT Anion Gap, Arterial 10 10 - 25 mmo/L    Patient Temperature 37.0 degrees Celsius    FiO2 80 %   Coox Panel, Arterial Unsolicited   Result Value Ref Range    POCT Hemoglobin, Arterial 8.5 (L) 12.0 - 16.0 g/dL    POCT Oxy Hemoglobin, Arterial 97.6 94.0 - 98.0 %    POCT Carboxyhemoglobin, Arterial 0.9 %    POCT Methemoglobin, Arterial 1.0 0.0 - 1.5 %    POCT  Deoxy Hemoglobin, Arterial 0.5 0.0 - 5.0 %   ACTIVATED CLOTTING TIME HIGH   Result Value Ref Range    POCT Activated Clotting Time High Range 744 (H) 96 - 152 sec   ACTIVATED CLOTTING TIME HIGH   Result Value Ref Range    POCT Activated Clotting Time High Range 810 (H) 96 - 152 sec   Blood Gas Arterial Full Panel Unsolicited   Result Value Ref Range    POCT pH, Arterial 7.32 (L) 7.38 - 7.42 pH    POCT pCO2, Arterial 51 (H) 38 - 42 mm Hg    POCT pO2, Arterial 393 (H) 85 - 95 mm Hg    POCT SO2, Arterial 99 94 - 100 %    POCT Oxy Hemoglobin, Arterial 97.3 94.0 - 98.0 %    POCT Hematocrit Calculated, Arterial 27.0 (L) 36.0 - 46.0 %    POCT Sodium, Arterial 133 (L) 136 - 145 mmol/L    POCT Potassium, Arterial 4.7 3.5 - 5.3 mmol/L    POCT Chloride, Arterial 103 98 - 107 mmol/L    POCT Ionized Calcium, Arterial 0.95 (L) 1.10 - 1.33 mmol/L    POCT Glucose, Arterial 141 (H) 74 - 99 mg/dL    POCT Lactate, Arterial 1.2 0.4 - 2.0 mmol/L    POCT Base Excess, Arterial -0.1 -2.0 - 3.0 mmol/L    POCT HCO3 Calculated, Arterial 26.3 (H) 22.0 - 26.0 mmol/L    POCT Hemoglobin, Arterial 9.1 (L) 12.0 - 16.0 g/dL    POCT Anion Gap, Arterial 8 (L) 10 - 25 mmo/L    Patient Temperature 37.0 degrees Celsius    FiO2 80 %    Test Comment REPETE SAMPLE    Coox Panel, Arterial Unsolicited   Result Value Ref Range    POCT Hemoglobin, Arterial 9.1 (L) 12.0 - 16.0 g/dL    POCT Oxy Hemoglobin, Arterial 97.3 94.0 - 98.0 %    POCT Carboxyhemoglobin, Arterial 0.9 %    POCT Methemoglobin, Arterial 1.2 0.0 - 1.5 %    POCT Deoxy Hemoglobin, Arterial 0.6 0.0 - 5.0 %    Test Comment REPETE SAMPLE    Blood Gas Arterial Full Panel Unsolicited   Result Value Ref Range    POCT pH, Arterial 7.30 (L) 7.38 - 7.42 pH    POCT pCO2, Arterial 50 (H) 38 - 42 mm Hg    POCT pO2, Arterial 397 (H) 85 - 95 mm Hg    POCT SO2, Arterial 100 94 - 100 %    POCT Oxy Hemoglobin, Arterial 97.7 94.0 - 98.0 %    POCT Hematocrit Calculated, Arterial 30.0 (L) 36.0 - 46.0 %    POCT  Sodium, Arterial 132 (L) 136 - 145 mmol/L    POCT Potassium, Arterial 5.0 3.5 - 5.3 mmol/L    POCT Chloride, Arterial 104 98 - 107 mmol/L    POCT Ionized Calcium, Arterial 0.94 (L) 1.10 - 1.33 mmol/L    POCT Glucose, Arterial 170 (H) 74 - 99 mg/dL    POCT Lactate, Arterial 1.6 0.4 - 2.0 mmol/L    POCT Base Excess, Arterial -2.1 (L) -2.0 - 3.0 mmol/L    POCT HCO3 Calculated, Arterial 24.6 22.0 - 26.0 mmol/L    POCT Hemoglobin, Arterial 10.0 (L) 12.0 - 16.0 g/dL    POCT Anion Gap, Arterial 8 (L) 10 - 25 mmo/L    Patient Temperature 37.0 degrees Celsius   Coox Panel, Arterial Unsolicited   Result Value Ref Range    POCT Hemoglobin, Arterial 10.0 (L) 12.0 - 16.0 g/dL    POCT Oxy Hemoglobin, Arterial 97.7 94.0 - 98.0 %    POCT Carboxyhemoglobin, Arterial 1.3 %    POCT Methemoglobin, Arterial 1.0 0.0 - 1.5 %    POCT Deoxy Hemoglobin, Arterial 0.0 0.0 - 5.0 %   ACTIVATED CLOTTING TIME HIGH   Result Value Ref Range    POCT Activated Clotting Time High Range 566 (H) 96 - 152 sec   Blood Gas Arterial Full Panel Unsolicited   Result Value Ref Range    POCT pH, Arterial 7.28 (L) 7.38 - 7.42 pH    POCT pCO2, Arterial 51 (H) 38 - 42 mm Hg    POCT pO2, Arterial 293 (H) 85 - 95 mm Hg    POCT SO2, Arterial 100 94 - 100 %    POCT Oxy Hemoglobin, Arterial 97.2 94.0 - 98.0 %    POCT Hematocrit Calculated, Arterial 30.0 (L) 36.0 - 46.0 %    POCT Sodium, Arterial 132 (L) 136 - 145 mmol/L    POCT Potassium, Arterial 5.4 (H) 3.5 - 5.3 mmol/L    POCT Chloride, Arterial 103 98 - 107 mmol/L    POCT Ionized Calcium, Arterial 1.55 (H) 1.10 - 1.33 mmol/L    POCT Glucose, Arterial 166 (H) 74 - 99 mg/dL    POCT Lactate, Arterial 3.8 (H) 0.4 - 2.0 mmol/L    POCT Base Excess, Arterial -3.0 (L) -2.0 - 3.0 mmol/L    POCT HCO3 Calculated, Arterial 24.0 22.0 - 26.0 mmol/L    POCT Hemoglobin, Arterial 10.0 (L) 12.0 - 16.0 g/dL    POCT Anion Gap, Arterial 10 10 - 25 mmo/L    Patient Temperature 37.0 degrees Celsius    FiO2 80 %   Coox Panel, Arterial  Unsolicited   Result Value Ref Range    POCT Hemoglobin, Arterial 10.0 (L) 12.0 - 16.0 g/dL    POCT Oxy Hemoglobin, Arterial 97.2 94.0 - 98.0 %    POCT Carboxyhemoglobin, Arterial 1.0 %    POCT Methemoglobin, Arterial 1.2 0.0 - 1.5 %    POCT Deoxy Hemoglobin, Arterial 0.5 0.0 - 5.0 %   ACTIVATED CLOTTING TIME HIGH   Result Value Ref Range    POCT Activated Clotting Time High Range 505 (H) 96 - 152 sec   Blood Gas Arterial Full Panel Unsolicited   Result Value Ref Range    POCT pH, Arterial 7.39 7.38 - 7.42 pH    POCT pCO2, Arterial 43 (H) 38 - 42 mm Hg    POCT pO2, Arterial 400 (H) 85 - 95 mm Hg    POCT SO2, Arterial 100 94 - 100 %    POCT Oxy Hemoglobin, Arterial 97.8 94.0 - 98.0 %    POCT Hematocrit Calculated, Arterial 31.0 (L) 36.0 - 46.0 %    POCT Sodium, Arterial 135 (L) 136 - 145 mmol/L    POCT Potassium, Arterial 4.1 3.5 - 5.3 mmol/L    POCT Chloride, Arterial 104 98 - 107 mmol/L    POCT Ionized Calcium, Arterial 1.17 1.10 - 1.33 mmol/L    POCT Glucose, Arterial 154 (H) 74 - 99 mg/dL    POCT Lactate, Arterial 3.4 (H) 0.4 - 2.0 mmol/L    POCT Base Excess, Arterial 0.8 -2.0 - 3.0 mmol/L    POCT HCO3 Calculated, Arterial 26.0 22.0 - 26.0 mmol/L    POCT Hemoglobin, Arterial 10.2 (L) 12.0 - 16.0 g/dL    POCT Anion Gap, Arterial 9 (L) 10 - 25 mmo/L    Patient Temperature 37.0 degrees Celsius    FiO2 100 %   Coox Panel, Arterial Unsolicited   Result Value Ref Range    POCT Hemoglobin, Arterial 10.2 (L) 12.0 - 16.0 g/dL    POCT Oxy Hemoglobin, Arterial 97.8 94.0 - 98.0 %    POCT Carboxyhemoglobin, Arterial 1.2 %    POCT Methemoglobin, Arterial 0.7 0.0 - 1.5 %    POCT Deoxy Hemoglobin, Arterial 0.2 0.0 - 5.0 %   ACTIVATED CLOTTING TIME HIGH   Result Value Ref Range    POCT Activated Clotting Time High Range 135 96 - 152 sec   ACTIVATED CLOTTING TIME HIGH   Result Value Ref Range    POCT Activated Clotting Time High Range 129 96 - 152 sec   TEG Clot Global Profile Unsolicited   Result Value Ref Range    R (Reaction  Time) K 12.7 (H) 4.6 - 9.1 min    K (Clot Kinetics) 2.6 (H) 0.8 - 2.1 min    ANGLE 52.0 (L) 63.0 - 78.0 deg    MA (Max Amplitude) K 47.0 (L) 52.0 - 69.0 mm    R (Reaction Time) KH 12.1 (H) 4.3 - 8.3 min    MA (Max Amplitude) RT 42.0 (L) 52.0 - 70.0 mm    MA ( Kitty Amplitude) FF 14.0 (L) 15.0 - 32.0 mm    FLEV 250 (L) 278 - 581 mg/dL    Test Comment post protamine    Blood Gas Arterial Full Panel Unsolicited   Result Value Ref Range    POCT pH, Arterial 7.28 (L) 7.38 - 7.42 pH    POCT pCO2, Arterial 58 (H) 38 - 42 mm Hg    POCT pO2, Arterial 210 (H) 85 - 95 mm Hg    POCT SO2, Arterial 100 94 - 100 %    POCT Oxy Hemoglobin, Arterial 97.5 94.0 - 98.0 %    POCT Hematocrit Calculated, Arterial 32.0 (L) 36.0 - 46.0 %    POCT Sodium, Arterial 137 136 - 145 mmol/L    POCT Potassium, Arterial 3.6 3.5 - 5.3 mmol/L    POCT Chloride, Arterial 105 98 - 107 mmol/L    POCT Ionized Calcium, Arterial 1.14 1.10 - 1.33 mmol/L    POCT Glucose, Arterial 138 (H) 74 - 99 mg/dL    POCT Lactate, Arterial 3.4 (H) 0.4 - 2.0 mmol/L    POCT Base Excess, Arterial -0.2 -2.0 - 3.0 mmol/L    POCT HCO3 Calculated, Arterial 27.3 (H) 22.0 - 26.0 mmol/L    POCT Hemoglobin, Arterial 10.6 (L) 12.0 - 16.0 g/dL    POCT Anion Gap, Arterial 8 (L) 10 - 25 mmo/L    Patient Temperature 37.0 degrees Celsius    FiO2 100 %   Coox Panel, Arterial Unsolicited   Result Value Ref Range    POCT Hemoglobin, Arterial 10.6 (L) 12.0 - 16.0 g/dL    POCT Oxy Hemoglobin, Arterial 97.5 94.0 - 98.0 %    POCT Carboxyhemoglobin, Arterial 1.5 %    POCT Methemoglobin, Arterial 0.9 0.0 - 1.5 %    POCT Deoxy Hemoglobin, Arterial 0.1 0.0 - 5.0 %   ACTIVATED CLOTTING TIME HIGH   Result Value Ref Range    POCT Activated Clotting Time High Range 164 (H) 96 - 152 sec   Prepare Cryoprecipitated AHF (Pooled Units): 1 Pools   Result Value Ref Range    PRODUCT CODE T6402M83     Unit Number [id number]     Unit ABO O     Unit RH POS     Dispense Status IS     Blood Expiration Date October  13, 2023 19:05 EDT     PRODUCT BLOOD TYPE 5100     UNIT VOLUME 88    Prepare Plasma: 1 Units   Result Value Ref Range    PRODUCT CODE G6800T68     Unit Number [id number]     Unit ABO A     Unit RH POS     Dispense Status TR     Blood Expiration Date October 16, 2023 16:00 EDT     PRODUCT BLOOD TYPE 6200     UNIT VOLUME 341    Prepare Platelets: 1 Units   Result Value Ref Range    PRODUCT CODE V4617K16     Unit Number [id number]     Unit ABO A     Unit RH POS     Dispense Status TR     Blood Expiration Date October 14, 2023 23:59 EDT     PRODUCT BLOOD TYPE 6200     UNIT VOLUME 174    Blood Gas Arterial Full Panel Unsolicited   Result Value Ref Range    POCT pH, Arterial 7.38 7.38 - 7.42 pH    POCT pCO2, Arterial 42 38 - 42 mm Hg    POCT pO2, Arterial 103 (H) 85 - 95 mm Hg    POCT SO2, Arterial 99 94 - 100 %    POCT Oxy Hemoglobin, Arterial 96.8 94.0 - 98.0 %    POCT Hematocrit Calculated, Arterial 31.0 (L) 36.0 - 46.0 %    POCT Sodium, Arterial 138 136 - 145 mmol/L    POCT Potassium, Arterial 3.7 3.5 - 5.3 mmol/L    POCT Chloride, Arterial 104 98 - 107 mmol/L    POCT Ionized Calcium, Arterial 0.97 (L) 1.10 - 1.33 mmol/L    POCT Glucose, Arterial 236 (H) 74 - 99 mg/dL    POCT Lactate, Arterial 4.9 (HH) 0.4 - 2.0 mmol/L    POCT Base Excess, Arterial -0.4 -2.0 - 3.0 mmol/L    POCT HCO3 Calculated, Arterial 24.8 22.0 - 26.0 mmol/L    POCT Hemoglobin, Arterial 10.3 (L) 12.0 - 16.0 g/dL    POCT Anion Gap, Arterial 13 10 - 25 mmo/L    Patient Temperature 37.0 degrees Celsius    FiO2 50 %   Coox Panel, Arterial Unsolicited   Result Value Ref Range    POCT Hemoglobin, Arterial 10.3 (L) 12.0 - 16.0 g/dL    POCT Oxy Hemoglobin, Arterial 96.8 94.0 - 98.0 %    POCT Carboxyhemoglobin, Arterial 1.7 %    POCT Methemoglobin, Arterial 0.6 0.0 - 1.5 %    POCT Deoxy Hemoglobin, Arterial 0.9 0.0 - 5.0 %   ACTIVATED CLOTTING TIME HIGH   Result Value Ref Range    POCT Activated Clotting Time High Range 104 96 - 152 sec   Calcium,  Ionized   Result Value Ref Range    POCT Calcium, Ionized 1.15 1.1 - 1.33 mmol/L   Magnesium   Result Value Ref Range    Magnesium 3.04 (H) 1.60 - 2.40 mg/dL   Coagulation Screen   Result Value Ref Range    Protime 15.2 (H) 9.8 - 12.8 seconds    INR 1.3 (H) 0.9 - 1.1    aPTT 25 (L) 27 - 38 seconds   Fibrinogen   Result Value Ref Range    Fibrinogen 187 (L) 200 - 400 mg/dL   CBC   Result Value Ref Range    WBC 17.6 (H) 4.4 - 11.3 x10*3/uL    nRBC 0.0 0.0 - 0.0 /100 WBCs    RBC 3.76 (L) 4.00 - 5.20 x10*6/uL    Hemoglobin 10.8 (L) 12.0 - 16.0 g/dL    Hematocrit 32.9 (L) 36.0 - 46.0 %    MCV 88 80 - 100 fL    MCH 28.7 26.0 - 34.0 pg    MCHC 32.8 32.0 - 36.0 g/dL    RDW 15.1 (H) 11.5 - 14.5 %    Platelets 151 150 - 450 x10*3/uL    MPV 10.7 7.5 - 11.5 fL   Renal Function Panel   Result Value Ref Range    Glucose 233 (H) 74 - 99 mg/dL    Sodium 142 136 - 145 mmol/L    Potassium 4.0 3.5 - 5.3 mmol/L    Chloride 104 98 - 107 mmol/L    Bicarbonate 23 21 - 32 mmol/L    Anion Gap 19 10 - 20 mmol/L    Urea Nitrogen 21 6 - 23 mg/dL    Creatinine 1.08 (H) 0.50 - 1.05 mg/dL    eGFR 56 (L) >60 mL/min/1.73m*2    Calcium 8.8 8.6 - 10.6 mg/dL    Phosphorus 4.2 2.5 - 4.9 mg/dL    Albumin 3.2 (L) 3.4 - 5.0 g/dL   Blood Gas Arterial Full Panel   Result Value Ref Range    POCT pH, Arterial 7.37 (L) 7.38 - 7.42 pH    POCT pCO2, Arterial 43 (H) 38 - 42 mm Hg    POCT pO2, Arterial 116 (H) 85 - 95 mm Hg    POCT SO2, Arterial 99 94 - 100 %    POCT Oxy Hemoglobin, Arterial 96.3 94.0 - 98.0 %    POCT Hematocrit Calculated, Arterial 34.0 (L) 36.0 - 46.0 %    POCT Sodium, Arterial 138 136 - 145 mmol/L    POCT Potassium, Arterial 4.1 3.5 - 5.3 mmol/L    POCT Chloride, Arterial 105 98 - 107 mmol/L    POCT Ionized Calcium, Arterial 1.22 1.10 - 1.33 mmol/L    POCT Glucose, Arterial 243 (H) 74 - 99 mg/dL    POCT Lactate, Arterial 5.3 (HH) 0.4 - 2.0 mmol/L    POCT Base Excess, Arterial -0.5 -2.0 - 3.0 mmol/L    POCT HCO3 Calculated, Arterial 24.9 22.0 -  26.0 mmol/L    POCT Hemoglobin, Arterial 11.4 (L) 12.0 - 16.0 g/dL    POCT Anion Gap, Arterial 12 10 - 25 mmo/L    Patient Temperature 37.0 degrees Celsius    FiO2 50 %   Blood Gas Arterial Full Panel   Result Value Ref Range    POCT pH, Arterial 7.34 (L) 7.38 - 7.42 pH    POCT pCO2, Arterial 44 (H) 38 - 42 mm Hg    POCT pO2, Arterial 96 (H) 85 - 95 mm Hg    POCT SO2, Arterial 99 94 - 100 %    POCT Oxy Hemoglobin, Arterial 96.1 94.0 - 98.0 %    POCT Hematocrit Calculated, Arterial 31.0 (L) 36.0 - 46.0 %    POCT Sodium, Arterial 140 136 - 145 mmol/L    POCT Potassium, Arterial 4.3 3.5 - 5.3 mmol/L    POCT Chloride, Arterial 107 98 - 107 mmol/L    POCT Ionized Calcium, Arterial 1.14 1.10 - 1.33 mmol/L    POCT Glucose, Arterial 214 (H) 74 - 99 mg/dL    POCT Lactate, Arterial 4.2 (HH) 0.4 - 2.0 mmol/L    POCT Base Excess, Arterial -2.1 (L) -2.0 - 3.0 mmol/L    POCT HCO3 Calculated, Arterial 23.7 22.0 - 26.0 mmol/L    POCT Hemoglobin, Arterial 10.3 (L) 12.0 - 16.0 g/dL    POCT Anion Gap, Arterial 14 10 - 25 mmo/L    Patient Temperature 37.0 degrees Celsius    FiO2 40 %   Blood Gas Arterial Full Panel Unsolicited   Result Value Ref Range    POCT pH, Arterial 7.39 7.38 - 7.42 pH    POCT pCO2, Arterial 44 (H) 38 - 42 mm Hg    POCT pO2, Arterial 93 85 - 95 mm Hg    POCT SO2, Arterial 98 94 - 100 %    POCT Oxy Hemoglobin, Arterial 95.7 94.0 - 98.0 %    POCT Hematocrit Calculated, Arterial 30.0 (L) 36.0 - 46.0 %    POCT Sodium, Arterial 138 136 - 145 mmol/L    POCT Potassium, Arterial 4.3 3.5 - 5.3 mmol/L    POCT Chloride, Arterial 109 (H) 98 - 107 mmol/L    POCT Ionized Calcium, Arterial 1.14 1.10 - 1.33 mmol/L    POCT Glucose, Arterial 201 (H) 74 - 99 mg/dL    POCT Lactate, Arterial 2.4 (H) 0.4 - 2.0 mmol/L    POCT Base Excess, Arterial 1.4 -2.0 - 3.0 mmol/L    POCT HCO3 Calculated, Arterial 26.6 (H) 22.0 - 26.0 mmol/L    POCT Hemoglobin, Arterial 10.0 (L) 12.0 - 16.0 g/dL    POCT Anion Gap, Arterial 7 (L) 10 - 25 mmo/L     Patient Temperature 37.0 degrees Celsius   Blood Gas Arterial Full Panel Unsolicited   Result Value Ref Range    POCT pH, Arterial 7.38 7.38 - 7.42 pH    POCT pCO2, Arterial 45 (H) 38 - 42 mm Hg    POCT pO2, Arterial 92 85 - 95 mm Hg    POCT SO2, Arterial 99 94 - 100 %    POCT Oxy Hemoglobin, Arterial 97.0 94.0 - 98.0 %    POCT Hematocrit Calculated, Arterial 32.0 (L) 36.0 - 46.0 %    POCT Sodium, Arterial 140 136 - 145 mmol/L    POCT Potassium, Arterial 4.2 3.5 - 5.3 mmol/L    POCT Chloride, Arterial 106 98 - 107 mmol/L    POCT Ionized Calcium, Arterial 1.15 1.10 - 1.33 mmol/L    POCT Glucose, Arterial 220 (H) 74 - 99 mg/dL    POCT Lactate, Arterial 2.2 (H) 0.4 - 2.0 mmol/L    POCT Base Excess, Arterial 1.1 -2.0 - 3.0 mmol/L    POCT HCO3 Calculated, Arterial 26.6 (H) 22.0 - 26.0 mmol/L    POCT Hemoglobin, Arterial 10.5 (L) 12.0 - 16.0 g/dL    POCT Anion Gap, Arterial 12 10 - 25 mmo/L    Patient Temperature 37.0 degrees Celsius    FiO2 21 %   Prepare RBC: 6 Units   Result Value Ref Range    PRODUCT CODE F2908H39     Unit Number [id number]     Unit ABO A     Unit RH POS     XM INTEP COMP     Dispense Status XM     Blood Expiration Date October 27, 2023 23:59 EDT     PRODUCT BLOOD TYPE 6200     UNIT VOLUME 350     PRODUCT CODE C0541E45     Unit Number [id number]     Unit ABO A     Unit RH POS     XM INTEP COMP     Dispense Status IS     Blood Expiration Date October 27, 2023 23:59 EDT     PRODUCT BLOOD TYPE 6200     UNIT VOLUME 350     PRODUCT CODE F0282Y79     Unit Number [id number]     Unit ABO A     Unit RH POS     XM INTEP COMP     Dispense Status XM     Blood Expiration Date October 27, 2023 23:59 EDT     PRODUCT BLOOD TYPE 6200     UNIT VOLUME 350     PRODUCT CODE P7407P92     Unit Number [id number]     Unit ABO A     Unit RH POS     XM INTEP COMP     Dispense Status XM     Blood Expiration Date October 27, 2023 23:59 EDT     PRODUCT BLOOD TYPE 6200     UNIT VOLUME 350     PRODUCT CODE  X8326R98     Unit Number [id number]     Unit ABO A     Unit RH POS     XM INTEP COMP     Dispense Status XM     Blood Expiration Date October 27, 2023 23:59 EDT     PRODUCT BLOOD TYPE 6200     UNIT VOLUME 350     PRODUCT CODE [id number]     Unit Number [id number]     Unit ABO A     Unit RH POS     XM INTEP COMP     Dispense Status XM     Blood Expiration Date October 28, 2023 23:59 EDT     PRODUCT BLOOD TYPE 6200     UNIT VOLUME 350    POCT GLUCOSE   Result Value Ref Range    POCT Glucose 207 (H) 74 - 99 mg/dL   POCT GLUCOSE   Result Value Ref Range    POCT Glucose 183 (H) 74 - 99 mg/dL   POCT GLUCOSE   Result Value Ref Range    POCT Glucose 146 (H) 74 - 99 mg/dL   POCT GLUCOSE   Result Value Ref Range    POCT Glucose 159 (H) 74 - 99 mg/dL   POCT GLUCOSE   Result Value Ref Range    POCT Glucose 178 (H) 74 - 99 mg/dL   Magnesium   Result Value Ref Range    Magnesium 2.23 1.60 - 2.40 mg/dL   CBC   Result Value Ref Range    WBC 11.9 (H) 4.4 - 11.3 x10*3/uL    nRBC 0.0 0.0 - 0.0 /100 WBCs    RBC 3.39 (L) 4.00 - 5.20 x10*6/uL    Hemoglobin 10.1 (L) 12.0 - 16.0 g/dL    Hematocrit 30.4 (L) 36.0 - 46.0 %    MCV 90 80 - 100 fL    MCH 29.8 26.0 - 34.0 pg    MCHC 33.2 32.0 - 36.0 g/dL    RDW 15.4 (H) 11.5 - 14.5 %    Platelets 147 (L) 150 - 450 x10*3/uL    MPV 11.0 7.5 - 11.5 fL   Renal Function Panel   Result Value Ref Range    Glucose 189 (H) 74 - 99 mg/dL    Sodium 142 136 - 145 mmol/L    Potassium 4.3 3.5 - 5.3 mmol/L    Chloride 106 98 - 107 mmol/L    Bicarbonate 28 21 - 32 mmol/L    Anion Gap 12 10 - 20 mmol/L    Urea Nitrogen 19 6 - 23 mg/dL    Creatinine 0.88 0.50 - 1.05 mg/dL    eGFR 72 >60 mL/min/1.73m*2    Calcium 8.6 8.6 - 10.6 mg/dL    Phosphorus 3.9 2.5 - 4.9 mg/dL    Albumin 3.6 3.4 - 5.0 g/dL   Vancomycin   Result Value Ref Range    Vancomycin 7.2 5.0 - 20.0 ug/mL   Blood Gas Arterial Full Panel   Result Value Ref Range    POCT pH, Arterial 7.44 (H) 7.38 - 7.42 pH    POCT pCO2, Arterial 40 38 - 42 mm Hg     POCT pO2, Arterial 75 (L) 85 - 95 mm Hg    POCT SO2, Arterial 98 94 - 100 %    POCT Oxy Hemoglobin, Arterial 95.7 94.0 - 98.0 %    POCT Hematocrit Calculated, Arterial 30.0 (L) 36.0 - 46.0 %    POCT Sodium, Arterial 137 136 - 145 mmol/L    POCT Potassium, Arterial 4.2 3.5 - 5.3 mmol/L    POCT Chloride, Arterial 106 98 - 107 mmol/L    POCT Ionized Calcium, Arterial 1.16 1.10 - 1.33 mmol/L    POCT Glucose, Arterial 186 (H) 74 - 99 mg/dL    POCT Lactate, Arterial 1.3 0.4 - 2.0 mmol/L    POCT Base Excess, Arterial 2.8 -2.0 - 3.0 mmol/L    POCT HCO3 Calculated, Arterial 27.2 (H) 22.0 - 26.0 mmol/L    POCT Hemoglobin, Arterial 10.0 (L) 12.0 - 16.0 g/dL    POCT Anion Gap, Arterial 8 (L) 10 - 25 mmo/L    Patient Temperature 37.0 degrees Celsius    FiO2 36 %   Calcium, Ionized   Result Value Ref Range    POCT Calcium, Ionized 1.12 1.1 - 1.33 mmol/L   POCT GLUCOSE   Result Value Ref Range    POCT Glucose 179 (H) 74 - 99 mg/dL   POCT GLUCOSE   Result Value Ref Range    POCT Glucose 174 (H) 74 - 99 mg/dL   POCT GLUCOSE   Result Value Ref Range    POCT Glucose 195 (H) 74 - 99 mg/dL     XR chest 1 view    Result Date: 10/14/2023  Interpreted By:  Bharati Jacinto [facility], STUDY: XR CHEST 1 VIEW;  10/14/2023 3:24 am   INDICATION: Signs/Symptoms:Post op cardiac surgery.   COMPARISON: Radiograph dated 10/13/2023, 5:09 p.m.   ACCESSION NUMBER(S): [id number]   ORDERING CLINICIAN: JOEY PARTIDA   FINDINGS: AP radiograph of the chest was provided.   Interval extubation. Interval removal of enteric tube. RightIJ central venous catheter with tip overlying the lower SVC. Cardiac valve replacement/repair. Surgical clips overlie the cardiomediastinal silhouette and right axilla. Bilateral chest tubes and mediastinal drain in place. Postsurgical changes from median sternotomy.   CARDIOMEDIASTINAL SILHOUETTE: Cardiomediastinal silhouette is normal in size and configuration.   LUNGS: Low lung volumes contributing to bronchovascular  crowding. Prominent perihilar and interstitial markings bilaterally. Streaky opacities of the right-greater-than-left lower lung zones favored to represent atelectasis. No focal consolidation, pleural effusion, or pneumothorax.   ABDOMEN: No remarkable upper abdominal findings.   BONES: No acute osseous changes.       1. Slight improvement in the aeration of the lungs with mild residual perihilar congestion and bibasilar atelectasis. 2. Interval extubation. Other medical appliances and postsurgical changes as described above       Signed by: Agustín Jacinto 10/14/2023 8:01 AM Dictation workstation:   [id number]    XR chest 1 view    Result Date: 10/13/2023  Interpreted By:  Agustín Randolph and Stephens Katherine STUDY: XR CHEST 1 VIEW;  10/13/2023 5:12 pm   INDICATION: Signs/Symptoms:Post op cardiac surgery.   COMPARISON: CT chest 03/10/2023 and chest radiograph 10/08/2018   ACCESSION NUMBER(S): [id number]   ORDERING CLINICIAN: JOEY PARTIDA   FINDINGS: AP radiograph of the chest was provided.   Endotracheal tube noted with tip projecting approximately 3.6 cm above the julissa. Enteric tube seen coursing below the level diaphragm with tip out of the field of view. RightIJ central venous catheter with tip overlying the lower SVC. Cardiac valve replacement/repair. Surgical clips overlie the cardiomediastinal silhouette and right axilla. Bilateral chest tubes and mediastinal drain in place. Postsurgical changes from median sternotomy.   CARDIOMEDIASTINAL SILHOUETTE: Cardiomediastinal silhouette is normal in size and configuration.   LUNGS: Low lung volumes contributing to bronchovascular crowding. Prominent perihilar and interstitial markings bilaterally. Streaky opacities of the right-greater-than-left lower lung zones favored to represent atelectasis. No focal consolidation, pleural effusion, or pneumothorax.   ABDOMEN: No remarkable upper abdominal findings.   BONES: No acute osseous changes.        1.  Low lung volumes with mild postoperative pulmonary edema and atelectasis. 2.  Postsurgical changes and medical devices as described above.   I personally reviewed the images/study and I agree with the findings as stated. This study was interpreted at [place], [place].   MACRO: None   Signed by: Christophechad Calabrese Orville 10/13/2023 6:04 PM Dictation workstation:   [id number]    Anesthesia Intraoperative Transesophageal Echocardiogram    Result Date: 10/13/2023  [facility] Dept of Anesthesiology, [address] [place] [address]                     Tel [phone] and Fax [phone] TRANSESOPHAGEAL ECHOCARDIOGRAM REPORT  Patient Name:      SHANEL Santacruz Physician: [id number] Sayra Smallwood MD Study Date:        10/13/2023           Ordering Provider: [id number] TAMIKA CHAMPAGNE MRN/PID:           [id number]             Fellow: Accession#:        [id number]         Nurse: Date of Birth/Age: 1955 / 68 years Sonographer:       FIDEL Gender:            F                    Additional Staff: BSA:               m2                   Encounter#:        [id number] Study Type:    ANESTHESIA INTRAOPERATIVE BUZZ Diagnosis/ICD: Aneurysm of the ascending aorta, without rupture-I71.21;                Atherosclerosis of native coronary artery of transplanted heart                without angina pectoris-I25.811 PHYSICIAN INTERPRETATION: Left Ventricle: The left ventricular systolic function is normal, with an estimated ejection fraction of 55-60%. There are no regional wall motion abnormalities. The left ventricular cavity size is normal. Left ventricular diastolic filling was not assessed. Left Atrium: The left atrium is normal in size. There is no evidence of a patent foramen ovale. There is no thrombus visualized in the left atrial appendage. Right Ventricle: The right ventricle is normal in size.  There is normal right ventricular global systolic function. Right Atrium: The right atrium is normal in size. Aortic Valve: There is a prosthetic aortic valve present. There is trivial aortic valve regurgitation. Velocity across the mechancal valve in the aortic position is 120 cm/s. No vegetations observed,and no para valvular leaks. Mitral Valve: The mitral valve is mildly thickened. There is trace mitral valve regurgitation. Tricuspid Valve: The tricuspid valve is structurally normal. There is trace tricuspid regurgitation. Pulmonic Valve: The pulmonic valve is structurally normal. There is physiologic pulmonic valve regurgitation. Pericardium: There is no pericardial effusion noted. Aorta: The aortic root is abnormal. There is no evidence of aortic dissection. The ascending Ao diameter is 5.3 cm. The descending aorta is classified as a Grade 2 [mild (focal or diffuse) intimal thickening of 2-3 mm] atherosclerosis. In comparison to the previous echocardiogram(s): Not performed on intraoperative study.  CONCLUSIONS:  1. Left ventricular systolic function is normal with a 55-60% estimated ejection fraction.  2. Ascending aortic anuerism. POST CARDIOPULMONARY BYPASS REPORT: Patient is being paced. LV function is unchanged from pre-pump exam. RV function is unchanged from pre-pump exam. Unchanged. No evidence of post aortic cannulation dissection.  QUANTITATIVE DATA SUMMARY:  [id number] Sayra Smallwood MD Electronically signed on 10/13/2023 at 1:44:23 PM  ** Final **     Cardiac catheterization - coronary    Result Date: 10/6/2023   [facility], Cath Lab, [address] [place] [address] Cardiovascular Catheterization Report Patient Name:      SHANEL ESCOBEDO     Performing Physician:  [id number] Ramy Rebolledo MD Study Date:        10/2/2023            Verifying Physician:   Gael Mccann                                                                 Amaury GANDARA MRN/PID:           [id number]             Cardiologist/Co-scrub: Accession#:        [id number]         Ordering Provider:     [id number] THOMAS ROSALES Date of Birth/Age: 1955 / 68 years Fellow:                [id number] Piyush Estes MD PhD Gender:            F                    Fellow:                [id number] Carl Gillombardo MD Encounter#:        [id number]  Study: Coronary Arteriogram  Indications: SHANEL ESCOBEDO is a 68 year old female who presents with obesity, dyslipidemia, coronary artery disease and an asymptomatic chest pain assessment. Valvular disease and pre-operative evaluation.  Procedure Description: After infiltration with 2% Lidocaine, the right femoral artery was cannulated with a modified Seldinger technique. Subsequently a 6 Slovak sheath was placed in the right femoral artery. Selective coronary catheterization was performed using a 6 Fr catheter(s) exchanged over a guide wire to cannulate the coronary arteries. A JL 5 tip catheter was used for left coronary injections. A JR 4 tip catheter was used for right coronary injections. Multiple injections of contrast were made into the left and right coronary arteries with angiograms recorded in multiple projections. After completion of the procedure, the arterial sheath was pulled and pressure was applied to the site.  Coronary Angiography: The coronary circulation is right dominant.  Left Main Coronary Artery: The left main coronary artery is a normal caliber vessel. The left main arises normally from the left coronary sinus of Valsalva and bifurcates into the LAD and circumflex coronary arteries. The left main coronary artery showed no significant disease or stenosis greater than 30%.  Left Anterior Descending Coronary Artery Distribution: The  left anterior descending coronary artery is a normal caliber vessel. The LAD arises normally from the left main coronary artery. The proximal to mid left anterior descending coronary artery showed 50-60%. There is 20% proximal LAD stenosis. There is 50-60% mid LAD stenosis after first diagonal branch.  Circumflex Coronary Artery Distribution: The circumflex coronary artery is a small caliber vessel. The circumflex arises normally from the left main coronary artery and terminates in the AV groove. The circumflex revealed no significant disease or stenosis greater than 30%.  Right Coronary Artery Distribution: The right coronary artery is a normal caliber vessel. The RCA arises normally from the right sinus of Valsalva. There is 90% proximal stenosis of the RCA. There is 40% stenosis in the mid RCA.  Coronary Lesion Summary: Vessel Stenosis Vessel Segment LAD     50-60%  proximal to mid  Hemo Personnel: +---------------------+------------+ Name                 Duty         +---------------------+------------+ Shaq Valle MD, MD 1 +---------------------+------------+ Giancarlo Hernández 1 +---------------------+------------+  Hemodynamic Pressures:  +----+---------------------+----------+-------------+--------------+---------+ Site      Date Time      Phase NameSystolic mmHgDiastolic mmHgMean mmHg +----+---------------------+----------+-------------+--------------+---------+   AO10/2/2023 11:08:29 AM      Rest          130            70       93 +----+---------------------+----------+-------------+--------------+---------+  Complications: No in-lab complications observed.  Cardiac Cath Post Procedure Notes: Post Procedure Diagnosis: Single vessel severe CAD. Blood Loss:               Estimated blood loss during the procedure was 0 mls. Specimens Removed:        Number of specimen(s) removed: none.  Recommendations: Maximize medical therapy. Agressive risk factor  modification efforts. Telemetry monitoring. Monitor vitals and arterial access site/pulses. Lipid lowering agent or Statin therapy. Workup for TAA+/-AVR with possible RCA bypass graft per CTS. Aspirin therapy. ____________________________________________________________________________________ CONCLUSIONS:  1. Severe 90% stenosis of proximal RCA, and 50-60% proximal to mid LAD stenosis. The circumflex is small and without signiifcant disease.  2. Right dominant coronary system. ICD 10 Codes: Atherosclerotic heart disease of native coronary artery without angina pectoris-I25.10  [id number] Ramy Rebolledo MD Performing Physician Electronically signed by [id number] Ramy Rebolledo MD on 10/6/2023 at 11:05:47 AM  ** Final **     Electrocardiogram 12 Lead    Result Date: 10/2/2023  Normal sinus rhythm Incomplete right bundle branch block Borderline ECG When compared with ECG of 11-NOV-2022 11:51, No significant change was found Confirmed by Mart Swift ([id number]) on 10/2/2023 8:12:06 AM    Carotid Artery Duplex Ultrasoun    Result Date: 9/29/2023  [place] [address] Tel [phone] and Fax [phone] Vascular Lab Report Carotid Artery Duplex Ultrasound Patient Name:     SHANEL Santacruz Physician:  [id number] Rodriguez Kim MD, [facility] Study Date:       9/29/2023      Referring           JG IVORY Physician: MRN/PID:          [id number]       PCP: Accession/Order#: [id number]      CC Report to: YOB: 1955      Technologist:       Patricia Valdivia RVT, RDMS Gender:           F              Technologist 2: Admission Status: Inpatient      Location Performed: [facility] Diagnosis/ICD: R09.89-Other specified symptoms and signs involving the circulatory and respiratory systems Procedure/CPT: 95873 Cerebrovascular Carotid Duplex scan complete-12740 CONCLUSIONS: Right Carotid: Findings are consistent with less than 50% stenosis of the  right proximal internal carotid artery. Laminar flow seen by color Doppler. Right external carotid artery appears patent with no evidence of stenosis. The right vertebral artery is patent with antegrade flow. No evidence of hemodynamically significant stenosis in the right subclavian artery. Left Carotid: Findings are consistent with less than 50% stenosis of the left proximal internal carotid artery. Left external carotid artery appears patent with no evidence of stenosis. The left vertebral artery is patent with antegrade flow. No evidence of hemodynamically significant stenosis in the left subclavian artery. Imaging & Doppler Findings: Right Plaque Morph: The proximal right internal carotid artery demonstrates heterogenous plaque. Right                       Left PSV     EDV                PSV      EDV 90 cm/s           CCA P    69 cm/s 61 cm/s           CCA D    66 cm/s 35 cm/s 11 cm/s   ICA P    38 cm/s  11 cm/s 43 cm/s 13 cm/s   ICA M    52 cm/s  16 cm/s 48 cm/s 16 cm/s   ICA D    89 cm/s  17 cm/s 59 cm/s            ECA     62 cm/s 56 cm/s 13 cm/s Vertebral  42 cm/s  10 cm/s 68 cm/s         Subclavian 159 cm/s Right Left ICA/CCA Ratio  0.6  0.6 [id number] Rodriguez Kim MD, RPVI Electronically signed by [id number] Rodriguez Kim MD, RPVI on 9/29/2023 at 10:00:40 AM  Final      Adult Cath    Result Date: 9/28/2023  [facility], Cath Lab, [address] [place] [address] Cardiovascular Catheterization Report Patient Name:     SHANEL ESCOBEDO Performing Physician:  [id number] Piyush Jordan MD Study Date:       9/28/2023        Verifying Physician:   [id number] Jordan MD MRN/PID:          [id number]         Cardiologist/Co-scrub: Accession/Order#: [id number]        Fellow:                [id number] Piyush Estes MD PhD YOB: 1955        Fellow:                [id number] Carl Gillombardo MD Gender:           F                Referring Physician:   Yoseph Mcadams MD Admit Date:                         Referring Physician:   [id number] Gian Lr MD Surgeon:                           Referring Physician:   [id number] Christopher Hayden MD Study: Left Heart Catheterization Indications: SHANEL ESCOBEDO is a 68 year old female who presents with diabetes, cerebrovascular accident, coronary artery disease, chronic pulmonary disease, dyslipidemia, obesity, hypertension, current smoker and s/p AVR and an asymptomatic chest pain assessment. Pre-operative evaluation. Appropriate Use Criteria: Preoperative assessment before valvular surgery; AUC score = 7. Procedure Description: After infiltration with 2% Lidocaine, the right radial artery was cannulated with a modified Seldinger technique. Subsequently a 5 Nigerien sheath was placed retrograde in the right radial artery. Selective coronary catheterization was performed using a 5 Fr catheter(s) exchanged over a guide wire to cannulate the coronary arteries. A JR 4 tip catheter was used for right coronary injections. After completion of the procedure, the arterial sheath was pulled and a TR Band Radial Compression Device was utilized to obtain patent hemostasis. Procedure Description Comments: The right radial nerve was cannulated, and a 5F sheath was placed. Nitroglycerin was given through the radial sheath. A 5F JR4 catheter was advanced to the subclavian artery over a tight J-wire. Despite extensive manipulations, the tight J-wire could not be advanced into the innominate artery or central aorta. An exchange was made for a Versacore wire as well as a BMW coronary wire. Despite exhaustive attempts to manipulate these wires into the central aorta, none of these wires could be advanced into the central aorta. Limited right subclavian artery angiography suggested a possible innominate occlusion. Procedure was abandoned at this point. Discussed case with CT surgery who is awaiting coronary angiography to help with planning of ascending aortic aneurysm surgery. Patient will be  admitted to the [facility] to have her Coumadin held. She will also be placed on heparin due to her mechanical prosthetic aortic valve. A diagnostic catheterization will be performed from a femoral approach once her INR is near normal. Coronary Angiography: The coronary circulation is right dominant. Hemo Personnel: +----------------------+-------------+ Name                  Duty          +----------------------+-------------+ Piyush Jordan MD, MD 1 +----------------------+-------------+ Nael Liao RN    PROC CIRC 1 +----------------------+-------------+ Kd Perez RN          PROC CIRC 2 +----------------------+-------------+ Kaiser Patel RN   PROC RECORD 1 +----------------------+-------------+ Gillombardo, Carl B MDFELLOW PHYS 1 +----------------------+-------------+ Piyush Estes MD   FELLOW PHYS 2 +----------------------+-------------+ Sedation Time: +------------------------+----------------------------------------+ Sedation Start/End TimesTime                                     +------------------------+----------------------------------------+ Start                   9/28/2023 10:34:53                       +------------------------+----------------------------------------+ Drugs                   Fentanyl 50 mcg IV per physician for sed +------------------------+----------------------------------------+ End                     9/28/2023 10:50:31                       +------------------------+----------------------------------------+ Equipment Used: +---------------------+--------------------------------------------------------+             Date/Time                                             Description +---------------------+--------------------------------------------------------+ 9/28/2023 10:29:41 AM  Termio Glidesheath Slender Radial Sheath 5                                            Fr x 10cm - Qty: 1 Each Part #: 72  +---------------------+--------------------------------------------------------+ 9/28/2023 10:29:52 AM       [facility] Guidewire                                .035mm x 210cm J-Tip - Qty: 1 Each Part #: 941 +---------------------+--------------------------------------------------------+ 9/28/2023 10:29:59 AM  - Omnipaque 350 mgl/ml 100ml Bottle Contrast                                                     - Qty: 1 Each Part #: 840 +---------------------+--------------------------------------------------------+ 9/28/2023 10:39:31 AM   A[employer]ott Hi-Torque Versacore .035mm x                                        145cm Floppy - Qty: 1 Each Part #: 901 +---------------------+--------------------------------------------------------+ 9/28/2023 10:45:19 AM     A[employer]ott Hi-Torque BMW .014mm x                                              190cm - Qty: 1 Each Part #: 1379 +---------------------+--------------------------------------------------------+ 9/28/2023 10:50:16 AM   Udacity 5 Fr FR4 Impulse x                                                100cm - Qty: 1 Each Part #: 88 +---------------------+--------------------------------------------------------+ 9/28/2023 10:50:51 AM           Terumo TR Band Radial Artery                             Compression Device 24cm - Qty: 1 Each Part #: [id number] +---------------------+--------------------------------------------------------+ Fluoroscopy Time: +--------------------------+--------+ X-Ray Summary Fluoro Time:2.60 min +--------------------------+--------+ +----------+-------+ Contrast: Dose:   +----------+-------+ Omnipaque:5.00 ml +----------+-------+ Hemodynamic Pressures: +----+---------------------+----------+-------------+--------------+---------+ Site      Date Time      Phase NameSystolic mmHgDiastolic mmHgMean mmHg +----+---------------------+----------+-------------+--------------+---------+   AO9/28/2023  10:42:44 AM      Rest          103            56       74 +----+---------------------+----------+-------------+--------------+---------+ Oxygen Saturation %: +-----------+------------+ Sample SiteHB (g/100ml) +-----------+------------+     SYS ART        13.8 +-----------+------------+     SYS CHERI        13.8 +-----------+------------+     PUL ART        13.8 +-----------+------------+     PUL CHERI        13.8 +-----------+------------+ Complications: No in-lab complications observed. Cardiac Cath Transition of Care Summary: Post Procedure Diagnosis: Unsuccessful left heart catheterization via right radial arterial approach due to possible innominate occlusion. Blood Loss:               Estimated blood loss during the procedure was 20 mls. Specimens Removed:        Number of specimen(s) removed: none. ____________________________________________________________________________________ CONCLUSIONS: 1. Unsuccessful attempt at left heart catheterization from a right radial arterial approach. ____________________________________________________________________________________ CPT Codes: Left Heart Cath (visualization of coronaries) and LV-26988; Moderate Sedation Services initial 15 minutes patient >5 years-66673; Moderate Sedation Services 1st additional 15 minutes patient >5 years-72273 ICD 10 Codes: I25.10-Atherosclerotic heart disease of native coronary artery without angina pectoris; I71.20-Thoracic aortic aneurysm, without rupture, unspecified [id number] Piyush Jordan MD Performing Physician Electronically signed by [id number] Piyush Jordan MD on 9/28/2023 at 11:18:41 AM cc Report to: Yoseph Mcadams MD cc Report to: [id number] Gian Lr MD cc Report to: [id number] Christopher Hayden MD  Final          Assessment/Plan     Assessment:   This is a 68 year old female with a PMH significant for mechanical aortic valve replacement in 2014, HTN, DM, COPD and a brain aneurysm rupture. yesterday she presents s/p redo  CABG x 1 (SVG-RCA) scending aortic root replacement with Dr. Hayden.        Plan:  NEURO:  PMH. Acute post operative pain.   -->alert,post op pain controlled, she is sitting on a chair   - Serial neuro and pain assessments .    - Scheduled Tylenol   - PRN oxycodone  - PRN dilaudid for pain   - PT Consult, OOB to chair as tolerated, chair position if not tolerated   - CAM ICU score qshift  - Sleep/wake cycle hygiene  - Resume home Prozac      CV:  Patient has a history of HTN, mechanical AVR in 2014, HPL.  Is now status post Ascending aortic root replacement and CABG x 1 on 10/13 with Dr. Hayden.  Pre/Post EF: Normal BIV. Arrived to CTICU on Epi 0.02 weaned off, A/V epicardial wires set VVI @ 50. >  - Maintain goal systolic <110 for first 48hrs ,on 10mcg nitro infusion   - Mixed venous and CI Q4H  - Volume resuscitate as clinically indicated  - Hydralazine 10mg TID   - ASA   - Start statin  - Start coumadin Tomorrow no need for Heparin bridge per Dr. Hayden  - Metoprlol 12.5 mg BID  - Hold home amlodipine, losartan,coumadin     PULM:  HX of COPD.  Currently extubated on 3L nc. Chest tubes 2MS and 2 left pleural.--> draining 20,20 for the last 2 hours.  - F/u post op CXR  - Wean FiO2 maintaining SpO2 >92%.   - IS q1h and OOB to chair when extubated  - Chest tubes to wall suction.  - Resume home inhaled therapies      GI:  No pmhx. passed swallow test will order diabetic diet-->  - PPI   - Colace/senna BID and miralax BID     :  CSA-FAINA Risk Score Moderate.  No history of renal disease, baseline creatinine 0.85. had a mild FAINA preop of a creatinine 1.11. Creatinine stable post-op. Ramírez in place and making adequate UOP. --> net positive 2L, will monitor UO for possible diuresis    - Continue ramírez catheter for strict I/Os.  - Goal UOP 0.5ml/kg/hr  - RFP as clinically indicated  - Replete electrolytes per CTICU protocol     ENDO:  PMH of DM. Hyperglycemia  -->  - Maintain BG <180  - On insulin gtt postop  - Hold home  metformin     HEME:  Acute blood loss anemia and thrombocytopenia. Received 1 PRBC, 1 FFP, 5pk platelets, 5pk cryo in the OR-->    - Monitor drain output volume and characteristics  - CBC, coags, and fibrinogen post op and as clinically indicated  - Start ASA   - SQH   - Restart coumadin tomorrow per Dr. Hayden. No need to bridge  - SCDs for DVT prophylaxis.  - Last type and screen: 10/13     ID:  Afebrile, no current indications of infection. MRSA negative.-->  - Trend temp q4h  - Allergy to penicillins so aztreonam q8hrs and Vanc for 48 hours postop     Skin:  No active skin issues.  - preventative Mepilex dressings in place on sacrum and heels  - change preventative Mepilex weekly or more frequently as indicated (when moist/soiled)   - every shift skin assessment per nursing and weekly ICU skin rounds  - moisture barrier to be applied with randy care  - active skin problems addressed with nursing on daily rounds     Proph:  SCDs  PPI  SQH     G:  Line  Right IJ MAC w [place] placed 10/13  Left brachial a-line placed 10/13   will remove lines when weaned off of nitro     F: Family: will update at bedside postoperatively.     A,B,C,D,E,F,G: reviewed     Dispo: CTICU care for now.    CTICU TEAM PHONE [phone]

## 2023-10-14 NOTE — PROGRESS NOTES
Suze Najera is a 68 y.o. female on day 1 of admission presenting with Aneurysm of ascending aorta without rupture (CMS/[facility]).    I have seen the patient either independently or with an associated resident physician or advanced practice provider    Overnight Events: Extubated, mild nitroglycerin need for afterload reduction.     Neuro: Postoperative pain controlled with PO analgesics. Resume home SSRI. OOB to chair this AM.   Cardiac: NSR, normotensive. Goal SBP < 110 for first 48 hours. Escalated PO antihypertensives to liberate from drip. Restart metoprolol, schedule PO hydralazine. Pt with mechanical aortic valve, will start anticoagulation with warfarin tomorrow 10/15. No need for heparin briedge per CT surgery  Pulmonary: Hx of COPD, on 3 L NC post extubation. Chest tubes to be removed per protocol. XR appears well. Restart home inhalers   Gastrointestinal: Tolerating a diet, start PPI, bowel regimen  Renal: Creatinine reassuring, making good urine. Plan for mild diuresis today.   Endocrine: Hx of NIDDM. On insulin infusion, will transition to insulin sliding scale  Hematology: TEG askew postoperatively in OR, received component therapy with no postoperative bleeding. H/H stable this AM. Start SQH, ASA  Infectious Disease: Periop abx, pencillin allergy  Musculoskeletal: No issues    Lines/Tubes/Drains: Dc mac, alissa, ramírez    Mechanical Circulatory Support: None    Prophylaxis: SCDs, SQH, PPI    Disposition: Floor when off nitroglycerin gtt    ABCDEF Checklist  Analgesia: Spontaneous awakening trial to be pursued if clinically appropriate. RASS goal reviewed  Breathing: Spontaneous breathing trial to be pursued if clinically appropriate. Mechanical power of assisted ventilation reviewed  Choice of analgesia/sedation: Analgesic and sedative agents adjusted per clinical context.   Delirium assessed by CAM, will avoid exacerbating factors  Early mobility and exercise: Physical and occupational therapy  engaged  Family: Plan of care, overall trajectory of patient shared with family. Questions elicited and answered as appropriate.       Due to the high probability of life threatening clinical decompensation, the patient required critical care time evaluating and managing this patient.  Critical care time included obtaining a history, examining the patient, ordering and reviewing studies, discussing, developing, and implementing a management plan, evaluating the patient's response to treatment, and discussion with other care team providers. I saw and evaluated the patient myself.  Critical care time was performed exclusive of billable procedures.    Critical care time: 40         Elio Henry MD

## 2023-10-14 NOTE — ANESTHESIA POSTPROCEDURE EVALUATION
Patient: Suze Najera    Procedure Summary       Date: 10/13/23 Room / Location: [facility] OR 19 / [facility] OR    Anesthesia Start: 0733 Anesthesia Stop: 1547    Procedures:       REDO AVR TISSUE/ASC AORTA      Redo sternotomy, ascending aorta replacement with 34 mm gelweave, coronary artery bypass x1 SVG- RCA, right leg endovascular harvest Diagnosis:       Thoracic aortic aneurysm (CMS/HCC)      Coronary artery disease involving coronary bypass graft of native heart, unspecified whether angina present      (Thoracic aortic aneurysm (CMS/HCC) [I71.20])    Surgeons: Christopher Hayden MD Responsible Provider: Sayra Smallwood MD    Anesthesia Type: general ASA Status: 3            Anesthesia Type: general    Vitals Value Taken Time   /72 10/14/23 0656   Temp 36.8 °C (98.2 °F) 10/14/23 0400   Pulse 94 10/14/23 0655   Resp 11 10/14/23 0654   SpO2 97 % 10/14/23 0655   Vitals shown include unvalidated device data.    Anesthesia Post Evaluation    No notable events documented.

## 2023-10-15 ENCOUNTER — APPOINTMENT (OUTPATIENT)
Dept: RADIOLOGY | Facility: HOSPITAL | Age: 68
DRG: 220 | End: 2023-10-15
Payer: MEDICARE

## 2023-10-15 LAB
ALBUMIN SERPL BCP-MCNC: 3.3 G/DL (ref 3.4–5)
ANION GAP SERPL CALC-SCNC: 13 MMOL/L (ref 10–20)
BUN SERPL-MCNC: 23 MG/DL (ref 6–23)
CA-I BLD-SCNC: 1.14 MMOL/L (ref 1.1–1.33)
CALCIUM SERPL-MCNC: 8.4 MG/DL (ref 8.6–10.6)
CHLORIDE SERPL-SCNC: 102 MMOL/L (ref 98–107)
CO2 SERPL-SCNC: 25 MMOL/L (ref 21–32)
CREAT SERPL-MCNC: 0.87 MG/DL (ref 0.5–1.05)
ERYTHROCYTE [DISTWIDTH] IN BLOOD BY AUTOMATED COUNT: 15.4 % (ref 11.5–14.5)
GFR SERPL CREATININE-BSD FRML MDRD: 73 ML/MIN/1.73M*2
GLUCOSE BLD MANUAL STRIP-MCNC: 137 MG/DL (ref 74–99)
GLUCOSE BLD MANUAL STRIP-MCNC: 156 MG/DL (ref 74–99)
GLUCOSE BLD MANUAL STRIP-MCNC: 171 MG/DL (ref 74–99)
GLUCOSE BLD MANUAL STRIP-MCNC: 175 MG/DL (ref 74–99)
GLUCOSE SERPL-MCNC: 153 MG/DL (ref 74–99)
HCT VFR BLD AUTO: 28.4 % (ref 36–46)
HGB BLD-MCNC: 9.4 G/DL (ref 12–16)
MAGNESIUM SERPL-MCNC: 1.97 MG/DL (ref 1.6–2.4)
MCH RBC QN AUTO: 29.8 PG (ref 26–34)
MCHC RBC AUTO-ENTMCNC: 33.1 G/DL (ref 32–36)
MCV RBC AUTO: 90 FL (ref 80–100)
NRBC BLD-RTO: 0 /100 WBCS (ref 0–0)
PHOSPHATE SERPL-MCNC: 3.2 MG/DL (ref 2.5–4.9)
PLATELET # BLD AUTO: 155 X10*3/UL (ref 150–450)
PMV BLD AUTO: 10.7 FL (ref 7.5–11.5)
POTASSIUM SERPL-SCNC: 4 MMOL/L (ref 3.5–5.3)
RBC # BLD AUTO: 3.15 X10*6/UL (ref 4–5.2)
SODIUM SERPL-SCNC: 136 MMOL/L (ref 136–145)
WBC # BLD AUTO: 17 X10*3/UL (ref 4.4–11.3)

## 2023-10-15 PROCEDURE — 2500000004 HC RX 250 GENERAL PHARMACY W/ HCPCS (ALT 636 FOR OP/ED): Performed by: NURSE PRACTITIONER

## 2023-10-15 PROCEDURE — 99291 CRITICAL CARE FIRST HOUR: CPT | Performed by: EMERGENCY MEDICINE

## 2023-10-15 PROCEDURE — 2500000004 HC RX 250 GENERAL PHARMACY W/ HCPCS (ALT 636 FOR OP/ED)

## 2023-10-15 PROCEDURE — 2500000001 HC RX 250 WO HCPCS SELF ADMINISTERED DRUGS (ALT 637 FOR MEDICARE OP): Performed by: CLINICAL NURSE SPECIALIST

## 2023-10-15 PROCEDURE — 83735 ASSAY OF MAGNESIUM: CPT | Mod: CMCLAB | Performed by: NURSE PRACTITIONER

## 2023-10-15 PROCEDURE — 2500000001 HC RX 250 WO HCPCS SELF ADMINISTERED DRUGS (ALT 637 FOR MEDICARE OP): Performed by: STUDENT IN AN ORGANIZED HEALTH CARE EDUCATION/TRAINING PROGRAM

## 2023-10-15 PROCEDURE — 2500000004 HC RX 250 GENERAL PHARMACY W/ HCPCS (ALT 636 FOR OP/ED): Performed by: STUDENT IN AN ORGANIZED HEALTH CARE EDUCATION/TRAINING PROGRAM

## 2023-10-15 PROCEDURE — 2500000001 HC RX 250 WO HCPCS SELF ADMINISTERED DRUGS (ALT 637 FOR MEDICARE OP)

## 2023-10-15 PROCEDURE — 96372 THER/PROPH/DIAG INJ SC/IM: CPT | Performed by: NURSE PRACTITIONER

## 2023-10-15 PROCEDURE — 2500000002 HC RX 250 W HCPCS SELF ADMINISTERED DRUGS (ALT 637 FOR MEDICARE OP, ALT 636 FOR OP/ED): Performed by: NURSE PRACTITIONER

## 2023-10-15 PROCEDURE — 96372 THER/PROPH/DIAG INJ SC/IM: CPT

## 2023-10-15 PROCEDURE — 2500000001 HC RX 250 WO HCPCS SELF ADMINISTERED DRUGS (ALT 637 FOR MEDICARE OP): Performed by: NURSE PRACTITIONER

## 2023-10-15 PROCEDURE — 2500000002 HC RX 250 W HCPCS SELF ADMINISTERED DRUGS (ALT 637 FOR MEDICARE OP, ALT 636 FOR OP/ED): Performed by: STUDENT IN AN ORGANIZED HEALTH CARE EDUCATION/TRAINING PROGRAM

## 2023-10-15 PROCEDURE — 37799 UNLISTED PX VASCULAR SURGERY: CPT | Performed by: NURSE PRACTITIONER

## 2023-10-15 PROCEDURE — 82947 ASSAY GLUCOSE BLOOD QUANT: CPT | Mod: CMCLAB

## 2023-10-15 PROCEDURE — 71045 X-RAY EXAM CHEST 1 VIEW: CPT

## 2023-10-15 PROCEDURE — 85027 COMPLETE CBC AUTOMATED: CPT | Mod: CMCLAB | Performed by: NURSE PRACTITIONER

## 2023-10-15 PROCEDURE — 82330 ASSAY OF CALCIUM: CPT | Performed by: NURSE PRACTITIONER

## 2023-10-15 PROCEDURE — 80069 RENAL FUNCTION PANEL: CPT | Mod: CMCLAB | Performed by: NURSE PRACTITIONER

## 2023-10-15 PROCEDURE — 71045 X-RAY EXAM CHEST 1 VIEW: CPT | Performed by: RADIOLOGY

## 2023-10-15 PROCEDURE — 1200000002 HC GENERAL ROOM WITH TELEMETRY DAILY

## 2023-10-15 RX ORDER — HYDRALAZINE HYDROCHLORIDE 20 MG/ML
10 INJECTION INTRAMUSCULAR; INTRAVENOUS
Status: DISCONTINUED | OUTPATIENT
Start: 2023-10-15 | End: 2023-10-15

## 2023-10-15 RX ORDER — NAPROXEN SODIUM 220 MG/1
81 TABLET, FILM COATED ORAL DAILY
Status: DISCONTINUED | OUTPATIENT
Start: 2023-10-15 | End: 2023-10-16

## 2023-10-15 RX ORDER — HYDRALAZINE HYDROCHLORIDE 25 MG/1
25 TABLET, FILM COATED ORAL EVERY 8 HOURS
Status: DISCONTINUED | OUTPATIENT
Start: 2023-10-15 | End: 2023-10-18

## 2023-10-15 RX ORDER — INSULIN LISPRO 100 [IU]/ML
0-15 INJECTION, SOLUTION INTRAVENOUS; SUBCUTANEOUS EVERY 4 HOURS
Status: DISCONTINUED | OUTPATIENT
Start: 2023-10-15 | End: 2023-10-15

## 2023-10-15 RX ORDER — INSULIN LISPRO 100 [IU]/ML
0-5 INJECTION, SOLUTION INTRAVENOUS; SUBCUTANEOUS
Status: DISCONTINUED | OUTPATIENT
Start: 2023-10-15 | End: 2023-10-18 | Stop reason: HOSPADM

## 2023-10-15 RX ORDER — FUROSEMIDE 10 MG/ML
20 INJECTION INTRAMUSCULAR; INTRAVENOUS ONCE
Status: COMPLETED | OUTPATIENT
Start: 2023-10-15 | End: 2023-10-15

## 2023-10-15 RX ORDER — WARFARIN 3 MG/1
4.5 TABLET ORAL
Status: DISCONTINUED | OUTPATIENT
Start: 2023-10-15 | End: 2023-10-18 | Stop reason: HOSPADM

## 2023-10-15 RX ORDER — BISACODYL 10 MG/1
10 SUPPOSITORY RECTAL ONCE
Status: COMPLETED | OUTPATIENT
Start: 2023-10-15 | End: 2023-10-15

## 2023-10-15 RX ORDER — OXYCODONE HYDROCHLORIDE 5 MG/1
5 TABLET ORAL EVERY 4 HOURS PRN
Status: DISCONTINUED | OUTPATIENT
Start: 2023-10-15 | End: 2023-10-18 | Stop reason: HOSPADM

## 2023-10-15 RX ORDER — WARFARIN 3 MG/1
3 TABLET ORAL
Status: DISCONTINUED | OUTPATIENT
Start: 2023-10-16 | End: 2023-10-18 | Stop reason: HOSPADM

## 2023-10-15 RX ORDER — POLYETHYLENE GLYCOL 3350 17 G/17G
17 POWDER, FOR SOLUTION ORAL 3 TIMES DAILY
Status: DISCONTINUED | OUTPATIENT
Start: 2023-10-15 | End: 2023-10-18 | Stop reason: HOSPADM

## 2023-10-15 RX ORDER — HYDRALAZINE HYDROCHLORIDE 50 MG/1
50 TABLET, FILM COATED ORAL EVERY 8 HOURS
Status: DISCONTINUED | OUTPATIENT
Start: 2023-10-15 | End: 2023-10-15

## 2023-10-15 RX ORDER — HYDRALAZINE HYDROCHLORIDE 50 MG/1
TABLET, FILM COATED ORAL
Status: COMPLETED
Start: 2023-10-15 | End: 2023-10-15

## 2023-10-15 RX ORDER — HEPARIN SODIUM 5000 [USP'U]/ML
5000 INJECTION, SOLUTION INTRAVENOUS; SUBCUTANEOUS EVERY 8 HOURS
Status: DISCONTINUED | OUTPATIENT
Start: 2023-10-15 | End: 2023-10-18 | Stop reason: HOSPADM

## 2023-10-15 RX ADMIN — FLUTICASONE FUROATE AND VILANTEROL TRIFENATATE 1 PUFF: 100; 25 POWDER RESPIRATORY (INHALATION) at 06:12

## 2023-10-15 RX ADMIN — HYDRALAZINE HYDROCHLORIDE 50 MG: 50 TABLET, FILM COATED ORAL at 09:00

## 2023-10-15 RX ADMIN — PANTOPRAZOLE SODIUM 40 MG: 40 TABLET, DELAYED RELEASE ORAL at 06:11

## 2023-10-15 RX ADMIN — INSULIN LISPRO 10 UNITS: 100 INJECTION, SOLUTION INTRAVENOUS; SUBCUTANEOUS at 07:34

## 2023-10-15 RX ADMIN — FUROSEMIDE 20 MG: 10 INJECTION, SOLUTION INTRAVENOUS at 01:49

## 2023-10-15 RX ADMIN — SENNOSIDES AND DOCUSATE SODIUM 2 TABLET: 8.6; 5 TABLET ORAL at 20:55

## 2023-10-15 RX ADMIN — ATORVASTATIN CALCIUM 80 MG: 80 TABLET, FILM COATED ORAL at 20:54

## 2023-10-15 RX ADMIN — OXYCODONE HYDROCHLORIDE 5 MG: 5 TABLET ORAL at 06:11

## 2023-10-15 RX ADMIN — VANCOMYCIN HYDROCHLORIDE 1250 MG: 1.25 INJECTION, POWDER, LYOPHILIZED, FOR SOLUTION INTRAVENOUS at 09:00

## 2023-10-15 RX ADMIN — HYDROMORPHONE HYDROCHLORIDE 0.2 MG: 1 INJECTION, SOLUTION INTRAMUSCULAR; INTRAVENOUS; SUBCUTANEOUS at 01:13

## 2023-10-15 RX ADMIN — BISACODYL 10 MG: 10 SUPPOSITORY RECTAL at 10:35

## 2023-10-15 RX ADMIN — POLYETHYLENE GLYCOL 3350 17 G: 17 POWDER, FOR SOLUTION ORAL at 15:00

## 2023-10-15 RX ADMIN — HYDRALAZINE HYDROCHLORIDE 10 MG: 20 INJECTION INTRAMUSCULAR; INTRAVENOUS at 06:11

## 2023-10-15 RX ADMIN — WARFARIN SODIUM 4.5 MG: 3 TABLET ORAL at 10:39

## 2023-10-15 RX ADMIN — METOPROLOL TARTRATE 12.5 MG: 25 TABLET, FILM COATED ORAL at 20:54

## 2023-10-15 RX ADMIN — SENNOSIDES AND DOCUSATE SODIUM 2 TABLET: 8.6; 5 TABLET ORAL at 09:00

## 2023-10-15 RX ADMIN — HEPARIN SODIUM 5000 UNITS: 5000 INJECTION INTRAVENOUS; SUBCUTANEOUS at 10:35

## 2023-10-15 RX ADMIN — INSULIN LISPRO 1 UNITS: 100 INJECTION, SOLUTION INTRAVENOUS; SUBCUTANEOUS at 12:47

## 2023-10-15 RX ADMIN — HYDRALAZINE HYDROCHLORIDE 25 MG: 25 TABLET, FILM COATED ORAL at 01:13

## 2023-10-15 RX ADMIN — FLUOXETINE 40 MG: 20 CAPSULE ORAL at 08:05

## 2023-10-15 RX ADMIN — OXYCODONE HYDROCHLORIDE 5 MG: 5 TABLET ORAL at 20:55

## 2023-10-15 RX ADMIN — ACETAMINOPHEN 650 MG: 325 TABLET ORAL at 07:36

## 2023-10-15 RX ADMIN — INSULIN LISPRO 5 UNITS: 100 INJECTION, SOLUTION INTRAVENOUS; SUBCUTANEOUS at 02:30

## 2023-10-15 RX ADMIN — OXYCODONE HYDROCHLORIDE 5 MG: 5 TABLET ORAL at 07:36

## 2023-10-15 RX ADMIN — HYDROMORPHONE HYDROCHLORIDE 0.2 MG: 1 INJECTION, SOLUTION INTRAMUSCULAR; INTRAVENOUS; SUBCUTANEOUS at 07:36

## 2023-10-15 RX ADMIN — METOPROLOL TARTRATE 12.5 MG: 25 TABLET, FILM COATED ORAL at 08:31

## 2023-10-15 RX ADMIN — ACETAMINOPHEN 650 MG: 325 TABLET ORAL at 20:54

## 2023-10-15 RX ADMIN — POLYETHYLENE GLYCOL 3350 17 G: 17 POWDER, FOR SOLUTION ORAL at 09:00

## 2023-10-15 RX ADMIN — ACETAMINOPHEN 650 MG: 325 TABLET ORAL at 12:46

## 2023-10-15 RX ADMIN — ASPIRIN 81 MG CHEWABLE TABLET 81 MG: 81 TABLET CHEWABLE at 10:15

## 2023-10-15 RX ADMIN — POLYETHYLENE GLYCOL 3350 17 G: 17 POWDER, FOR SOLUTION ORAL at 20:55

## 2023-10-15 RX ADMIN — HYDRALAZINE HYDROCHLORIDE 10 MG: 20 INJECTION INTRAMUSCULAR; INTRAVENOUS at 03:28

## 2023-10-15 RX ADMIN — ACETAMINOPHEN 650 MG: 325 TABLET ORAL at 15:57

## 2023-10-15 RX ADMIN — AZTREONAM 2 G: 2 INJECTION, POWDER, LYOPHILIZED, FOR SOLUTION INTRAMUSCULAR; INTRAVENOUS at 00:00

## 2023-10-15 RX ADMIN — HEPARIN SODIUM 5000 UNITS: 5000 INJECTION INTRAVENOUS; SUBCUTANEOUS at 20:55

## 2023-10-15 ASSESSMENT — PAIN SCALES - GENERAL
PAINLEVEL_OUTOF10: 9
PAINLEVEL_OUTOF10: 7
PAINLEVEL_OUTOF10: 6
PAINLEVEL_OUTOF10: 4
PAINLEVEL_OUTOF10: 3
PAINLEVEL_OUTOF10: 6
PAINLEVEL_OUTOF10: 6
PAINLEVEL_OUTOF10: 7
PAINLEVEL_OUTOF10: 2

## 2023-10-15 ASSESSMENT — COGNITIVE AND FUNCTIONAL STATUS - GENERAL
CLIMB 3 TO 5 STEPS WITH RAILING: A LOT
DRESSING REGULAR LOWER BODY CLOTHING: A LOT
MOBILITY SCORE: 16
HELP NEEDED FOR BATHING: A LITTLE
TURNING FROM BACK TO SIDE WHILE IN FLAT BAD: A LITTLE
STANDING UP FROM CHAIR USING ARMS: A LITTLE
DRESSING REGULAR UPPER BODY CLOTHING: A LOT
EATING MEALS: A LITTLE
DAILY ACTIVITIY SCORE: 16
MOVING FROM LYING ON BACK TO SITTING ON SIDE OF FLAT BED WITH BEDRAILS: A LITTLE
TOILETING: A LITTLE
PERSONAL GROOMING: A LITTLE
MOVING TO AND FROM BED TO CHAIR: A LITTLE
WALKING IN HOSPITAL ROOM: A LOT

## 2023-10-15 ASSESSMENT — PAIN - FUNCTIONAL ASSESSMENT
PAIN_FUNCTIONAL_ASSESSMENT: 0-10

## 2023-10-15 ASSESSMENT — PAIN DESCRIPTION - DESCRIPTORS
DESCRIPTORS: SORE
DESCRIPTORS: SORE

## 2023-10-15 NOTE — PROGRESS NOTES
Suze Najera is a 68 y.o. female on day 2 of admission presenting with Aneurysm of ascending aorta without rupture (CMS/[facility]).    I have seen the patient either independently or with an associated resident physician or advanced practice provider    Overnight Events: Increased hydralazine dosing to wean off nitroglycerin    Neuro: AaOx4, LOZADA. PO APAP/Oxycodone. Home sertraline.  Cardiac: s/p redo Asc root replacement, CABG x1. SBP goal now < 140, will wean off NTG, adjust hydralazine appropriate. Warfarin for mechanical valve to commence today with home dose. Continue metoprolol.   Pulmonary: Hx of COPD. On 2 L NC currently. Using IS. XR appears well, can remove ChT when meet criteria today  Gastrointestinal: DM diet, PPI, bowel regimen but no BM. Add suppository today  Renal: Cr stable, making good urine. Basil ramírez this AM. -1.4 L.   Endocrine: SSI   Hematology: H/H stable, no transfusion needs or signs of bleeding. Restar twarfarin today, stop SQH when INR therapeutic  Infectious Disease: No active antimicrobials.  Musculoskeletal: No issues    Lines/Tubes/Drains: Dc ramírez, dc chest tube when meets criteria, dc RIJ    Mechanical Circulatory Support: None    Prophylaxis: SCDs, PPI, SQH/Warfarin    Disposition: Floor when off nitroglycerin    ABCDEF Checklist  Analgesia: Spontaneous awakening trial to be pursued if clinically appropriate. RASS goal reviewed  Breathing: Spontaneous breathing trial to be pursued if clinically appropriate. Mechanical power of assisted ventilation reviewed  Choice of analgesia/sedation: Analgesic and sedative agents adjusted per clinical context.   Delirium assessed by CAM, will avoid exacerbating factors  Early mobility and exercise: Physical and occupational therapy engaged  Family: Plan of care, overall trajectory of patient shared with family. Questions elicited and answered as appropriate.       Due to the high probability of life threatening clinical decompensation, the patient  required critical care time evaluating and managing this patient.  Critical care time included obtaining a history, examining the patient, ordering and reviewing studies, discussing, developing, and implementing a management plan, evaluating the patient's response to treatment, and discussion with other care team providers. I saw and evaluated the patient myself.  Critical care time was performed exclusive of billable procedures.    Critical care time: 37             Elio Henry MD

## 2023-10-15 NOTE — PROGRESS NOTES
CTICU Progress Note      Subjective     Overnight events: hydralazine dose was increased to wean off nitroglycerin, today her systolic is controlled will decrease hydralazine dose and wean off nitroglycerin.     Scheduled Medications:   acetaminophen, 650 mg, oral, q4h  aspirin, 81 mg, oral, Daily  atorvastatin, 80 mg, oral, Nightly  FLUoxetine, 40 mg, oral, Daily  fluticasone furoate-vilanteroL, 1 puff, inhalation, Daily  heparin (porcine), 5,000 Units, subcutaneous, q8h  hydrALAZINE, 25 mg, oral, q8h  insulin lispro, 0-5 Units, subcutaneous, TID with meals  lactated Ringer's, 500 mL, intravenous, Once  metoprolol tartrate, 12.5 mg, oral, BID  pantoprazole, 40 mg, oral, Daily before breakfast  polyethylene glycol, 17 g, oral, TID  sennosides-docusate sodium, 2 tablet, oral, BID  [START ON 10/16/2023] warfarin, 3 mg, oral, Once per day on Mon Wed Sat  warfarin, 4.5 mg, oral, Once per day on Sun Tue Thu Fri         Continuous Medications:   lactated Ringer's, 5 mL/hr, Last Rate: 5 mL/hr (10/15/23 1000)         PRN Medications:   PRN medications: calcium gluconate, calcium gluconate, dextrose **OR** glucagon, magnesium sulfate, magnesium sulfate, [DISCONTINUED] ondansetron **OR** ondansetron, oxyCODONE, oxygen, potassium chloride, potassium chloride    Objective   Vitals:  Most Recent:  Vitals:    10/15/23 1045   BP:    Pulse: 81   Resp: 17   Temp:    SpO2: 97%       24hr Min/Max:  Temp  Min: 36.5 °C (97.7 °F)  Max: 36.6 °C (97.9 °F)  Pulse  Min: 80  Max: 98  BP  Min: 98/56  Max: 124/58  Resp  Min: 11  Max: 29  SpO2  Min: 91 %  Max: 99 %    I/O:  I/O last 2 completed shifts:  In: 580.9 (7.7 mL/kg) [I.V.:230.9 (3.1 mL/kg); IV Piggyback:350]  Out: 2045 (27.1 mL/kg) [Urine:1190 (0.7 mL/kg/hr); Chest Tube:855]  Weight: 75.6 kg     Hemodynamic parameters for last 24 hours:         Vent settings:       LDA:  CVC 10/13/23 Single lumen Right Internal jugular (Active)   Placement Date/Time: 10/13/23 (c) 1223   Hand Hygiene  Performed Prior to CVC Insertion: Yes  Site Prep: Chlorhexidine   Site Prep Agent has Completely Dried Before Insertion: Yes  All 5 Sterile Barriers Used (Gloves, Gown, Cap, Mask, Large Sterile Chanelle...   Number of days: 2       Arterial Line 10/13/23 Left Brachial (Active)   Placement Date/Time: 10/13/23 (c) 0754   Size: 20 G  Orientation: Left  Location: Brachial  Securement Method: Transparent dressing  Patient Tolerance: Tolerated well   Number of days: 2       Urethral Catheter Non-latex;Double-lumen;Temperature probe 14 Fr. (Active)   Placement Date/Time: 10/13/23 0845   Placed by: Vicki Jacome  Hand Hygiene Completed: Yes  Catheter Type: Non-latex;Double-lumen;Temperature probe  Tube Size (Fr.): 14 Fr.  Catheter Balloon Size: 10 mL  Urine Returned: Yes   Number of days: 2       Y Chest Tube 1 and 2 Mediastinal Mediastinal (Active)   Placement Date/Time: 10/13/23 1500   Chest Tube Location 1: Mediastinal  Chest Tube Location 2: Mediastinal  Chest Tube Drainage System: Suction   Number of days: 1       Y Chest Tube 1 and 2 Mediastinal Mediastinal (Active)   Placement Date/Time: 10/13/23 1500   Chest Tube Location 1: Mediastinal  Chest Tube Location 2: Mediastinal   Number of days: 1         Physical Exam:   Physical Exam  Constitutional:       Appearance: Normal appearance. She is not ill-appearing.   Eyes:      Extraocular Movements: Extraocular movements intact.   Cardiovascular:      Rate and Rhythm: Normal rate and regular rhythm.      Pulses: Normal pulses.   Pulmonary:      Effort: Pulmonary effort is normal. No respiratory distress.      Breath sounds: Normal breath sounds. No wheezing.   Abdominal:      General: Abdomen is flat. There is no distension.      Palpations: Abdomen is soft.      Tenderness: There is no abdominal tenderness.   Genitourinary:     Comments: Santos intact  Musculoskeletal:         General: Normal range of motion.   Skin:     Coloration: Skin is not jaundiced or pale.       Findings: No rash.   Neurological:      General: No focal deficit present.      Mental Status: She is alert.      Motor: No weakness.      Comments: Some confusion      Psychiatric:      Comments: Some confusion          Lab/Radiology/Diagnostic Review:  Results for orders placed or performed during the hospital encounter of 10/13/23 (from the past 24 hour(s))   POCT GLUCOSE   Result Value Ref Range    POCT Glucose 167 (H) 74 - 99 mg/dL   POCT GLUCOSE   Result Value Ref Range    POCT Glucose 175 (H) 74 - 99 mg/dL   POCT GLUCOSE   Result Value Ref Range    POCT Glucose 171 (H) 74 - 99 mg/dL   Calcium, Ionized   Result Value Ref Range    POCT Calcium, Ionized 1.14 1.1 - 1.33 mmol/L   Magnesium   Result Value Ref Range    Magnesium 1.97 1.60 - 2.40 mg/dL   CBC   Result Value Ref Range    WBC 17.0 (H) 4.4 - 11.3 x10*3/uL    nRBC 0.0 0.0 - 0.0 /100 WBCs    RBC 3.15 (L) 4.00 - 5.20 x10*6/uL    Hemoglobin 9.4 (L) 12.0 - 16.0 g/dL    Hematocrit 28.4 (L) 36.0 - 46.0 %    MCV 90 80 - 100 fL    MCH 29.8 26.0 - 34.0 pg    MCHC 33.1 32.0 - 36.0 g/dL    RDW 15.4 (H) 11.5 - 14.5 %    Platelets 155 150 - 450 x10*3/uL    MPV 10.7 7.5 - 11.5 fL   Renal Function Panel   Result Value Ref Range    Glucose 153 (H) 74 - 99 mg/dL    Sodium 136 136 - 145 mmol/L    Potassium 4.0 3.5 - 5.3 mmol/L    Chloride 102 98 - 107 mmol/L    Bicarbonate 25 21 - 32 mmol/L    Anion Gap 13 10 - 20 mmol/L    Urea Nitrogen 23 6 - 23 mg/dL    Creatinine 0.87 0.50 - 1.05 mg/dL    eGFR 73 >60 mL/min/1.73m*2    Calcium 8.4 (L) 8.6 - 10.6 mg/dL    Phosphorus 3.2 2.5 - 4.9 mg/dL    Albumin 3.3 (L) 3.4 - 5.0 g/dL   POCT GLUCOSE   Result Value Ref Range    POCT Glucose 156 (H) 74 - 99 mg/dL     XR chest 1 view    Result Date: 10/15/2023  Interpreted By:  Agustín Randolph and Benza Andrew STUDY: XR CHEST 1 VIEW;  10/15/2023 9:09 am   INDICATION: Signs/Symptoms:ICU rounds.   COMPARISON: Chest radiograph dated 10/14/2023   ACCESSION NUMBER(S): [id number]    ORDERING CLINICIAN: GRANT REDDY   FINDINGS: AP radiograph of the chest was provided.   Right internal jugular approach central venous catheter tip projects over the lower SVC. Bilateral chest tubes and mediastinal drain are again in place.   CARDIOMEDIASTINAL SILHOUETTE: Cardiomediastinal silhouette is stable in size and configuration. Cardiac valve replacement/repair is again noted.   LUNGS: Prominent perihilar and interstitial markings are again noted bilaterally. No pneumothorax.   ABDOMEN: No remarkable upper abdominal findings.   BONES: No acute osseous changes. Median sternotomy wires are noted.       1.  Similar appearance of the chest compared to prior examination with persistent perihilar congestion and bibasilar atelectasis. 2. Medical devices and postsurgical changes as above.   I personally reviewed the images/study and I agree with the resident findings as stated. This study was interpreted at [place], [place].     MACRO: None.   Signed by: Agustín Jacinto 10/15/2023 10:10 AM Dictation workstation:   [id number]    XR chest 1 view    Result Date: 10/14/2023  Interpreted By:  Agustín Randolph, STUDY: XR CHEST 1 VIEW;  10/14/2023 3:24 am   INDICATION: Signs/Symptoms:Post op cardiac surgery.   COMPARISON: Radiograph dated 10/13/2023, 5:09 p.m.   ACCESSION NUMBER(S): [id number]   ORDERING CLINICIAN: JOEY PARTIDA   FINDINGS: AP radiograph of the chest was provided.   Interval extubation. Interval removal of enteric tube. RightIJ central venous catheter with tip overlying the lower SVC. Cardiac valve replacement/repair. Surgical clips overlie the cardiomediastinal silhouette and right axilla. Bilateral chest tubes and mediastinal drain in place. Postsurgical changes from median sternotomy.   CARDIOMEDIASTINAL SILHOUETTE: Cardiomediastinal silhouette is normal in size and configuration.   LUNGS: Low lung volumes contributing to  bronchovascular crowding. Prominent perihilar and interstitial markings bilaterally. Streaky opacities of the right-greater-than-left lower lung zones favored to represent atelectasis. No focal consolidation, pleural effusion, or pneumothorax.   ABDOMEN: No remarkable upper abdominal findings.   BONES: No acute osseous changes.       1. Slight improvement in the aeration of the lungs with mild residual perihilar congestion and bibasilar atelectasis. 2. Interval extubation. Other medical appliances and postsurgical changes as described above       Signed by: Agustín Jacinto 10/14/2023 8:01 AM Dictation workstation:   [id number]    XR chest 1 view    Result Date: 10/13/2023  Interpreted By:  Agustín Randolph and Stephens Katherine STUDY: XR CHEST 1 VIEW;  10/13/2023 5:12 pm   INDICATION: Signs/Symptoms:Post op cardiac surgery.   COMPARISON: CT chest 03/10/2023 and chest radiograph 10/08/2018   ACCESSION NUMBER(S): [id number]   ORDERING CLINICIAN: JOEY PARTIDA   FINDINGS: AP radiograph of the chest was provided.   Endotracheal tube noted with tip projecting approximately 3.6 cm above the julissa. Enteric tube seen coursing below the level diaphragm with tip out of the field of view. RightIJ central venous catheter with tip overlying the lower SVC. Cardiac valve replacement/repair. Surgical clips overlie the cardiomediastinal silhouette and right axilla. Bilateral chest tubes and mediastinal drain in place. Postsurgical changes from median sternotomy.   CARDIOMEDIASTINAL SILHOUETTE: Cardiomediastinal silhouette is normal in size and configuration.   LUNGS: Low lung volumes contributing to bronchovascular crowding. Prominent perihilar and interstitial markings bilaterally. Streaky opacities of the right-greater-than-left lower lung zones favored to represent atelectasis. No focal consolidation, pleural effusion, or pneumothorax.   ABDOMEN: No remarkable upper abdominal findings.   BONES: No acute  osseous changes.       1.  Low lung volumes with mild postoperative pulmonary edema and atelectasis. 2.  Postsurgical changes and medical devices as described above.   I personally reviewed the images/study and I agree with the findings as stated. This study was interpreted at [facility], [place], [place].   MACRO: None   Signed by: Christophechad Maneadelita Jacinto 10/13/2023 6:04 PM Dictation workstation:   [id number]    Anesthesia Intraoperative Transesophageal Echocardiogram    Result Date: 10/13/2023  [facility] Dept of Anesthesiology, [address] [place] [address]                     Tel [phone] and Fax [phone] TRANSESOPHAGEAL ECHOCARDIOGRAM REPORT  Patient Name:      SHANEL Santacruz Physician: [id number] Sayra Smallwood MD Study Date:        10/13/2023           Ordering Provider: [id number] TAMIKA CHAMPAGNE MRN/PID:           [id number]             Fellow: Accession#:        [id number]         Nurse: Date of Birth/Age: 1955 / 68 years Sonographer:       FIDEL Gender:            F                    Additional Staff: BSA:               m2                   Encounter#:        [id number] Study Type:    ANESTHESIA INTRAOPERATIVE BUZZ Diagnosis/ICD: Aneurysm of the ascending aorta, without rupture-I71.21;                Atherosclerosis of native coronary artery of transplanted heart                without angina pectoris-I25.811 PHYSICIAN INTERPRETATION: Left Ventricle: The left ventricular systolic function is normal, with an estimated ejection fraction of 55-60%. There are no regional wall motion abnormalities. The left ventricular cavity size is normal. Left ventricular diastolic filling was not assessed. Left Atrium: The left atrium is normal in size. There is no evidence of a patent foramen ovale. There is no thrombus visualized in the left atrial appendage. Right Ventricle: The right ventricle  is normal in size. There is normal right ventricular global systolic function. Right Atrium: The right atrium is normal in size. Aortic Valve: There is a prosthetic aortic valve present. There is trivial aortic valve regurgitation. Velocity across the mechancal valve in the aortic position is 120 cm/s. No vegetations observed,and no para valvular leaks. Mitral Valve: The mitral valve is mildly thickened. There is trace mitral valve regurgitation. Tricuspid Valve: The tricuspid valve is structurally normal. There is trace tricuspid regurgitation. Pulmonic Valve: The pulmonic valve is structurally normal. There is physiologic pulmonic valve regurgitation. Pericardium: There is no pericardial effusion noted. Aorta: The aortic root is abnormal. There is no evidence of aortic dissection. The ascending Ao diameter is 5.3 cm. The descending aorta is classified as a Grade 2 [mild (focal or diffuse) intimal thickening of 2-3 mm] atherosclerosis. In comparison to the previous echocardiogram(s): Not performed on intraoperative study.  CONCLUSIONS:  1. Left ventricular systolic function is normal with a 55-60% estimated ejection fraction.  2. Ascending aortic anuerism. POST CARDIOPULMONARY BYPASS REPORT: Patient is being paced. LV function is unchanged from pre-pump exam. RV function is unchanged from pre-pump exam. Unchanged. No evidence of post aortic cannulation dissection.  QUANTITATIVE DATA SUMMARY:  [id number] Sayra Smallwood MD Electronically signed on 10/13/2023 at 1:44:23 PM  ** Final **     Cardiac catheterization - coronary    Result Date: 10/6/2023   [facility], Cath Lab, [address] [place] [address] Cardiovascular Catheterization Report Patient Name:      SHANEL BECKHAM FANNY     Performing Physician:  [id number] Ramy Rebolledo MD Study Date:        10/2/2023            Verifying Physician:   [id number] Mccann                                                                 Amaury GANDARA MRN/PID:           [id number]             Cardiologist/Co-scrub: Accession#:        [id number]         Ordering Provider:     [id number] THOMAS ROSALES Date of Birth/Age: 1955 / 68 years Fellow:                [id number] Piyush Estes MD PhD Gender:            F                    Fellow:                [id number] Carl Gillombardo MD Encounter#:        [id number]  Study: Coronary Arteriogram  Indications: SHANEL ESCOBEDO is a 68 year old female who presents with obesity, dyslipidemia, coronary artery disease and an asymptomatic chest pain assessment. Valvular disease and pre-operative evaluation.  Procedure Description: After infiltration with 2% Lidocaine, the right femoral artery was cannulated with a modified Seldinger technique. Subsequently a 6 Bangladeshi sheath was placed in the right femoral artery. Selective coronary catheterization was performed using a 6 Fr catheter(s) exchanged over a guide wire to cannulate the coronary arteries. A JL 5 tip catheter was used for left coronary injections. A JR 4 tip catheter was used for right coronary injections. Multiple injections of contrast were made into the left and right coronary arteries with angiograms recorded in multiple projections. After completion of the procedure, the arterial sheath was pulled and pressure was applied to the site.  Coronary Angiography: The coronary circulation is right dominant.  Left Main Coronary Artery: The left main coronary artery is a normal caliber vessel. The left main arises normally from the left coronary sinus of Valsalva and bifurcates into the LAD and circumflex coronary arteries. The left main coronary artery showed no significant disease or stenosis greater than 30%.  Left Anterior Descending Coronary  Artery Distribution: The left anterior descending coronary artery is a normal caliber vessel. The LAD arises normally from the left main coronary artery. The proximal to mid left anterior descending coronary artery showed 50-60%. There is 20% proximal LAD stenosis. There is 50-60% mid LAD stenosis after first diagonal branch.  Circumflex Coronary Artery Distribution: The circumflex coronary artery is a small caliber vessel. The circumflex arises normally from the left main coronary artery and terminates in the AV groove. The circumflex revealed no significant disease or stenosis greater than 30%.  Right Coronary Artery Distribution: The right coronary artery is a normal caliber vessel. The RCA arises normally from the right sinus of Valsalva. There is 90% proximal stenosis of the RCA. There is 40% stenosis in the mid RCA.  Coronary Lesion Summary: Vessel Stenosis Vessel Segment LAD     50-60%  proximal to mid  Hemo Personnel: +---------------------+------------+ Name                 Duty         +---------------------+------------+ Shaq Valle MD, MD 1 +---------------------+------------+ Giancarlo Hernández 1 +---------------------+------------+  Hemodynamic Pressures:  +----+---------------------+----------+-------------+--------------+---------+ Site      Date Time      Phase NameSystolic mmHgDiastolic mmHgMean mmHg +----+---------------------+----------+-------------+--------------+---------+   AO10/2/2023 11:08:29 AM      Rest          130            70       93 +----+---------------------+----------+-------------+--------------+---------+  Complications: No in-lab complications observed.  Cardiac Cath Post Procedure Notes: Post Procedure Diagnosis: Single vessel severe CAD. Blood Loss:               Estimated blood loss during the procedure was 0 mls. Specimens Removed:        Number of specimen(s) removed: none.  Recommendations: Maximize medical therapy.  Agressive risk factor modification efforts. Telemetry monitoring. Monitor vitals and arterial access site/pulses. Lipid lowering agent or Statin therapy. Workup for TAA+/-AVR with possible RCA bypass graft per CTS. Aspirin therapy. ____________________________________________________________________________________ CONCLUSIONS:  1. Severe 90% stenosis of proximal RCA, and 50-60% proximal to mid LAD stenosis. The circumflex is small and without signiifcant disease.  2. Right dominant coronary system. ICD 10 Codes: Atherosclerotic heart disease of native coronary artery without angina pectoris-I25.10  [id number] Ramy Rebolledo MD Performing Physician Electronically signed by [id number] Ramy Rebolledo MD on 10/6/2023 at 11:05:47 AM  ** Final **     Electrocardiogram 12 Lead    Result Date: 10/2/2023  Normal sinus rhythm Incomplete right bundle branch block Borderline ECG When compared with ECG of 11-NOV-2022 11:51, No significant change was found Confirmed by Mart Swift ([id number]) on 10/2/2023 8:12:06 AM    Carotid Artery Duplex Ultrasoun    Result Date: 9/29/2023  [place] [address] Tel [phone] and Fax [phone] Vascular Lab Report Carotid Artery Duplex Ultrasound Patient Name:     SHANEL Santacruz Physician:  [id number] Rodriguez Kim MD, [facility] Study Date:       9/29/2023      Referring           JG IVORY Physician: MRN/PID:          [id number]       PCP: Accession/Order#: [id number]      CC Report to: YOB: 1955      Technologist:       Patricia Valdivia RVT, RDMS Gender:           F              Technologist 2: Admission Status: Inpatient      Location Performed: [facility] Diagnosis/ICD: R09.89-Other specified symptoms and signs involving the circulatory and respiratory systems Procedure/CPT: 77588 Cerebrovascular Carotid Duplex scan complete-81237 CONCLUSIONS: Right Carotid: Findings are consistent with less than  50% stenosis of the right proximal internal carotid artery. Laminar flow seen by color Doppler. Right external carotid artery appears patent with no evidence of stenosis. The right vertebral artery is patent with antegrade flow. No evidence of hemodynamically significant stenosis in the right subclavian artery. Left Carotid: Findings are consistent with less than 50% stenosis of the left proximal internal carotid artery. Left external carotid artery appears patent with no evidence of stenosis. The left vertebral artery is patent with antegrade flow. No evidence of hemodynamically significant stenosis in the left subclavian artery. Imaging & Doppler Findings: Right Plaque Morph: The proximal right internal carotid artery demonstrates heterogenous plaque. Right                       Left PSV     EDV                PSV      EDV 90 cm/s           CCA P    69 cm/s 61 cm/s           CCA D    66 cm/s 35 cm/s 11 cm/s   ICA P    38 cm/s  11 cm/s 43 cm/s 13 cm/s   ICA M    52 cm/s  16 cm/s 48 cm/s 16 cm/s   ICA D    89 cm/s  17 cm/s 59 cm/s            ECA     62 cm/s 56 cm/s 13 cm/s Vertebral  42 cm/s  10 cm/s 68 cm/s         Subclavian 159 cm/s Right Left ICA/CCA Ratio  0.6  0.6 [id number] Rodriguez Kim MD, RPVI Electronically signed by [id number] Rodriguez Kim MD, RPVI on 9/29/2023 at 10:00:40 AM  Final      Adult Cath    Result Date: 9/28/2023  [facility], Cath Lab, [address] [place] [address] Cardiovascular Catheterization Report Patient Name:     SHANEL ESCOBEDO Performing Physician:  [id number] Piyush Jordan MD Study Date:       9/28/2023        Verifying Physician:   [id number] Piyush Jordan MD MRN/PID:          [id number]         Cardiologist/Co-scrub: Accession/Order#: [id number]        Fellow:                [id number] Piyush Estes MD PhD YOB: 1955        Fellow:                [id number] Carl Gillombardo MD Gender:           F                Referring Physician:   Yoseph Mcadams MD Admit  Date:                        Referring Physician:   [id number] Gian Lr MD Surgeon:                           Referring Physician:   [id number] Christopher Hayden MD Study: Left Heart Catheterization Indications: SHANEL ESCOBEDO is a 68 year old female who presents with diabetes, cerebrovascular accident, coronary artery disease, chronic pulmonary disease, dyslipidemia, obesity, hypertension, current smoker and s/p AVR and an asymptomatic chest pain assessment. Pre-operative evaluation. Appropriate Use Criteria: Preoperative assessment before valvular surgery; AUC score = 7. Procedure Description: After infiltration with 2% Lidocaine, the right radial artery was cannulated with a modified Seldinger technique. Subsequently a 5 Danish sheath was placed retrograde in the right radial artery. Selective coronary catheterization was performed using a 5 Fr catheter(s) exchanged over a guide wire to cannulate the coronary arteries. A JR 4 tip catheter was used for right coronary injections. After completion of the procedure, the arterial sheath was pulled and a TR Band Radial Compression Device was utilized to obtain patent hemostasis. Procedure Description Comments: The right radial nerve was cannulated, and a 5F sheath was placed. Nitroglycerin was given through the radial sheath. A 5F JR4 catheter was advanced to the subclavian artery over a tight J-wire. Despite extensive manipulations, the tight J-wire could not be advanced into the innominate artery or central aorta. An exchange was made for a Versacore wire as well as a BMW coronary wire. Despite exhaustive attempts to manipulate these wires into the central aorta, none of these wires could be advanced into the central aorta. Limited right subclavian artery angiography suggested a possible innominate occlusion. Procedure was abandoned at this point. Discussed case with CT surgery who is awaiting coronary angiography to help with planning of ascending aortic aneurysm surgery.  Patient will be admitted to the [facility] to have her Coumadin held. She will also be placed on heparin due to her mechanical prosthetic aortic valve. A diagnostic catheterization will be performed from a femoral approach once her INR is near normal. Coronary Angiography: The coronary circulation is right dominant. Hemo Personnel: +----------------------+-------------+ Name                  Duty          +----------------------+-------------+ Piyush Jordan MD, MD 1 +----------------------+-------------+ Nael Liao RN    PROC CIRC 1 +----------------------+-------------+ Kd Perez RN          PROC CIRC 2 +----------------------+-------------+ Kaiser Patel RN   PROC RECORD 1 +----------------------+-------------+ Gillombardo, Carl B MDFELLOW PHYS 1 +----------------------+-------------+ Piyush Estes MD   FELLOW PHYS 2 +----------------------+-------------+ Sedation Time: +------------------------+----------------------------------------+ Sedation Start/End TimesTime                                     +------------------------+----------------------------------------+ Start                   9/28/2023 10:34:53                       +------------------------+----------------------------------------+ Drugs                   Fentanyl 50 mcg IV per physician for sed +------------------------+----------------------------------------+ End                     9/28/2023 10:50:31                       +------------------------+----------------------------------------+ Equipment Used: +---------------------+--------------------------------------------------------+             Date/Time                                             Description +---------------------+--------------------------------------------------------+ 9/28/2023 10:29:41 AM  Terumo Glidesheath Slender Radial Sheath 5                                            Fr x 10cm - Qty: 1  Each Part #: 72 +---------------------+--------------------------------------------------------+ 9/28/2023 10:29:52 AM       [facility] [employer] Guidewire                                .035mm x 210cm J-Tip - Qty: 1 Each Part #: 941 +---------------------+--------------------------------------------------------+ 9/28/2023 10:29:59 AM  - Omnipaque 350 mgl/ml 100ml Bottle Contrast                                                     - Qty: 1 Each Part #: 840 +---------------------+--------------------------------------------------------+ 9/28/2023 10:39:31 AM   [employer]ott Hi-Torque Versacore .035mm x                                        145cm Floppy - Qty: 1 Each Part #: 901 +---------------------+--------------------------------------------------------+ 9/28/2023 10:45:19 AM     [employer]ott Hi-Torque BMW .014mm x                                              190cm - Qty: 1 Each Part #: 1379 +---------------------+--------------------------------------------------------+ 9/28/2023 10:50:16 AM   Tears for Life 5 Fr FR4 Impulse x                                                100cm - Qty: 1 Each Part #: 88 +---------------------+--------------------------------------------------------+ 9/28/2023 10:50:51 AM           Terumo TR Band Radial Artery                             Compression Device 24cm - Qty: 1 Each Part #: 828 +---------------------+--------------------------------------------------------+ Fluoroscopy Time: +--------------------------+--------+ X-Ray Summary Fluoro Time:2.60 min +--------------------------+--------+ +----------+-------+ Contrast: Dose:   +----------+-------+ Omnipaque:5.00 ml +----------+-------+ Hemodynamic Pressures: +----+---------------------+----------+-------------+--------------+---------+ Site      Date Time      Phase NameSystolic mmHgDiastolic mmHgMean mmHg  +----+---------------------+----------+-------------+--------------+---------+   AO9/28/2023 10:42:44 AM      Rest          103            56       74 +----+---------------------+----------+-------------+--------------+---------+ Oxygen Saturation %: +-----------+------------+ Sample SiteHB (g/100ml) +-----------+------------+     SYS ART        13.8 +-----------+------------+     SYS CHERI        13.8 +-----------+------------+     PUL ART        13.8 +-----------+------------+     PUL CHERI        13.8 +-----------+------------+ Complications: No in-lab complications observed. Cardiac Cath Transition of Care Summary: Post Procedure Diagnosis: Unsuccessful left heart catheterization via right radial arterial approach due to possible innominate occlusion. Blood Loss:               Estimated blood loss during the procedure was 20 mls. Specimens Removed:        Number of specimen(s) removed: none. ____________________________________________________________________________________ CONCLUSIONS: 1. Unsuccessful attempt at left heart catheterization from a right radial arterial approach. ____________________________________________________________________________________ CPT Codes: Left Heart Cath (visualization of coronaries) and LV-[id number]; Moderate Sedation Services initial 15 minutes patient >5 years-83914; Moderate Sedation Services 1st additional 15 minutes patient >5 years-80100 ICD 10 Codes: I25.10-Atherosclerotic heart disease of native coronary artery without angina pectoris; I71.20-Thoracic aortic aneurysm, without rupture, unspecified [id number] Piyush Jordan MD Performing Physician Electronically signed by [id number] Piyush Jordan MD on 9/28/2023 at 11:18:41 AM cc Report to: Yoseph Mcadams MD cc Report to: [id number] Gian Lr MD cc Report to: [id number] Christopher Hayden MD  Final            This patient has a central line   Reason for the central line remaining today? Line unnecessary, will be removed  today    This patient has a urinary catheter   Reason for the urinary catheter remaining today? Urine catheter unnecessary, will be removed today               Assessment/Plan     Assessment:      This is a 68 year old female with a PMH significant for mechanical aortic valve replacement in 2014, HTN, DM, COPD and a brain aneurysm rupture. yesterday she presents s/p redo CABG x 1 (SVG-RCA) scending aortic root replacement with Dr. Hayden.   Ovn hydralazine dose was increased to wean off nitroglycerin, today her systolic is controlled will decrease hydralazine dose and wean off nitroglycerin .      Plan:  NEURO:  PMH. Acute post operative pain.   -->alert,post op pain controlled, she is sitting on a chair   - Serial neuro and pain assessments .    - Scheduled Tylenol   - PRN oxycodone  - PRN dilaudid for pain   - PT Consult, OOB to chair as tolerated, chair position if not tolerated   - CAM ICU score qshift  - Sleep/wake cycle hygiene  - Resumed home Prozac      CV:  Patient has a history of HTN, mechanical AVR in 2014, HPL.  Is now status post Ascending aortic root replacement and CABG x 1 on 10/13 with Dr. Hayden.  Pre/Post EF: Normal BIV. Arrived to CTICU on Epi 0.02 weaned off, A/V epicardial wires set VVI @ 50. >  - Maintain goal systolic <140s ,weaned off nitroglycerin   - Mixed venous and CI Q4H  - Volume resuscitate as clinically indicated  - Hydralazine 25mg TID   - ASA   - Start statin  - Started home coumadin   - Metoprlol 12.5 mg BID  - Hold home amlodipine, losartan     PULM:  HX of COPD.  Currently extubated on 2L nc. Chest tubes 2MS and 2 left pleural.--> dumped 100,  - F/u post op CXR, persistent perihilar congestion and bibasilar atelectasis   - Wean FiO2 maintaining SpO2 >92%.   - IS q1h and OOB to chair when extubated  - Chest tubes to wall suction.  - Resumed home inhaled therapies      GI:  No pmhx. passed swallow test will order diabetic diet-->  - PPI   - Colace/senna BID and miralax BID  -Add  suppository today      :  CSA-FAINA Risk Score Moderate.  No history of renal disease, baseline creatinine 0.85. had a mild FAINA preop of a creatinine 1.11. Creatinine stable post-op. Ramírez in place and making adequate UOP. --> net negative 1.4L   - Continue ramírez catheter for strict I/Os. Dc ramírez today   - Goal UOP 0.5ml/kg/hr  - RFP as clinically indicated  - Replete electrolytes per CTICU protocol     ENDO:  PMH of DM. Hyperglycemia  -->  - Maintain BG <180  - SSI  - Hold home metformin     HEME:  Acute blood loss anemia and thrombocytopenia. Received 1 PRBC, 1 FFP, 5pk platelets, 5pk cryo in the OR-->    - Monitor drain output volume and characteristics  - CBC, coags, and fibrinogen post op and as clinically indicated  - Start ASA   - SQH stop when INR therapeutic   - Restarted coumadin todayper Dr. Hayden. No need to bridge  - SCDs for DVT prophylaxis.  - Last type and screen: 10/13     ID:  Afebrile, no current indications of infection. MRSA negative.-->  - Trend temp q4h  - Allergy to penicillins so aztreonam q8hrs and Vanc for 48 hours postop     Skin:  No active skin issues.  - preventative Mepilex dressings in place on sacrum and heels  - change preventative Mepilex weekly or more frequently as indicated (when moist/soiled)   - every shift skin assessment per nursing and weekly ICU skin rounds  - moisture barrier to be applied with randy care  - active skin problems addressed with nursing on daily rounds     Proph:  SCDs  PPI  SQH  Coumadin      G:  Line  Right IJ MAC w Minimac placed 10/13 Dc 10/15  Left brachial a-line placed 10/13 Dc 10/15       F: Family: will update at bedside postoperatively.     A,B,C,D,E,F,G: reviewed     Dispo: CTICU care for now.    CTICU TEAM PHONE [phone]

## 2023-10-16 ENCOUNTER — APPOINTMENT (OUTPATIENT)
Dept: RADIOLOGY | Facility: HOSPITAL | Age: 68
DRG: 220 | End: 2023-10-16
Payer: MEDICARE

## 2023-10-16 LAB
ALBUMIN SERPL BCP-MCNC: 3.2 G/DL (ref 3.4–5)
ANION GAP SERPL CALC-SCNC: 15 MMOL/L (ref 10–20)
BLOOD EXPIRATION DATE: NORMAL
BUN SERPL-MCNC: 25 MG/DL (ref 6–23)
CALCIUM SERPL-MCNC: 8.3 MG/DL (ref 8.6–10.6)
CHLORIDE SERPL-SCNC: 102 MMOL/L (ref 98–107)
CO2 SERPL-SCNC: 22 MMOL/L (ref 21–32)
CREAT SERPL-MCNC: 0.79 MG/DL (ref 0.5–1.05)
DISPENSE STATUS: NORMAL
ERYTHROCYTE [DISTWIDTH] IN BLOOD BY AUTOMATED COUNT: 14.7 % (ref 11.5–14.5)
GFR SERPL CREATININE-BSD FRML MDRD: 82 ML/MIN/1.73M*2
GLUCOSE BLD MANUAL STRIP-MCNC: 131 MG/DL (ref 74–99)
GLUCOSE BLD MANUAL STRIP-MCNC: 135 MG/DL (ref 74–99)
GLUCOSE BLD MANUAL STRIP-MCNC: 139 MG/DL (ref 74–99)
GLUCOSE BLD MANUAL STRIP-MCNC: 150 MG/DL (ref 74–99)
GLUCOSE BLD MANUAL STRIP-MCNC: 169 MG/DL (ref 74–99)
GLUCOSE BLD MANUAL STRIP-MCNC: 201 MG/DL (ref 74–99)
GLUCOSE BLD MANUAL STRIP-MCNC: 203 MG/DL (ref 74–99)
GLUCOSE SERPL-MCNC: 178 MG/DL (ref 74–99)
HCT VFR BLD AUTO: 30.1 % (ref 36–46)
HGB BLD-MCNC: 9.2 G/DL (ref 12–16)
INR PPP: 1.6 (ref 0.9–1.1)
MAGNESIUM SERPL-MCNC: 1.98 MG/DL (ref 1.6–2.4)
MCH RBC QN AUTO: 29.4 PG (ref 26–34)
MCHC RBC AUTO-ENTMCNC: 30.6 G/DL (ref 32–36)
MCV RBC AUTO: 96 FL (ref 80–100)
NRBC BLD-RTO: 0 /100 WBCS (ref 0–0)
PHOSPHATE SERPL-MCNC: 2.4 MG/DL (ref 2.5–4.9)
PLATELET # BLD AUTO: 178 X10*3/UL (ref 150–450)
PMV BLD AUTO: 11.2 FL (ref 7.5–11.5)
POTASSIUM SERPL-SCNC: 3.4 MMOL/L (ref 3.5–5.3)
PRODUCT BLOOD TYPE: 5100
PRODUCT BLOOD TYPE: 6200
PRODUCT CODE: NORMAL
PROTHROMBIN TIME: 18.5 SECONDS (ref 9.8–12.8)
RBC # BLD AUTO: 3.13 X10*6/UL (ref 4–5.2)
SODIUM SERPL-SCNC: 136 MMOL/L (ref 136–145)
UNIT ABO: NORMAL
UNIT NUMBER: NORMAL
UNIT RH: NORMAL
UNIT VOLUME: 350
UNIT VOLUME: 88
WBC # BLD AUTO: 13 X10*3/UL (ref 4.4–11.3)
XM INTEP: NORMAL

## 2023-10-16 PROCEDURE — 96372 THER/PROPH/DIAG INJ SC/IM: CPT

## 2023-10-16 PROCEDURE — 83735 ASSAY OF MAGNESIUM: CPT | Mod: CMCLAB | Performed by: CLINICAL NURSE SPECIALIST

## 2023-10-16 PROCEDURE — 36415 COLL VENOUS BLD VENIPUNCTURE: CPT | Mod: CMCLAB | Performed by: CLINICAL NURSE SPECIALIST

## 2023-10-16 PROCEDURE — 2500000004 HC RX 250 GENERAL PHARMACY W/ HCPCS (ALT 636 FOR OP/ED)

## 2023-10-16 PROCEDURE — 2500000001 HC RX 250 WO HCPCS SELF ADMINISTERED DRUGS (ALT 637 FOR MEDICARE OP): Performed by: NURSE PRACTITIONER

## 2023-10-16 PROCEDURE — 71045 X-RAY EXAM CHEST 1 VIEW: CPT | Mod: FY

## 2023-10-16 PROCEDURE — 2500000004 HC RX 250 GENERAL PHARMACY W/ HCPCS (ALT 636 FOR OP/ED): Performed by: NURSE PRACTITIONER

## 2023-10-16 PROCEDURE — 85610 PROTHROMBIN TIME: CPT | Mod: CMCLAB | Performed by: NURSE PRACTITIONER

## 2023-10-16 PROCEDURE — 36415 COLL VENOUS BLD VENIPUNCTURE: CPT | Performed by: NURSE PRACTITIONER

## 2023-10-16 PROCEDURE — 2500000001 HC RX 250 WO HCPCS SELF ADMINISTERED DRUGS (ALT 637 FOR MEDICARE OP)

## 2023-10-16 PROCEDURE — 71045 X-RAY EXAM CHEST 1 VIEW: CPT | Performed by: RADIOLOGY

## 2023-10-16 PROCEDURE — 85027 COMPLETE CBC AUTOMATED: CPT | Mod: CMCLAB | Performed by: CLINICAL NURSE SPECIALIST

## 2023-10-16 PROCEDURE — 1200000002 HC GENERAL ROOM WITH TELEMETRY DAILY

## 2023-10-16 PROCEDURE — 99232 SBSQ HOSP IP/OBS MODERATE 35: CPT | Performed by: NURSE PRACTITIONER

## 2023-10-16 PROCEDURE — 82947 ASSAY GLUCOSE BLOOD QUANT: CPT | Mod: CMCLAB

## 2023-10-16 PROCEDURE — 80069 RENAL FUNCTION PANEL: CPT | Performed by: CLINICAL NURSE SPECIALIST

## 2023-10-16 PROCEDURE — 2500000002 HC RX 250 W HCPCS SELF ADMINISTERED DRUGS (ALT 637 FOR MEDICARE OP, ALT 636 FOR OP/ED): Performed by: NURSE PRACTITIONER

## 2023-10-16 PROCEDURE — 97161 PT EVAL LOW COMPLEX 20 MIN: CPT | Mod: GP | Performed by: STUDENT IN AN ORGANIZED HEALTH CARE EDUCATION/TRAINING PROGRAM

## 2023-10-16 RX ORDER — IRON POLYSACCHARIDE COMPLEX 150 MG
150 CAPSULE ORAL DAILY
Status: DISCONTINUED | OUTPATIENT
Start: 2023-10-16 | End: 2023-10-18 | Stop reason: HOSPADM

## 2023-10-16 RX ORDER — BISACODYL 10 MG/1
10 SUPPOSITORY RECTAL DAILY PRN
Status: DISCONTINUED | OUTPATIENT
Start: 2023-10-16 | End: 2023-10-18 | Stop reason: HOSPADM

## 2023-10-16 RX ORDER — DOCUSATE SODIUM 100 MG/1
100 CAPSULE, LIQUID FILLED ORAL 2 TIMES DAILY
Status: DISCONTINUED | OUTPATIENT
Start: 2023-10-16 | End: 2023-10-18 | Stop reason: HOSPADM

## 2023-10-16 RX ORDER — POTASSIUM CHLORIDE 20 MEQ/1
40 TABLET, EXTENDED RELEASE ORAL ONCE
Status: COMPLETED | OUTPATIENT
Start: 2023-10-16 | End: 2023-10-16

## 2023-10-16 RX ORDER — LANOLIN ALCOHOL/MO/W.PET/CERES
400 CREAM (GRAM) TOPICAL DAILY
Status: COMPLETED | OUTPATIENT
Start: 2023-10-16 | End: 2023-10-16

## 2023-10-16 RX ORDER — MULTIVIT-MIN/IRON FUM/FOLIC AC 7.5 MG-4
1 TABLET ORAL DAILY
Status: DISCONTINUED | OUTPATIENT
Start: 2023-10-16 | End: 2023-10-18 | Stop reason: HOSPADM

## 2023-10-16 RX ORDER — FUROSEMIDE 10 MG/ML
20 INJECTION INTRAMUSCULAR; INTRAVENOUS ONCE
Status: COMPLETED | OUTPATIENT
Start: 2023-10-16 | End: 2023-10-16

## 2023-10-16 RX ORDER — METFORMIN HYDROCHLORIDE 500 MG/1
500 TABLET ORAL
Status: DISCONTINUED | OUTPATIENT
Start: 2023-10-16 | End: 2023-10-18 | Stop reason: HOSPADM

## 2023-10-16 RX ORDER — BISMUTH SUBSALICYLATE 262 MG
1 TABLET,CHEWABLE ORAL DAILY
Status: DISCONTINUED | OUTPATIENT
Start: 2023-10-16 | End: 2023-10-16

## 2023-10-16 RX ORDER — POTASSIUM CHLORIDE 20 MEQ/1
20 TABLET, EXTENDED RELEASE ORAL ONCE
Status: DISCONTINUED | OUTPATIENT
Start: 2023-10-16 | End: 2023-10-16

## 2023-10-16 RX ORDER — ASPIRIN 81 MG/1
81 TABLET ORAL DAILY
Status: DISCONTINUED | OUTPATIENT
Start: 2023-10-17 | End: 2023-10-18 | Stop reason: HOSPADM

## 2023-10-16 RX ADMIN — HEPARIN SODIUM 5000 UNITS: 5000 INJECTION INTRAVENOUS; SUBCUTANEOUS at 04:10

## 2023-10-16 RX ADMIN — ACETAMINOPHEN 650 MG: 325 TABLET ORAL at 08:28

## 2023-10-16 RX ADMIN — METFORMIN HYDROCHLORIDE 500 MG: 500 TABLET, FILM COATED ORAL at 17:31

## 2023-10-16 RX ADMIN — ASPIRIN 81 MG CHEWABLE TABLET 81 MG: 81 TABLET CHEWABLE at 08:28

## 2023-10-16 RX ADMIN — Medication 1 TABLET: at 15:12

## 2023-10-16 RX ADMIN — METOPROLOL TARTRATE 12.5 MG: 25 TABLET, FILM COATED ORAL at 08:28

## 2023-10-16 RX ADMIN — MAGNESIUM OXIDE TAB 400 MG (241.3 MG ELEMENTAL MG) 400 MG: 400 (241.3 MG) TAB at 13:40

## 2023-10-16 RX ADMIN — ACETAMINOPHEN 650 MG: 325 TABLET ORAL at 17:22

## 2023-10-16 RX ADMIN — METOPROLOL TARTRATE 12.5 MG: 25 TABLET, FILM COATED ORAL at 20:58

## 2023-10-16 RX ADMIN — FUROSEMIDE 20 MG: 10 INJECTION, SOLUTION INTRAVENOUS at 15:12

## 2023-10-16 RX ADMIN — FLUOXETINE 40 MG: 20 CAPSULE ORAL at 08:29

## 2023-10-16 RX ADMIN — HEPARIN SODIUM 5000 UNITS: 5000 INJECTION INTRAVENOUS; SUBCUTANEOUS at 17:22

## 2023-10-16 RX ADMIN — ACETAMINOPHEN 650 MG: 325 TABLET ORAL at 13:40

## 2023-10-16 RX ADMIN — SENNOSIDES AND DOCUSATE SODIUM 2 TABLET: 8.6; 5 TABLET ORAL at 08:28

## 2023-10-16 RX ADMIN — WARFARIN SODIUM 3 MG: 3 TABLET ORAL at 08:28

## 2023-10-16 RX ADMIN — POTASSIUM CHLORIDE 40 MEQ: 1500 TABLET, EXTENDED RELEASE ORAL at 13:40

## 2023-10-16 RX ADMIN — PANTOPRAZOLE SODIUM 40 MG: 40 TABLET, DELAYED RELEASE ORAL at 08:00

## 2023-10-16 RX ADMIN — HEPARIN SODIUM 5000 UNITS: 5000 INJECTION INTRAVENOUS; SUBCUTANEOUS at 10:43

## 2023-10-16 RX ADMIN — ACETAMINOPHEN 650 MG: 325 TABLET ORAL at 04:10

## 2023-10-16 RX ADMIN — HYDRALAZINE HYDROCHLORIDE 25 MG: 25 TABLET, FILM COATED ORAL at 10:43

## 2023-10-16 RX ADMIN — ACETAMINOPHEN 650 MG: 325 TABLET ORAL at 20:58

## 2023-10-16 RX ADMIN — ATORVASTATIN CALCIUM 80 MG: 80 TABLET, FILM COATED ORAL at 20:58

## 2023-10-16 RX ADMIN — POLYETHYLENE GLYCOL 3350 17 G: 17 POWDER, FOR SOLUTION ORAL at 08:29

## 2023-10-16 RX ADMIN — Medication 150 MG: at 15:12

## 2023-10-16 RX ADMIN — HYDRALAZINE HYDROCHLORIDE 25 MG: 25 TABLET, FILM COATED ORAL at 04:10

## 2023-10-16 RX ADMIN — HYDRALAZINE HYDROCHLORIDE 25 MG: 25 TABLET, FILM COATED ORAL at 17:22

## 2023-10-16 ASSESSMENT — COGNITIVE AND FUNCTIONAL STATUS - GENERAL
TURNING FROM BACK TO SIDE WHILE IN FLAT BAD: A LITTLE
MOBILITY SCORE: 16
MOVING FROM LYING ON BACK TO SITTING ON SIDE OF FLAT BED WITH BEDRAILS: A LITTLE
WALKING IN HOSPITAL ROOM: A LITTLE
CLIMB 3 TO 5 STEPS WITH RAILING: TOTAL
MOVING TO AND FROM BED TO CHAIR: A LITTLE
STANDING UP FROM CHAIR USING ARMS: A LITTLE

## 2023-10-16 ASSESSMENT — PAIN SCALES - GENERAL
PAINLEVEL_OUTOF10: 5 - MODERATE PAIN
PAINLEVEL_OUTOF10: 7
PAINLEVEL_OUTOF10: 6
PAINLEVEL_OUTOF10: 2

## 2023-10-16 ASSESSMENT — PAIN - FUNCTIONAL ASSESSMENT
PAIN_FUNCTIONAL_ASSESSMENT: 0-10
PAIN_FUNCTIONAL_ASSESSMENT: 0-10

## 2023-10-16 NOTE — PROGRESS NOTES
" CARDIAC SURGERY DAILY PROGRESS NOTE    68 year old female with a PMH significant for mechanical aortic valve replacement in 2014, HTN, DM, COPD and a brain aneurysm rupture. Pt has ascending aortic aneurysm and was referred for dilated ascending aorta . Also has single vessel significant CAD. Admitted for surgery 10/13/2023 with Dr Hayden.    10/13/2023 Operation Procedures: ana Hayden  #1 a standard median redo sternotomy  #2 innominate trunk arterial cannulation  #3 ascending aorta replacement with a Gelweave 34 mm  #4 aortic root noncoronary sinus plication  #5 CABG x1: SVG to RCA    CTICU - Warfarin started no heparin gtt bridge  Transferred to the floor 10/15    Objective   /66 (BP Location: Left arm)   Pulse 84   Temp 35.6 °C (96.1 °F) (Temporal)   Resp 17   Ht 1.57 m (5' 1.81\")   Wt 82.6 kg (182 lb)   SpO2 93%   BMI 33.49 kg/m²   Pain Score: 5 - Moderate pain   3 Day Weight Change: Unable to Calculate    Intake and Output    Intake/Output Summary (Last 24 hours) at 10/16/2023 1359  Last data filed at 10/16/2023 1300  Gross per 24 hour   Intake 255 ml   Output 642 ml   Net -387 ml       Physical Exam  Physical Exam  Constitutional:       Appearance: Normal appearance.   HENT:      Head: Normocephalic and atraumatic.      Mouth/Throat:      Mouth: Mucous membranes are moist.   Eyes:      Conjunctiva/sclera: Conjunctivae normal.   Cardiovascular:      Rate and Rhythm: Normal rate and regular rhythm.      Comments: TELE - SR 80s-90s  Wires  to temp pacer V50  Pulmonary:      Effort: Pulmonary effort is normal.      Comments: CT's to -20 wall suction; + air leak; serosang drng  Nonlabored; fair inspir effort and cough; mildly diminished bases; on RA  Abdominal:      General: Bowel sounds are normal.      Palpations: Abdomen is soft.   Musculoskeletal:         General: Normal range of motion.      Cervical back: Neck supple.   Skin:     General: Skin is warm and dry.      Comments: INCISION: " midsternal with Prevena; R SVG ALETA, well approx, w/o s/s infection; sternum stable   Neurological:      General: No focal deficit present.      Mental Status: She is alert and oriented to person, place, and time.   Psychiatric:         Mood and Affect: Mood normal.         Behavior: Behavior normal.         Medications  Scheduled medications  acetaminophen, 650 mg, oral, q4h  [START ON 10/17/2023] aspirin, 81 mg, oral, Daily  atorvastatin, 80 mg, oral, Nightly  FLUoxetine, 40 mg, oral, Daily  fluticasone furoate-vilanteroL, 1 puff, inhalation, Daily  heparin (porcine), 5,000 Units, subcutaneous, q8h  hydrALAZINE, 25 mg, oral, q8h  insulin lispro, 0-5 Units, subcutaneous, TID with meals  metoprolol tartrate, 12.5 mg, oral, BID  pantoprazole, 40 mg, oral, Daily before breakfast  polyethylene glycol, 17 g, oral, TID  sennosides-docusate sodium, 2 tablet, oral, BID  warfarin, 3 mg, oral, Once per day on Mon Wed Sat  warfarin, 4.5 mg, oral, Once per day on Sun Tue Thu Fri    PRN medications  PRN medications: dextrose **OR** glucagon, [DISCONTINUED] ondansetron **OR** ondansetron, oxyCODONE, oxygen    Labs  Results for orders placed or performed during the hospital encounter of 10/13/23 (from the past 24 hour(s))   POCT GLUCOSE   Result Value Ref Range    POCT Glucose 137 (H) 74 - 99 mg/dL   Prepare Cryoprecipitated AHF (Pooled Units): 1 Pools   Result Value Ref Range    PRODUCT CODE Y3539B32     Unit Number [id number]     Unit ABO O     Unit RH POS     Dispense Status TR     Blood Expiration Date October 13, 2023 19:05 EDT     PRODUCT BLOOD TYPE 5100     UNIT VOLUME 88    POCT GLUCOSE   Result Value Ref Range    POCT Glucose 135 (H) 74 - 99 mg/dL   Prepare RBC: 6 Units   Result Value Ref Range    PRODUCT CODE P5002G57     Unit Number [id number]     Unit ABO A     Unit RH POS     XM INTEP COMP     Dispense Status XM     Blood Expiration Date October 27, 2023 23:59 EDT     PRODUCT BLOOD TYPE 6200     UNIT VOLUME  350     PRODUCT CODE T2062T28     Unit Number [id number]     Unit ABO A     Unit RH POS     XM INTEP COMP     Dispense Status PT     Blood Expiration Date October 27, 2023 23:59 EDT     PRODUCT BLOOD TYPE 6200     UNIT VOLUME 350     PRODUCT CODE N6006J01     Unit Number [id number]     Unit ABO A     Unit RH POS     XM INTEP COMP     Dispense Status XM     Blood Expiration Date October 27, 2023 23:59 EDT     PRODUCT BLOOD TYPE 6200     UNIT VOLUME 350     PRODUCT CODE X8256X23     Unit Number [id number]     Unit ABO A     Unit RH POS     XM INTEP COMP     Dispense Status XM     Blood Expiration Date October 27, 2023 23:59 EDT     PRODUCT BLOOD TYPE 6200     UNIT VOLUME 350     PRODUCT CODE O0708V09     Unit Number [id number]     Unit ABO A     Unit RH POS     XM INTEP COMP     Dispense Status XM     Blood Expiration Date October 27, 2023 23:59 EDT     PRODUCT BLOOD TYPE 6200     UNIT VOLUME 350     PRODUCT CODE B8786F11     Unit Number [id number]     Unit ABO A     Unit RH POS     XM INTEP COMP     Dispense Status XM     Blood Expiration Date October 28, 2023 23:59 EDT     PRODUCT BLOOD TYPE 6200     UNIT VOLUME 350    Magnesium   Result Value Ref Range    Magnesium 1.98 1.60 - 2.40 mg/dL   CBC   Result Value Ref Range    WBC 13.0 (H) 4.4 - 11.3 x10*3/uL    nRBC 0.0 0.0 - 0.0 /100 WBCs    RBC 3.13 (L) 4.00 - 5.20 x10*6/uL    Hemoglobin 9.2 (L) 12.0 - 16.0 g/dL    Hematocrit 30.1 (L) 36.0 - 46.0 %    MCV 96 80 - 100 fL    MCH 29.4 26.0 - 34.0 pg    MCHC 30.6 (L) 32.0 - 36.0 g/dL    RDW 14.7 (H) 11.5 - 14.5 %    Platelets 178 150 - 450 x10*3/uL    MPV 11.2 7.5 - 11.5 fL   Renal Function Panel   Result Value Ref Range    Glucose 178 (H) 74 - 99 mg/dL    Sodium 136 136 - 145 mmol/L    Potassium 3.4 (L) 3.5 - 5.3 mmol/L    Chloride 102 98 - 107 mmol/L    Bicarbonate 22 21 - 32 mmol/L    Anion Gap 15 10 - 20 mmol/L    Urea Nitrogen 25 (H) 6 - 23 mg/dL    Creatinine 0.79 0.50 - 1.05 mg/dL    eGFR  82 >60 mL/min/1.73m*2    Calcium 8.3 (L) 8.6 - 10.6 mg/dL    Phosphorus 2.4 (L) 2.5 - 4.9 mg/dL    Albumin 3.2 (L) 3.4 - 5.0 g/dL   POCT GLUCOSE   Result Value Ref Range    POCT Glucose 150 (H) 74 - 99 mg/dL             IMPRESSION & PLAN:  POD # 3 s/p ascending aorta replacement, CABGx1 on 10/13; h/o 2014 mechanical aortic valve  - Increase activity/ ambulation; PT/OT  - Encourage IS, C/DB; respiratory therapy; wean O2 as ketan   - Cardiac rehab referral   - Continue cardiac meds: ASA, BB, statin  - warfarin for prior mechanical aortic valve; goal INR 2-3  - Pain and anticonstipation meds  - Maintain chest tubes until output down/removal ok with surgeon; monitor 1v CXRs while in place  - 10/16 kept chest tubes for + airleak  - 2v CXR before discharge; once CT's out  - CUT epicardial wires prior to discharge   - remove Prevena POD#5  - Tele until discharge  - Optimize nutrition and electrolytes    Rhythm  - Tele: SR 80s-90s, occas PVC  - Continue BB  - Adjust medications as tolerated    Acute Blood Loss Anemia   Hematocrit   Date Value Ref Range Status   10/16/2023 30.1 (L) 36.0 - 46.0 % Final   10/15/2023 28.4 (L) 36.0 - 46.0 % Final   10/14/2023 30.4 (L) 36.0 - 46.0 % Final   10/13/2023 32.9 (L) 36.0 - 46.0 % Final   - MV, PO Iron x1mo  - Daily labs, transfuse as indicated    Thrombocytopenia  Platelets   Date Value Ref Range Status   10/16/2023 178 150 - 450 x10*3/uL Final   10/15/2023 155 150 - 450 x10*3/uL Final   10/14/2023 147 (L) 150 - 450 x10*3/uL Final   10/13/2023 151 150 - 450 x10*3/uL Final   - Etiology likely postop/CPB related  - Continue to trend with daily CBCs    Volume/Electrolyte Status: Preop wt 78.7  - 10/15 ICU 20mg IV lasix x1  - Weight: 82.6  - 10/16 lasix 20mg IV x1  - Adjust diuresis as needed for postop cardiac surgery hypervolemia  - Replete electrolytes for hypokalemia/hypomagnesemia/hypophosphatemia as needed - 10/16 replaced K and Mg  - Daily weights and strict I&Os  - Daily RFP while  admitted    Hypertension: home meds: amlodipine 5mg daily, losartan 100mg daily, Toprol XL 25mg daily  Systolic (24hrs), Av , Min:103 , Max:128    - continue hydralazine; change to losartan as tolerated  - continue metoprolol  - additional antihypertensives as needed     Hyperlipidemia - home med rosuvastatin 40mg daily  - continue high intensity statin  - follow up with PCP/cards for ongoing lipid management    COPD:   -CXR PRN  -wean NC maintaining SpO2 >92%  -ABGs as needed  -Continue fluticasone furoate-vilanterol  -bronchial hygiene with EZ PAPs     DM:  home meds: metformin XR 500mg BID  Results from last 7 days   Lab Units 10/16/23  1233   POCT GLUCOSE mg/dL 150*     -accuchecks premeal and at bedtime  -diabetic diet  -lispro premeal corrective scale     Neuro/Psych - h/o brain aneurysm rupture  - continue home Prozac     VTE Prophylaxis: SCDs/TEDs, ambulation, SQ heparin  Code Status: Full Code    Dispo  - PT/OT recs home at [place]  - Would benefit from homecare for RN and PT carepath and RN visits  - Anticipate discharge in several days, pending chest tube removal  - Will continue to assess discharge needs      DAMIÁN Gregory-CNP  Cardiac Surgery [facility]  [facility]  Team Pager [phone]

## 2023-10-16 NOTE — PROGRESS NOTES
10/16/23  1046  Transitional Care Coordinator   Met with patient and introduced myself as Care Coordinator and member of the discharge planning team. Patient is s/p redo CABG, aortic root replacement. Pt plans to return home at time of discharge with assistance from her daughter. Discussed home care needs and options. Patient has not used home care services in the past. She is agreeable to having a referral sent to  Home Care. Will continue to follow for home going needs.  Tayler Bell RN

## 2023-10-16 NOTE — PROGRESS NOTES
Physical Therapy    Physical Therapy Evaluation    Patient Name: Suze Najera  MRN: [id number]  Today's Date: 10/16/2023   Time Calculation  Start Time: 0918  Stop Time: 0940  Time Calculation (min): 22 min    Assessment/Plan   PT Assessment  PT Assessment Results: Impaired balance, Decreased endurance, Decreased mobility, Decreased strength, Decreased range of motion  Rehab Prognosis: Good  Evaluation/Treatment Tolerance:  (Limited by dizziness)  Medical Staff Made Aware: Yes  End of Session Communication: Bedside nurse  Assessment Comment: Patient presents s/p CABG redo.  Currently min A for mobility with balance and gait impairements.  Recommend further therapy to increase functional independence and safety with mobility.  Will continue to follow.  End of Session Patient Position: Bed, 3 rail up, Alarm on  IP OR SWING BED PT PLAN  Inpatient or Swing Bed: Inpatient  PT Plan  Treatment/Interventions: Bed mobility, Transfer training, Gait training, Balance training  PT Plan: Skilled PT  PT Frequency: 5 times per week  PT Discharge Recommendations: Low intensity level of continued care  Equipment Recommended upon Discharge: Wheeled walker  PT Recommended Transfer Status: Assist x1, Assistive device  PT - OK to Discharge: Yes    Subjective     General Visit Information:  General  Reason for Referral: s/p redio CABG  Past Medical History Relevant to Rehab: PMH significant for mechanical aortic valve replacement in 2014, HTN, DM, COPD and a brain aneurysm rupture  Family/Caregiver Present: No  Prior to Session Communication: Bedside nurse  Patient Position Received: Bed, 2 rail up  General Comment: Patient is alert, agreeable to PT.  Complaints of dizziness andhot throughout activity and positional changes.  States  they happen but did  not elaborate when asked.  Home Living:  Home Living  Type of Home: House  Lives With: Adult children (daughter, and grandsom)  Home Adaptive Equipment: None  Home Layout: Two level,  Able to live on main level with bedroom/bathroom  Home Access: Level entry  Prior Level of Function:  Prior Function Per Pt/Caregiver Report  Level of [place]: Independent with ADLs and functional transfers  Precautions:  Precautions  Medical Precautions: Fall precautions, Oxygen therapy device and L/min  Post-Surgical Precautions: Move in the Tube  Vital Signs:  Vital Signs  Heart Rate: 90  BP: 128/77 (120/74 sitting, 124/75 supine.)  Oxygen Therapy: Supplemental oxygen  O2 Delivery Method: Nasal cannula   Patient Vitals for the past 8 hrs:   BP Temp Temp src Pulse Resp SpO2 Weight   10/16/23 1012 103/66 35.6 °C (96.1 °F) Temporal 84 17 93 % --   10/16/23 0918 128/77 -- -- 90 -- -- --   10/16/23 0608 116/71 36 °C (96.8 °F) Temporal 90 16 94 % --   10/16/23 0412 -- -- -- -- -- -- 82.6 kg (182 lb)       Objective   Pain:  Pain Assessment  Pain Assessment: 0-10  Pain Score: 2 (post 2)  Pain Type:  (incisional pain)    Cognition:  Cognition  Overall Cognitive Status: Within Functional Limits  Orientation Level: Oriented X4    General Assessments:  Extremity/Trunk Assessments:  Tone: No abnormalities noted  Upper Extremity  ROM: BUE limited by pain able to flex AROM to 90  Strength:WFL  Lower Extremity  ROM: WFL  Strength: WFL                              Coordination  Movements are Fluid and Coordinated:  (initial delay opposition LUE, improved  with redirection, otherwise WFL bilaterally)         Sitting Dynamic Balance Not NormalUE Support Two UE support and Assist Level Supervision  Sitting Static Balance Not NormalUE Support Two UE support and Assist Level Supervision  Standing Dynamic Balance Not NormalUE Support Two UE support and Assist Level Contact Guard  Standing Static Balance Not NormalUE Support Two UE support and Assist Level Contact Guard    Functional Assessments:  Bed Mobility  Bed Mobility: Yes  Bed Mobility 1  Bed Mobility 1: Sitting to supine  Level of Assistance 1: Minimum assistance  Bed  Mobility Comments 1: HOB 30, modified log roll    Transfers  Transfer: Yes  Transfer 1  Transfer From 1: Sit to  Transfer to 1: Stand  Transfer Device 1: Walker  Transfer Level of Assistance 1: Contact guard  Transfers 2  Transfer From 2: Stand to  Transfer to 2: Sit  Transfer Device 2: Walker  Transfer Level of Assistance 2: Contact guard  Transfers 3  Transfer From 3: Bed to  Transfer to 3: Bed  Transfer Device 3: Walker  Transfer Level of Assistance 3: Minimum assistance    Ambulation/Gait Training  Ambulation/Gait Training Performed: Yes  Ambulation/Gait Training 1  Surface 1: Level tile  Device 1: Rolling walker  Assistance 1: Minimum assistance  Quality of Gait 1:  (decreased gait speed, shuffle pattern, decreased stride length, decreased foot clearance, narrow  LUNA)  Comments/Distance (ft) 1: 10'    Outcome Measures:  [facility] Basic Mobility  Turning from your back to your side while in a flat bed without using bedrails: A little  Moving from lying on your back to sitting on the side of a flat bed without using bedrails: A little  Moving to and from bed to chair (including a wheelchair): A little  Standing up from a chair using your arms (e.g. wheelchair or bedside chair): A little  To walk in hospital room: A little  Climbing 3-5 steps with railing: Total  Basic Mobility - Total Score: 16         [place]  Sitting Balance: Leans or slides in chair  Arises: Able, uses arms to help  Attempts to Arise: Able, requires more than one attempt  Immediate Standing Balance (First 5 Seconds): Steady but uses walker or other support  Standing Balance: Steady but wide stance, uses cane or other support  Nudged: Staggers, grabs, catches self  Eyes Closed: Steady  Turned 360 Degrees: Steadiness: Unsteady (Grabs, staggers)  Turned 360 Degrees: Continuity of Steps: Discontinuous steps  Sitting Down: Uses arms or not a smooth motion  Balance Score: 7  Initiation of Gait: Any hesitancy or multiple attempts to start  Step Height: R  Swing Foot: Right foot does not clear floor completely with step  Step Length: R Swing Foot: Does not pass left stance foot with step  Step Height: L Swing Foot: Left foot does not clear floor completely with step  Step Length: L Swing Foot: Does not pass right stance foot with step  Step Symmetry: Right and left step length not equal (Estimate)  Step Continuity: Stopping or discontinuity between steps  Path: Marked deviation  Trunk: Marked sway or uses walking aid  Walking Time: Heels apart  Gait Score: 0  Total Score: 7              Encounter Problems       Encounter Problems (Active)       PT Problem       Patient will complete supine to sit and sit to supine Supervision        Start:  10/16/23    Expected End:  10/30/23            Patient will perform sit<>stand transfer with walker, and stand-by        Start:  10/16/23    Expected End:  10/30/23            Patient will ambulate >100' with walker and SBA        Start:  10/16/23    Expected End:  10/30/23                   Education Documentation  Precautions, taught by Ashkan Amaya PT at 10/16/2023 10:30 AM.  Learner: Patient  Readiness: Acceptance  Method: Explanation  Response: Needs Reinforcement    Body Mechanics, taught by Ashkan Amaya PT at 10/16/2023 10:30 AM.  Learner: Patient  Readiness: Acceptance  Method: Explanation  Response: Needs Reinforcement    Mobility Training, taught by Ashkan Amaya PT at 10/16/2023 10:30 AM.  Learner: Patient  Readiness: Acceptance  Method: Explanation  Response: Needs Reinforcement    Education Comments  No comments found.          10/16/23 at 10:30 AM - Ashkan Amaya PT  #[id number]  [facility]/[employer]

## 2023-10-17 ENCOUNTER — APPOINTMENT (OUTPATIENT)
Dept: RADIOLOGY | Facility: HOSPITAL | Age: 68
DRG: 220 | End: 2023-10-17
Payer: MEDICARE

## 2023-10-17 LAB
ALBUMIN SERPL BCP-MCNC: 3.1 G/DL (ref 3.4–5)
ANION GAP SERPL CALC-SCNC: 14 MMOL/L (ref 10–20)
BUN SERPL-MCNC: 20 MG/DL (ref 6–23)
CALCIUM SERPL-MCNC: 8.3 MG/DL (ref 8.6–10.6)
CHLORIDE SERPL-SCNC: 105 MMOL/L (ref 98–107)
CO2 SERPL-SCNC: 25 MMOL/L (ref 21–32)
CREAT SERPL-MCNC: 0.76 MG/DL (ref 0.5–1.05)
ERYTHROCYTE [DISTWIDTH] IN BLOOD BY AUTOMATED COUNT: 14.7 % (ref 11.5–14.5)
GFR SERPL CREATININE-BSD FRML MDRD: 86 ML/MIN/1.73M*2
GLUCOSE BLD MANUAL STRIP-MCNC: 114 MG/DL (ref 74–99)
GLUCOSE BLD MANUAL STRIP-MCNC: 132 MG/DL (ref 74–99)
GLUCOSE BLD MANUAL STRIP-MCNC: 137 MG/DL (ref 74–99)
GLUCOSE BLD MANUAL STRIP-MCNC: 97 MG/DL (ref 74–99)
GLUCOSE SERPL-MCNC: 128 MG/DL (ref 74–99)
HCT VFR BLD AUTO: 31.1 % (ref 36–46)
HGB BLD-MCNC: 9.6 G/DL (ref 12–16)
INR PPP: 1.9 (ref 0.9–1.1)
MAGNESIUM SERPL-MCNC: 2.02 MG/DL (ref 1.6–2.4)
MCH RBC QN AUTO: 28.7 PG (ref 26–34)
MCHC RBC AUTO-ENTMCNC: 30.9 G/DL (ref 32–36)
MCV RBC AUTO: 93 FL (ref 80–100)
NRBC BLD-RTO: 0 /100 WBCS (ref 0–0)
PHOSPHATE SERPL-MCNC: 2.4 MG/DL (ref 2.5–4.9)
PLATELET # BLD AUTO: 223 X10*3/UL (ref 150–450)
PMV BLD AUTO: 10.8 FL (ref 7.5–11.5)
POTASSIUM SERPL-SCNC: 3.6 MMOL/L (ref 3.5–5.3)
PROTHROMBIN TIME: 21.4 SECONDS (ref 9.8–12.8)
RBC # BLD AUTO: 3.34 X10*6/UL (ref 4–5.2)
SODIUM SERPL-SCNC: 140 MMOL/L (ref 136–145)
WBC # BLD AUTO: 10.3 X10*3/UL (ref 4.4–11.3)

## 2023-10-17 PROCEDURE — 96372 THER/PROPH/DIAG INJ SC/IM: CPT

## 2023-10-17 PROCEDURE — 36415 COLL VENOUS BLD VENIPUNCTURE: CPT | Performed by: NURSE PRACTITIONER

## 2023-10-17 PROCEDURE — 71045 X-RAY EXAM CHEST 1 VIEW: CPT | Performed by: RADIOLOGY

## 2023-10-17 PROCEDURE — 80069 RENAL FUNCTION PANEL: CPT | Performed by: CLINICAL NURSE SPECIALIST

## 2023-10-17 PROCEDURE — 2500000001 HC RX 250 WO HCPCS SELF ADMINISTERED DRUGS (ALT 637 FOR MEDICARE OP)

## 2023-10-17 PROCEDURE — 2500000004 HC RX 250 GENERAL PHARMACY W/ HCPCS (ALT 636 FOR OP/ED)

## 2023-10-17 PROCEDURE — 82947 ASSAY GLUCOSE BLOOD QUANT: CPT

## 2023-10-17 PROCEDURE — 2500000001 HC RX 250 WO HCPCS SELF ADMINISTERED DRUGS (ALT 637 FOR MEDICARE OP): Performed by: NURSE PRACTITIONER

## 2023-10-17 PROCEDURE — 85027 COMPLETE CBC AUTOMATED: CPT | Mod: CMCLAB | Performed by: CLINICAL NURSE SPECIALIST

## 2023-10-17 PROCEDURE — 2500000004 HC RX 250 GENERAL PHARMACY W/ HCPCS (ALT 636 FOR OP/ED): Performed by: NURSE PRACTITIONER

## 2023-10-17 PROCEDURE — 99233 SBSQ HOSP IP/OBS HIGH 50: CPT | Performed by: NURSE PRACTITIONER

## 2023-10-17 PROCEDURE — 83735 ASSAY OF MAGNESIUM: CPT | Performed by: CLINICAL NURSE SPECIALIST

## 2023-10-17 PROCEDURE — 36415 COLL VENOUS BLD VENIPUNCTURE: CPT | Performed by: CLINICAL NURSE SPECIALIST

## 2023-10-17 PROCEDURE — 97530 THERAPEUTIC ACTIVITIES: CPT | Mod: GO

## 2023-10-17 PROCEDURE — 97165 OT EVAL LOW COMPLEX 30 MIN: CPT | Mod: GO

## 2023-10-17 PROCEDURE — 2500000002 HC RX 250 W HCPCS SELF ADMINISTERED DRUGS (ALT 637 FOR MEDICARE OP, ALT 636 FOR OP/ED): Performed by: NURSE PRACTITIONER

## 2023-10-17 PROCEDURE — 71045 X-RAY EXAM CHEST 1 VIEW: CPT

## 2023-10-17 PROCEDURE — 85610 PROTHROMBIN TIME: CPT | Mod: CMCLAB | Performed by: NURSE PRACTITIONER

## 2023-10-17 PROCEDURE — 2500000002 HC RX 250 W HCPCS SELF ADMINISTERED DRUGS (ALT 637 FOR MEDICARE OP, ALT 636 FOR OP/ED)

## 2023-10-17 PROCEDURE — 1200000002 HC GENERAL ROOM WITH TELEMETRY DAILY

## 2023-10-17 PROCEDURE — 71045 X-RAY EXAM CHEST 1 VIEW: CPT | Mod: FY

## 2023-10-17 RX ORDER — POTASSIUM CHLORIDE 20 MEQ/1
20 TABLET, EXTENDED RELEASE ORAL DAILY
Status: DISCONTINUED | OUTPATIENT
Start: 2023-10-17 | End: 2023-10-18

## 2023-10-17 RX ORDER — FUROSEMIDE 20 MG/1
20 TABLET ORAL DAILY
Status: DISCONTINUED | OUTPATIENT
Start: 2023-10-17 | End: 2023-10-18 | Stop reason: HOSPADM

## 2023-10-17 RX ORDER — METOPROLOL TARTRATE 25 MG/1
25 TABLET, FILM COATED ORAL 2 TIMES DAILY
Status: DISCONTINUED | OUTPATIENT
Start: 2023-10-17 | End: 2023-10-18 | Stop reason: HOSPADM

## 2023-10-17 RX ADMIN — HEPARIN SODIUM 5000 UNITS: 5000 INJECTION INTRAVENOUS; SUBCUTANEOUS at 09:33

## 2023-10-17 RX ADMIN — HYDRALAZINE HYDROCHLORIDE 25 MG: 25 TABLET, FILM COATED ORAL at 09:30

## 2023-10-17 RX ADMIN — HEPARIN SODIUM 5000 UNITS: 5000 INJECTION INTRAVENOUS; SUBCUTANEOUS at 18:15

## 2023-10-17 RX ADMIN — PANTOPRAZOLE SODIUM 40 MG: 40 TABLET, DELAYED RELEASE ORAL at 09:32

## 2023-10-17 RX ADMIN — ACETAMINOPHEN 650 MG: 325 TABLET ORAL at 20:05

## 2023-10-17 RX ADMIN — FUROSEMIDE 20 MG: 20 TABLET ORAL at 17:04

## 2023-10-17 RX ADMIN — HYDRALAZINE HYDROCHLORIDE 25 MG: 25 TABLET, FILM COATED ORAL at 17:04

## 2023-10-17 RX ADMIN — FLUTICASONE FUROATE AND VILANTEROL TRIFENATATE 1 PUFF: 100; 25 POWDER RESPIRATORY (INHALATION) at 09:00

## 2023-10-17 RX ADMIN — POTASSIUM CHLORIDE 20 MEQ: 1500 TABLET, EXTENDED RELEASE ORAL at 17:04

## 2023-10-17 RX ADMIN — ATORVASTATIN CALCIUM 80 MG: 80 TABLET, FILM COATED ORAL at 20:05

## 2023-10-17 RX ADMIN — FLUOXETINE 40 MG: 20 CAPSULE ORAL at 09:32

## 2023-10-17 RX ADMIN — Medication 150 MG: at 09:31

## 2023-10-17 RX ADMIN — Medication 1 TABLET: at 09:00

## 2023-10-17 RX ADMIN — METFORMIN HYDROCHLORIDE 500 MG: 500 TABLET, FILM COATED ORAL at 18:10

## 2023-10-17 RX ADMIN — METOPROLOL TARTRATE 25 MG: 25 TABLET, FILM COATED ORAL at 20:05

## 2023-10-17 RX ADMIN — METFORMIN HYDROCHLORIDE 500 MG: 500 TABLET, FILM COATED ORAL at 09:33

## 2023-10-17 RX ADMIN — METOPROLOL TARTRATE 12.5 MG: 25 TABLET, FILM COATED ORAL at 09:31

## 2023-10-17 RX ADMIN — ASPIRIN 81 MG: 81 TABLET, COATED ORAL at 09:32

## 2023-10-17 RX ADMIN — ACETAMINOPHEN 650 MG: 325 TABLET ORAL at 17:04

## 2023-10-17 RX ADMIN — WARFARIN SODIUM 4.5 MG: 3 TABLET ORAL at 09:31

## 2023-10-17 RX ADMIN — ACETAMINOPHEN 650 MG: 325 TABLET ORAL at 13:15

## 2023-10-17 RX ADMIN — HYDRALAZINE HYDROCHLORIDE 25 MG: 25 TABLET, FILM COATED ORAL at 01:26

## 2023-10-17 RX ADMIN — HEPARIN SODIUM 5000 UNITS: 5000 INJECTION INTRAVENOUS; SUBCUTANEOUS at 01:26

## 2023-10-17 RX ADMIN — ACETAMINOPHEN 650 MG: 325 TABLET ORAL at 09:31

## 2023-10-17 ASSESSMENT — PAIN SCALES - GENERAL
PAINLEVEL_OUTOF10: 0 - NO PAIN
PAINLEVEL_OUTOF10: 3
PAINLEVEL_OUTOF10: 6

## 2023-10-17 ASSESSMENT — PAIN - FUNCTIONAL ASSESSMENT: PAIN_FUNCTIONAL_ASSESSMENT: 0-10

## 2023-10-17 ASSESSMENT — COGNITIVE AND FUNCTIONAL STATUS - GENERAL
DRESSING REGULAR UPPER BODY CLOTHING: A LITTLE
HELP NEEDED FOR BATHING: A LITTLE
DAILY ACTIVITIY SCORE: 19
PERSONAL GROOMING: A LITTLE
TOILETING: A LITTLE
DRESSING REGULAR LOWER BODY CLOTHING: A LITTLE

## 2023-10-17 ASSESSMENT — ACTIVITIES OF DAILY LIVING (ADL): BATHING_ASSISTANCE: MINIMAL

## 2023-10-17 NOTE — PROGRESS NOTES
Occupational Therapy    Evaluation/Treatment    Patient Name: Suze Najera  MRN: [id number]  : 1955  Today's Date: 10/17/23  Time Calculation  Start Time: 1013  Stop Time: 1036  Time Calculation (min): 23 min       Assessment:  OT Assessment: Pt presents with difficulty with ADLs, decreased strength, decreased balance, decreased endurance, and decreased functional mobility. Pt would benefit from continued OT services to maximize independence in all of these areas. Will continue to follow.  Prognosis: Good  Evaluation/Treatment Tolerance: Patient limited by fatigue  Medical Staff Made Aware: Yes  End of Session Communication: Bedside nurse, PCT/NA/CTA  End of Session Patient Position: Bed, 3 rail up, Alarm on  OT Assessment Results: Decreased ADL status, Decreased upper extremity range of motion, Decreased upper extremity strength, Decreased safe judgment during ADL, Decreased endurance, Decreased functional mobility, Decreased gross motor control, Decreased IADLs  Prognosis: Good  Evaluation/Treatment Tolerance: Patient limited by fatigue  Medical Staff Made Aware: Yes  Strengths: Attitude of self  Plan:  Treatment Interventions: ADL retraining, Functional transfer training, UE strengthening/ROM, Endurance training, Equipment evaluation/education, Compensatory technique education  OT Frequency: 2 times per week  OT Discharge Recommendations: Low intensity level of continued care  Equipment Recommended upon Discharge:  (WhW, shower chair, hip kit)  OT Recommended Transfer Status: Minimal assist, Assist of 1  OT - OK to Discharge: Yes  Treatment Interventions: ADL retraining, Functional transfer training, UE strengthening/ROM, Endurance training, Equipment evaluation/education, Compensatory technique education    Subjective   Current Problem:  1. S/P ascending aortic replacement  Basic metabolic panel    CBC    Referral to Home Health      2. Aortic valve replaced        3. Aneurysm of ascending aorta  without rupture (CMS/HCC)  Anesthesia Intraoperative Transesophageal Echocardiogram    Anesthesia Intraoperative Transesophageal Echocardiogram      4. Thoracic aortic aneurysm (CMS/HCC)  Surgical Pathology Exam    Surgical Pathology Exam      5. Atherosclerosis of native coronary artery of transplanted heart without angina pectoris  Anesthesia Intraoperative Transesophageal Echocardiogram      6. S/P CABG x 1  Basic metabolic panel    CBC    Referral to Home Health        General:   OT Received On: 10/17/23  General  Reason for Referral: s/p redio CABG  Past Medical History Relevant to Rehab: PMH significant for mechanical aortic valve replacement in 2014, HTN, DM, COPD and a brain aneurysm rupture  Missed Visit: Yes  Missed Visit Reason: Patient refused  Family/Caregiver Present: No  Prior to Session Communication: Bedside nurse  Patient Position Received: Up in chair, Alarm off, not on at start of session  Precautions:  Medical Precautions: Cardiac precautions, Fall precautions  Post-Surgical Precautions: Move in the Tube  Vital Signs:  Heart Rate: (!) 112  Pain:  Pain Assessment  Pain Assessment: 0-10  Pain Score: 6  Pain Location:  (sx site)    Objective   Cognition:  Overall Cognitive Status: Within Functional Limits  Orientation Level: Disoriented to place, Disoriented to situation (VC's for date)  Attention:  (pt tangential, talkative, requires re-direction to task, but pleasant and cooperative)  Problem Solving: Exceptions to WFL  Complex Functional Tasks: Impaired  Managing Medications: Impaired (mod VCs in session to finish taking meds)  Safety/Judgement: Exceptions to WFL  Complex Functional Tasks: Minimal  Novel Situations: Minimal  Routine Tasks: Minimal  Unable to Self-Monitor and Self-Correct Consistently: Minimal  Insight: Moderate  Task Initiation: Delayed initiation  Organization: Mildly disorganized     Confusion Assessment Method (CAM)  Acute Onset and Fluctuating Course (1A): No     Home  Living:  Type of Home: House  Lives With:  (dtr and grandson)  Home Adaptive Equipment: None  Home Layout:  (0 FELIPE, 1st floor setup with walkin shower)  Prior Function:  Level of [place]: Independent with ADLs and functional transfers, Needs assistance with homemaking  Vocational: Unemployed  Hand Dominance: Right  IADL History:  IADL Comments: drt A with cooking, cleaning, +drive but dtr mostly A driving  ADL:  Eating Assistance: Independent  Grooming Assistance: Minimal (anticipated standing)  Bathing Assistance: Minimal (anticipated)  UE Dressing Assistance: Minimal  LE Dressing Assistance: Minimal (anticiapted)  Toileting Assistance with Device: Minimal (anticipated)  Functional Deficit: Verbal cueing, Steadying, Supervision/safety, Increased time to complete, Setup  Activities of Daily Living: Feeding  Feeding Level of Assistance: Independent    Activity Tolerance:  Endurance:  (fair)       Bed Mobility/Transfers: Bed Mobility  Bed Mobility: Yes  Bed Mobility 1  Level of Assistance 1:  (sit to sup min A)    Transfers  Transfer: Yes  Transfer 1  Transfer Level of Assistance 1:  (sit/stand min A WHW, bed to chair min A  WhW A with lines)       Therapy/Activity: Therapeutic Activity  Therapeutic Activity Performed: Yes  Therapeutic Activity 1: extended time chair to bed min A WHW, extended time pt taking meds 2/2 appears tangential with conversation and cues to complete task. OT educated and demo'd MITT prec for LBD and bathing, AE as needed or bringing feet to self, OT educated and demo'd UBD shoulder height donning/doffing shirts and recommended sx bra hook in front and rotate as needed, handouts provided pt reports will pass on handout to dtr       Vision:Vision - Basic Assessment  Current Vision: No visual deficits  Sensation:  Light Touch: No apparent deficits  Strength:  Strength Comments: B shoulder flex 3+/5, distal 4/5    Coordination:  Movements are Fluid and Coordinated:  (RUE tremor)   Hand  Function:  Hand Function  Gross Grasp: Functional  Extremities: RUE   RUE :  (shoulder flex ~0-120 degrees distal WFL) and LUE   LUE:  (shoulder flex ~0-120 distal WFL)      Outcome Measures: [facility] Daily Activity  Putting on and taking off regular lower body clothing: A little  Bathing (including washing, rinsing, drying): A little  Putting on and taking off regular upper body clothing: A little  Toileting, which includes using toilet, bedpan or urinal: A little  Taking care of personal grooming such as brushing teeth: A little  Eating Meals: None  Daily Activity - Total Score: 19         and Brief Confusion Assessment Method (bCAM)  CAM Result: CAM -    Education Documentation  Precautions, taught by Ragini George OT at 10/17/2023 12:06 PM.  Learner: Patient  Readiness: Acceptance  Method: Explanation  Response: Verbalizes Understanding, Needs Reinforcement    ADL Training, taught by Ragini George OT at 10/17/2023 12:06 PM.  Learner: Patient  Readiness: Acceptance  Method: Explanation  Response: Verbalizes Understanding, Needs Reinforcement    Education Comments  No comments found.        Goals:  Encounter Problems       Encounter Problems (Active)       ADLs       Patient will perform UB and LB bathing  with stand by assist level of assistance and AE. (Progressing)       Start:  10/17/23    Expected End:  11/07/23            Patient with complete upper body dressing with independent level of assistance donning and doffing all UE clothes with no adaptive equipment  (Progressing)       Start:  10/17/23    Expected End:  11/07/23            Patient with complete lower body dressing with stand by assist level of assistance donning and doffing all LE clothes  with PRN adaptive equipment  (Progressing)       Start:  10/17/23    Expected End:  11/07/23            Patient will complete daily grooming independent level of assistance and PRN adaptive equipment  (Progressing)       Start:  10/17/23    Expected End:   11/07/23            Patient will complete toileting including hygiene clothing management/hygiene with stand by assist level of assistance and LRD. (Progressing)       Start:  10/17/23    Expected End:  11/07/23               COGNITION/SAFETY       Patient will score WFL on standardized cognitive assessment with min cues and within reasonable time frame (Progressing)       Start:  10/17/23    Expected End:  11/07/23               MOBILITY       Patient will perform Functional mobility mod  Household distances/Community Distances with stand by assist level of assistance and least restrictive device in order to improve safety and functional mobility. (Progressing)       Start:  10/17/23    Expected End:  11/07/23               TRANSFERS       Patient will perform bed mobility stand by assist level of assistance and bed rails in order to improve safety and independence with mobility (Progressing)       Start:  10/17/23    Expected End:  11/07/23            Patient will complete functional transfers with least restrictive device with stand by assist level of assistance. (Progressing)       Start:  10/17/23    Expected End:  11/07/23

## 2023-10-17 NOTE — CARE PLAN
Problem: COGNITION/SAFETY  Goal: Patient will score WFL on standardized cognitive assessment with min cues and within reasonable time frame  Outcome: Progressing     Problem: ADLs  Goal: Patient will perform UB and LB bathing  with stand by assist level of assistance and AE.  Outcome: Progressing  Goal: Patient with complete upper body dressing with independent level of assistance donning and doffing all UE clothes with no adaptive equipment   Outcome: Progressing  Goal: Patient with complete lower body dressing with stand by assist level of assistance donning and doffing all LE clothes  with PRN adaptive equipment   Outcome: Progressing  Goal: Patient will complete daily grooming independent level of assistance and PRN adaptive equipment   Outcome: Progressing  Goal: Patient will complete toileting including hygiene clothing management/hygiene with stand by assist level of assistance and LRD.  Outcome: Progressing     Problem: TRANSFERS  Goal: Patient will perform bed mobility stand by assist level of assistance and bed rails in order to improve safety and independence with mobility  Outcome: Progressing  Goal: Patient will complete functional transfers with least restrictive device with stand by assist level of assistance.  Outcome: Progressing     Problem: MOBILITY  Goal: Patient will perform Functional mobility mod  Household distances/Community Distances with stand by assist level of assistance and least restrictive device in order to improve safety and functional mobility.  Outcome: Progressing

## 2023-10-17 NOTE — PROGRESS NOTES
" CARDIAC SURGERY DAILY PROGRESS NOTE    68 year old female with a PMH significant for mechanical aortic valve replacement in 2014, HTN, DM, COPD and a brain aneurysm rupture. Pt has ascending aortic aneurysm and was referred for dilated ascending aorta . Also has single vessel significant CAD. Admitted for surgery 10/13/2023 with Dr Hayden.    10/13/2023 Operation Procedures: ana Hayden  #1 a standard median redo sternotomy  #2 innominate trunk arterial cannulation  #3 ascending aorta replacement with a Gelweave 34 mm  #4 aortic root noncoronary sinus plication  #5 CABG x1: SVG to RCA    CTICU - Warfarin started no heparin gtt bridge  Transferred to the floor 10/15    Objective   /73   Pulse 96   Temp 36.9 °C (98.4 °F)   Resp 17   Ht 1.57 m (5' 1.81\")   Wt 81.3 kg (179 lb 3.7 oz)   SpO2 92%   BMI 32.98 kg/m²   Pain Score: 6   3 Day Weight Change: 0.867 kg (1 lb 14.6 oz) per day    Intake and Output    Intake/Output Summary (Last 24 hours) at 10/17/2023 1125  Last data filed at 10/17/2023 0943  Gross per 24 hour   Intake --   Output 262 ml   Net -262 ml         Physical Exam  Physical Exam  Constitutional:       Appearance: Normal appearance.   HENT:      Head: Normocephalic and atraumatic.      Mouth/Throat:      Mouth: Mucous membranes are moist.   Eyes:      Conjunctiva/sclera: Conjunctivae normal.   Cardiovascular:      Rate and Rhythm: Normal rate and regular rhythm.      Comments: TELE - SR 80s-90s  Wires  to temp pacer V50    S1S2 - accentuated S2 click throughout anterior chest  Pulmonary:      Effort: Pulmonary effort is normal.      Comments: CT's to -20 wall suction; + air leak; serosang drng  Nonlabored; fair inspir effort and cough; mildly diminished bases; on RA  Abdominal:      General: Bowel sounds are normal.      Palpations: Abdomen is soft.   Musculoskeletal:         General: Normal range of motion.      Cervical back: Neck supple.   Skin:     General: Skin is warm and dry.      " Comments: INCISION: midsternal with Prevena; R SVG ALETA, well approx, w/o s/s infection; sternum stable   Neurological:      General: No focal deficit present.      Mental Status: She is alert and oriented to person, place, and time.   Psychiatric:         Mood and Affect: Mood normal.         Behavior: Behavior normal.         Medications  Scheduled medications  acetaminophen, 650 mg, oral, q4h  aspirin, 81 mg, oral, Daily  atorvastatin, 80 mg, oral, Nightly  docusate sodium, 100 mg, oral, BID  FLUoxetine, 40 mg, oral, Daily  fluticasone furoate-vilanteroL, 1 puff, inhalation, Daily  heparin (porcine), 5,000 Units, subcutaneous, q8h  hydrALAZINE, 25 mg, oral, q8h  insulin lispro, 0-5 Units, subcutaneous, TID with meals  iron polysaccharides, 150 mg, oral, Daily  metFORMIN, 500 mg, oral, BID with meals  metoprolol tartrate, 12.5 mg, oral, BID  multivitamin with minerals, 1 tablet, oral, Daily  pantoprazole, 40 mg, oral, Daily before breakfast  polyethylene glycol, 17 g, oral, TID  warfarin, 3 mg, oral, Once per day on Mon Wed Sat  warfarin, 4.5 mg, oral, Once per day on Sun Tue Thu Fri    PRN medications  PRN medications: bisacodyl, dextrose **OR** glucagon, [DISCONTINUED] ondansetron **OR** ondansetron, oxyCODONE, oxygen    Labs  Results for orders placed or performed during the hospital encounter of 10/13/23 (from the past 24 hour(s))   POCT GLUCOSE   Result Value Ref Range    POCT Glucose 150 (H) 74 - 99 mg/dL   Protime-INR   Result Value Ref Range    Protime 18.5 (H) 9.8 - 12.8 seconds    INR 1.6 (H) 0.9 - 1.1   POCT GLUCOSE   Result Value Ref Range    POCT Glucose 139 (H) 74 - 99 mg/dL   POCT GLUCOSE   Result Value Ref Range    POCT Glucose 131 (H) 74 - 99 mg/dL   Prepare RBC: 6 Units   Result Value Ref Range    PRODUCT CODE R9251M61     Unit Number [id number]     Unit ABO A     Unit RH POS     XM INTEP COMP     Dispense Status RE     Blood Expiration Date October 27, 2023 23:59 EDT     PRODUCT BLOOD TYPE  6200     UNIT VOLUME 350     PRODUCT CODE E6423I36     Unit Number [id number]     Unit ABO A     Unit RH POS     XM INTEP COMP     Dispense Status PT     Blood Expiration Date October 27, 2023 23:59 EDT     PRODUCT BLOOD TYPE 6200     UNIT VOLUME 350     PRODUCT CODE T6215H97     Unit Number [id number]     Unit ABO A     Unit RH POS     XM INTEP COMP     Dispense Status RE     Blood Expiration Date October 27, 2023 23:59 EDT     PRODUCT BLOOD TYPE 6200     UNIT VOLUME 350     PRODUCT CODE N1491O61     Unit Number [id number]     Unit ABO A     Unit RH POS     XM INTEP COMP     Dispense Status RE     Blood Expiration Date October 27, 2023 23:59 EDT     PRODUCT BLOOD TYPE 6200     UNIT VOLUME 350     PRODUCT CODE P3501L96     Unit Number [id number]     Unit ABO A     Unit RH POS     XM INTEP COMP     Dispense Status RE     Blood Expiration Date October 27, 2023 23:59 EDT     PRODUCT BLOOD TYPE 6200     UNIT VOLUME 350     PRODUCT CODE D3085M70     Unit Number [id number]     Unit ABO A     Unit RH POS     XM INTEP COMP     Dispense Status RE     Blood Expiration Date October 28, 2023 23:59 EDT     PRODUCT BLOOD TYPE 6200     UNIT VOLUME 350    Magnesium   Result Value Ref Range    Magnesium 2.02 1.60 - 2.40 mg/dL   CBC   Result Value Ref Range    WBC 10.3 4.4 - 11.3 x10*3/uL    nRBC 0.0 0.0 - 0.0 /100 WBCs    RBC 3.34 (L) 4.00 - 5.20 x10*6/uL    Hemoglobin 9.6 (L) 12.0 - 16.0 g/dL    Hematocrit 31.1 (L) 36.0 - 46.0 %    MCV 93 80 - 100 fL    MCH 28.7 26.0 - 34.0 pg    MCHC 30.9 (L) 32.0 - 36.0 g/dL    RDW 14.7 (H) 11.5 - 14.5 %    Platelets 223 150 - 450 x10*3/uL    MPV 10.8 7.5 - 11.5 fL   Renal Function Panel   Result Value Ref Range    Glucose 128 (H) 74 - 99 mg/dL    Sodium 140 136 - 145 mmol/L    Potassium 3.6 3.5 - 5.3 mmol/L    Chloride 105 98 - 107 mmol/L    Bicarbonate 25 21 - 32 mmol/L    Anion Gap 14 10 - 20 mmol/L    Urea Nitrogen 20 6 - 23 mg/dL    Creatinine 0.76 0.50 - 1.05 mg/dL     eGFR 86 >60 mL/min/1.73m*2    Calcium 8.3 (L) 8.6 - 10.6 mg/dL    Phosphorus 2.4 (L) 2.5 - 4.9 mg/dL    Albumin 3.1 (L) 3.4 - 5.0 g/dL   Protime-INR   Result Value Ref Range    Protime 21.4 (H) 9.8 - 12.8 seconds    INR 1.9 (H) 0.9 - 1.1   POCT GLUCOSE   Result Value Ref Range    POCT Glucose 137 (H) 74 - 99 mg/dL             IMPRESSION & PLAN:  POD # 4 s/p ascending aorta replacement, CABGx1 on 10/13; h/o 2014 mechanical aortic valve  - Increase activity/ ambulation; PT/OT  - Encourage IS, C/DB; respiratory therapy; wean O2 as ketan   - Cardiac rehab referral   - Continue cardiac meds: ASA, BB, statin  - warfarin for prior mechanical aortic valve; goal INR 2-3  - Pain and anticonstipation meds  - discontinue chest tubes, post removal CXR  - 2v CXR ordered for 10/18   - CUT epicardial wires prior to discharge   - remove Prevena POD#5 10/18  - Tele until discharge  - Optimize nutrition and electrolytes    Rhythm  - Tele: SR 80s-90s, occas PVC  - increase beta blocker 25mg bid  - Adjust medications as tolerated  - wires capped    Acute Blood Loss Anemia   Hematocrit   Date Value Ref Range Status   10/17/2023 31.1 (L) 36.0 - 46.0 % Final   10/16/2023 30.1 (L) 36.0 - 46.0 % Final   10/15/2023 28.4 (L) 36.0 - 46.0 % Final   - MV, PO Iron x1mo  - Daily labs, transfuse as indicated    Thrombocytopenia  Platelets   Date Value Ref Range Status   10/17/2023 223 150 - 450 x10*3/uL Final   10/16/2023 178 150 - 450 x10*3/uL Final   10/15/2023 155 150 - 450 x10*3/uL Final   - Etiology likely postop/CPB related  - Continue to trend with daily CBCs    Volume/Electrolyte Status: Preop wt 78.7  - 10/15 ICU 20mg IV lasix x1  - Weight: 82.6  - 10/16 lasix 20mg IV x1  - 10/17 daily po lasix added Adjust diuresis as needed weight 81.3  - Replete electrolytes for hypokalemia/hypomagnesemia/hypophosphatemia as needed - 10/16 replaced K and Mg 10/17 added daily K+& Mg++  - Daily weights and strict I&Os  - Daily RFP while  admitted    Hypertension: home meds: amlodipine 5mg daily, losartan 100mg daily, Toprol XL 25mg daily  Systolic (24hrs), Av , Min:103 , Max:128    - continue hydralazine; change to losartan as tolerated  - continue metoprolol  - additional antihypertensives as needed     Hyperlipidemia - home med rosuvastatin 40mg daily  - continue high intensity statin  - follow up with PCP/cards for ongoing lipid management    COPD:  no PFTs on file  -CXR PRN  -wean NC maintaining SpO2 >92%  -ABGs as needed  -Continue fluticasone furoate-vilanterol  -bronchial hygiene with EZ PAPs     DM:  home meds: metformin XR 500mg BID  Results from last 7 days   Lab Units 10/17/23  0926   POCT GLUCOSE mg/dL 137*     - metformin bid  -accuchecks premeal and at bedtime  -diabetic diet  -lispro premeal corrective scale     Neuro/Psych - h/o brain aneurysm rupture  - continue home Prozac     VTE Prophylaxis: SCDs/TEDs, ambulation, SQ heparin  Code Status: Full Code    Dispo  - PT/OT recs home care at [place]  - Would benefit from homecare for RN and PT carepath and RN visits  - Anticipate discharge - once INR therapeutic  - Will continue to assess discharge needs  - home care referral placed      DAMIÁN Lovelace-CNP  Cardiac Surgery [facility]  [facility]  Team Pager [phone]

## 2023-10-17 NOTE — SIGNIFICANT EVENT
"         CHEST TUBE REMOVAL   1 right pleural, 1 left pleural, and 2 mediastinal chest tube removed without difficulty. Patient tolerated well.  Pt on 2LNC   Stat 1V CXR ordered.    /73   Pulse (!) 112   Temp 36.9 °C (98.4 °F)   Resp 17   Ht 1.57 m (5' 1.81\")   Wt 81.3 kg (179 lb 3.7 oz)   SpO2 92%   BMI 32.98 kg/m²          Charley Guerrero, APRN-CNP  Cardiac Surgery [place]  [facility]  Team Pager [phone]    "

## 2023-10-18 ENCOUNTER — APPOINTMENT (OUTPATIENT)
Dept: RADIOLOGY | Facility: HOSPITAL | Age: 68
DRG: 220 | End: 2023-10-18
Payer: MEDICARE

## 2023-10-18 ENCOUNTER — TELEPHONE (OUTPATIENT)
Dept: CARDIOLOGY | Facility: CLINIC | Age: 68
End: 2023-10-18
Payer: MEDICARE

## 2023-10-18 VITALS
WEIGHT: 178.68 LBS | HEIGHT: 62 IN | DIASTOLIC BLOOD PRESSURE: 74 MMHG | SYSTOLIC BLOOD PRESSURE: 130 MMHG | TEMPERATURE: 98.1 F | OXYGEN SATURATION: 94 % | HEART RATE: 88 BPM | RESPIRATION RATE: 18 BRPM | BODY MASS INDEX: 32.88 KG/M2

## 2023-10-18 LAB
ALBUMIN SERPL BCP-MCNC: 3.1 G/DL (ref 3.4–5)
ANION GAP SERPL CALC-SCNC: 14 MMOL/L (ref 10–20)
BUN SERPL-MCNC: 16 MG/DL (ref 6–23)
CALCIUM SERPL-MCNC: 8.5 MG/DL (ref 8.6–10.6)
CHLORIDE SERPL-SCNC: 105 MMOL/L (ref 98–107)
CO2 SERPL-SCNC: 25 MMOL/L (ref 21–32)
CREAT SERPL-MCNC: 0.73 MG/DL (ref 0.5–1.05)
ERYTHROCYTE [DISTWIDTH] IN BLOOD BY AUTOMATED COUNT: 14.7 % (ref 11.5–14.5)
GFR SERPL CREATININE-BSD FRML MDRD: 90 ML/MIN/1.73M*2
GLUCOSE BLD MANUAL STRIP-MCNC: 135 MG/DL (ref 74–99)
GLUCOSE BLD MANUAL STRIP-MCNC: 170 MG/DL (ref 74–99)
GLUCOSE SERPL-MCNC: 129 MG/DL (ref 74–99)
HCT VFR BLD AUTO: 31.1 % (ref 36–46)
HGB BLD-MCNC: 9.7 G/DL (ref 12–16)
INR PPP: 2.7 (ref 0.9–1.1)
MAGNESIUM SERPL-MCNC: 1.84 MG/DL (ref 1.6–2.4)
MCH RBC QN AUTO: 29 PG (ref 26–34)
MCHC RBC AUTO-ENTMCNC: 31.2 G/DL (ref 32–36)
MCV RBC AUTO: 93 FL (ref 80–100)
NRBC BLD-RTO: 0 /100 WBCS (ref 0–0)
PHOSPHATE SERPL-MCNC: 2.5 MG/DL (ref 2.5–4.9)
PLATELET # BLD AUTO: 294 X10*3/UL (ref 150–450)
PMV BLD AUTO: 10.6 FL (ref 7.5–11.5)
POTASSIUM SERPL-SCNC: 4 MMOL/L (ref 3.5–5.3)
PROTHROMBIN TIME: 30.2 SECONDS (ref 9.8–12.8)
RBC # BLD AUTO: 3.35 X10*6/UL (ref 4–5.2)
SODIUM SERPL-SCNC: 140 MMOL/L (ref 136–145)
WBC # BLD AUTO: 11.2 X10*3/UL (ref 4.4–11.3)

## 2023-10-18 PROCEDURE — 97530 THERAPEUTIC ACTIVITIES: CPT | Mod: GP

## 2023-10-18 PROCEDURE — 85027 COMPLETE CBC AUTOMATED: CPT | Mod: CMCLAB | Performed by: CLINICAL NURSE SPECIALIST

## 2023-10-18 PROCEDURE — 2500000004 HC RX 250 GENERAL PHARMACY W/ HCPCS (ALT 636 FOR OP/ED)

## 2023-10-18 PROCEDURE — 2500000001 HC RX 250 WO HCPCS SELF ADMINISTERED DRUGS (ALT 637 FOR MEDICARE OP): Performed by: NURSE PRACTITIONER

## 2023-10-18 PROCEDURE — 36415 COLL VENOUS BLD VENIPUNCTURE: CPT | Mod: CMCLAB | Performed by: NURSE PRACTITIONER

## 2023-10-18 PROCEDURE — 71046 X-RAY EXAM CHEST 2 VIEWS: CPT

## 2023-10-18 PROCEDURE — 2500000004 HC RX 250 GENERAL PHARMACY W/ HCPCS (ALT 636 FOR OP/ED): Performed by: NURSE PRACTITIONER

## 2023-10-18 PROCEDURE — 80069 RENAL FUNCTION PANEL: CPT | Mod: CMCLAB | Performed by: CLINICAL NURSE SPECIALIST

## 2023-10-18 PROCEDURE — 2500000002 HC RX 250 W HCPCS SELF ADMINISTERED DRUGS (ALT 637 FOR MEDICARE OP, ALT 636 FOR OP/ED): Performed by: NURSE PRACTITIONER

## 2023-10-18 PROCEDURE — 2500000001 HC RX 250 WO HCPCS SELF ADMINISTERED DRUGS (ALT 637 FOR MEDICARE OP)

## 2023-10-18 PROCEDURE — 36415 COLL VENOUS BLD VENIPUNCTURE: CPT | Performed by: CLINICAL NURSE SPECIALIST

## 2023-10-18 PROCEDURE — 71046 X-RAY EXAM CHEST 2 VIEWS: CPT | Performed by: RADIOLOGY

## 2023-10-18 PROCEDURE — 96372 THER/PROPH/DIAG INJ SC/IM: CPT

## 2023-10-18 PROCEDURE — 99232 SBSQ HOSP IP/OBS MODERATE 35: CPT | Performed by: NURSE PRACTITIONER

## 2023-10-18 PROCEDURE — 83735 ASSAY OF MAGNESIUM: CPT | Mod: CMCLAB | Performed by: CLINICAL NURSE SPECIALIST

## 2023-10-18 PROCEDURE — 85610 PROTHROMBIN TIME: CPT | Mod: CMCLAB | Performed by: NURSE PRACTITIONER

## 2023-10-18 PROCEDURE — 82947 ASSAY GLUCOSE BLOOD QUANT: CPT

## 2023-10-18 RX ORDER — POTASSIUM CHLORIDE 750 MG/1
10 TABLET, FILM COATED, EXTENDED RELEASE ORAL DAILY
Qty: 7 TABLET | Refills: 0 | Status: SHIPPED | OUTPATIENT
Start: 2023-10-18 | End: 2023-10-24 | Stop reason: ALTCHOICE

## 2023-10-18 RX ORDER — CALCIUM CARBONATE 300MG(750)
400 TABLET,CHEWABLE ORAL DAILY
Qty: 7 TABLET | Refills: 0 | Status: SHIPPED | OUTPATIENT
Start: 2023-10-18 | End: 2023-10-24 | Stop reason: ALTCHOICE

## 2023-10-18 RX ORDER — MULTIVIT-MIN/IRON FUM/FOLIC AC 7.5 MG-4
1 TABLET ORAL DAILY
Refills: 0 | COMMUNITY
Start: 2023-10-19 | End: 2024-10-18

## 2023-10-18 RX ORDER — IRON POLYSACCHARIDE COMPLEX 150 MG
150 CAPSULE ORAL DAILY
Qty: 30 CAPSULE | Refills: 0 | Status: SHIPPED | OUTPATIENT
Start: 2023-10-19 | End: 2023-11-18

## 2023-10-18 RX ORDER — LOSARTAN POTASSIUM 25 MG/1
25 TABLET ORAL DAILY
Status: DISCONTINUED | OUTPATIENT
Start: 2023-10-18 | End: 2023-10-18 | Stop reason: HOSPADM

## 2023-10-18 RX ORDER — ASPIRIN 81 MG/1
81 TABLET ORAL DAILY
Refills: 0 | COMMUNITY
Start: 2023-10-19 | End: 2023-10-24 | Stop reason: ALTCHOICE

## 2023-10-18 RX ORDER — ACETAMINOPHEN 325 MG/1
325-650 TABLET ORAL EVERY 6 HOURS PRN
Qty: 30 TABLET | Refills: 0 | COMMUNITY
Start: 2023-10-18

## 2023-10-18 RX ORDER — LOSARTAN POTASSIUM 25 MG/1
25 TABLET ORAL DAILY
Qty: 30 TABLET | Refills: 0 | Status: SHIPPED | OUTPATIENT
Start: 2023-10-18 | End: 2023-11-08 | Stop reason: SDUPTHER

## 2023-10-18 RX ORDER — FUROSEMIDE 20 MG/1
20 TABLET ORAL DAILY
Qty: 7 TABLET | Refills: 0 | Status: SHIPPED | OUTPATIENT
Start: 2023-10-19 | End: 2023-10-24 | Stop reason: ALTCHOICE

## 2023-10-18 RX ORDER — POLYETHYLENE GLYCOL 3350 17 G/17G
17 POWDER, FOR SOLUTION ORAL DAILY PRN
Refills: 0 | COMMUNITY
Start: 2023-10-18 | End: 2023-10-24 | Stop reason: ALTCHOICE

## 2023-10-18 RX ORDER — METOPROLOL SUCCINATE 50 MG/1
50 TABLET, EXTENDED RELEASE ORAL DAILY
Qty: 30 TABLET | Refills: 0 | Status: SHIPPED | OUTPATIENT
Start: 2023-10-18 | End: 2023-12-05 | Stop reason: SDUPTHER

## 2023-10-18 RX ORDER — PANTOPRAZOLE SODIUM 40 MG/1
40 TABLET, DELAYED RELEASE ORAL
Qty: 30 TABLET | Refills: 0 | Status: SHIPPED | OUTPATIENT
Start: 2023-10-19 | End: 2023-12-29 | Stop reason: WASHOUT

## 2023-10-18 RX ORDER — DOCUSATE SODIUM 100 MG/1
100 CAPSULE, LIQUID FILLED ORAL 2 TIMES DAILY PRN
COMMUNITY
Start: 2023-10-18 | End: 2023-10-24 | Stop reason: ALTCHOICE

## 2023-10-18 RX ORDER — LANOLIN ALCOHOL/MO/W.PET/CERES
800 CREAM (GRAM) TOPICAL ONCE
Status: COMPLETED | OUTPATIENT
Start: 2023-10-18 | End: 2023-10-18

## 2023-10-18 RX ADMIN — FLUOXETINE 40 MG: 20 CAPSULE ORAL at 08:23

## 2023-10-18 RX ADMIN — Medication 150 MG: at 08:21

## 2023-10-18 RX ADMIN — HEPARIN SODIUM 5000 UNITS: 5000 INJECTION INTRAVENOUS; SUBCUTANEOUS at 09:46

## 2023-10-18 RX ADMIN — POTASSIUM CHLORIDE 20 MEQ: 1500 TABLET, EXTENDED RELEASE ORAL at 08:21

## 2023-10-18 RX ADMIN — ACETAMINOPHEN 650 MG: 325 TABLET ORAL at 02:13

## 2023-10-18 RX ADMIN — PANTOPRAZOLE SODIUM 40 MG: 40 TABLET, DELAYED RELEASE ORAL at 08:21

## 2023-10-18 RX ADMIN — HYDRALAZINE HYDROCHLORIDE 25 MG: 25 TABLET, FILM COATED ORAL at 08:21

## 2023-10-18 RX ADMIN — LOSARTAN POTASSIUM 25 MG: 25 TABLET, FILM COATED ORAL at 12:40

## 2023-10-18 RX ADMIN — ASPIRIN 81 MG: 81 TABLET, COATED ORAL at 08:21

## 2023-10-18 RX ADMIN — Medication 800 MG: at 10:24

## 2023-10-18 RX ADMIN — METFORMIN HYDROCHLORIDE 500 MG: 500 TABLET, FILM COATED ORAL at 08:23

## 2023-10-18 RX ADMIN — FUROSEMIDE 20 MG: 20 TABLET ORAL at 08:21

## 2023-10-18 RX ADMIN — Medication 1 TABLET: at 09:00

## 2023-10-18 RX ADMIN — HEPARIN SODIUM 5000 UNITS: 5000 INJECTION INTRAVENOUS; SUBCUTANEOUS at 02:13

## 2023-10-18 RX ADMIN — HYDRALAZINE HYDROCHLORIDE 25 MG: 25 TABLET, FILM COATED ORAL at 02:13

## 2023-10-18 RX ADMIN — WARFARIN SODIUM 3 MG: 3 TABLET ORAL at 08:21

## 2023-10-18 RX ADMIN — METOPROLOL TARTRATE 25 MG: 25 TABLET, FILM COATED ORAL at 08:21

## 2023-10-18 RX ADMIN — ACETAMINOPHEN 650 MG: 325 TABLET ORAL at 08:21

## 2023-10-18 ASSESSMENT — COGNITIVE AND FUNCTIONAL STATUS - GENERAL
STANDING UP FROM CHAIR USING ARMS: A LITTLE
PERSONAL GROOMING: A LITTLE
TURNING FROM BACK TO SIDE WHILE IN FLAT BAD: A LITTLE
MOVING TO AND FROM BED TO CHAIR: A LITTLE
WALKING IN HOSPITAL ROOM: A LITTLE
DRESSING REGULAR UPPER BODY CLOTHING: A LITTLE
HELP NEEDED FOR BATHING: A LITTLE
MOVING FROM LYING ON BACK TO SITTING ON SIDE OF FLAT BED WITH BEDRAILS: A LITTLE
MOBILITY SCORE: 18
MOBILITY SCORE: 17
DRESSING REGULAR LOWER BODY CLOTHING: A LITTLE
MOVING TO AND FROM BED TO CHAIR: A LITTLE
TOILETING: A LITTLE
MOVING FROM LYING ON BACK TO SITTING ON SIDE OF FLAT BED WITH BEDRAILS: A LITTLE
WALKING IN HOSPITAL ROOM: A LITTLE
CLIMB 3 TO 5 STEPS WITH RAILING: A LITTLE
STANDING UP FROM CHAIR USING ARMS: A LITTLE
DAILY ACTIVITIY SCORE: 19
TURNING FROM BACK TO SIDE WHILE IN FLAT BAD: A LITTLE
CLIMB 3 TO 5 STEPS WITH RAILING: A LOT

## 2023-10-18 ASSESSMENT — PAIN SCALES - GENERAL: PAINLEVEL_OUTOF10: 6

## 2023-10-18 ASSESSMENT — PAIN - FUNCTIONAL ASSESSMENT: PAIN_FUNCTIONAL_ASSESSMENT: 0-10

## 2023-10-18 ASSESSMENT — PAIN DESCRIPTION - DESCRIPTORS: DESCRIPTORS: ACHING

## 2023-10-18 NOTE — TELEPHONE ENCOUNTER
Pt called from [facility] reporting she is going home today and POCT is to be scheduled for tomorrow or Friday.  Scheduled her for Fri 10/20/23 at 1140.

## 2023-10-18 NOTE — NURSING NOTE
Pt IV's removed and telemetry. Pt had walker delivered to room. Pt verbalized understanding of discharge plans and follow up. Belongings return and pt transported to private car via wheelchair.

## 2023-10-18 NOTE — DISCHARGE INSTRUCTIONS
Anticoagulation Clinic - please get an INR check on Friday 10/20 or Monday 10/23. Goal INR 2-3 for mechanical AVR  ===========    No NSAIDS (Motrin, Ibuprofen, Aleve, etc) after cardiac surgery and while on warfarin    ====    “Keep Your Move in the Tube!!”   Please refer to the “Move in the Tube” handout.  -- Load bearing activities can be completed if you are “staying in the tube.” If you are attempting load bearing activities, let pain be your guide with when trying an activity “out of the tube”. If an activity hurts or is uncomfortable go back to doing it while you stay “in the tube”. There is no time limit to “stay in the tube”.     -- Non-load bearing activities, which are your activities of daily living, can be completed with your arms “out of the tube” as long as you remain pain free. Some activities of daily living examples are dressing, personal care, showering, washing hair, and toilet hygiene.     Don't forget to KEEP YOUR MOVE IN THE TUBE and think of a T. Brian dinosaur!     ==========                               **IMPORTANT**  Prevention of Infective Endocarditis (Heart Infections) After Cardiac Surgery:    Infective endocarditis occurs when bacteria enters the bloodstream and then attaches to the heart. Patients with a new heart valve, repaired heart valve, or graft used to repair/replace their aorta are at higher risk for developing this because of the prosthetic (man-made) medical material in their heart. Endocarditis is rare but when you undergo certain medical or dental procedures, as listed below, it can give bacteria an opportunity to enter the blood and cause this type of infection. To prevent this, preventative antibiotics taken before these procedures are required.     Antibiotics are always required PRIOR to the following:  - Dental work with gingival, periapical, or other gingival perforation (such as tooth extractions, dental abscess drainage, and dental cleanings)  - Respiratory  procedures with incisions or biopsies   - Cardiac or vascular procedures to place or replace prosthetic material (such as heart valves, stents, etc.)    Antibiotics are required in the following situations ONLY if an infection is already present:  - Skin or soft tissue procedures with infected tissue  - Gastrointestinal procedures with GI infections  - Urinary or genital procedure with acute genitourinary infections    Antibiotic examples you should expect to be prescribed:  - Amoxicillin or Ampicillin are usually the  first choice  - Cephalexin, Clindamycin, or Vancomycin may be used if you are unable to tolerate/allergic to Amoxicillin or Ampicillin  - Amoxicillin or Ampicillin (if allergic, use Vancomycin) will cover for enterococcus if you have a known acute biliary tract infection   - Specific antibiotics for a known organism should be used when treating an active infection    Please notify your dentist/primary physician/cardiologist PRIOR to these types of procedures to obtain the proper antibiotics and prevent a serious infection in your heart. When in doubt, always ask!   Additionally, good oral hygiene (routine brushing, flossing, and dental care) and prompt treatment of other infections (such as UTIs and skin infections) are equally as important to prevent endocarditis!!

## 2023-10-18 NOTE — PROGRESS NOTES
Physical Therapy    Physical Therapy Treatment    Patient Name: Suze Najera  MRN: [id number]  Today's Date: 10/18/2023  Time Calculation  Start Time: 1319  Stop Time: 1336  Time Calculation (min): 17 min       Assessment/Plan   PT Assessment  PT Assessment Results: Impaired balance, Decreased endurance, Decreased mobility, Decreased strength, Decreased range of motion  Rehab Prognosis: Good  Evaluation/Treatment Tolerance:  (Limited by dizziness)  Medical Staff Made Aware: Yes  End of Session Communication: Bedside nurse  Assessment Comment: Pt demonstrated improved quality and quantity of ambulation this session.  Able to walk 200ft with RW before fatigued.  Pt politely declined ther ex this session stating she was anxious for her daughter to get here.  Pt has met all PT goals at this time and does not have any acute PT needs.  Pt would benefit from low intensity PT upon discharge.  End of Session Patient Position: Bed, 2 rail up, Alarm off, not on at start of session  PT Plan  Inpatient/Swing Bed or Outpatient: Inpatient  PT Plan  Treatment/Interventions: Bed mobility, Transfer training, Gait training, Balance training  PT Plan: No further acute PT needs identified.  Safe to return home  PT Discharge Recommendations: Low intensity level of continued care  Equipment Recommended upon Discharge: Wheeled walker  PT Recommended Transfer Status: Stand by assist, Assistive device (RW)  PT - OK to Discharge: Yes      General Visit Information:   PT  Visit  PT Received On: 10/18/23  General  Prior to Session Communication: Bedside nurse  Patient Position Received: Bed, 2 rail up, Alarm off, not on at start of session  General Comment: Pt alert and agreeable to PT.    Subjective   Precautions:  Precautions  Medical Precautions: Cardiac precautions, Fall precautions  Post-Surgical Precautions: Move in the Tube  Vital Signs:  Vital Signs  Heart Rate: 93    Objective   Cognition:  Cognition  Overall Cognitive Status: Within  Functional Limits    Activity Tolerance:  Activity Tolerance  Endurance: Decreased tolerance for upright activites  Treatments:   Bed Mobility  Bed Mobility: Yes  Bed Mobility 1  Bed Mobility 1: Supine to sitting, Sitting to supine  Level of Assistance 1: Independent    Ambulation/Gait Training  Ambulation/Gait Training Performed: Yes  Ambulation/Gait Training 1  Surface 1: Level tile  Device 1: Rolling walker  Assistance 1: Close supervision  Quality of Gait 1:  (decreased gait speed)  Comments/Distance (ft) 1: 200ft  Transfers  Transfer: Yes  Transfer 1  Transfer From 1: Bed to  Transfer to 1: Stand  Technique 1: Sit to stand, Stand to sit  Transfer Device 1: Walker  Transfer Level of Assistance 1: Close supervision      Outcome Measures:   [facility] Basic Mobility  Turning from your back to your side while in a flat bed without using bedrails: A little  Moving from lying on your back to sitting on the side of a flat bed without using bedrails: A little  Moving to and from bed to chair (including a wheelchair): A little  Standing up from a chair using your arms (e.g. wheelchair or bedside chair): A little  To walk in hospital room: A little  Climbing 3-5 steps with railing: A little  Basic Mobility - Total Score: 18    Education Documentation  Precautions, taught by Mony Mcnamara PT at 10/18/2023  1:42 PM.  Learner: Patient  Readiness: Acceptance  Method: Explanation  Response: Verbalizes Understanding    Body Mechanics, taught by Mony Mcnamara PT at 10/18/2023  1:42 PM.  Learner: Patient  Readiness: Acceptance  Method: Explanation  Response: Verbalizes Understanding    Mobility Training, taught by Mony Mcnamara PT at 10/18/2023  1:42 PM.  Learner: Patient  Readiness: Acceptance  Method: Explanation  Response: Verbalizes Understanding    Education Comments  No comments found.        Encounter Problems       Encounter Problems (Active)       PT Problem       Patient will complete supine to sit and sit to supine  Supervision        Start:  10/16/23    Expected End:  10/30/23            Patient will perform sit<>stand transfer with walker, and stand-by        Start:  10/16/23    Expected End:  10/30/23            Patient will ambulate >100' with walker and SBA        Start:  10/16/23    Expected End:  10/30/23

## 2023-10-18 NOTE — SIGNIFICANT EVENT
Atrial and ventricular wires cut at skin level due to surgeon preference.  Patient instructed to notify radiology of retained epicardial wires prior to any MRI procedure, and to notify Dr Hayden of any visible wires or s/s infection.

## 2023-10-18 NOTE — CARE PLAN
Problem: Fall/Injury  Goal: Not fall by end of shift  10/18/2023 1515 by Almaz Sullivan RN  Outcome: Adequate for Discharge  10/18/2023 0834 by Almaz Sullivan RN  Outcome: Progressing  Goal: Be free from injury by end of the shift  10/18/2023 1515 by Almaz Sullivan RN  Outcome: Adequate for Discharge  10/18/2023 0834 by Almaz Sullivan RN  Outcome: Progressing  Goal: Verbalize understanding of personal risk factors for fall in the hospital  10/18/2023 1515 by Almaz Sullivan RN  Outcome: Adequate for Discharge  10/18/2023 0834 by Almaz Sullivan RN  Outcome: Progressing  Goal: Verbalize understanding of risk factor reduction measures to prevent injury from fall in the home  Outcome: Adequate for Discharge  Goal: Use assistive devices by end of the shift  Outcome: Adequate for Discharge  Goal: Pace activities to prevent fatigue by end of the shift  Outcome: Adequate for Discharge     Problem: MOBILITY  Goal: Patient will perform Functional mobility mod  Household distances/Community Distances with stand by assist level of assistance and least restrictive device in order to improve safety and functional mobility.  Outcome: Adequate for Discharge     Problem: TRANSFERS  Goal: Patient will perform bed mobility stand by assist level of assistance and bed rails in order to improve safety and independence with mobility  Outcome: Adequate for Discharge  Goal: Patient will complete functional transfers with least restrictive device with stand by assist level of assistance.  Outcome: Adequate for Discharge     Problem: ADLs  Goal: Patient will perform UB and LB bathing  with stand by assist level of assistance and AE.  Outcome: Adequate for Discharge  Goal: Patient with complete upper body dressing with independent level of assistance donning and doffing all UE clothes with no adaptive equipment   Outcome: Adequate for Discharge  Goal: Patient with complete lower body dressing  with stand by assist level of assistance donning and doffing all LE clothes  with PRN adaptive equipment   Outcome: Adequate for Discharge  Goal: Patient will complete daily grooming independent level of assistance and PRN adaptive equipment   Outcome: Adequate for Discharge  Goal: Patient will complete toileting including hygiene clothing management/hygiene with stand by assist level of assistance and LRD.  Outcome: Adequate for Discharge     Problem: COGNITION/SAFETY  Goal: Patient will score WFL on standardized cognitive assessment with min cues and within reasonable time frame  Outcome: Adequate for Discharge     Problem: Lack of knowledge on where to find additional support for diabetes  Goal: Increase knowledge of where support can be found to best address patient's need  Outcome: Adequate for Discharge     Problem: Lack of knowldge regarding diabetes medications  Goal: Increase knowledge via education  Outcome: Adequate for Discharge     Problem: Lack of knowledge on benefits of activity for meeting blood glucose targets and health goals  Goal: Discover best plan for being active and address any barriers  Outcome: Adequate for Discharge     Problem: Address barriers to lifestyle change  Goal: Find workable solutions to meet health goals  Outcome: Adequate for Discharge     Problem: Lack of knowledge on diet for diabetes  Goal: Discover best plan for balanced nutrition to manage diabetes  Outcome: Adequate for Discharge     Problem: Lack of Diabetes disease process knowledge  Goal: Increase knowledge/understanding of diabetes and how to reduce risk of complications  10/18/2023 1515 by Almaz Sullivan RN  Outcome: Adequate for Discharge  10/18/2023 0834 by Almaz Sullivan, LOKESH  Outcome: Progressing   The patient's goals for the shift include      The clinical goals for the shift include Pt will remain free of falls today    Goal met

## 2023-10-18 NOTE — PROGRESS NOTES
" CARDIAC SURGERY DAILY PROGRESS NOTE    68 year old female with a PMH significant for mechanical aortic valve replacement in 2014, HTN, DM, COPD and a brain aneurysm rupture. Pt has ascending aortic aneurysm and was referred for dilated ascending aorta . Also has single vessel significant CAD. Admitted for surgery 10/13/2023 with Dr Hayden.    10/13/2023 Operation Procedures: ana Hayden  #1 a standard median redo sternotomy  #2 innominate trunk arterial cannulation  #3 ascending aorta replacement with a Gelweave 34 mm  #4 aortic root noncoronary sinus plication  #5 CABG x1: SVG to RCA    CTICU - Warfarin started no heparin gtt bridge  Transferred to the floor 10/15    Objective   /65 (BP Location: Right arm, Patient Position: Lying)   Pulse 85   Temp 35.7 °C (96.3 °F) (Temporal)   Resp 18   Ht 1.57 m (5' 1.81\")   Wt 81.1 kg (178 lb 10.9 oz)   SpO2 95%   BMI 32.88 kg/m²   Pain Score: 6   3 Day Weight Change: 1.817 kg (4 lb 0.1 oz) per day    Intake and Output    Intake/Output Summary (Last 24 hours) at 10/18/2023 1209  Last data filed at 10/18/2023 0943  Gross per 24 hour   Intake 120 ml   Output 1150 ml   Net -1030 ml         Physical Exam  Physical Exam  Constitutional:       Appearance: Normal appearance.   HENT:      Head: Normocephalic and atraumatic.      Mouth/Throat:      Mouth: Mucous membranes are moist.   Eyes:      Conjunctiva/sclera: Conjunctivae normal.   Cardiovascular:      Rate and Rhythm: Normal rate and regular rhythm.      Comments: TELE - SR 80s-90s  Wires capped  + [facility] valve click  Pulmonary:      Effort: Pulmonary effort is normal.      Comments: Nonlabored; fair inspir effort and cough; mildly diminished bases; on RA  Abdominal:      General: Bowel sounds are normal.      Palpations: Abdomen is soft.      Comments: + postop BM   Musculoskeletal:         General: Normal range of motion.      Cervical back: Neck supple.      Right lower leg: No edema.      Left lower leg: No " edema.   Skin:     General: Skin is warm and dry.      Comments: INCISION: midsternal, R SVG ALETA, well approx, w/o s/s infection; sternum stable   Neurological:      General: No focal deficit present.      Mental Status: She is alert and oriented to person, place, and time.   Psychiatric:         Mood and Affect: Mood normal.         Behavior: Behavior normal.         Medications  Scheduled medications  acetaminophen, 650 mg, oral, q4h  aspirin, 81 mg, oral, Daily  atorvastatin, 80 mg, oral, Nightly  docusate sodium, 100 mg, oral, BID  FLUoxetine, 40 mg, oral, Daily  fluticasone furoate-vilanteroL, 1 puff, inhalation, Daily  furosemide, 20 mg, oral, Daily  heparin (porcine), 5,000 Units, subcutaneous, q8h  hydrALAZINE, 25 mg, oral, q8h  insulin lispro, 0-5 Units, subcutaneous, TID with meals  iron polysaccharides, 150 mg, oral, Daily  metFORMIN, 500 mg, oral, BID with meals  metoprolol tartrate, 25 mg, oral, BID  multivitamin with minerals, 1 tablet, oral, Daily  pantoprazole, 40 mg, oral, Daily before breakfast  polyethylene glycol, 17 g, oral, TID  warfarin, 3 mg, oral, Once per day on Mon Wed Sat  warfarin, 4.5 mg, oral, Once per day on Sun Tue Thu Fri    PRN medications  PRN medications: bisacodyl, dextrose **OR** glucagon, [DISCONTINUED] ondansetron **OR** ondansetron, oxyCODONE, oxygen    Labs  Results for orders placed or performed during the hospital encounter of 10/13/23 (from the past 24 hour(s))   POCT GLUCOSE   Result Value Ref Range    POCT Glucose 132 (H) 74 - 99 mg/dL   POCT GLUCOSE   Result Value Ref Range    POCT Glucose 114 (H) 74 - 99 mg/dL   POCT GLUCOSE   Result Value Ref Range    POCT Glucose 97 74 - 99 mg/dL   Magnesium   Result Value Ref Range    Magnesium 1.84 1.60 - 2.40 mg/dL   CBC   Result Value Ref Range    WBC 11.2 4.4 - 11.3 x10*3/uL    nRBC 0.0 0.0 - 0.0 /100 WBCs    RBC 3.35 (L) 4.00 - 5.20 x10*6/uL    Hemoglobin 9.7 (L) 12.0 - 16.0 g/dL    Hematocrit 31.1 (L) 36.0 - 46.0 %    MCV 93  80 - 100 fL    MCH 29.0 26.0 - 34.0 pg    MCHC 31.2 (L) 32.0 - 36.0 g/dL    RDW 14.7 (H) 11.5 - 14.5 %    Platelets 294 150 - 450 x10*3/uL    MPV 10.6 7.5 - 11.5 fL   Renal Function Panel   Result Value Ref Range    Glucose 129 (H) 74 - 99 mg/dL    Sodium 140 136 - 145 mmol/L    Potassium 4.0 3.5 - 5.3 mmol/L    Chloride 105 98 - 107 mmol/L    Bicarbonate 25 21 - 32 mmol/L    Anion Gap 14 10 - 20 mmol/L    Urea Nitrogen 16 6 - 23 mg/dL    Creatinine 0.73 0.50 - 1.05 mg/dL    eGFR 90 >60 mL/min/1.73m*2    Calcium 8.5 (L) 8.6 - 10.6 mg/dL    Phosphorus 2.5 2.5 - 4.9 mg/dL    Albumin 3.1 (L) 3.4 - 5.0 g/dL   Protime-INR   Result Value Ref Range    Protime 30.2 (H) 9.8 - 12.8 seconds    INR 2.7 (H) 0.9 - 1.1   POCT GLUCOSE   Result Value Ref Range    POCT Glucose 135 (H) 74 - 99 mg/dL   POCT GLUCOSE   Result Value Ref Range    POCT Glucose 170 (H) 74 - 99 mg/dL             IMPRESSION & PLAN:  POD #5 s/p ascending aorta replacement, CABGx1 on 10/13; h/o 2014 mechanical aortic valve  - Increase activity/ ambulation; PT/OT  - Encourage IS, C/DB; respiratory therapy; wean O2 as ketan -> on RA  - Cardiac rehab referral   - Continue cardiac meds: ASA, BB, statin  - warfarin for prior mechanical aortic valve; goal INR 2-3  - Pain and anticonstipation meds  - 10/17 discontinued chest tubes  - 2v CXR 10/18   - CUT epicardial wires prior to discharge   - removed Prevena POD#5 10/18  - Tele until discharge  - Optimize nutrition and electrolytes    Rhythm  - Tele: SR 80s-90s, occas PVC  - continue metoprolol 25mg bid  - Adjust medications as tolerated  - wires capped    Acute Blood Loss Anemia   Hematocrit   Date Value Ref Range Status   10/18/2023 31.1 (L) 36.0 - 46.0 % Final   10/17/2023 31.1 (L) 36.0 - 46.0 % Final   10/16/2023 30.1 (L) 36.0 - 46.0 % Final   - MV, PO Iron x1mo  - Daily labs, transfuse as indicated    Thrombocytopenia  Platelets   Date Value Ref Range Status   10/18/2023 294 150 - 450 x10*3/uL Final   10/17/2023  223 150 - 450 x10*3/uL Final   10/16/2023 178 150 - 450 x10*3/uL Final   - Etiology likely postop/CPB related  - Continue to trend with daily CBCs    Volume/Electrolyte Status: Preop wt 78.7  - 10/15 ICU 20mg IV lasix x1  - Weight: 81.1, 82.6  - 10/16 lasix 20mg IV x1  - 10/17 daily po lasix added; will discharge on lasix  - Replete electrolytes for hypokalemia/hypomagnesemia/hypophosphatemia as needed - 10/16 replaced K and Mg 10/17 added daily K+& Mg; 10/18 replaced K and Mg  - Daily weights and strict I&Os  - Daily RFP while admitted    Hypertension: home meds: amlodipine 5mg daily, losartan 100mg daily, Toprol XL 25mg daily  Systolic (24hrs), Av , Min:103 , Max:128    - continue hydralazine; change to losartan as tolerated  - continue metoprolol  - additional antihypertensives as needed     Hyperlipidemia - home med rosuvastatin 40mg daily  - continue high intensity statin  - follow up with PCP/cards for ongoing lipid management    COPD:  no PFTs on file  -CXR PRN  -wean NC maintaining SpO2 >92%  -ABGs as needed  -Continue fluticasone furoate-vilanterol  -bronchial hygiene with EZ PAPs     DM:  home meds: metformin XR 500mg BID  Results from last 7 days   Lab Units 10/18/23  1147   POCT GLUCOSE mg/dL 170*     - metformin bid  -accuchecks premeal and at bedtime  -diabetic diet  -lispro premeal corrective scale     Neuro/Psych - h/o brain aneurysm rupture  - continue home Prozac     VTE Prophylaxis: SCDs/TEDs, ambulation, SQ heparin  Code Status: Full Code    Dispo  - PT/OT recs home care at [facility]  - Would benefit from homecare for RN and PT carepath and RN visits  - Anticipate discharge - once INR therapeutic -> INR 2.7; will discharge today  - Will continue to assess discharge needs  - home care referral has been placed      DAMIÁN Gregory-CNP  Cardiac Surgery [facility]  [facility]  Team Pager [phone]

## 2023-10-18 NOTE — DISCHARGE SUMMARY
Cardiac Surgery Inpatient Discharge Summary    BRIEF OVERVIEW  Admitting Provider: Elyse Zamudio APRN-CNP  Discharge Provider: Christopher Hayden MD  Primary Care Physician at Discharge: Jo Catalan [phone]   Cardiologist at Discharge: Pricila Beavers CNP    Admission Date: 10/13/2023     Discharge Date: 10/18/2023    Primary Discharge Diagnosis  Ascending aortic aneurysm  Secondary Discharge Diagnosis  CAD    Discharge Disposition  Home with home care  Code Status at Discharge: Full Code      Outpatient Follow-Up  Future Appointments   Date Time Provider Department Center   10/20/2023 11:40 AM ANTICOAG [facility] [id number] CARD1 COAG CLINIC [id number] [place]   10/30/2023  2:20 PM [facility]DS[facility] [id number] NURSE [id number] [facility]   10/31/2023  3:30 PM Elise Gaming MD [id number] [place]   11/29/2023 11:45 AM Christopher Hayden MD [id number] [facility]   1/26/2024  1:30 PM Jo Catalan DO [id number] [facility]       Test Results Pending at Discharge  Pending Labs       Order Current Status    Surgical Pathology Exam In process          DETAILS OF HOSPITAL STAY    Presenting Problem/History of Present Illness  68 year old female with a PMH significant for mechanical aortic valve replacement in 2014, HTN, DM, COPD and a brain aneurysm rupture. Pt has ascending aortic aneurysm and was referred for dilated ascending aorta . Also has single vessel significant CAD. Admitted for surgery 10/13/2023 with Dr Hayden.    Hospital Course      10/13/2023 Operation Procedures: ana Hayden  #1 a standard median redo sternotomy  #2 innominate trunk arterial cannulation  #3 ascending aorta replacement with a Gelweave 34 mm  #4 aortic root noncoronary sinus plication  #5 CABG x1: SVG to RCA    CTICU - Warfarin started no heparin gtt bridge  Transferred to the floor 10/15    Floor Course:  - Rhythm: SR   - Patient was diuresed for fluid volume overload post cardiac surgery; Preop weight: 78.7kg, discharge wt:  81.1kg  - On ASA, statin, BB, ARB by discharge  - chest tubes removed 10/17  - Epicardial wires CUT on 10/18  - 2v CXR done 10/18  - Cardiac rehab referral was placed  - PT recs home at discharge; walker ordered  - Discharge to home with home care RN and PT    Physical Exam at Discharge  Discharge Condition: stable  Heart Rate: 88  Resp: 18  BP: 130/74  Temp: 36.7 °C (98.1 °F)  Weight: 81.1 kg (178 lb 10.9 oz)  Physical Exam    Discharge Medication List     Medication List      START taking these medications     aspirin 81 mg EC tablet; Take 1 tablet (81 mg) by mouth once daily. Do   not start before October 19, 2023.; Start taking on: October 19, 2023   docusate sodium 100 mg capsule; Commonly known as: Colace; Take 1   capsule (100 mg) by mouth 2 times a day as needed for constipation (hard   stools).   furosemide 20 mg tablet; Commonly known as: Lasix; Take 1 tablet (20 mg)   by mouth once daily for 7 days. Do not start before October 19, 2023.;   Start taking on: October 19, 2023   iron polysaccharides 150 mg iron capsule; Commonly known as:   Nu-Iron,Niferex; Take 1 capsule (150 mg) by mouth once daily. Do not start   before October 19, 2023.; Start taking on: October 19, 2023   magnesium oxide 400 mg tablet; Commonly known as: Mag-Ox; Take 1 tablet   (400 mg) by mouth once daily for 7 days.   multivitamin with minerals tablet; Take 1 tablet by mouth once daily. Do   not start before October 19, 2023.; Start taking on: October 19, 2023   pantoprazole 40 mg EC tablet; Commonly known as: ProtoNix; Take 1 tablet   (40 mg) by mouth once daily in the morning. Take before meals. Do not   crush, chew, or split. Do not start before October 19, 2023.; Start taking   on: October 19, 2023   polyethylene glycol 17 gram packet; Commonly known as: Glycolax,   Miralax; Take 17 g by mouth once daily as needed (constipation). [Miralax]   potassium chloride CR 10 mEq ER tablet; Commonly known as: Klor-Con;   Take 1 tablet (10  mEq) by mouth once daily for 7 days. Do not crush, chew,   or split.     CHANGE how you take these medications     acetaminophen 325 mg tablet; Commonly known as: Tylenol; Take 1-2   tablets (325-650 mg) by mouth every 6 hours if needed for mild pain (1 -   3) or moderate pain (4 - 6).; What changed: when to take this, reasons to   take this   losartan 25 mg tablet; Commonly known as: Cozaar; Take 1 tablet (25 mg)   by mouth once daily.; What changed: medication strength, how much to take   metoprolol succinate XL 50 mg 24 hr tablet; Commonly known as:   Toprol-XL; Take 1 tablet (50 mg) by mouth once daily.; What changed:   medication strength, how much to take     CONTINUE taking these medications     FLUoxetine 40 mg capsule; Commonly known as: PROzac; Take 1 capsule (40   mg) by mouth once daily.   fluticasone-umeclidin-vilanter 100-62.5-25 mcg blister with device;   Commonly known as: TRELEGY-ELLIPTA   Gemtesa 75 mg tablet; Generic drug: vibegron   glucosamine sulfate 1,000 mg tablet   metFORMIN  mg 24 hr tablet; Commonly known as: Glucophage-XR; Take   1 tablet (500 mg) by mouth 2 times a day.   rosuvastatin 40 mg tablet; Commonly known as: Crestor; Take 1 tablet (40   mg) by mouth once daily.   warfarin 3 mg tablet; Commonly known as: Coumadin; Take as directed. If   you are unsure how to take this medication, talk to your nurse or doctor.;   Original instructions: Take 3 mg (1 tablet) Monday, Wednesday and Saturday   Take 4.5 mg (1.5 tablets) Sunday, Tuesday, Thursday and Friday     STOP taking these medications     amLODIPine 5 mg tablet; Commonly known as: Norvasc        On day of discharge, vital signs were stable and no acute distress was noted. All questions were and answered and much support was given. After VS and labs were reviewed it was determined the patient was stable for discharge. Hospital day of discharge management- spent >30 minutes coordinating the discharge and counseling/educating  patient and family regarding discharge instructions.

## 2023-10-19 ENCOUNTER — PATIENT OUTREACH (OUTPATIENT)
Dept: CARE COORDINATION | Facility: CLINIC | Age: 68
End: 2023-10-19
Payer: MEDICARE

## 2023-10-19 ENCOUNTER — HOME HEALTH ADMISSION (OUTPATIENT)
Dept: HOME HEALTH SERVICES | Facility: HOME HEALTH | Age: 68
End: 2023-10-19
Payer: MEDICARE

## 2023-10-19 NOTE — PROGRESS NOTES
Discharge Facility: [facility]  Discharge Diagnosis: CABG  Admission Date: 10/13/23  Discharge Date:  10/18/23    PCP Appointment Date: TBD  Specialist Appointment Date: 10/30/23 Cardiac RN, IRMA Ortega NP  Hospital Encounter and Summary: Linked   See discharge assessment below for further details    Engagement  Call Start Time: 1045 (10/19/2023 10:46 AM)    Medications  Medications reviewed with patient/caregiver?: Yes (10/19/2023 10:46 AM)  Is the patient having any side effects they believe may be caused by any medication additions or changes?: No (10/19/2023 10:46 AM)  Does the patient have all medications ordered at discharge?: Yes (10/19/2023 10:46 AM)  Care Management Interventions: Provided patient education (10/19/2023 10:46 AM)  Prescription Comments: stopped amlodipine, change losartan to 25mg daily, change metoprolol to succinate 50mg daily, startSTART taking these medications     aspirin 81 mg EC tablet; Take 1 tablet (81 mg) by mouth once daily. Do   not start before October 19, 2023.; Start taking on: October 19, 2023   docusate sodium 100 mg capsule; Commonly known as: Colace; Take 1   capsule (100 mg) by mouth 2 times a day as needed for constipation (hard   stools).   furosemide 20 mg tablet; Commonly known as: Lasix; Take 1 tablet (20 mg)   by mouth once daily for 7 days. Do not start before October 19, 2023.;   Start taking on: October 19, 2023   iron polysaccharides 150 mg iron capsule; Commonly known as:   Nu-Iron,Niferex; Take 1 capsule (150 mg) by mouth once daily. Do not start   before October 19, 2023.; Start taking on: October 19, 2023   magnesium oxide 400 mg tablet; Commonly known as: Mag-Ox; Take 1 tablet   (400 mg) by mouth once daily for 7 days.   multivitamin with minerals tablet; Take 1 tablet by mouth once daily. Do   not start before October 19, 2023.; Start taking on: October 19, 2023   pantoprazole 40 mg EC tablet; Commonly known as: ProtoNix; Take 1 tablet   (40 mg) by mouth  once daily in the morning. Take before meals. Do not   crush, chew, or split. Do not start before October 19, 2023.; Start taking   on: October 19, 2023   polyethylene glycol 17 gram packet; Commonly known as: Glycolax,   Miralax; Take 17 g by mouth once daily as needed (constipation). (Miralax)   potassium chloride CR 10 mEq ER tablet; Commonly known as: Klor-Con;   Take 1 tablet (10 mEq) by mouth once daily for 7 days. Do not crush, chew,   or split. (10/19/2023 10:46 AM)  Is the patient taking all medications as directed (includes completed medication regime)?: Yes (10/19/2023 10:46 AM)  Care Management Interventions: Provided patient education (10/19/2023 10:46 AM)  Medication Comments: Patient states her daughter picked up her medications, will call with any concerns. (10/19/2023 10:46 AM)    Appointments  Does the patient have a primary care provider?: Yes (10/19/2023 10:46 AM)  Care Management Interventions: Educated patient on importance of making appointment (10/19/2023 10:46 AM)  Has the patient kept scheduled appointments due by today?: Yes (10/19/2023 10:46 AM)  Care Management Interventions: Advised to schedule with specialist (10/19/2023 10:46 AM)    Self Management  What is the home health agency?: [facility] (10/19/2023 10:46 AM)  Has home health visited the patient within 72 hours of discharge?: Call prior to 72 hours (10/19/2023 10:46 AM)  What Durable Medical Equipment (DME) was ordered?: walker (10/19/2023 10:46 AM)  Has all Durable Medical Equipment (DME) been delivered?: Yes (10/19/2023 10:46 AM)    Patient Teaching  Does the patient have access to their discharge instructions?: Yes (10/19/2023 10:46 AM)  Care Management Interventions: Reviewed instructions with patient (10/19/2023 10:46 AM)  What is the patient's perception of their health status since discharge?: Improving (10/19/2023 10:46 AM)  Is the patient/caregiver able to teach back the hierarchy of who to call/visit for symptoms/problems?  PCP, Specialist, Home Health nurse, Urgent Care, ED, 911: Yes (10/19/2023 10:46 AM)  Patient/Caregiver Education Comments: No immediate questions on activity limitations, her daughter is helping her and reviewing discharge paperwork. (10/19/2023 10:46 AM)    Wrap Up  Wrap Up Additional Comments: She will call to set up appt with Pricila Ortega NP and see coumadin clinic tomorrow (10/19/2023 10:46 AM)  Call End Time: 1052 (10/19/2023 10:46 AM)

## 2023-10-20 ENCOUNTER — ANTICOAGULATION - WARFARIN VISIT (OUTPATIENT)
Dept: CARDIOLOGY | Facility: CLINIC | Age: 68
DRG: 220 | End: 2023-10-20
Payer: MEDICARE

## 2023-10-20 DIAGNOSIS — Z79.01 LONG TERM (CURRENT) USE OF ANTICOAGULANTS: ICD-10-CM

## 2023-10-20 DIAGNOSIS — Z95.2 AORTIC VALVE REPLACED: Primary | ICD-10-CM

## 2023-10-20 LAB
POC INR: 2.1
POC PROTHROMBIN TIME: NORMAL

## 2023-10-20 PROCEDURE — 85610 PROTHROMBIN TIME: CPT | Mod: QW

## 2023-10-20 PROCEDURE — 99211 OFF/OP EST MAY X REQ PHY/QHP: CPT | Mod: 27 | Performed by: NURSE PRACTITIONER

## 2023-10-20 NOTE — TELEPHONE ENCOUNTER
Melba Mendoza CMA 10/19/2023 11:11 AM EDT    ------------------------------------  Left Message to call back

## 2023-10-20 NOTE — TELEPHONE ENCOUNTER
----- Message from Isabelle Vela RN sent at 10/19/2023 11:01 AM EDT -----  Regarding: hosp fu  This patient was discharged from: [facility]  Discharge diagnosis:   CABG  Date of discharge:      10/18/23  No PCP appointments available within 14 days of discharge.   Please reach out to patient and schedule an appointment within 7-13 days from discharge date. Thank you!

## 2023-10-20 NOTE — PROGRESS NOTES
Patient identification verified with 2 identifiers.    Location: [facility] - [address] [place], [place] [address] [phone]     Referring Physician: Kiera Bishop CNP  Enrollment/ Re-enrollment date: 23   INR Goal: 2.5-3.5  INR monitoring is per [facility] protocol.  Anticoagulation Medication: warfarin  Indication:  Aortic Valve Replacement    Subjective   Bleeding signs/symptoms: No    Bruising: No   Major bleeding event: No  Thrombosis signs/symptoms: No  Thromboembolic event: No  Missed doses: No  Extra doses: No  Medication changes: No  Dietary changes: No  Change in health: No  Change in activity: No  Alcohol: No  Other concerns: No    Upcoming Surgeries:  Does the Patient Have any upcoming surgeries that require interruption in anticoagulation therapy? no  Does the patient require bridging? no      Anticoagulation Summary  As of 10/20/2023      INR goal:  2.5-3.5   TTR:  --   INR used for dosin.10 (10/20/2023)   Weekly warfarin total:  27 mg               Assessment/Plan   Subtherapeutic Pt had a CABG on 10/13/20.  According to patient's daughter, patient has not had any Coumadin since before the surgery.  Patient was discharged 2 days ago(10/18/23), and was told to not take any Coumadin until her follow-up today, yet her INR is 2.1.  Told patient to maintain dose and she will return in 4 days.  Patient states no new meds were added, and patient is not on any pain meds.    1. New dose: no change    2. Next INR:  4 days      Education provided to patient during the visit:  Patient instructed to call in interim with questions, concerns and changes.

## 2023-10-23 DIAGNOSIS — E66.9 CLASS 1 OBESITY WITH BODY MASS INDEX (BMI) OF 31.0 TO 31.9 IN ADULT, UNSPECIFIED OBESITY TYPE, UNSPECIFIED WHETHER SERIOUS COMORBIDITY PRESENT: ICD-10-CM

## 2023-10-23 DIAGNOSIS — F41.9 ANXIETY: ICD-10-CM

## 2023-10-24 ENCOUNTER — HOME HEALTH ADMISSION (OUTPATIENT)
Dept: HOME HEALTH SERVICES | Facility: HOME HEALTH | Age: 68
End: 2023-10-24
Payer: MEDICARE

## 2023-10-24 ENCOUNTER — OFFICE VISIT (OUTPATIENT)
Dept: PRIMARY CARE | Facility: CLINIC | Age: 68
End: 2023-10-24
Payer: MEDICARE

## 2023-10-24 ENCOUNTER — ANTICOAGULATION - WARFARIN VISIT (OUTPATIENT)
Dept: CARDIOLOGY | Facility: CLINIC | Age: 68
End: 2023-10-24
Payer: MEDICARE

## 2023-10-24 VITALS
DIASTOLIC BLOOD PRESSURE: 68 MMHG | SYSTOLIC BLOOD PRESSURE: 105 MMHG | BODY MASS INDEX: 32.88 KG/M2 | WEIGHT: 178.7 LBS | RESPIRATION RATE: 16 BRPM | HEART RATE: 77 BPM | TEMPERATURE: 97.3 F | OXYGEN SATURATION: 96 %

## 2023-10-24 DIAGNOSIS — L21.9 SEBORRHEIC DERMATITIS: ICD-10-CM

## 2023-10-24 DIAGNOSIS — Z98.890 H/O THORACIC AORTIC ANEURYSM REPAIR: ICD-10-CM

## 2023-10-24 DIAGNOSIS — Z95.1 S/P SINGLE VESSEL CORONARY ARTERY BYPASS: ICD-10-CM

## 2023-10-24 DIAGNOSIS — R41.3 MEMORY IMPAIRMENT: ICD-10-CM

## 2023-10-24 DIAGNOSIS — Z95.2 AORTIC VALVE REPLACED: Primary | ICD-10-CM

## 2023-10-24 DIAGNOSIS — Z86.79 H/O THORACIC AORTIC ANEURYSM REPAIR: ICD-10-CM

## 2023-10-24 DIAGNOSIS — Z00.00 HEALTH CARE MAINTENANCE: Primary | ICD-10-CM

## 2023-10-24 DIAGNOSIS — Z79.01 LONG TERM (CURRENT) USE OF ANTICOAGULANTS: ICD-10-CM

## 2023-10-24 LAB
POC INR: 2.8
POC PROTHROMBIN TIME: NORMAL

## 2023-10-24 PROCEDURE — 3078F DIAST BP <80 MM HG: CPT | Performed by: FAMILY MEDICINE

## 2023-10-24 PROCEDURE — 1159F MED LIST DOCD IN RCRD: CPT | Performed by: FAMILY MEDICINE

## 2023-10-24 PROCEDURE — 99214 OFFICE O/P EST MOD 30 MIN: CPT | Performed by: FAMILY MEDICINE

## 2023-10-24 PROCEDURE — 99211 OFF/OP EST MAY X REQ PHY/QHP: CPT | Performed by: NURSE PRACTITIONER

## 2023-10-24 PROCEDURE — 1160F RVW MEDS BY RX/DR IN RCRD: CPT | Performed by: FAMILY MEDICINE

## 2023-10-24 PROCEDURE — 3074F SYST BP LT 130 MM HG: CPT | Performed by: FAMILY MEDICINE

## 2023-10-24 PROCEDURE — 1125F AMNT PAIN NOTED PAIN PRSNT: CPT | Performed by: FAMILY MEDICINE

## 2023-10-24 PROCEDURE — 1111F DSCHRG MED/CURRENT MED MERGE: CPT | Performed by: FAMILY MEDICINE

## 2023-10-24 PROCEDURE — 3008F BODY MASS INDEX DOCD: CPT | Performed by: FAMILY MEDICINE

## 2023-10-24 PROCEDURE — 3052F HG A1C>EQUAL 8.0%<EQUAL 9.0%: CPT | Performed by: FAMILY MEDICINE

## 2023-10-24 PROCEDURE — 4010F ACE/ARB THERAPY RXD/TAKEN: CPT | Performed by: FAMILY MEDICINE

## 2023-10-24 PROCEDURE — 85610 PROTHROMBIN TIME: CPT | Mod: QW

## 2023-10-24 RX ORDER — KETOCONAZOLE 20 MG/G
CREAM TOPICAL 2 TIMES DAILY
Qty: 30 G | Refills: 1 | Status: SHIPPED | OUTPATIENT
Start: 2023-10-24 | End: 2024-10-23

## 2023-10-24 RX ORDER — FLUOXETINE HYDROCHLORIDE 40 MG/1
40 CAPSULE ORAL DAILY
Qty: 90 CAPSULE | Refills: 1 | Status: SHIPPED | OUTPATIENT
Start: 2023-10-24 | End: 2024-01-10 | Stop reason: WASHOUT

## 2023-10-24 RX ORDER — ROSUVASTATIN CALCIUM 40 MG/1
40 TABLET, COATED ORAL DAILY
Qty: 90 TABLET | Refills: 1 | Status: SHIPPED | OUTPATIENT
Start: 2023-10-24 | End: 2023-12-14 | Stop reason: SDUPTHER

## 2023-10-24 ASSESSMENT — PAIN SCALES - GENERAL: PAINLEVEL: 6

## 2023-10-24 NOTE — PROGRESS NOTES
Subjective   Patient ID: Suze Najera is a 68 y.o. female who presents for Post-op (Cardiac vein transplant at [facility]).    HPI   Rayne was seen today, daughter Veronica present, for hospital follow-up, status post aortic arch aneurysm repair, single-vessel bypass (saphenous vein graft to right coronary artery).  Hospital records reviewed, other than feeling fatigued, she is overall doing well, eating and drinking, bowel movements and urinary pattern fine also.  Sternotomy pain is minimal at this point, right medial leg saphenous vein incision is actually more painful.  She is using ice to her chest, and leg.  Remains on warfarin, had an INR today at the Coumadin clinic, was 2.8.  Medication(s) are being taken and tolerated as prescribed, without concerns, list reconciled today.  She will be off of Lasix and potassium in another day or so.  Amlodipine was discontinued during her hospitalization.    Suze is essentially homebound, needs assistance/transportation to leave the home, i.e. get to appointments, etc.  They wonder about the option of home health care, as she at times is a bit confused about her medication.  Her daughter Veronica has been staying with her, though has to go back to work tomorrow.  They do not believe she needs physical or occupational therapy.  She does have memory impairment, has worsened postoperatively.  Earlier this year Suze was evaluated at the [facility], they have yet to have follow-up or get the results of the comprehensive work-up.  They do not believe she needs PT or OT.  Review of Systems  The full, 10+ multi-organ review of systems, is within normal limits with the exception of what is noted above in HPI.  Objective   /68 (BP Location: Right arm, Patient Position: Sitting, BP Cuff Size: Adult)   Pulse 77   Temp 36.3 °C (97.3 °F) (Temporal)   Resp 16   Wt 81.1 kg (178 lb 11.2 oz)   SpO2 96%   BMI 32.88 kg/m²     Physical Exam  Cardiac exam  reveals a regular rate and rhythm, loud murmur present.   Lungs are clear bilaterally.    1+ right lower extremity edema present.  Trace left lower extremity edema present  She is pleasant, alert, oriented.  Assessment/Plan     Status post single-vessel CABG (RCA) and aortic arch aneurysm repair, recovering in the expected fashion.  Please see HPI for details.  She will keep regular follow-up with cardiology and cardiothoracic surgery, call with problems in the meantime.    Home health care ordered as noted.    Memory impairment, awaiting [facility] evaluation results.  They will follow-up there as soon as they can.    Seborrheic dermatitis, facial, forehead, ketoconazole prescribed, also discussed possibly using over-the-counter Nizoral (ketoconazole) 1% shampoo on the face.    Follow-up as scheduled    **Portions of this medical record have been created using voice recognition software and may have minor errors which are inherent in voice recognition systems. It has not been fully edited for typographical or grammatical errors**

## 2023-10-24 NOTE — PROGRESS NOTES
Patient identification verified with 2 identifiers.    Location: [facility] - [address] [place], [place] [id number] [phone]     Referring Physician: CORWIN Bunch   Enrollment/ Re-enrollment date: 2023   INR Goal: 2.5-3.5  INR monitoring is per [facility] protocol.  Anticoagulation Medication: warfarin  Indication:  Aortic Valve Replacement    Subjective   Bleeding signs/symptoms: No    Bruising: No   Major bleeding event: No  Thrombosis signs/symptoms: No  Thromboembolic event: No  Missed doses: No  Extra doses: No  Medication changes: No  Dietary changes: No  Change in health: No  Change in activity: No  Alcohol: No  Other concerns: No    Upcoming Surgeries:  Does the Patient Have any upcoming surgeries that require interruption in anticoagulation therapy? no  Does the patient require bridging? no      Anticoagulation Summary  As of 10/24/2023      INR goal:  2.5-3.5   TTR:  42.9 % (4 d)   INR used for dosin.80 (10/24/2023)   Weekly warfarin total:  27 mg               Assessment/Plan   Therapeutic     1. New dose: no change    2. Next INR:  two days      Education provided to patient during the visit:  Patient instructed to call in interim with questions, concerns and changes.

## 2023-10-25 LAB
LABORATORY COMMENT REPORT: NORMAL
PATH REPORT.FINAL DX SPEC: NORMAL
PATH REPORT.GROSS SPEC: NORMAL
PATH REPORT.RELEVANT HX SPEC: NORMAL
PATH REPORT.TOTAL CANCER: NORMAL

## 2023-10-26 ENCOUNTER — ANTICOAGULATION - WARFARIN VISIT (OUTPATIENT)
Dept: CARDIOLOGY | Facility: CLINIC | Age: 68
End: 2023-10-26
Payer: MEDICARE

## 2023-10-26 DIAGNOSIS — Z79.01 LONG TERM (CURRENT) USE OF ANTICOAGULANTS: ICD-10-CM

## 2023-10-26 DIAGNOSIS — Z95.2 AORTIC VALVE REPLACED: Primary | ICD-10-CM

## 2023-10-26 LAB
POC INR: 1.6
POC PROTHROMBIN TIME: NORMAL

## 2023-10-26 PROCEDURE — 99211 OFF/OP EST MAY X REQ PHY/QHP: CPT | Mod: 27 | Performed by: NURSE PRACTITIONER

## 2023-10-26 PROCEDURE — 85610 PROTHROMBIN TIME: CPT | Mod: QW

## 2023-10-26 NOTE — PROGRESS NOTES
Patient identification verified with 2 identifiers.    Location: [facility] - [address] [place], [place] [address] [phone]     Referring Physician: CORWIN Bunch   Enrollment/ Re-enrollment date: 2023   INR Goal: 2.5-3.5  INR monitoring is per [facility] protocol.  Anticoagulation Medication: warfarin  Indication: Aortic Valve Replacement    Subjective   Bleeding signs/symptoms: No    Bruising: No   Major bleeding event: No  Thrombosis signs/symptoms: No  Thromboembolic event: No  Missed doses: No  Extra doses: No  Medication changes: No  Dietary changes: No  Change in health: No  Change in activity: No  Alcohol: No  Other concerns: No    Upcoming Surgeries:  Does the Patient Have any upcoming surgeries that require interruption in anticoagulation therapy? no  Does the patient require bridging? no      Anticoagulation Summary  As of 10/26/2023      INR goal:  2.5-3.5   TTR:  38.4 % (6 d)   INR used for dosin.60 (10/26/2023)   Weekly warfarin total:  27 mg               Assessment/Plan   Subtherapeutic and INR less than 2, notified: Pricila Beavers CNP      1. New dose:  Per Pricila Beavers, patient is to get a one time dose today and maintain currently dose. RTC on Tuesday Oct 31     2. Next INR:  5 days      Education provided to patient during the visit:  Patient instructed to call in interim with questions, concerns and changes.   Patient educated on dietary consistency in vitamin k consumption.

## 2023-10-27 ENCOUNTER — HOME CARE VISIT (OUTPATIENT)
Dept: HOME HEALTH SERVICES | Facility: HOME HEALTH | Age: 68
End: 2023-10-27
Payer: MEDICARE

## 2023-10-27 PROCEDURE — 169592 NO-PAY CLAIM PROCEDURE

## 2023-10-27 PROCEDURE — 0023 HH SOC

## 2023-10-27 PROCEDURE — G0299 HHS/HOSPICE OF RN EA 15 MIN: HCPCS | Mod: HHH

## 2023-10-27 PROCEDURE — 1090000002 HH PPS REVENUE DEBIT

## 2023-10-27 PROCEDURE — 1090000001 HH PPS REVENUE CREDIT

## 2023-10-28 VITALS
SYSTOLIC BLOOD PRESSURE: 125 MMHG | HEIGHT: 62 IN | DIASTOLIC BLOOD PRESSURE: 70 MMHG | HEART RATE: 78 BPM | WEIGHT: 177 LBS | RESPIRATION RATE: 18 BRPM | TEMPERATURE: 97.2 F | BODY MASS INDEX: 32.57 KG/M2 | OXYGEN SATURATION: 98 %

## 2023-10-28 PROCEDURE — 1090000001 HH PPS REVENUE CREDIT

## 2023-10-28 PROCEDURE — 1090000002 HH PPS REVENUE DEBIT

## 2023-10-28 ASSESSMENT — ENCOUNTER SYMPTOMS
PAIN: PATIENT STATES THAT SHE HAS NOT HAD ANY PAIN IN THE LAST 24 HOURS.
PAIN SEVERITY GOAL: 0/10
HYPERTENSION: 1
FATIGUES EASILY: 1
PERSON REPORTING PAIN: PATIENT
HIGHEST PAIN SEVERITY IN PAST 24 HOURS: 0/10
DENIES PAIN: 1
CHANGE IN APPETITE: UNCHANGED
APPETITE LEVEL: FAIR
LOWEST PAIN SEVERITY IN PAST 24 HOURS: 0/10

## 2023-10-28 ASSESSMENT — LIFESTYLE VARIABLES: SMOKING_STATUS: 0

## 2023-10-29 PROCEDURE — 1090000001 HH PPS REVENUE CREDIT

## 2023-10-29 PROCEDURE — 1090000002 HH PPS REVENUE DEBIT

## 2023-10-29 ASSESSMENT — ACTIVITIES OF DAILY LIVING (ADL)
PREPARING MEALS: NEEDS ASSISTANCE
PHYSICAL TRANSFERS ASSESSED: 1
DRESSING_UB_CURRENT_FUNCTION: STAND BY ASSIST
BATHING ASSESSED: 1
BATHING_CURRENT_FUNCTION: STAND BY ASSIST
TOILETING: STAND BY ASSIST
AMBULATION ASSISTANCE: STAND BY ASSIST
GROOMING_CURRENT_FUNCTION: STAND BY ASSIST
FEEDING: STAND BY ASSIST
GROOMING ASSESSED: 1
AMBULATION ASSISTANCE: 1
DRESSING_LB_CURRENT_FUNCTION: STAND BY ASSIST
FEEDING ASSESSED: 1
TOILETING: 1
CURRENT_FUNCTION: STAND BY ASSIST

## 2023-10-29 ASSESSMENT — ENCOUNTER SYMPTOMS: OCCASIONAL FEELINGS OF UNSTEADINESS: 0

## 2023-10-29 NOTE — PROGRESS NOTES
Contacting Suze by phone status post 10/13/23 redo ascending aorta [id number]. Doing well at home lives with her daughter. Weight today 175.7 lbs. Stated incisions intact, no peripheral edema, sleeping well at night. Confirmed follow up appointment with Dr. Hayden and chest xray.  Margo Corcoran RN

## 2023-10-30 ENCOUNTER — TELEMEDICINE CLINICAL SUPPORT (OUTPATIENT)
Dept: CARDIAC SURGERY | Facility: HOSPITAL | Age: 68
End: 2023-10-30
Payer: MEDICARE

## 2023-10-30 DIAGNOSIS — Z09 POSTOP CHECK: ICD-10-CM

## 2023-10-30 PROCEDURE — 1090000001 HH PPS REVENUE CREDIT

## 2023-10-30 PROCEDURE — 1090000002 HH PPS REVENUE DEBIT

## 2023-10-31 ENCOUNTER — APPOINTMENT (OUTPATIENT)
Dept: OBSTETRICS AND GYNECOLOGY | Facility: CLINIC | Age: 68
End: 2023-10-31
Payer: MEDICARE

## 2023-10-31 ENCOUNTER — ANTICOAGULATION - WARFARIN VISIT (OUTPATIENT)
Dept: CARDIOLOGY | Facility: CLINIC | Age: 68
End: 2023-10-31
Payer: MEDICARE

## 2023-10-31 DIAGNOSIS — Z95.2 AORTIC VALVE REPLACED: Primary | ICD-10-CM

## 2023-10-31 DIAGNOSIS — Z79.01 LONG TERM (CURRENT) USE OF ANTICOAGULANTS: ICD-10-CM

## 2023-10-31 LAB
POC INR: 1.9
POC PROTHROMBIN TIME: NORMAL

## 2023-10-31 PROCEDURE — 1090000001 HH PPS REVENUE CREDIT

## 2023-10-31 PROCEDURE — 99211 OFF/OP EST MAY X REQ PHY/QHP: CPT | Performed by: NURSE PRACTITIONER

## 2023-10-31 PROCEDURE — 1090000002 HH PPS REVENUE DEBIT

## 2023-10-31 PROCEDURE — 85610 PROTHROMBIN TIME: CPT | Mod: QW

## 2023-10-31 NOTE — PROGRESS NOTES
Patient identification verified with 2 identifiers.    Location: [facility] - [address] [place], [place] [address] [phone]     Referring Physician: CORWIN Bunch   Enrollment/ Re-enrollment date: 2023   INR Goal: 2.5-3.5  INR monitoring is per [facility] protocol.  Anticoagulation Medication: warfarin  Indication: Aortic Valve Replacement    Subjective   Bleeding signs/symptoms: No    Bruising: No   Major bleeding event: No  Thrombosis signs/symptoms: No  Thromboembolic event: No  Missed doses: No  Extra doses: No  Medication changes: No  Dietary changes: No  Change in health: No  Change in activity: No  Alcohol: No  Other concerns: No    Upcoming Surgeries:  Does the Patient Have any upcoming surgeries that require interruption in anticoagulation therapy? no  Does the patient require bridging? no      Anticoagulation Summary  As of 10/31/2023      INR goal:  2.5-3.5   TTR:  20.1 % (1.6 wk)   INR used for dosin.90 (10/31/2023)   Weekly warfarin total:  27 mg               Assessment/Plan   Subtherapeutic     1. New dose: take additional 3 mg tonight  2. Next INR:  3 days      Education provided to patient during the visit:  Patient instructed to call in interim with questions, concerns and changes.

## 2023-11-01 ENCOUNTER — PATIENT OUTREACH (OUTPATIENT)
Dept: CARE COORDINATION | Facility: CLINIC | Age: 68
End: 2023-11-01
Payer: MEDICARE

## 2023-11-01 PROCEDURE — 1090000001 HH PPS REVENUE CREDIT

## 2023-11-01 PROCEDURE — 1090000002 HH PPS REVENUE DEBIT

## 2023-11-01 NOTE — PROGRESS NOTES
Unable to reach patient for call back after patient's follow up appointment with PCP.   SAMM with call back number for patient to call if needed   If no voicemail available call attempts x 2 were made to contact the patient to assist with any questions or concerns patient may have.

## 2023-11-02 ENCOUNTER — HOME CARE VISIT (OUTPATIENT)
Dept: HOME HEALTH SERVICES | Facility: HOME HEALTH | Age: 68
End: 2023-11-02
Payer: MEDICARE

## 2023-11-02 VITALS — SYSTOLIC BLOOD PRESSURE: 110 MMHG | TEMPERATURE: 97.3 F | HEART RATE: 80 BPM | DIASTOLIC BLOOD PRESSURE: 68 MMHG

## 2023-11-02 PROCEDURE — 1090000002 HH PPS REVENUE DEBIT

## 2023-11-02 PROCEDURE — G0299 HHS/HOSPICE OF RN EA 15 MIN: HCPCS | Mod: HHH

## 2023-11-02 PROCEDURE — 1090000001 HH PPS REVENUE CREDIT

## 2023-11-02 SDOH — ECONOMIC STABILITY: GENERAL

## 2023-11-02 ASSESSMENT — ENCOUNTER SYMPTOMS
CHANGE IN APPETITE: UNCHANGED
PAIN: 1
PAIN LOCATION: CHEST
HIGHEST PAIN SEVERITY IN PAST 24 HOURS: 3/10
SUBJECTIVE PAIN PROGRESSION: UNCHANGED
PAIN LOCATION - PAIN DURATION: CONSTANT
PAIN LOCATION - PAIN FREQUENCY: CONSTANT
DIZZINESS: 1
PAIN SEVERITY GOAL: 0/10
DIARRHEA: 1
LOWEST PAIN SEVERITY IN PAST 24 HOURS: 0/10
STOOL FREQUENCY: DAILY
PAIN LOCATION - PAIN QUALITY: SORE
PERSON REPORTING PAIN: PATIENT
LAST BOWEL MOVEMENT: 66779
APPETITE LEVEL: GOOD
MUSCLE WEAKNESS: 1
OCCASIONAL FEELINGS OF UNSTEADINESS: 0
PAIN LOCATION - PAIN SEVERITY: 3/10

## 2023-11-02 ASSESSMENT — PAIN SCALES - PAIN ASSESSMENT IN ADVANCED DEMENTIA (PAINAD)
BREATHING: 0
NEGVOCALIZATION: 0
BODYLANGUAGE: 0 - RELAXED.
CONSOLABILITY: 0 - NO NEED TO CONSOLE.
CONSOLABILITY: 0
FACIALEXPRESSION: 0 - SMILING OR INEXPRESSIVE.
TOTALSCORE: 0
BODYLANGUAGE: 0
NEGVOCALIZATION: 0 - NONE.
FACIALEXPRESSION: 0

## 2023-11-02 ASSESSMENT — ACTIVITIES OF DAILY LIVING (ADL)
MONEY MANAGEMENT (EXPENSES/BILLS): INDEPENDENT
HOUSEKEEPING ASSESSED: 1

## 2023-11-02 NOTE — HOME HEALTH
With this SN visit, patient presents alert and oriented x 3 and was pleasant and cooperative. Patient sat on couch in living room for asssessment.  The skill of a nurse is required for Aftercare following open heart surgery. Instructed patient regarding protocol of open heart surgery of no lifting over 5 lbs the entire 2 months afterwards, no driving, no riding in the front seat because of the air bags, keep legs elevated when sitting, use incentive spirometer while watching TV, and weigh self daily. Also cautioned patient not to reach for things out of reach. Patient verbalized understanding of all instructions.

## 2023-11-03 ENCOUNTER — ANTICOAGULATION - WARFARIN VISIT (OUTPATIENT)
Dept: CARDIOLOGY | Facility: CLINIC | Age: 68
End: 2023-11-03
Payer: MEDICARE

## 2023-11-03 DIAGNOSIS — Z95.2 AORTIC VALVE REPLACED: Primary | ICD-10-CM

## 2023-11-03 DIAGNOSIS — Z79.01 LONG TERM (CURRENT) USE OF ANTICOAGULANTS: ICD-10-CM

## 2023-11-03 LAB
POC INR: 2.3
POC PROTHROMBIN TIME: NORMAL

## 2023-11-03 PROCEDURE — 85610 PROTHROMBIN TIME: CPT | Mod: QW

## 2023-11-03 PROCEDURE — 99211 OFF/OP EST MAY X REQ PHY/QHP: CPT | Performed by: NURSE PRACTITIONER

## 2023-11-03 PROCEDURE — 1090000001 HH PPS REVENUE CREDIT

## 2023-11-03 PROCEDURE — 1090000002 HH PPS REVENUE DEBIT

## 2023-11-03 NOTE — PROGRESS NOTES
Patient identification verified with 2 identifiers.    Location: [facility] - [address] [place], [place] [address] [phone]     Referring Physician: CORWIN Bunch   Enrollment/ Re-enrollment date: 2023   INR Goal: 2.5-3.5  INR monitoring is per [facility] protocol.  Anticoagulation Medication: warfarin  Indication: Aortic Valve Replacement    Subjective   Bleeding signs/symptoms: No    Bruising: No   Major bleeding event: No  Thrombosis signs/symptoms: No  Thromboembolic event: No  Missed doses: No  Extra doses: Yes  extra dose on 10/31  Medication changes: No  Dietary changes: No  Change in health: No  Change in activity: No  Alcohol: No  Other concerns: No    Upcoming Surgeries:  Does the Patient Have any upcoming surgeries that require interruption in anticoagulation therapy? no  Does the patient require bridging? no      Anticoagulation Summary  As of 11/3/2023      INR goal:  2.5-3.5   TTR:  16.0 % (2 wk)   INR used for dosin.30 (11/3/2023)   Weekly warfarin total:  27 mg               Assessment/Plan   Subtherapeutic     1. New dose: no change    2. Next INR:  4 Days       Education provided to patient during the visit:  Patient instructed to call in interim with questions, concerns and changes.   Patient educated on interactions between medications and warfarin.   Patient educated on dietary consistency in vitamin k consumption.   Patient educated on affects of alcohol consumption while taking warfarin.   Patient educated on signs of bleeding/clotting.

## 2023-11-04 PROCEDURE — 1090000002 HH PPS REVENUE DEBIT

## 2023-11-04 PROCEDURE — 1090000001 HH PPS REVENUE CREDIT

## 2023-11-05 PROCEDURE — 1090000001 HH PPS REVENUE CREDIT

## 2023-11-05 PROCEDURE — 1090000002 HH PPS REVENUE DEBIT

## 2023-11-06 PROCEDURE — 1090000001 HH PPS REVENUE CREDIT

## 2023-11-06 PROCEDURE — 1090000002 HH PPS REVENUE DEBIT

## 2023-11-07 ENCOUNTER — ANTICOAGULATION - WARFARIN VISIT (OUTPATIENT)
Dept: CARDIOLOGY | Facility: CLINIC | Age: 68
End: 2023-11-07
Payer: MEDICARE

## 2023-11-07 DIAGNOSIS — Z79.01 LONG TERM (CURRENT) USE OF ANTICOAGULANTS: ICD-10-CM

## 2023-11-07 DIAGNOSIS — Z95.2 AORTIC VALVE REPLACED: Primary | ICD-10-CM

## 2023-11-07 LAB
POC INR: 2.5
POC PROTHROMBIN TIME: NORMAL

## 2023-11-07 PROCEDURE — 99211 OFF/OP EST MAY X REQ PHY/QHP: CPT | Performed by: NURSE PRACTITIONER

## 2023-11-07 PROCEDURE — 1090000001 HH PPS REVENUE CREDIT

## 2023-11-07 PROCEDURE — 1090000002 HH PPS REVENUE DEBIT

## 2023-11-07 PROCEDURE — 85610 PROTHROMBIN TIME: CPT | Mod: QW

## 2023-11-07 NOTE — PROGRESS NOTES
Patient identification verified with 2 identifiers.    Location: [facility] - [address] [place], [place] [address] [phone]     Referring Physician: CORWIN Kilgore   Enrollment/ Re-enrollment date: 11/11/2023   INR Goal: 2.5-3.5  INR monitoring is per [facility] protocol.  Anticoagulation Medication: warfarin  Indication: Aortic Valve Replacement    Subjective   Bleeding signs/symptoms: No    Bruising: No   Major bleeding event: No  Thrombosis signs/symptoms: No  Thromboembolic event: No  Missed doses: No  Extra doses: No  Medication changes: No  Dietary changes: No  Change in health: No  Change in activity: No  Alcohol: No  Other concerns: No    Upcoming Surgeries:  Does the Patient Have any upcoming surgeries that require interruption in anticoagulation therapy? no  Does the patient require bridging? no      Anticoagulation Summary  As of 11/7/2023      INR goal:  2.5-3.5   TTR:  16.0 % (2 wk)   INR used for dosing:                 Assessment/Plan   Therapeutic     1. New dose:  3 mg Mon and Fri; 4.5 mg other days     2. Next INR: 1 week      Education provided to patient during the visit:  Patient instructed to call in interim with questions, concerns and changes.   Patient educated on interactions between medications and warfarin.

## 2023-11-08 ENCOUNTER — HOME CARE VISIT (OUTPATIENT)
Dept: HOME HEALTH SERVICES | Facility: HOME HEALTH | Age: 68
End: 2023-11-08
Payer: MEDICARE

## 2023-11-08 VITALS
WEIGHT: 176 LBS | OXYGEN SATURATION: 98 % | HEART RATE: 76 BPM | TEMPERATURE: 97.4 F | RESPIRATION RATE: 18 BRPM | BODY MASS INDEX: 32.19 KG/M2 | SYSTOLIC BLOOD PRESSURE: 115 MMHG | DIASTOLIC BLOOD PRESSURE: 70 MMHG

## 2023-11-08 DIAGNOSIS — I10 BENIGN ESSENTIAL HYPERTENSION: ICD-10-CM

## 2023-11-08 PROCEDURE — 1090000002 HH PPS REVENUE DEBIT

## 2023-11-08 PROCEDURE — 1090000001 HH PPS REVENUE CREDIT

## 2023-11-08 PROCEDURE — G0299 HHS/HOSPICE OF RN EA 15 MIN: HCPCS | Mod: HHH

## 2023-11-08 RX ORDER — LOSARTAN POTASSIUM 25 MG/1
25 TABLET ORAL DAILY
Qty: 30 TABLET | Refills: 0 | Status: SHIPPED | OUTPATIENT
Start: 2023-11-08 | End: 2023-11-14 | Stop reason: SDUPTHER

## 2023-11-08 SDOH — ECONOMIC STABILITY: GENERAL

## 2023-11-08 ASSESSMENT — ACTIVITIES OF DAILY LIVING (ADL)
GROOMING_CURRENT_FUNCTION: STAND BY ASSIST
AMBULATION ASSISTANCE: STAND BY ASSIST
GROOMING ASSESSED: 1
DRESSING_LB_CURRENT_FUNCTION: STAND BY ASSIST
LIGHT HOUSEKEEPING: NEEDS ASSISTANCE
HOUSEKEEPING ASSESSED: 1
PHYSICAL TRANSFERS ASSESSED: 1
MONEY MANAGEMENT (EXPENSES/BILLS): TOTALLY DEPENDENT
DRESSING_UB_CURRENT_FUNCTION: STAND BY ASSIST
BATHING_CURRENT_FUNCTION: STAND BY ASSIST
TOILETING: 1
CURRENT_FUNCTION: STAND BY ASSIST
FEEDING: STAND BY ASSIST
FEEDING ASSESSED: 1
BATHING ASSESSED: 1
TOILETING: STAND BY ASSIST
PREPARING MEALS: NEEDS ASSISTANCE
AMBULATION ASSISTANCE: 1

## 2023-11-08 ASSESSMENT — ENCOUNTER SYMPTOMS
LAST BOWEL MOVEMENT: 66786
PAIN LOCATION: CHEST
DIARRHEA: 1
PAIN SEVERITY GOAL: 0/10
PAIN LOCATION - PAIN SEVERITY: 4/10
PAIN LOCATION - RELIEVING FACTORS: MOVEMENT, MEDICATION
APPETITE LEVEL: GOOD
SUBJECTIVE PAIN PROGRESSION: WAXING AND WANING
STOOL FREQUENCY: DAILY
HIGHEST PAIN SEVERITY IN PAST 24 HOURS: 7/10
PERSON REPORTING PAIN: PATIENT
LOWEST PAIN SEVERITY IN PAST 24 HOURS: 2/10
PAIN LOCATION - PAIN DURATION: VARIABLE
PAIN: 1
PAIN LOCATION - PAIN QUALITY: ACHE
PAIN LOCATION - PAIN FREQUENCY: INTERMITTENT
PAIN LOCATION - EXACERBATING FACTORS: LAYING DOWN
DYSPNEA ON EXERTION: 1
CHANGE IN APPETITE: INCREASED
FATIGUES EASILY: 1

## 2023-11-09 ENCOUNTER — HOME CARE VISIT (OUTPATIENT)
Dept: HOME HEALTH SERVICES | Facility: HOME HEALTH | Age: 68
End: 2023-11-09
Payer: MEDICARE

## 2023-11-09 VITALS — DIASTOLIC BLOOD PRESSURE: 63 MMHG | OXYGEN SATURATION: 97 % | HEART RATE: 67 BPM | SYSTOLIC BLOOD PRESSURE: 117 MMHG

## 2023-11-09 VITALS
HEART RATE: 76 BPM | WEIGHT: 176 LBS | OXYGEN SATURATION: 98 % | BODY MASS INDEX: 32.19 KG/M2 | RESPIRATION RATE: 18 BRPM | SYSTOLIC BLOOD PRESSURE: 115 MMHG | TEMPERATURE: 97.4 F | DIASTOLIC BLOOD PRESSURE: 70 MMHG

## 2023-11-09 PROCEDURE — G0151 HHCP-SERV OF PT,EA 15 MIN: HCPCS | Mod: HHH

## 2023-11-09 PROCEDURE — 1090000001 HH PPS REVENUE CREDIT

## 2023-11-09 PROCEDURE — G0152 HHCP-SERV OF OT,EA 15 MIN: HCPCS | Mod: HHH

## 2023-11-09 PROCEDURE — 1090000002 HH PPS REVENUE DEBIT

## 2023-11-09 SDOH — ECONOMIC STABILITY: HOUSING INSECURITY: HOME SAFETY: PT REPORTS SHE HAS HAD SOME DEMENTIA TESTING, "FOGGY SOMETIMES" . . . MOTHER HAD ALZ

## 2023-11-09 SDOH — ECONOMIC STABILITY: GENERAL

## 2023-11-09 ASSESSMENT — ACTIVITIES OF DAILY LIVING (ADL)
AMBULATION ASSISTANCE: 1
AMBULATION_DISTANCE/DURATION_TOLERATED: 20 FT X 2
PREPARING MEALS: NEEDS ASSISTANCE
DRESSING_LB_CURRENT_FUNCTION: STAND BY ASSIST
AMBULATION ASSISTANCE: STAND BY ASSIST
DRESSING_UB_CURRENT_FUNCTION: STAND BY ASSIST
DRESSING_UB_CURRENT_FUNCTION: INDEPENDENT
GROOMING ASSESSED: 1
FEEDING: STAND BY ASSIST
DRESSING_LB_CURRENT_FUNCTION: INDEPENDENT
LIGHT HOUSEKEEPING: NEEDS ASSISTANCE
CURRENT_FUNCTION: SUPERVISION
TOILETING: MODERATE ASSIST
LAUNDRY ASSESSED: 1
CURRENT_FUNCTION: STAND BY ASSIST
AMBULATION ASSISTANCE: 1
PREPARING MEALS: NEEDS ASSISTANCE
AMBULATION ASSISTANCE: SUPERVISION
BATHING_CURRENT_FUNCTION: SUPERVISION
MONEY MANAGEMENT (EXPENSES/BILLS): NEEDS ASSISTANCE
BATHING ASSESSED: 1
LIGHT HOUSEKEEPING: DEPENDENT
TOILETING: STAND BY ASSIST
TOILETING: 1
BATHING ASSESSED: 1
PHYSICAL TRANSFERS ASSESSED: 1
TOILETING: 1
GROOMING_CURRENT_FUNCTION: STAND BY ASSIST
HOUSEKEEPING ASSESSED: 1
LAUNDRY: DEPENDENT
HOUSEKEEPING ASSESSED: 1
PHYSICAL TRANSFERS ASSESSED: 1
BATHING_CURRENT_FUNCTION: STAND BY ASSIST
FEEDING ASSESSED: 1

## 2023-11-09 ASSESSMENT — ENCOUNTER SYMPTOMS
PAIN LOCATION: CHEST
PAIN: 1
PAIN LOCATION: CHEST
PAIN LOCATION - PAIN FREQUENCY: INTERMITTENT
PAIN LOCATION - RELIEVING FACTORS: MOVEMENT, MEDICATION
LOWEST PAIN SEVERITY IN PAST 24 HOURS: 0/10
MUSCLE WEAKNESS: 1
DESCRIPTION OF MEMORY LOSS: SHORT TERM
CHANGE IN APPETITE: INCREASED
LAST BOWEL MOVEMENT: 66786
PAIN LOCATION - PAIN QUALITY: ACHE
DIARRHEA: 1
HIGHEST PAIN SEVERITY IN PAST 24 HOURS: 7/10
PAIN LOCATION: RIGHT KNEE
PAIN LOCATION - PAIN DURATION: INTERMITTENT
FATIGUES EASILY: 1
PAIN LOCATION - PAIN SEVERITY: 7/10
SUBJECTIVE PAIN PROGRESSION: GRADUALLY IMPROVING
DYSPNEA ON EXERTION: 1
PAIN SEVERITY GOAL: 0/10
PAIN LOCATION: CHEST
DESCRIPTION OF MEMORY LOSS: SHORT TERM
PERSON REPORTING PAIN: PATIENT
SUBJECTIVE PAIN PROGRESSION: WAXING AND WANING
PAIN LOCATION - EXACERBATING FACTORS: SITTING FOR LONG PERIODS OF TIME
PAIN LOCATION - PAIN SEVERITY: 3/10
PAIN: 1
APPETITE LEVEL: GOOD
HIGHEST PAIN SEVERITY IN PAST 24 HOURS: 2/10
PAIN LOCATION - PAIN SEVERITY: 2/10
PAIN: 1
SUBJECTIVE PAIN PROGRESSION: GRADUALLY IMPROVING
PAIN LOCATION - PAIN SEVERITY: 5/10
LOWEST PAIN SEVERITY IN PAST 24 HOURS: 0/10

## 2023-11-10 DIAGNOSIS — I10 BENIGN ESSENTIAL HYPERTENSION: Primary | ICD-10-CM

## 2023-11-10 PROCEDURE — 1090000002 HH PPS REVENUE DEBIT

## 2023-11-10 PROCEDURE — 1090000001 HH PPS REVENUE CREDIT

## 2023-11-10 NOTE — HOME HEALTH
Subjective:    Upon arrival, patient up ad kaitlynn no device.  Agreeable to PT evaluation.  Patient presents to her first home health PT visit following hospitalization for CABG.  She resides with her daughter in a 2 story home with a first floor set up.  She is currently ambulating without a device within her home.  Her PLOF was independent in the community without a device.  She reports some incisional pain along her sternum and medial aspect of right knee, where blood vessell was harvested.      Objective:    See evaluation.    Assessment:    Patient presents to PT with deficits consistent with her recent CABG surgery.  She is approprriate and will benefit from skilled PT services to improve her LE strength, transfers, gait, balance, and to establish a safe HEP.  PT educated patient on post operative precautions.  Patient agreeable to plan of care.    Plan:    Progress as tolerated.

## 2023-11-11 PROCEDURE — 1090000002 HH PPS REVENUE DEBIT

## 2023-11-11 PROCEDURE — 1090000001 HH PPS REVENUE CREDIT

## 2023-11-12 PROCEDURE — 1090000002 HH PPS REVENUE DEBIT

## 2023-11-12 PROCEDURE — 1090000001 HH PPS REVENUE CREDIT

## 2023-11-13 ENCOUNTER — HOME CARE VISIT (OUTPATIENT)
Dept: HOME HEALTH SERVICES | Facility: HOME HEALTH | Age: 68
End: 2023-11-13
Payer: MEDICARE

## 2023-11-13 PROCEDURE — 1090000001 HH PPS REVENUE CREDIT

## 2023-11-13 PROCEDURE — 1090000002 HH PPS REVENUE DEBIT

## 2023-11-14 ENCOUNTER — ANTICOAGULATION - WARFARIN VISIT (OUTPATIENT)
Dept: CARDIOLOGY | Facility: CLINIC | Age: 68
End: 2023-11-14
Payer: MEDICARE

## 2023-11-14 ENCOUNTER — HOME CARE VISIT (OUTPATIENT)
Dept: HOME HEALTH SERVICES | Facility: HOME HEALTH | Age: 68
End: 2023-11-14
Payer: MEDICARE

## 2023-11-14 DIAGNOSIS — Z95.2 AORTIC VALVE REPLACED: Primary | ICD-10-CM

## 2023-11-14 DIAGNOSIS — Z79.01 LONG TERM (CURRENT) USE OF ANTICOAGULANTS: ICD-10-CM

## 2023-11-14 DIAGNOSIS — I10 BENIGN ESSENTIAL HYPERTENSION: ICD-10-CM

## 2023-11-14 LAB
POC INR: 3
POC PROTHROMBIN TIME: NORMAL

## 2023-11-14 PROCEDURE — 99211 OFF/OP EST MAY X REQ PHY/QHP: CPT | Mod: 27 | Performed by: NURSE PRACTITIONER

## 2023-11-14 PROCEDURE — 1090000001 HH PPS REVENUE CREDIT

## 2023-11-14 PROCEDURE — G0155 HHCP-SVS OF CSW,EA 15 MIN: HCPCS | Mod: HHH

## 2023-11-14 PROCEDURE — 1090000002 HH PPS REVENUE DEBIT

## 2023-11-14 PROCEDURE — 85610 PROTHROMBIN TIME: CPT | Mod: QW

## 2023-11-14 SDOH — SOCIAL STABILITY: SOCIAL NETWORK: HELP FROM FAMILY/FRIENDS: DTR

## 2023-11-14 ASSESSMENT — ACTIVITIES OF DAILY LIVING (ADL): OASIS_M1830: 03

## 2023-11-14 NOTE — PROGRESS NOTES
Patient identification verified with 2 identifiers.    Location: [facility] - [address] [place], [place] [address] [phone]     Referring Physician: Jordyn Beavers CNP  Enrollment/ Re-enrollment date: 11/11/2023   INR Goal: 2.5-3.5  INR monitoring is per [facility] protocol.  Anticoagulation Medication: warfarin  Indication: Aortic Valve Replacement    Subjective   Bleeding signs/symptoms: No    Bruising: No   Major bleeding event: No  Thrombosis signs/symptoms: No  Thromboembolic event: No  Missed doses: No  Extra doses: No  Medication changes: No  Dietary changes: No  Change in health: No  Change in activity: No  Alcohol: No  Other concerns: No    Upcoming Surgeries:  Does the Patient Have any upcoming surgeries that require interruption in anticoagulation therapy? no  Does the patient require bridging? no      Anticoagulation Summary  As of 11/14/2023      INR goal:  2.5-3.5   TTR:  36.3 % (3.6 wk)   INR used for dosing:  3.00 (11/14/2023)   Weekly warfarin total:  28.5 mg               Assessment/Plan   Therapeutic     1. New dose: no change    2. Next INR: 2 weeks      Education provided to patient during the visit:  Patient instructed to call in interim with questions, concerns and changes.

## 2023-11-15 ENCOUNTER — TELEPHONE (OUTPATIENT)
Dept: CARDIOLOGY | Facility: CLINIC | Age: 68
End: 2023-11-15
Payer: MEDICARE

## 2023-11-15 ENCOUNTER — HOME CARE VISIT (OUTPATIENT)
Dept: HOME HEALTH SERVICES | Facility: HOME HEALTH | Age: 68
End: 2023-11-15
Payer: MEDICARE

## 2023-11-15 VITALS
DIASTOLIC BLOOD PRESSURE: 75 MMHG | OXYGEN SATURATION: 98 % | BODY MASS INDEX: 32.19 KG/M2 | HEART RATE: 76 BPM | WEIGHT: 176 LBS | TEMPERATURE: 98.5 F | SYSTOLIC BLOOD PRESSURE: 125 MMHG | RESPIRATION RATE: 18 BRPM

## 2023-11-15 VITALS — HEART RATE: 84 BPM | DIASTOLIC BLOOD PRESSURE: 70 MMHG | SYSTOLIC BLOOD PRESSURE: 124 MMHG | OXYGEN SATURATION: 98 %

## 2023-11-15 PROCEDURE — 1090000001 HH PPS REVENUE CREDIT

## 2023-11-15 PROCEDURE — G0299 HHS/HOSPICE OF RN EA 15 MIN: HCPCS | Mod: HHH

## 2023-11-15 PROCEDURE — 1090000002 HH PPS REVENUE DEBIT

## 2023-11-15 PROCEDURE — G0151 HHCP-SERV OF PT,EA 15 MIN: HCPCS | Mod: HHH

## 2023-11-15 RX ORDER — LOSARTAN POTASSIUM 25 MG/1
25 TABLET ORAL DAILY
Qty: 90 TABLET | Refills: 1 | Status: SHIPPED | OUTPATIENT
Start: 2023-11-15 | End: 2023-11-20 | Stop reason: SDUPTHER

## 2023-11-15 RX ORDER — CIPROFLOXACIN 500 MG/1
1 TABLET ORAL 2 TIMES DAILY
COMMUNITY
Start: 2023-11-16 | End: 2023-11-26

## 2023-11-15 RX ORDER — LOSARTAN POTASSIUM 25 MG/1
25 TABLET ORAL DAILY
Qty: 90 TABLET | Refills: 3 | Status: SHIPPED | OUTPATIENT
Start: 2023-11-15 | End: 2023-11-15 | Stop reason: SINTOL

## 2023-11-15 SDOH — ECONOMIC STABILITY: GENERAL

## 2023-11-15 ASSESSMENT — ENCOUNTER SYMPTOMS
PERSON REPORTING PAIN: PATIENT
PAIN LOCATION - RELIEVING FACTORS: ELEVATION
PAIN LOCATION: CHEST
LOWEST PAIN SEVERITY IN PAST 24 HOURS: 4/10
PAIN LOCATION: RIGHT LEG
PAIN LOCATION - RELIEVING FACTORS: REST, MEDICATION
CHANGE IN APPETITE: UNCHANGED
FATIGUES EASILY: 1
PAIN LOCATION - PAIN DURATION: VARIABLE
DYSPNEA ON EXERTION: 1
PAIN SEVERITY GOAL: 0/10
PAIN LOCATION - EXACERBATING FACTORS: MOVEMENT
PAIN LOCATION - PAIN SEVERITY: 5/10
PAIN LOCATION - PAIN SEVERITY: 4/10
PAIN LOCATION - PAIN FREQUENCY: INTERMITTENT
LAST BOWEL MOVEMENT: 66793
PAIN: 1
PAIN LOCATION - PAIN SEVERITY: 5/10
PAIN LOCATION - PAIN SEVERITY: 5/10
PAIN LOCATION - PAIN FREQUENCY: CONSTANT
PAIN LOCATION - PAIN DURATION: CONSTANT
PAIN LOCATION: CHEST
PAIN LOCATION - PAIN QUALITY: ACHE
APPETITE LEVEL: GOOD
SUBJECTIVE PAIN PROGRESSION: WAXING AND WANING
PAIN: 1
HIGHEST PAIN SEVERITY IN PAST 24 HOURS: 5/10
PAIN LOCATION: RIGHT LEG
BOWEL PATTERN NORMAL: 1
PAIN LOCATION - PAIN QUALITY: ACHE
STOOL FREQUENCY: DAILY
PAIN LOCATION - EXACERBATING FACTORS: MOVEMENT

## 2023-11-15 ASSESSMENT — ACTIVITIES OF DAILY LIVING (ADL)
BATHING_CURRENT_FUNCTION: STAND BY ASSIST
FEEDING: STAND BY ASSIST
BATHING ASSESSED: 1
GROOMING_CURRENT_FUNCTION: STAND BY ASSIST
PHYSICAL TRANSFERS ASSESSED: 1
LIGHT HOUSEKEEPING: NEEDS ASSISTANCE
GROOMING ASSESSED: 1
FEEDING ASSESSED: 1
TOILETING: 1
PREPARING MEALS: NEEDS ASSISTANCE
HOUSEKEEPING ASSESSED: 1
AMBULATION ASSISTANCE: 1
DRESSING_UB_CURRENT_FUNCTION: STAND BY ASSIST
AMBULATION ASSISTANCE: STAND BY ASSIST
CURRENT_FUNCTION: STAND BY ASSIST
TOILETING: STAND BY ASSIST
MONEY MANAGEMENT (EXPENSES/BILLS): TOTALLY DEPENDENT
DRESSING_LB_CURRENT_FUNCTION: STAND BY ASSIST

## 2023-11-15 NOTE — TELEPHONE ENCOUNTER
Daughter Veronica called and stated that Suze will be completed with Iron and Pantoprazole in 3 days. Will bring patient back in 1 week to reassess INR.

## 2023-11-15 NOTE — HOME HEALTH
Subjective:    Upon arrival, homecare nurse present and finishing her visit.  Concern over red area along donor site along with some pain.  Mild soreness still present along sternum.  Otherwise, patient doing well.  She adds that she has forgotten her FWW when going out for appointments and she acknowledges that she would feel more comfortable with a walker when out.    Objective:    Trialed gait with SPC.  Patient ambulated 2.5 minutes continuously x 1 attempt and 1.5 minutes continuously x 1 attempt.    Assessment:    Patient presents with limited activity tolerance, as she required a rest break after just 2.5 minutes of gait.  PT spoke to RN who stated she notified patient MD regarding concerns over donor site.  Good understanding of written HEP.  Skilled PT services are justified to further improve her gait, balance, activity tolerance.    Plan:    Progress as tolerated.

## 2023-11-16 ENCOUNTER — HOME CARE VISIT (OUTPATIENT)
Dept: HOME HEALTH SERVICES | Facility: HOME HEALTH | Age: 68
End: 2023-11-16
Payer: MEDICARE

## 2023-11-16 ENCOUNTER — TELEPHONE (OUTPATIENT)
Dept: CARDIOLOGY | Facility: CLINIC | Age: 68
End: 2023-11-16
Payer: MEDICARE

## 2023-11-16 PROCEDURE — 1090000002 HH PPS REVENUE DEBIT

## 2023-11-16 PROCEDURE — 1090000001 HH PPS REVENUE CREDIT

## 2023-11-16 PROCEDURE — G0180 MD CERTIFICATION HHA PATIENT: HCPCS | Performed by: FAMILY MEDICINE

## 2023-11-16 SDOH — HEALTH STABILITY: PHYSICAL HEALTH: PHYSICAL EXERCISE: 10

## 2023-11-16 SDOH — HEALTH STABILITY: PHYSICAL HEALTH: EXERCISE ACTIVITY: HAMSTRING CURLS

## 2023-11-16 SDOH — HEALTH STABILITY: PHYSICAL HEALTH: EXERCISE ACTIVITY: ANKLE PUMPS

## 2023-11-16 SDOH — HEALTH STABILITY: PHYSICAL HEALTH: RESISTANCE: 0#

## 2023-11-16 SDOH — HEALTH STABILITY: PHYSICAL HEALTH: PHYSICAL EXERCISE: SEATED

## 2023-11-16 SDOH — HEALTH STABILITY: PHYSICAL HEALTH: EXERCISE ACTIVITIES SETS: 3

## 2023-11-16 SDOH — HEALTH STABILITY: PHYSICAL HEALTH: PHYSICAL EXERCISE: STANDING

## 2023-11-16 SDOH — HEALTH STABILITY: PHYSICAL HEALTH: EXERCISE ACTIVITY: HIP FLEXION

## 2023-11-16 SDOH — HEALTH STABILITY: PHYSICAL HEALTH: EXERCISE ACTIVITY: LAQ

## 2023-11-16 NOTE — TELEPHONE ENCOUNTER
1134hrs:  Patient's daughter called to report that patient will begin taking CIPRO this evening.  POCT appointment moved up to Monday, November 20th, 2023, given changes in patient's medication regimen.

## 2023-11-17 ENCOUNTER — HOME CARE VISIT (OUTPATIENT)
Dept: HOME HEALTH SERVICES | Facility: HOME HEALTH | Age: 68
End: 2023-11-17
Payer: MEDICARE

## 2023-11-17 PROCEDURE — 1090000001 HH PPS REVENUE CREDIT

## 2023-11-17 PROCEDURE — G0152 HHCP-SERV OF OT,EA 15 MIN: HCPCS | Mod: HHH

## 2023-11-17 PROCEDURE — 1090000002 HH PPS REVENUE DEBIT

## 2023-11-17 ASSESSMENT — ENCOUNTER SYMPTOMS
PAIN LOCATION: RIGHT LEG
PAIN: 1
PERSON REPORTING PAIN: PATIENT
PAIN LOCATION - EXACERBATING FACTORS: WALKING
PAIN LOCATION - RELIEVING FACTORS: REST
PAIN LOCATION - PAIN FREQUENCY: INTERMITTENT

## 2023-11-17 ASSESSMENT — ACTIVITIES OF DAILY LIVING (ADL)
TOILETING: INDEPENDENT
BATHING_CURRENT_FUNCTION: SUPERVISION
PREPARING MEALS: NEEDS ASSISTANCE
BATHING ASSESSED: 1
DRESSING_LB_CURRENT_FUNCTION: INDEPENDENT
DRESSING_UB_CURRENT_FUNCTION: INDEPENDENT
AMBULATION ASSISTANCE: INDEPENDENT
TOILETING: 1
AMBULATION ASSISTANCE: 1

## 2023-11-18 PROCEDURE — 1090000002 HH PPS REVENUE DEBIT

## 2023-11-18 PROCEDURE — 1090000001 HH PPS REVENUE CREDIT

## 2023-11-19 PROCEDURE — 1090000001 HH PPS REVENUE CREDIT

## 2023-11-19 PROCEDURE — 1090000002 HH PPS REVENUE DEBIT

## 2023-11-20 ENCOUNTER — HOME CARE VISIT (OUTPATIENT)
Dept: HOME HEALTH SERVICES | Facility: HOME HEALTH | Age: 68
End: 2023-11-20
Payer: MEDICARE

## 2023-11-20 ENCOUNTER — ANTICOAGULATION - WARFARIN VISIT (OUTPATIENT)
Dept: CARDIOLOGY | Facility: CLINIC | Age: 68
End: 2023-11-20
Payer: MEDICARE

## 2023-11-20 VITALS — HEART RATE: 76 BPM | OXYGEN SATURATION: 97 % | DIASTOLIC BLOOD PRESSURE: 78 MMHG | SYSTOLIC BLOOD PRESSURE: 117 MMHG

## 2023-11-20 DIAGNOSIS — Z95.2 AORTIC VALVE REPLACED: Primary | ICD-10-CM

## 2023-11-20 DIAGNOSIS — Z79.01 LONG TERM (CURRENT) USE OF ANTICOAGULANTS: ICD-10-CM

## 2023-11-20 DIAGNOSIS — I10 BENIGN ESSENTIAL HYPERTENSION: ICD-10-CM

## 2023-11-20 LAB
POC INR: 2.4
POC PROTHROMBIN TIME: NORMAL

## 2023-11-20 PROCEDURE — G0151 HHCP-SERV OF PT,EA 15 MIN: HCPCS | Mod: HHH

## 2023-11-20 PROCEDURE — 85610 PROTHROMBIN TIME: CPT | Mod: QW

## 2023-11-20 PROCEDURE — 1090000002 HH PPS REVENUE DEBIT

## 2023-11-20 PROCEDURE — 1090000001 HH PPS REVENUE CREDIT

## 2023-11-20 PROCEDURE — 99211 OFF/OP EST MAY X REQ PHY/QHP: CPT | Mod: 27 | Performed by: NURSE PRACTITIONER

## 2023-11-20 RX ORDER — LOSARTAN POTASSIUM 50 MG/1
50 TABLET ORAL DAILY
Qty: 90 TABLET | Refills: 3 | Status: SHIPPED | OUTPATIENT
Start: 2023-11-20 | End: 2023-11-28 | Stop reason: ALTCHOICE

## 2023-11-20 ASSESSMENT — ENCOUNTER SYMPTOMS
PAIN LOCATION - PAIN SEVERITY: 6/10
PAIN: 1
SUBJECTIVE PAIN PROGRESSION: WAXING AND WANING
PAIN LOCATION: CHEST
PAIN LOCATION - PAIN SEVERITY: 4/10
PAIN LOCATION: RIGHT LEG
PERSON REPORTING PAIN: PATIENT

## 2023-11-20 NOTE — HOME HEALTH
Subjective:    Patient states she is doing well.  Started on an antibiotic to treat area of concern surrounding donor site right lower leg.  Some pain in that area as well as her sternum - no better no worse from last visit.  She reports compliance with her HEP.    Objective:  See discipline DC for details.    Assessment:    Suze is doing well overall and is recovering appropriately.  She still fatigues easily as she was limited to only 2.5 minutes of continuous walking before requiring a seated rest break.  PT reminded patient of importance of a progressive walking program to be completed safely within her home, but also emphasized not overdoing it.  Patient verbalized understanding.  Patient is appropriate and agreeable with plan to discharge PT at this time.  She was encouraged to call with any questions or concerns moving forward.    Plan:    Discharge PT at this time.

## 2023-11-20 NOTE — PROGRESS NOTES
Patient identification verified with 2 identifiers.    Location: [facility] - [address] [place], [place] [id number] [phone]     Referring Physician: CORWIN Bunch   Enrollment/ Re-enrollment date: 11/11/2023   INR Goal: 2.5-3.5  INR monitoring is per [facility] protocol.  Anticoagulation Medication: warfarin  Indication: Aortic Valve Replacement    Subjective   Bleeding signs/symptoms: No    Bruising: No   Major bleeding event: No  Thrombosis signs/symptoms: No  Thromboembolic event: No  Missed doses: No  Extra doses: No  Medication changes: No  Dietary changes: No  Change in health: No  Change in activity: No  Alcohol: No  Other concerns: No    Upcoming Surgeries:  Does the Patient Have any upcoming surgeries that require interruption in anticoagulation therapy? no  Does the patient require bridging? no      Anticoagulation Summary  As of 11/20/2023      INR goal:  2.5-3.5   TTR:  36.3 % (3.6 wk)   INR used for dosing:                 Assessment/Plan   Subtherapeutic     1. New dose: no change  dose rearranged to take higher today  2. Next INR: 1 week      Education provided to patient during the visit:  Patient instructed to call in interim with questions, concerns and changes.        
intact

## 2023-11-21 ENCOUNTER — APPOINTMENT (OUTPATIENT)
Dept: CARDIOLOGY | Facility: CLINIC | Age: 68
End: 2023-11-21
Payer: MEDICARE

## 2023-11-21 PROCEDURE — 1090000001 HH PPS REVENUE CREDIT

## 2023-11-21 PROCEDURE — 1090000002 HH PPS REVENUE DEBIT

## 2023-11-22 ENCOUNTER — HOME CARE VISIT (OUTPATIENT)
Dept: HOME HEALTH SERVICES | Facility: HOME HEALTH | Age: 68
End: 2023-11-22
Payer: MEDICARE

## 2023-11-22 VITALS
HEART RATE: 77 BPM | DIASTOLIC BLOOD PRESSURE: 75 MMHG | SYSTOLIC BLOOD PRESSURE: 130 MMHG | RESPIRATION RATE: 18 BRPM | OXYGEN SATURATION: 98 % | TEMPERATURE: 98.6 F

## 2023-11-22 DIAGNOSIS — R60.0 LOWER EXTREMITY EDEMA: ICD-10-CM

## 2023-11-22 PROCEDURE — G0152 HHCP-SERV OF OT,EA 15 MIN: HCPCS | Mod: HHH

## 2023-11-22 PROCEDURE — 1090000001 HH PPS REVENUE CREDIT

## 2023-11-22 PROCEDURE — G0299 HHS/HOSPICE OF RN EA 15 MIN: HCPCS | Mod: HHH

## 2023-11-22 PROCEDURE — 1090000002 HH PPS REVENUE DEBIT

## 2023-11-22 SDOH — ECONOMIC STABILITY: GENERAL

## 2023-11-22 ASSESSMENT — ENCOUNTER SYMPTOMS
PAIN LOCATION - PAIN DURATION: VARIABLE
PAIN LOCATION - PAIN SEVERITY: 6/10
FATIGUES EASILY: 1
LOWEST PAIN SEVERITY IN PAST 24 HOURS: 0/10
PAIN LOCATION - PAIN QUALITY: ACHE
PAIN LOCATION - RELIEVING FACTORS: REST, MEDICATION
CHANGE IN APPETITE: DECREASED
LAST BOWEL MOVEMENT: 66800
DIZZINESS: 1
PAIN LOCATION: RIGHT LEG
PAIN LOCATION - PAIN FREQUENCY: INTERMITTENT
PAIN: 1
APPETITE LEVEL: FAIR
SUBJECTIVE PAIN PROGRESSION: WAXING AND WANING
PAIN LOCATION - EXACERBATING FACTORS: MOVEMENT
STOOL FREQUENCY: DAILY
PAIN SEVERITY GOAL: 0/10
BOWEL PATTERN NORMAL: 1
HIGHEST PAIN SEVERITY IN PAST 24 HOURS: 6/10

## 2023-11-22 ASSESSMENT — ACTIVITIES OF DAILY LIVING (ADL)
FEEDING ASSESSED: 1
AMBULATION ASSISTANCE: 1
PREPARING MEALS: NEEDS ASSISTANCE
TOILETING: 1
PHYSICAL TRANSFERS ASSESSED: 1
GROOMING ASSESSED: 1
DRESSING_UB_CURRENT_FUNCTION: STAND BY ASSIST
BATHING ASSESSED: 1
FEEDING: STAND BY ASSIST
BATHING_CURRENT_FUNCTION: STAND BY ASSIST
LIGHT HOUSEKEEPING: NEEDS ASSISTANCE
DRESSING_LB_CURRENT_FUNCTION: STAND BY ASSIST
CURRENT_FUNCTION: STAND BY ASSIST
HOUSEKEEPING ASSESSED: 1
MONEY MANAGEMENT (EXPENSES/BILLS): TOTALLY DEPENDENT
AMBULATION ASSISTANCE: STAND BY ASSIST
GROOMING_CURRENT_FUNCTION: STAND BY ASSIST
TOILETING: STAND BY ASSIST

## 2023-11-22 NOTE — HOME HEALTH
Contacted Suze and spoke with her daughter who share d that the Pt had a  who already came out and there is notching else needed. Service declined.

## 2023-11-23 PROCEDURE — 1090000002 HH PPS REVENUE DEBIT

## 2023-11-23 PROCEDURE — 1090000001 HH PPS REVENUE CREDIT

## 2023-11-24 ENCOUNTER — TELEPHONE (OUTPATIENT)
Dept: CARDIOLOGY | Facility: CLINIC | Age: 68
End: 2023-11-24
Payer: MEDICARE

## 2023-11-24 PROCEDURE — 1090000002 HH PPS REVENUE DEBIT

## 2023-11-24 PROCEDURE — 1090000001 HH PPS REVENUE CREDIT

## 2023-11-25 PROCEDURE — 1090000002 HH PPS REVENUE DEBIT

## 2023-11-25 PROCEDURE — 1090000001 HH PPS REVENUE CREDIT

## 2023-11-26 PROCEDURE — 1090000001 HH PPS REVENUE CREDIT

## 2023-11-26 PROCEDURE — 1090000002 HH PPS REVENUE DEBIT

## 2023-11-26 PROCEDURE — 169592 NO-PAY CLAIM PROCEDURE

## 2023-11-27 PROCEDURE — 1090000002 HH PPS REVENUE DEBIT

## 2023-11-27 PROCEDURE — 1090000001 HH PPS REVENUE CREDIT

## 2023-11-28 ENCOUNTER — APPOINTMENT (OUTPATIENT)
Dept: CARDIOLOGY | Facility: CLINIC | Age: 68
End: 2023-11-28
Payer: MEDICARE

## 2023-11-28 ENCOUNTER — ANTICOAGULATION - WARFARIN VISIT (OUTPATIENT)
Dept: CARDIOLOGY | Facility: CLINIC | Age: 68
End: 2023-11-28
Payer: MEDICARE

## 2023-11-28 ENCOUNTER — TELEPHONE (OUTPATIENT)
Dept: OBSTETRICS AND GYNECOLOGY | Facility: CLINIC | Age: 68
End: 2023-11-28
Payer: MEDICARE

## 2023-11-28 DIAGNOSIS — N32.81 OAB (OVERACTIVE BLADDER): ICD-10-CM

## 2023-11-28 DIAGNOSIS — Z09 POSTOP CHECK: ICD-10-CM

## 2023-11-28 DIAGNOSIS — Z95.2 AORTIC VALVE REPLACED: ICD-10-CM

## 2023-11-28 DIAGNOSIS — Z95.828 STATUS POST ASCENDING AORTIC REPLACEMENT: ICD-10-CM

## 2023-11-28 DIAGNOSIS — I35.1 AORTIC VALVE INSUFFICIENCY, ETIOLOGY OF CARDIAC VALVE DISEASE UNSPECIFIED: ICD-10-CM

## 2023-11-28 DIAGNOSIS — Z79.01 LONG TERM (CURRENT) USE OF ANTICOAGULANTS: ICD-10-CM

## 2023-11-28 PROCEDURE — 1090000001 HH PPS REVENUE CREDIT

## 2023-11-28 PROCEDURE — 1090000002 HH PPS REVENUE DEBIT

## 2023-11-28 RX ORDER — LOSARTAN POTASSIUM 100 MG/1
50 TABLET ORAL DAILY
COMMUNITY
Start: 2023-11-22 | End: 2023-12-05 | Stop reason: SDUPTHER

## 2023-11-28 NOTE — PROGRESS NOTES
Chief Complaint  POST OP Evaluation    HPI:   Ms. Suze Najera is a 68 y.o. female, who presents for post-operative evaluation.   She is now 1 month out from her operation, and is recovering nicely.  She has resumed normal activities and her appetite is returned to normal.  She has no chest pain, no shortness of breath and denies palpitations, dizziness, or syncope.     Past Medical History:   Diagnosis Date    Bicuspid aortic valve 08/02/2018    Concussion with loss of consciousness status unknown, initial encounter 08/10/2018    Closed head injury with concussion    Conjunctival hemorrhage, left eye 01/04/2021    Subconjunctival hemorrhage of left eye    COPD (chronic obstructive pulmonary disease) (CMS/HCC)     Diabetes mellitus (CMS/[facility])     History of colonoscopy 01/10/2017    1/10/17 Dr. Donaldson-Normal colonoscopy     History of mammogram 09/09/2023 9/9/23 neg    Major depressive disorder, single episode, moderate (CMS/HCC) 12/23/2019    Depression, major, single episode, moderate    Nicotine dependence 03/18/2023    Nontraumatic subarachnoid hemorrhage, unspecified (CMS/HCC) 09/20/2017    Subarachnoid hemorrhage    Other amnesia 10/18/2017    Memory change    Other hypersomnia 11/13/2017    Excessive daytime sleepiness    Other specified disorders of adrenal gland (CMS/HCC) 06/15/2017    Adrenal hyperplasia    Personal history of (corrected) 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     History of bicuspid aortic valve    Personal history of COVID-19 04/22/2021    History of severe acute respiratory syndrome coronavirus 2 (SARS-CoV-2) disease    Personal history of diseases of the skin and subcutaneous tissue 06/26/2017    History of cellulitis    Personal history of malignant melanoma of skin 01/18/2018    History of malignant melanoma    Personal history of other benign neoplasm 08/21/2018    History of meningioma    Personal history of other diseases of the circulatory system     History  of intracranial hemorrhage    Personal history of other diseases of urinary system 01/04/2021    History of hematuria    Personal history of other diseases of urinary system 01/26/2018    History of hematuria    Personal history of other infectious and parasitic diseases 05/21/2022    History of Clostridioides difficile infection    Personal history of other specified conditions 12/15/2022    History of seizure    Personal history of transient ischemic attack (TIA), and cerebral infarction without residual deficits 01/18/2018    History of stroke    Personal history of urinary (tract) infections 03/27/2018    History of urinary tract infection    Polycythemia 03/18/2023    Heme- Dr. Anne     Screening for cervical cancer 07/29/2020 7/29/20 NILM HPV neg    Sleep apnea     Unspecified speech disturbances 09/20/2017    Transient speech disturbance    Urinary tract infection, site not specified 09/29/2020    Acute UTI       Past Surgical History:   Procedure Laterality Date    AORTIC VALVE REPLACEMENT  04/20/2017    Aortic Valve Replacement    CARDIAC CATHETERIZATION N/A 10/2/2023    Procedure: Left Heart Cath;  Surgeon: Ramy Rebolledo MD;  Location: [place] [address] Cardiac Cath Lab;  Service: Cardiovascular;  Laterality: N/A;  was unable to thread radial on 9/29 so need today,  on heparin gtt.    CARDIAC SURGERY  01/09/2017    Heart Surgery    OTHER SURGICAL HISTORY  03/13/2017    Brain Surgery    OTHER SURGICAL HISTORY  01/28/2019    Trigger finger repair    OTHER SURGICAL HISTORY  03/13/2017    Inferior Vena Cava Filter Placement W/ Fluorosc Angiogr Guidance    TUBAL LIGATION  03/13/2017    Tubal Ligation       Family History   Problem Relation Name Age of Onset    Alzheimer's disease Mother      Breast cancer Mother          age 52    Other (Brain tumor) Father      Breast cancer Mother's Sister         Social History     Socioeconomic History    Marital status:      Spouse name: Not on file    Number of  children: 2    Years of education: Not on file    Highest education level: Not on file   Occupational History    Not on file   Tobacco Use    Smoking status: Former     Packs/day: 1.00     Years: 51.00     Additional pack years: 0.00     Total pack years: 51.00     Types: Cigarettes     Quit date: 2023     Years since quittin.4     Passive exposure: Past    Smokeless tobacco: Never   Vaping Use    Vaping Use: Never used   Substance and Sexual Activity    Alcohol use: Not Currently    Drug use: Yes     Types: Marijuana     Comment: states sometimes use    Sexual activity: Defer   Other Topics Concern    Not on file   Social History Narrative    Not on file     Social Determinants of Health     Financial Resource Strain: Not on file   Food Insecurity: Not on file   Transportation Needs: No Transportation Needs (10/27/2023)    OASIS : Transportation     Lack of Transportation (Medical): No     Lack of Transportation (Non-Medical): No     Patient Unable or Declines to Respond: No   Physical Activity: Not on file   Stress: Not on file   Social Connections: Feeling Socially Integrated (10/27/2023)    OASIS : Social Isolation     Frequency of experiencing loneliness or isolation: Never   Intimate Partner Violence: Not on file   Housing Stability: Not on file       Allergies   Allergen Reactions    Acetazolamide Unknown    Ceftizoxime Hives    Cefuroxime Hives    Erythromycin Other     Vomiting     Ibuprofen Hives    Penicillins Hives    Sulfa (Sulfonamide Antibiotics) Hives       Outpatient Encounter Medications as of 2023   Medication Sig Dispense Refill    acetaminophen (Tylenol) 325 mg tablet Take 1-2 tablets (325-650 mg) by mouth every 6 hours if needed for mild pain (1 - 3) or moderate pain (4 - 6). 30 tablet 0    FLUoxetine (PROzac) 40 mg capsule TAKE 1 CAPSULE BY MOUTH ONCE DAILY 90 capsule 1    fluticasone-umeclidin-vilanter (TRELEGY-ELLIPTA) 100-62.5-25 mcg blister with device Inhale 1 puff  once daily.      glucosamine sulfate 1,000 mg tablet Take 1 tablet by mouth once daily.      ketoconazole (NIZOral) 2 % cream Apply topically 2 times a day. To affected forehead and facial areas. 30 g 1    losartan (Cozaar) 100 mg tablet Take 1 tablet (100 mg) by mouth once daily.      metFORMIN XR (Glucophage-XR) 500 mg 24 hr tablet Take 1 tablet (500 mg) by mouth 2 times a day. 180 tablet 3    multivitamin with minerals tablet Take 1 tablet by mouth once daily. Do not start before 2023.  0    rosuvastatin (Crestor) 40 mg tablet Take 1 tablet (40 mg) by mouth once daily. 90 tablet 1    vibegron (Gemtesa) 75 mg tablet Take 1 tablet (75 mg) by mouth once daily.      warfarin (Coumadin) 3 mg tablet Take 3 mg (1 tablet) Monday, Wednesday and Saturday  Take 4.5 mg (1.5 tablets) , Tuesday, Thursday and Friday 90 tablet 1    [] ciprofloxacin (Cipro) 500 mg tablet Take 1 tablet by mouth 2 times a day. Do not start before 2023.      metoprolol succinate XL (Toprol-XL) 50 mg 24 hr tablet Take 1 tablet (50 mg) by mouth once daily. 30 tablet 0    pantoprazole (ProtoNix) 40 mg EC tablet Take 1 tablet (40 mg) by mouth once daily in the morning. Take before meals. Do not crush, chew, or split. Do not start before 2023. 30 tablet 0    [DISCONTINUED] losartan (Cozaar) 50 mg tablet Take 1 tablet (50 mg) by mouth once daily. 90 tablet 3     No facility-administered encounter medications on file as of 2023.       Physical Exam    No results found. However, due to the size of the patient record, not all encounters were searched. Please check Results Review for a complete set of results.    Assessment and Plan:    Ms. Suze Najera is a 68 y.o. female, who is recovering well after surgery.  I have released them from sternal precautions and referred them for cardiopulmonary rehab.  I have encouraged them to slowly return to normal activities, but refrain from heavy lifting for a  full 12 weeks after surgery.  She will continue cardiovascular management with their cardiologist.  I am always happy to see them for any reason.  To follow in 6 echocardiogram.  She is released to start driving, cardiac rehab, and some light weightlifting.      Suze returns status post 10/13/23 redo ascending aorta [id number]. Margo Corcoran RN

## 2023-11-28 NOTE — TELEPHONE ENCOUNTER
From Saqib MCQUEEN [place] Nurse: Chip Olivares, I received a call from Veronica Najera for pt Rayne Najera [phone] The Gemtesa needed refilled and she also found out it will cost $133.00 She wants to know if there is something else you can prescribe?    Per Dr. Gaming's reply:  Not sure how this call came to you! I'm adding the [place] folks. Clarisa/MB, we can try Myrbetriq 25. I wouldn't do trospium for her because she has some baseline cognitive issues so we are avoiding all anticholinergics.    11/28/23 1135 LV for Pt koler, Veronica STERN.

## 2023-11-29 ENCOUNTER — APPOINTMENT (OUTPATIENT)
Dept: CARDIOLOGY | Facility: CLINIC | Age: 68
End: 2023-11-29
Payer: MEDICARE

## 2023-11-29 ENCOUNTER — ANTICOAGULATION - WARFARIN VISIT (OUTPATIENT)
Dept: CARDIOLOGY | Facility: CLINIC | Age: 68
End: 2023-11-29
Payer: MEDICARE

## 2023-11-29 ENCOUNTER — TELEPHONE (OUTPATIENT)
Dept: OBSTETRICS AND GYNECOLOGY | Facility: CLINIC | Age: 68
End: 2023-11-29

## 2023-11-29 ENCOUNTER — HOSPITAL ENCOUNTER (OUTPATIENT)
Dept: VASCULAR MEDICINE | Facility: CLINIC | Age: 68
Discharge: HOME | End: 2023-11-29
Payer: MEDICARE

## 2023-11-29 ENCOUNTER — OFFICE VISIT (OUTPATIENT)
Dept: CARDIAC SURGERY | Facility: HOSPITAL | Age: 68
End: 2023-11-29
Payer: MEDICARE

## 2023-11-29 ENCOUNTER — HOSPITAL ENCOUNTER (OUTPATIENT)
Dept: RADIOLOGY | Facility: HOSPITAL | Age: 68
Discharge: HOME | End: 2023-11-29
Payer: MEDICARE

## 2023-11-29 VITALS
SYSTOLIC BLOOD PRESSURE: 130 MMHG | OXYGEN SATURATION: 95 % | WEIGHT: 213 LBS | HEIGHT: 61 IN | HEART RATE: 87 BPM | BODY MASS INDEX: 40.22 KG/M2 | DIASTOLIC BLOOD PRESSURE: 85 MMHG

## 2023-11-29 DIAGNOSIS — Z95.2 AORTIC VALVE REPLACED: ICD-10-CM

## 2023-11-29 DIAGNOSIS — R60.0 LOWER EXTREMITY EDEMA: ICD-10-CM

## 2023-11-29 DIAGNOSIS — G45.9 TIA (TRANSIENT ISCHEMIC ATTACK): ICD-10-CM

## 2023-11-29 DIAGNOSIS — Z95.828 STATUS POST ASCENDING AORTIC REPLACEMENT: ICD-10-CM

## 2023-11-29 DIAGNOSIS — Z79.01 LONG TERM (CURRENT) USE OF ANTICOAGULANTS: Primary | ICD-10-CM

## 2023-11-29 DIAGNOSIS — N32.81 OAB (OVERACTIVE BLADDER): ICD-10-CM

## 2023-11-29 DIAGNOSIS — Z79.01 LONG TERM (CURRENT) USE OF ANTICOAGULANTS: ICD-10-CM

## 2023-11-29 DIAGNOSIS — Z09 POSTOP CHECK: ICD-10-CM

## 2023-11-29 LAB
POC INR: 2.1
POC PROTHROMBIN TIME: NORMAL

## 2023-11-29 PROCEDURE — 1126F AMNT PAIN NOTED NONE PRSNT: CPT | Performed by: THORACIC SURGERY (CARDIOTHORACIC VASCULAR SURGERY)

## 2023-11-29 PROCEDURE — 71046 X-RAY EXAM CHEST 2 VIEWS: CPT

## 2023-11-29 PROCEDURE — 71046 X-RAY EXAM CHEST 2 VIEWS: CPT | Mod: FY

## 2023-11-29 PROCEDURE — 3008F BODY MASS INDEX DOCD: CPT | Performed by: THORACIC SURGERY (CARDIOTHORACIC VASCULAR SURGERY)

## 2023-11-29 PROCEDURE — 3079F DIAST BP 80-89 MM HG: CPT | Performed by: THORACIC SURGERY (CARDIOTHORACIC VASCULAR SURGERY)

## 2023-11-29 PROCEDURE — 85610 PROTHROMBIN TIME: CPT | Mod: QW

## 2023-11-29 PROCEDURE — 4010F ACE/ARB THERAPY RXD/TAKEN: CPT | Performed by: THORACIC SURGERY (CARDIOTHORACIC VASCULAR SURGERY)

## 2023-11-29 PROCEDURE — 1090000002 HH PPS REVENUE DEBIT

## 2023-11-29 PROCEDURE — 1160F RVW MEDS BY RX/DR IN RCRD: CPT | Performed by: THORACIC SURGERY (CARDIOTHORACIC VASCULAR SURGERY)

## 2023-11-29 PROCEDURE — 1090000001 HH PPS REVENUE CREDIT

## 2023-11-29 PROCEDURE — 3075F SYST BP GE 130 - 139MM HG: CPT | Performed by: THORACIC SURGERY (CARDIOTHORACIC VASCULAR SURGERY)

## 2023-11-29 PROCEDURE — 93970 EXTREMITY STUDY: CPT | Performed by: INTERNAL MEDICINE

## 2023-11-29 PROCEDURE — 1159F MED LIST DOCD IN RCRD: CPT | Performed by: THORACIC SURGERY (CARDIOTHORACIC VASCULAR SURGERY)

## 2023-11-29 PROCEDURE — 99211 OFF/OP EST MAY X REQ PHY/QHP: CPT | Performed by: NURSE PRACTITIONER

## 2023-11-29 PROCEDURE — 3052F HG A1C>EQUAL 8.0%<EQUAL 9.0%: CPT | Performed by: THORACIC SURGERY (CARDIOTHORACIC VASCULAR SURGERY)

## 2023-11-29 PROCEDURE — 99024 POSTOP FOLLOW-UP VISIT: CPT | Performed by: THORACIC SURGERY (CARDIOTHORACIC VASCULAR SURGERY)

## 2023-11-29 PROCEDURE — 93970 EXTREMITY STUDY: CPT

## 2023-11-29 ASSESSMENT — PAIN SCALES - GENERAL: PAINLEVEL: 0-NO PAIN

## 2023-11-29 NOTE — TELEPHONE ENCOUNTER
Pt Dtr. Veronica CB and is aware Dr. Gaming recommends for pt to try myrbetriq 25mg since gemtesa is too costly. Rx sent to provider to review and approve. Veronica also stated that her mom had to cancel her cysto 10/29/23 d/t diarrhea. She would like to reschedule it at this time. She said that her mom still has diarrhea episodes and was advised for her to do the BRAT diet and take imodium 24-28 hrs before the appt to help keep sx at bay. Transferred to the  for appt.

## 2023-11-29 NOTE — PROGRESS NOTES
Patient identification verified with 2 identifiers.    Location: [facility] - [address] [place] [address] [address]. [place], [place] [address] [phone]     Referring Physician: Pricila Beavers MD  Enrollment/ Re-enrollment date:  2023 - [facility] Physician Order Form (Renewal) pended to Pricila Beavers CNP, today for signature.  INR Goal: 2.5-3.5  INR monitoring is per [facility] protocol.  Anticoagulation Medication: warfarin  Indication: Aortic Valve Replacement    TWD of warfarin was maintained at time of last appointment.     Subjective   Bleeding signs/symptoms: No    Bruising: No   Major bleeding event: No  Thrombosis signs/symptoms: No  Thromboembolic event: No  Missed doses: Yes  Patient unsure as to whether or not she missed a partial dose of warfarin during the past week.  Extra doses: No  Medication changes: No  Dietary changes: No  Change in health: No  Change in activity: No  Alcohol: No  Other concerns: No    Upcoming Surgeries:  Does the Patient Have any upcoming surgeries that require interruption in anticoagulation therapy? no  Does the patient require bridging? no      Anticoagulation Summary  As of 2023      INR goal:  2.5-3.5   TTR:  34.7 % (1.3 mo)   INR used for dosin.10 (2023)   Weekly warfarin total:  30 mg               Assessment/Plan   Subtherapeutic     1. New dose:  will increase TWD of warfarin by 1.5mg (approx. 5-10%) taking into account possible missed partial dose(s) during the past week.     2. Next INR: 1 week      Education provided to patient during the visit:  Patient instructed to call in interim with questions, concerns and changes.   Patient educated on interactions between medications and warfarin.   Patient educated on dietary consistency in vitamin k consumption.   Patient educated on affects of alcohol consumption while taking warfarin.   Patient educated on signs of bleeding/clotting.   Patient educated on compliance with dosing, follow up appointments,  and prescribed plan of care.

## 2023-11-30 ENCOUNTER — HOME CARE VISIT (OUTPATIENT)
Dept: HOME HEALTH SERVICES | Facility: HOME HEALTH | Age: 68
End: 2023-11-30
Payer: MEDICARE

## 2023-11-30 VITALS
OXYGEN SATURATION: 98 % | BODY MASS INDEX: 32.88 KG/M2 | SYSTOLIC BLOOD PRESSURE: 120 MMHG | RESPIRATION RATE: 18 BRPM | WEIGHT: 174 LBS | DIASTOLIC BLOOD PRESSURE: 65 MMHG | TEMPERATURE: 98.4 F | HEART RATE: 66 BPM

## 2023-11-30 PROCEDURE — G0299 HHS/HOSPICE OF RN EA 15 MIN: HCPCS | Mod: HHH

## 2023-11-30 PROCEDURE — 1090000001 HH PPS REVENUE CREDIT

## 2023-11-30 PROCEDURE — 1090000002 HH PPS REVENUE DEBIT

## 2023-11-30 PROCEDURE — 0023 HH SOC

## 2023-11-30 RX ORDER — MIRABEGRON 25 MG/1
25 TABLET, FILM COATED, EXTENDED RELEASE ORAL DAILY
Qty: 60 TABLET | Refills: 2 | Status: SHIPPED | OUTPATIENT
Start: 2023-11-30 | End: 2023-12-18 | Stop reason: ALTCHOICE

## 2023-11-30 SDOH — ECONOMIC STABILITY: GENERAL

## 2023-11-30 ASSESSMENT — ACTIVITIES OF DAILY LIVING (ADL)
CURRENT_FUNCTION: STAND BY ASSIST
GROOMING_CURRENT_FUNCTION: STAND BY ASSIST
TOILETING: 1
LIGHT HOUSEKEEPING: NEEDS ASSISTANCE
AMBULATION ASSISTANCE: STAND BY ASSIST
PHYSICAL TRANSFERS ASSESSED: 1
DRESSING_LB_CURRENT_FUNCTION: STAND BY ASSIST
GROOMING ASSESSED: 1
MONEY MANAGEMENT (EXPENSES/BILLS): NEEDS ASSISTANCE
DRESSING_UB_CURRENT_FUNCTION: STAND BY ASSIST
HOUSEKEEPING ASSESSED: 1
AMBULATION ASSISTANCE: 1
FEEDING: STAND BY ASSIST
TOILETING: STAND BY ASSIST
BATHING_CURRENT_FUNCTION: STAND BY ASSIST
PREPARING MEALS: NEEDS ASSISTANCE
BATHING ASSESSED: 1
FEEDING ASSESSED: 1

## 2023-11-30 ASSESSMENT — ENCOUNTER SYMPTOMS
PAIN LOCATION: RIGHT SHOULDER
ARTHRALGIAS: 1
PAIN LOCATION - PAIN FREQUENCY: CONSTANT
PAIN LOCATION - PAIN QUALITY: ACHE
PAIN LOCATION - PAIN SEVERITY: 4/10
LOWEST PAIN SEVERITY IN PAST 24 HOURS: 0/10
SUBJECTIVE PAIN PROGRESSION: WAXING AND WANING
PAIN LOCATION - EXACERBATING FACTORS: MOVEMENT
PAIN LOCATION - EXACERBATING FACTORS: MOVEMENT
PAIN LOCATION - PAIN DURATION: CONSTANT
PAIN LOCATION - RELIEVING FACTORS: REST, ICE, MEDICATION
HIGHEST PAIN SEVERITY IN PAST 24 HOURS: 4/10
PAIN LOCATION: LEFT SHOULDER
PAIN LOCATION - PAIN FREQUENCY: CONSTANT
PAIN LOCATION - PAIN QUALITY: ACHE
PAIN SEVERITY GOAL: 0/10
CHANGE IN APPETITE: UNCHANGED
APPETITE LEVEL: GOOD
FATIGUES EASILY: 1
PAIN: 1
PAIN LOCATION - RELIEVING FACTORS: REST, ICE, MEDICATION
PAIN LOCATION - PAIN SEVERITY: 4/10
PAIN LOCATION - PAIN DURATION: CONSTANT

## 2023-12-01 ENCOUNTER — PATIENT OUTREACH (OUTPATIENT)
Dept: CARE COORDINATION | Facility: CLINIC | Age: 68
End: 2023-12-01
Payer: MEDICARE

## 2023-12-01 DIAGNOSIS — Z95.2 H/O AORTIC VALVE REPLACEMENT: ICD-10-CM

## 2023-12-01 PROCEDURE — 1090000002 HH PPS REVENUE DEBIT

## 2023-12-01 PROCEDURE — 1090000001 HH PPS REVENUE CREDIT

## 2023-12-02 PROCEDURE — 1090000002 HH PPS REVENUE DEBIT

## 2023-12-02 PROCEDURE — 1090000001 HH PPS REVENUE CREDIT

## 2023-12-03 PROCEDURE — 1090000001 HH PPS REVENUE CREDIT

## 2023-12-03 PROCEDURE — 1090000002 HH PPS REVENUE DEBIT

## 2023-12-04 DIAGNOSIS — Z95.2 H/O AORTIC VALVE REPLACEMENT: ICD-10-CM

## 2023-12-04 PROBLEM — I71.21 ANEURYSM OF ASCENDING AORTA WITHOUT RUPTURE (CMS-HCC): Status: RESOLVED | Noted: 2023-10-13 | Resolved: 2023-12-04

## 2023-12-04 PROCEDURE — 1090000001 HH PPS REVENUE CREDIT

## 2023-12-04 PROCEDURE — 1090000002 HH PPS REVENUE DEBIT

## 2023-12-04 RX ORDER — WARFARIN 3 MG/1
TABLET ORAL
Qty: 90 TABLET | Refills: 1 | OUTPATIENT
Start: 2023-12-04

## 2023-12-04 RX ORDER — WARFARIN 3 MG/1
TABLET ORAL
Qty: 135 TABLET | Refills: 1 | OUTPATIENT
Start: 2023-12-04

## 2023-12-04 NOTE — TELEPHONE ENCOUNTER
Patient called for same day appointment to be seen for diarrhea    Office is full please advise     Diarrhea/abdominal pain/runny nose/sneezing started 3 days ago.  Denies fever.    Advised urgent care so she could be examined but patient asked I send message

## 2023-12-05 ENCOUNTER — OFFICE VISIT (OUTPATIENT)
Dept: CARDIOLOGY | Facility: CLINIC | Age: 68
End: 2023-12-05
Payer: MEDICARE

## 2023-12-05 ENCOUNTER — ANTICOAGULATION - WARFARIN VISIT (OUTPATIENT)
Dept: CARDIOLOGY | Facility: CLINIC | Age: 68
End: 2023-12-05
Payer: MEDICARE

## 2023-12-05 VITALS
BODY MASS INDEX: 32.47 KG/M2 | HEART RATE: 71 BPM | HEIGHT: 61 IN | OXYGEN SATURATION: 96 % | SYSTOLIC BLOOD PRESSURE: 120 MMHG | WEIGHT: 172 LBS | DIASTOLIC BLOOD PRESSURE: 78 MMHG

## 2023-12-05 DIAGNOSIS — Z95.2 AORTIC VALVE REPLACED: Primary | ICD-10-CM

## 2023-12-05 DIAGNOSIS — Z79.01 LONG TERM (CURRENT) USE OF ANTICOAGULANTS: ICD-10-CM

## 2023-12-05 DIAGNOSIS — Z95.2 AORTIC VALVE REPLACED: ICD-10-CM

## 2023-12-05 DIAGNOSIS — I10 BENIGN ESSENTIAL HYPERTENSION: ICD-10-CM

## 2023-12-05 DIAGNOSIS — I71.20 THORACIC AORTIC ANEURYSM WITHOUT RUPTURE, UNSPECIFIED PART (CMS-HCC): Primary | ICD-10-CM

## 2023-12-05 DIAGNOSIS — I25.10 CAD, MULTIPLE VESSEL: ICD-10-CM

## 2023-12-05 LAB
POC INR: 1.5
POC PROTHROMBIN TIME: NORMAL

## 2023-12-05 PROCEDURE — 4010F ACE/ARB THERAPY RXD/TAKEN: CPT | Performed by: NURSE PRACTITIONER

## 2023-12-05 PROCEDURE — 99214 OFFICE O/P EST MOD 30 MIN: CPT | Performed by: NURSE PRACTITIONER

## 2023-12-05 PROCEDURE — 1090000001 HH PPS REVENUE CREDIT

## 2023-12-05 PROCEDURE — 3008F BODY MASS INDEX DOCD: CPT | Performed by: NURSE PRACTITIONER

## 2023-12-05 PROCEDURE — 99211 OFF/OP EST MAY X REQ PHY/QHP: CPT | Performed by: NURSE PRACTITIONER

## 2023-12-05 PROCEDURE — 3074F SYST BP LT 130 MM HG: CPT | Performed by: NURSE PRACTITIONER

## 2023-12-05 PROCEDURE — 1126F AMNT PAIN NOTED NONE PRSNT: CPT | Performed by: NURSE PRACTITIONER

## 2023-12-05 PROCEDURE — 3052F HG A1C>EQUAL 8.0%<EQUAL 9.0%: CPT | Performed by: NURSE PRACTITIONER

## 2023-12-05 PROCEDURE — 1090000002 HH PPS REVENUE DEBIT

## 2023-12-05 PROCEDURE — 1159F MED LIST DOCD IN RCRD: CPT | Performed by: NURSE PRACTITIONER

## 2023-12-05 PROCEDURE — 1160F RVW MEDS BY RX/DR IN RCRD: CPT | Performed by: NURSE PRACTITIONER

## 2023-12-05 PROCEDURE — 99214 OFFICE O/P EST MOD 30 MIN: CPT | Mod: 25,27 | Performed by: NURSE PRACTITIONER

## 2023-12-05 PROCEDURE — 85610 PROTHROMBIN TIME: CPT | Mod: QW

## 2023-12-05 PROCEDURE — 3078F DIAST BP <80 MM HG: CPT | Performed by: NURSE PRACTITIONER

## 2023-12-05 RX ORDER — METOPROLOL SUCCINATE 50 MG/1
50 TABLET, EXTENDED RELEASE ORAL DAILY
Qty: 90 TABLET | Refills: 3 | Status: SHIPPED | OUTPATIENT
Start: 2023-12-05 | End: 2024-12-04

## 2023-12-05 RX ORDER — LOSARTAN POTASSIUM 100 MG/1
50 TABLET ORAL DAILY
Qty: 45 TABLET | Refills: 3 | Status: SHIPPED | OUTPATIENT
Start: 2023-12-05 | End: 2023-12-18 | Stop reason: ALTCHOICE

## 2023-12-05 ASSESSMENT — COLUMBIA-SUICIDE SEVERITY RATING SCALE - C-SSRS
1. IN THE PAST MONTH, HAVE YOU WISHED YOU WERE DEAD OR WISHED YOU COULD GO TO SLEEP AND NOT WAKE UP?: NO
6. HAVE YOU EVER DONE ANYTHING, STARTED TO DO ANYTHING, OR PREPARED TO DO ANYTHING TO END YOUR LIFE?: NO
2. HAVE YOU ACTUALLY HAD ANY THOUGHTS OF KILLING YOURSELF?: NO

## 2023-12-05 ASSESSMENT — PATIENT HEALTH QUESTIONNAIRE - PHQ9
SUM OF ALL RESPONSES TO PHQ9 QUESTIONS 1 AND 2: 0
2. FEELING DOWN, DEPRESSED OR HOPELESS: NOT AT ALL
1. LITTLE INTEREST OR PLEASURE IN DOING THINGS: NOT AT ALL

## 2023-12-05 NOTE — PROGRESS NOTES
Suze Najera is a 68 y.o. female     History Of Present Illness   Mrs Najera is a 68 year old female, status post 10/13/23 redo ascending aorta svg-rda , CABG x1,, COPD, DM II, hypertension, here for a routine follow up. Post operatively, she is doing very well.  She is complaining of incisional pain.  She is under the care of OT.  Her INR is 1.5 today, however she admits she missed her dose yesterday.      Social HX  Social History     Tobacco Use    Smoking status: Former     Packs/day: 1.00     Years: 51.00     Additional pack years: 0.00     Total pack years: 51.00     Types: Cigarettes     Quit date: 2023     Years since quittin.4     Passive exposure: Past    Smokeless tobacco: Never   Vaping Use    Vaping Use: Never used   Substance Use Topics    Alcohol use: Not Currently    Drug use: Yes     Types: Marijuana     Comment: states sometimes use          Family HX  Family History   Problem Relation Name Age of Onset    Alzheimer's disease Mother      Breast cancer Mother          age 52    Other (Brain tumor) Father      Breast cancer Mother's Sister            Review Of Systems   Constitutional: not feeling tired.   Eyes: no eyesight problems.   ENT: no hearing loss and no nosebleeds.   Cardiovascular: no intermittent leg claudication,   + incisional  chest pain,    no shortness of breath, no palpitations, no lower extremity edema, the heart rate was not slow, the heart rate was not fast and as noted in HPI.   Respiratory: no chronic cough and no shortness of breath.   Gastrointestinal: no change in bowel habits and no blood in stools.   Genitourinary: no urinary frequency.   Skin: no skin rashes.   Neurological: no seizures and no frequent falls.   Psychiatric: no depression and not suicidal.   All other systems have been reviewed and are negative for complaint.        Allergies  Allergies   Allergen Reactions    Acetazolamide Unknown    Ceftizoxime Hives    Cefuroxime Hives    Erythromycin Other      Vomiting     Ibuprofen Hives    Penicillins Hives    Sulfa (Sulfonamide Antibiotics) Hives          Vitals  There were no vitals taken for this visit.        Physical Exam  Constitutional: alert and in no acute distress.   Eyes: no erythema, swelling or discharge from the eye .   Neck: neck is supple, symmetric, trachea midline, no masses  and no thyromegaly .   Pulmonary: no increased work of breathing or signs of respiratory distress  and lungs clear to auscultation.  Auscultation of the lungs revealed no expiratory wheezing, normal expiratory time and no inspiratory wheezing. no rales or crackles were heard bilaterally. no rhonchi. no friction rub. no wheezing. no diminished breath sounds. no bronchial breath sounds.   Cardiovascular: carotid pulses 2+ bilaterally with no bruit  right carotid pulse 2+, no bruit heard over the right carotid,   Left carotid pulse 2+ and no bruit heard over the left carotid,   JVP was normal, no thrills ,   Regular rhythm, normal S1 and S2, no murmurs  the heart rate was normal normal S1, normal S2, no S3, no S4 no murmurs were heard.,   pedal pulses 2+ bilaterally  and no edema .   Her incisional is well healed with no redness, edema or drainage  Abdomen: abdomen non-tender, no masses  and no hepatomegaly .   Skin: skin warm and dry, normal skin turgor .   Psychiatric judgment and insight is normal  and oriented to person, place and time .           Current/Home Meds    Current Outpatient Medications:     acetaminophen (Tylenol) 325 mg tablet, Take 1-2 tablets (325-650 mg) by mouth every 6 hours if needed for mild pain (1 - 3) or moderate pain (4 - 6)., Disp: 30 tablet, Rfl: 0    FLUoxetine (PROzac) 40 mg capsule, TAKE 1 CAPSULE BY MOUTH ONCE DAILY, Disp: 90 capsule, Rfl: 1    fluticasone-umeclidin-vilanter (TRELEGY-ELLIPTA) 100-62.5-25 mcg blister with device, Inhale 1 puff once daily., Disp: , Rfl:     glucosamine sulfate 1,000 mg tablet, Take 1 tablet by mouth once daily.,  Disp: , Rfl:     ketoconazole (NIZOral) 2 % cream, Apply topically 2 times a day. To affected forehead and facial areas., Disp: 30 g, Rfl: 1    losartan (Cozaar) 100 mg tablet, Take 1 tablet (100 mg) by mouth once daily., Disp: , Rfl:     metFORMIN XR (Glucophage-XR) 500 mg 24 hr tablet, Take 1 tablet (500 mg) by mouth 2 times a day., Disp: 180 tablet, Rfl: 3    metoprolol succinate XL (Toprol-XL) 50 mg 24 hr tablet, Take 1 tablet (50 mg) by mouth once daily., Disp: 30 tablet, Rfl: 0    mirabegron (Myrbetriq) 25 mg tablet extended release 24 hr 24 hr tablet, Take 1 tablet (25 mg) by mouth once daily., Disp: 60 tablet, Rfl: 2    multivitamin with minerals tablet, Take 1 tablet by mouth once daily. Do not start before October 19, 2023., Disp: , Rfl: 0    pantoprazole (ProtoNix) 40 mg EC tablet, Take 1 tablet (40 mg) by mouth once daily in the morning. Take before meals. Do not crush, chew, or split. Do not start before October 19, 2023., Disp: 30 tablet, Rfl: 0    rosuvastatin (Crestor) 40 mg tablet, Take 1 tablet (40 mg) by mouth once daily., Disp: 90 tablet, Rfl: 1    vibegron (Gemtesa) 75 mg tablet, Take 1 tablet (75 mg) by mouth once daily., Disp: , Rfl:     warfarin (Coumadin) 3 mg tablet, Take 3 mg (1 tablet) Monday, Wednesday and Saturday Take 4.5 mg (1.5 tablets) Sunday, Tuesday, Thursday and Friday, Disp: 90 tablet, Rfl: 1       Labs   Creatinine 0.73  K 4.3    NO RECENT LIPID PANEL        EKG Findings  EKG:         Cardiac Service Results:   status post 10/13/23 redo ascending aorta svg-rda , CABG x1    Assessment/Plan       status post 10/13/23 redo ascending aorta svg-rda , CABG x1:  Doing very well.  She is under the care of OT.  INR is 1.5 today, however she admits that she missed her dose yesterday.  She is scheduled for a post op echo in 6 months.      HYPERTENSION:  Very well controlled at 120/78.  Continue losartan and metoprolol as directed    HYPERLIPIDEMIA:  No recent lipid panel.  I would like  her to have a fasting lipid panel prior to her next visit.      Follow up in 3 months or sooner for any questions, concerns or complaints.

## 2023-12-05 NOTE — PROGRESS NOTES
Patient identification verified with 2 identifiers.    Location: [facility] - [address] [place] [address] [address]. [place], [place] [address] [phone]     Referring Physician: CORWIN Zarate   Enrollment/ Re-enrollment date: 2023   INR Goal: 2.5-3.5  INR monitoring is per [facility] protocol.  Anticoagulation Medication: warfarin  Indication: Aortic Valve Replacement    Subjective   Bleeding signs/symptoms: No    Bruising: No   Major bleeding event: No  Thrombosis signs/symptoms: No  Thromboembolic event: No  Missed doses: Yes  Missed  4.5 mg so took it last night  Extra doses: No  Medication changes: No  Dietary changes: No  Change in health: No  Change in activity: No  Alcohol: No  Other concerns: No    Upcoming Surgeries:  Does the Patient Have any upcoming surgeries that require interruption in anticoagulation therapy? no  Does the patient require bridging? no      Anticoagulation Summary  As of 2023      INR goal:  2.5-3.5   TTR:  30.2 % (1.5 mo)   INR used for dosin.50 (2023)   Weekly warfarin total:  30 mg               Assessment/Plan   Subtherapeutic   Spoke with Jordyn Beavers while in suite for orders due to subtherapeutic INR.     1. New dose:  will take 6 mg tonight   resume dose  2. Next INR:  three days      Education provided to patient during the visit:  Patient instructed to call in interim with questions, concerns and changes.

## 2023-12-06 ENCOUNTER — LAB (OUTPATIENT)
Dept: LAB | Facility: LAB | Age: 68
End: 2023-12-06
Payer: MEDICARE

## 2023-12-06 ENCOUNTER — HOME CARE VISIT (OUTPATIENT)
Dept: HOME HEALTH SERVICES | Facility: HOME HEALTH | Age: 68
End: 2023-12-06
Payer: MEDICARE

## 2023-12-06 VITALS
DIASTOLIC BLOOD PRESSURE: 72 MMHG | SYSTOLIC BLOOD PRESSURE: 140 MMHG | TEMPERATURE: 97.7 F | RESPIRATION RATE: 12 BRPM | HEART RATE: 68 BPM

## 2023-12-06 DIAGNOSIS — I10 BENIGN ESSENTIAL HYPERTENSION: ICD-10-CM

## 2023-12-06 PROCEDURE — G0299 HHS/HOSPICE OF RN EA 15 MIN: HCPCS | Mod: HHH

## 2023-12-06 PROCEDURE — 36415 COLL VENOUS BLD VENIPUNCTURE: CPT

## 2023-12-06 PROCEDURE — 1090000002 HH PPS REVENUE DEBIT

## 2023-12-06 PROCEDURE — 80061 LIPID PANEL: CPT

## 2023-12-06 PROCEDURE — 1090000001 HH PPS REVENUE CREDIT

## 2023-12-06 PROCEDURE — 80053 COMPREHEN METABOLIC PANEL: CPT

## 2023-12-06 SDOH — ECONOMIC STABILITY: GENERAL

## 2023-12-06 ASSESSMENT — ENCOUNTER SYMPTOMS
PERSON REPORTING PAIN: PATIENT
PAIN LOCATION - PAIN FREQUENCY: INTERMITTENT
PAIN LOCATION: RIGHT SHOULDER
DENIES PAIN: 1
PAIN SEVERITY GOAL: 0/10
DEPRESSION: 0
CHANGE IN APPETITE: UNCHANGED
STOOL FREQUENCY: DAILY
SUBJECTIVE PAIN PROGRESSION: UNCHANGED
LOWEST PAIN SEVERITY IN PAST 24 HOURS: 1/10
LAST BOWEL MOVEMENT: 66814
LOSS OF SENSATION IN FEET: 0
FATIGUE: 1
APPETITE LEVEL: GOOD
PAIN LOCATION - RELIEVING FACTORS: ACHING, THROBBING
PAIN LOCATION - PAIN SEVERITY: 3/10
PAIN LOCATION - EXACERBATING FACTORS: OVERUSE
PAIN LOCATION - PAIN QUALITY: ACHING, THROBBING
HIGHEST PAIN SEVERITY IN PAST 24 HOURS: 3/10
DIARRHEA: 1
OCCASIONAL FEELINGS OF UNSTEADINESS: 0
PAIN LOCATION - PAIN DURATION: VARIES

## 2023-12-06 ASSESSMENT — ACTIVITIES OF DAILY LIVING (ADL)
MONEY MANAGEMENT (EXPENSES/BILLS): NEEDS ASSISTANCE
AMBULATION ASSISTANCE: 1
AMBULATION ASSISTANCE: INDEPENDENT

## 2023-12-07 LAB
ALBUMIN SERPL BCP-MCNC: 4.1 G/DL (ref 3.4–5)
ALP SERPL-CCNC: 67 U/L (ref 33–136)
ALT SERPL W P-5'-P-CCNC: 28 U/L (ref 7–45)
ANION GAP SERPL CALC-SCNC: 14 MMOL/L (ref 10–20)
AST SERPL W P-5'-P-CCNC: 30 U/L (ref 9–39)
BILIRUB SERPL-MCNC: 0.4 MG/DL (ref 0–1.2)
BUN SERPL-MCNC: 18 MG/DL (ref 6–23)
CALCIUM SERPL-MCNC: 9.2 MG/DL (ref 8.6–10.6)
CHLORIDE SERPL-SCNC: 105 MMOL/L (ref 98–107)
CHOLEST SERPL-MCNC: 126 MG/DL (ref 0–199)
CHOLESTEROL/HDL RATIO: 2.9
CO2 SERPL-SCNC: 25 MMOL/L (ref 21–32)
CREAT SERPL-MCNC: 0.84 MG/DL (ref 0.5–1.05)
GFR SERPL CREATININE-BSD FRML MDRD: 76 ML/MIN/1.73M*2
GLUCOSE SERPL-MCNC: 139 MG/DL (ref 74–99)
HDLC SERPL-MCNC: 43 MG/DL
LDLC SERPL CALC-MCNC: 34 MG/DL
NON HDL CHOLESTEROL: 83 MG/DL (ref 0–149)
POTASSIUM SERPL-SCNC: 4.4 MMOL/L (ref 3.5–5.3)
PROT SERPL-MCNC: 7 G/DL (ref 6.4–8.2)
SODIUM SERPL-SCNC: 140 MMOL/L (ref 136–145)
TRIGL SERPL-MCNC: 244 MG/DL (ref 0–149)
VLDL: 49 MG/DL (ref 0–40)

## 2023-12-07 PROCEDURE — 1090000001 HH PPS REVENUE CREDIT

## 2023-12-07 PROCEDURE — 1090000002 HH PPS REVENUE DEBIT

## 2023-12-08 ENCOUNTER — ANTICOAGULATION - WARFARIN VISIT (OUTPATIENT)
Dept: CARDIOLOGY | Facility: CLINIC | Age: 68
End: 2023-12-08
Payer: MEDICARE

## 2023-12-08 ENCOUNTER — ANTICOAGULATION - WARFARIN VISIT (OUTPATIENT)
Dept: CARDIOLOGY | Facility: CLINIC | Age: 68
End: 2023-12-08

## 2023-12-08 DIAGNOSIS — Z95.2 AORTIC VALVE REPLACED: Primary | ICD-10-CM

## 2023-12-08 DIAGNOSIS — Z79.01 LONG TERM (CURRENT) USE OF ANTICOAGULANTS: ICD-10-CM

## 2023-12-08 DIAGNOSIS — G45.9 TIA (TRANSIENT ISCHEMIC ATTACK): ICD-10-CM

## 2023-12-08 LAB
POC INR: 2
POC PROTHROMBIN TIME: NORMAL

## 2023-12-08 PROCEDURE — 85610 PROTHROMBIN TIME: CPT | Mod: QW

## 2023-12-08 PROCEDURE — 1090000001 HH PPS REVENUE CREDIT

## 2023-12-08 PROCEDURE — 99211 OFF/OP EST MAY X REQ PHY/QHP: CPT | Performed by: NURSE PRACTITIONER

## 2023-12-08 PROCEDURE — 1090000002 HH PPS REVENUE DEBIT

## 2023-12-08 NOTE — PROGRESS NOTES
Patient identification verified with 2 identifiers.     Location: [facility] - [address] [place], [place] [address] [phone]      Referring Physician: Pricila Beavers MD  Enrollment/ Re-enrollment date:  2023 - [facility] Physician Order Form (Renewal) pended to Pricila Beavers CNP, again on 23 for signature.  INR Goal: 2.5-3.5  INR monitoring is per [facility] protocol.  Anticoagulation Medication: warfarin  Indication: Aortic Valve Replacement    Subjective   Bleeding signs/symptoms: No    Bruising: No   Major bleeding event: No  Thrombosis signs/symptoms: No  Thromboembolic event: No  Missed doses: No  Extra doses: No  Medication changes: No  Dietary changes: No  Change in health: No  Change in activity: No  Alcohol: No  Other concerns: No    A one-time dose adjustment was made and TWD of warfarin was maintained at time of last appointment given accidental dosing noncompliance.    Upcoming Surgeries:  Does the Patient Have any upcoming surgeries that require interruption in anticoagulation therapy? no  Does the patient require bridging? no      Anticoagulation Summary  As of 2023      INR goal:  2.5-3.5   TTR:  28.6 % (1.6 mo)   INR used for dosin.00 (2023)   Weekly warfarin total:  33 mg               Assessment/Plan   Subtherapeutic   No identifiable cause for depression in INR.  1. New dose:  will increase TWD of warfarin by 3mg (approx. 10%).     2. Next INR:  5-7 days.  Patient prefers to RTC next Tuesday as this is the day she has transportation.      Education provided to patient during the visit:  Patient instructed to call in interim with questions, concerns and changes.   Patient educated on interactions between medications and warfarin.   Patient educated on dietary consistency in vitamin k consumption.   Patient educated on affects of alcohol consumption while taking warfarin.   Patient educated on signs of bleeding/clotting.   Patient educated on  compliance with dosing, follow up appointments, and prescribed plan of care.

## 2023-12-09 PROCEDURE — 1090000002 HH PPS REVENUE DEBIT

## 2023-12-09 PROCEDURE — 1090000001 HH PPS REVENUE CREDIT

## 2023-12-10 PROCEDURE — 1090000002 HH PPS REVENUE DEBIT

## 2023-12-10 PROCEDURE — 1090000001 HH PPS REVENUE CREDIT

## 2023-12-11 PROCEDURE — 1090000002 HH PPS REVENUE DEBIT

## 2023-12-11 PROCEDURE — 1090000001 HH PPS REVENUE CREDIT

## 2023-12-11 RX ORDER — MIRABEGRON 25 MG/1
25 TABLET, FILM COATED, EXTENDED RELEASE ORAL DAILY
Qty: 30 TABLET | Refills: 6 | Status: SHIPPED | OUTPATIENT
Start: 2023-12-11 | End: 2023-12-18 | Stop reason: ALTCHOICE

## 2023-12-11 NOTE — TELEPHONE ENCOUNTER
Daughter (tarik) contacted.     She states pt has not received Rx for myrbetriq 25mg.    daughter  voiced frustration with [facility] care pharmacy as of late.    Prefers to have Rx sent to [facility].    Order placed in system for dr hernandez to approve.

## 2023-12-12 ENCOUNTER — ANTICOAGULATION - WARFARIN VISIT (OUTPATIENT)
Dept: CARDIOLOGY | Facility: CLINIC | Age: 68
End: 2023-12-12
Payer: MEDICARE

## 2023-12-12 ENCOUNTER — APPOINTMENT (OUTPATIENT)
Dept: CARDIOLOGY | Facility: CLINIC | Age: 68
End: 2023-12-12
Payer: MEDICARE

## 2023-12-12 DIAGNOSIS — Z95.2 AORTIC VALVE REPLACED: Primary | ICD-10-CM

## 2023-12-12 DIAGNOSIS — Z79.01 LONG TERM (CURRENT) USE OF ANTICOAGULANTS: ICD-10-CM

## 2023-12-12 DIAGNOSIS — G45.9 TIA (TRANSIENT ISCHEMIC ATTACK): ICD-10-CM

## 2023-12-12 LAB
POC INR: 1.5
POC PROTHROMBIN TIME: NORMAL

## 2023-12-12 PROCEDURE — 1090000002 HH PPS REVENUE DEBIT

## 2023-12-12 PROCEDURE — 99211 OFF/OP EST MAY X REQ PHY/QHP: CPT | Performed by: NURSE PRACTITIONER

## 2023-12-12 PROCEDURE — 1090000001 HH PPS REVENUE CREDIT

## 2023-12-12 PROCEDURE — 85610 PROTHROMBIN TIME: CPT | Mod: QW

## 2023-12-12 NOTE — PROGRESS NOTES
Patient identification verified with 2 identifiers.    Location: [facility] - [address] [place] [address]  [place], [place] [address] [phone]      Referring Physician: Pricila Beavers MD  Enrollment/ Re-enrollment date:  2023 - [facility] Physician Order Form (Renewal) pended to Pricila Beavers CNP, again on 23 for signature.  INR Goal: 2.5-3.5  INR monitoring is per [facility] protocol.  Anticoagulation Medication: warfarin  Indication: Aortic Valve Replacement    Subjective   Bleeding signs/symptoms: No    Bruising: No   Major bleeding event: No  Thrombosis signs/symptoms: No  Thromboembolic event: No  Missed doses: No  Extra doses: No  Medication changes: No  Dietary changes: Yes  Patient has been eating vegetable soup the past few days  Change in health: No  Change in activity: No  Alcohol: No  Other concerns: No    Upcoming Surgeries:  Does the Patient Have any upcoming surgeries that require interruption in anticoagulation therapy? no  Does the patient require bridging? no      Anticoagulation Summary  As of 2023      INR goal:  2.5-3.5   TTR:  26.6 % (1.7 mo)   INR used for dosin.50 (2023)   Weekly warfarin total:  33 mg               Assessment/Plan   Subtherapeutic     1. New dose:  One time dose given  and  of 6mg both days. Will maintain weekly dose, and patient will stop eating the vegetable soup that she has been eating multiple times a day for the past 4 days.      2. Next INR: 1 week    Contacted Pricila Beavers regarding subtherapeutic INR.       Education provided to patient during the visit:  Patient instructed to call in interim with questions, concerns and changes.   Patient educated on dietary consistency in vitamin k consumption.

## 2023-12-13 PROCEDURE — 1090000002 HH PPS REVENUE DEBIT

## 2023-12-13 PROCEDURE — 1090000001 HH PPS REVENUE CREDIT

## 2023-12-14 DIAGNOSIS — E66.9 CLASS 1 OBESITY WITH BODY MASS INDEX (BMI) OF 31.0 TO 31.9 IN ADULT, UNSPECIFIED OBESITY TYPE, UNSPECIFIED WHETHER SERIOUS COMORBIDITY PRESENT: ICD-10-CM

## 2023-12-14 PROCEDURE — 1090000002 HH PPS REVENUE DEBIT

## 2023-12-14 PROCEDURE — 1090000001 HH PPS REVENUE CREDIT

## 2023-12-14 RX ORDER — ROSUVASTATIN CALCIUM 40 MG/1
40 TABLET, COATED ORAL DAILY
Qty: 90 TABLET | Refills: 0 | Status: SHIPPED | OUTPATIENT
Start: 2023-12-14 | End: 2024-04-23

## 2023-12-15 ENCOUNTER — HOME CARE VISIT (OUTPATIENT)
Dept: HOME HEALTH SERVICES | Facility: HOME HEALTH | Age: 68
End: 2023-12-15
Payer: MEDICARE

## 2023-12-15 VITALS
SYSTOLIC BLOOD PRESSURE: 120 MMHG | OXYGEN SATURATION: 100 % | DIASTOLIC BLOOD PRESSURE: 70 MMHG | HEART RATE: 86 BPM | TEMPERATURE: 97.4 F | RESPIRATION RATE: 18 BRPM

## 2023-12-15 PROCEDURE — 1090000001 HH PPS REVENUE CREDIT

## 2023-12-15 PROCEDURE — 1090000002 HH PPS REVENUE DEBIT

## 2023-12-15 PROCEDURE — G0299 HHS/HOSPICE OF RN EA 15 MIN: HCPCS | Mod: HHH

## 2023-12-15 ASSESSMENT — ENCOUNTER SYMPTOMS
PAIN LOCATION - PAIN FREQUENCY: INTERMITTENT
PAIN LOCATION - PAIN QUALITY: ACHE/BURN
SUBJECTIVE PAIN PROGRESSION: WAXING AND WANING
PAIN LOCATION: RIGHT LEG
LOWEST PAIN SEVERITY IN PAST 24 HOURS: 0/10
PAIN: 1
PAIN LOCATION - EXACERBATING FACTORS: MOVEMENT, PRESSURE
PAIN SEVERITY GOAL: 0/10
PAIN LOCATION - RELIEVING FACTORS: REST/MEDICATION
PAIN LOCATION - PAIN SEVERITY: 5/10
PAIN LOCATION - PAIN DURATION: VARIABLE
HIGHEST PAIN SEVERITY IN PAST 24 HOURS: 5/10

## 2023-12-15 ASSESSMENT — ACTIVITIES OF DAILY LIVING (ADL)
GROOMING ASSESSED: 1
TOILETING: STAND BY ASSIST
DRESSING_UB_CURRENT_FUNCTION: STAND BY ASSIST
HOUSEKEEPING ASSESSED: 1
GROOMING_CURRENT_FUNCTION: STAND BY ASSIST
BATHING ASSESSED: 1
PHYSICAL TRANSFERS ASSESSED: 1
OASIS_M1830: 00
FEEDING ASSESSED: 1
DRESSING_LB_CURRENT_FUNCTION: STAND BY ASSIST
FEEDING: STAND BY ASSIST
AMBULATION ASSISTANCE: 1
AMBULATION ASSISTANCE: STAND BY ASSIST
CURRENT_FUNCTION: STAND BY ASSIST
HOME_HEALTH_OASIS: 00
TOILETING: 1
BATHING_CURRENT_FUNCTION: STAND BY ASSIST
LIGHT HOUSEKEEPING: NEEDS ASSISTANCE
PREPARING MEALS: NEEDS ASSISTANCE

## 2023-12-16 PROCEDURE — 1090000002 HH PPS REVENUE DEBIT

## 2023-12-16 PROCEDURE — 1090000001 HH PPS REVENUE CREDIT

## 2023-12-17 PROCEDURE — 1090000001 HH PPS REVENUE CREDIT

## 2023-12-17 PROCEDURE — 1090000002 HH PPS REVENUE DEBIT

## 2023-12-18 ENCOUNTER — PROCEDURE VISIT (OUTPATIENT)
Dept: OBSTETRICS AND GYNECOLOGY | Facility: CLINIC | Age: 68
End: 2023-12-18
Payer: MEDICARE

## 2023-12-18 VITALS
SYSTOLIC BLOOD PRESSURE: 130 MMHG | DIASTOLIC BLOOD PRESSURE: 64 MMHG | HEIGHT: 61 IN | WEIGHT: 169 LBS | BODY MASS INDEX: 31.91 KG/M2

## 2023-12-18 DIAGNOSIS — N32.81 OAB (OVERACTIVE BLADDER): Primary | ICD-10-CM

## 2023-12-18 DIAGNOSIS — R31.21 ASYMPTOMATIC MICROSCOPIC HEMATURIA: ICD-10-CM

## 2023-12-18 LAB
POC APPEARANCE, URINE: CLEAR
POC BILIRUBIN, URINE: NEGATIVE
POC BLOOD, URINE: ABNORMAL
POC COLOR, URINE: YELLOW
POC GLUCOSE, URINE: NEGATIVE MG/DL
POC KETONES, URINE: NEGATIVE MG/DL
POC LEUKOCYTES, URINE: ABNORMAL
POC NITRITE,URINE: NEGATIVE
POC PH, URINE: 5.5 PH
POC PROTEIN, URINE: NEGATIVE MG/DL
POC SPECIFIC GRAVITY, URINE: 1.02
POC UROBILINOGEN, URINE: 0.2 EU/DL

## 2023-12-18 PROCEDURE — 81003 URINALYSIS AUTO W/O SCOPE: CPT | Performed by: STUDENT IN AN ORGANIZED HEALTH CARE EDUCATION/TRAINING PROGRAM

## 2023-12-18 PROCEDURE — 99214 OFFICE O/P EST MOD 30 MIN: CPT | Performed by: STUDENT IN AN ORGANIZED HEALTH CARE EDUCATION/TRAINING PROGRAM

## 2023-12-18 PROCEDURE — 52000 CYSTOURETHROSCOPY: CPT | Performed by: STUDENT IN AN ORGANIZED HEALTH CARE EDUCATION/TRAINING PROGRAM

## 2023-12-18 RX ORDER — LOSARTAN POTASSIUM 25 MG/1
25 TABLET ORAL DAILY
COMMUNITY
Start: 2023-12-06

## 2023-12-18 ASSESSMENT — PAIN SCALES - GENERAL: PAINLEVEL: 0-NO PAIN

## 2023-12-18 NOTE — PROGRESS NOTES
Follow up visit for:   CYSTOSCOPY    This is a 68 y.o. y.o. patient who presents for cystoscopy.   Indication(s): AMH    Last visit   2/23/23  Plan:   1. OAB  - Discussed etiology of OAB and potential management options.  - Reviewed bladder health recommendations (fluid intake, avoiding bladder triggers).  - Discussed treatment options: medications, PTNS, intradetrusor Botox injections, or SNM.   - Patient is amenable to pursuing medication. Given her baseline cognitive issues we will defer prescribing anticholinergics.   - Rx: Gemtesa 75mg 1 pill po QD. Sent this Rx to her preferred pharmacy. If Gemtesa is cost-prohibitive will send Rx for Myrbetriq and discussed that this medication potential side effects include elevating blood pressure.   - If the medication does not improve her OAB sx we will consider third-line therapy to treat her OAB such as intradetrusor Botox. Of note if she opts for intradetrusor Botox she will need to d/c taking Warfarin 2 days prior to the procedure.      2. AMH  - UA with small blood.   - Send for microscopic UA with reflex to urine cx.   - D/w the patient that she needs a hematuria w/u with orders for a CT urography (evaluate upper  tract) and cystoscopy (evaluate lower  tract) to rule out any underlying etiology of the hematuria.     Today she reports  Myrbetriq was just as expensive as Gemtesa, interested in alternatives. Cognitive issues as above, so not a candidate for anticholinergics.        CYSTOSCOPY  Verbal and written consent was obtained and a Time Out was performed.    DOCUMENTATION OF UNIVERSAL PROTOCOL FOR INVASIVE PROCEDURES    Time-out was taken with all members of procedure team immediately prior to procedure and the following were confirmed: Correct patient identified  Agreement on procedure  Correct side and site  Correct patient position    PROCEDURE NOTE:   Type of Procedure:  cystoscopy  Procedure Date: 12/18/2023  Time:  8:13 AM  Performing Provider: Elise SANTOS  MD Mekhi    Specimens Sent: n/a  Estimated Blood Loss: n/a    Form Completed By:  Elise Gaming MD  8:13 AM    Procedure:  The patient was positioned in the cystoscopy chair. The urethra was prepped in the usual fashion with betadine or dung soap.  A straight catheter was inserted and the urinary bladder was drained.  Local anesthesia was administered with 2% lidocaine jelly transurethrally in the usual fashion. The bladder was distended with approximately 200mL of sterile water    Cystourethroscopy was performed with an Ambu flexible cystoscope.    Findings:  There were no vitals taken for this visit.  Urethra: normal mucosa, no masses or lesions, no evidence of urethral diverticula  Bladder neck:  unremarkable  Bladder:  normal mucosa, no masses, no lesions, no foreign body, no diverticulum, no cystitis cysticia, no squamous metaplasia, no inflamation, no acute cystitis, no stones  Ureteral orifices:  normal position and appearance,  efflux visualized bilaterally    The patient tolerated the procedure well: Yes    Impression: normal cystoscopy      Assessment and Plan:    1) Reviewed cystoscopy results, Suze Najera watched procedure on video screen.    2) OAB  - myrbetriq and gemtesa were both cost prohibitive  - not a candidate for anticholinergics given baseline cognitive dysfunction  - reviewed next option would be botox or SNM  - not interested in surgical intervention so if anything would consider botox  - Reviewed procedure steps for intradetrusor botox injection, and reviewed this is typically done in the office  - discussed success rate around 80%  - counseled on risk of retention following injection (5% retention risk with injection of 100 U, higher with higher doses) and that this can require CIC to manage  - unsure if she would want to proceed with this  - will consider options and call back if she opts to pursue  - would need UDS prior to botox scheduling    RTC as needed, or if opting  for UDS/Botox    Elise Gaming MD

## 2023-12-18 NOTE — PROGRESS NOTES
Patient ID: Suze Najera is a 68 y.o. female.    Cystoscopy    Date/Time: 12/18/2023 10:39 AM    Performed by: Elise Gaming MD  Authorized by: Elise Gaming MD    Consent:     Consent obtained:  Written    Consent given by:  Patient  Universal Protocol:     Patient states understanding of procedure being performed: yes      Relevant documents present and verified: yes      Test results available and properly labeled: yes      Imaging studies available: yes      Required blood products, implants, devices, and special equipment available: yes      Site marked: yes

## 2023-12-19 ENCOUNTER — ANTICOAGULATION - WARFARIN VISIT (OUTPATIENT)
Dept: CARDIOLOGY | Facility: CLINIC | Age: 68
End: 2023-12-19
Payer: MEDICARE

## 2023-12-19 DIAGNOSIS — G45.9 TIA (TRANSIENT ISCHEMIC ATTACK): ICD-10-CM

## 2023-12-19 DIAGNOSIS — Z95.2 AORTIC VALVE REPLACED: Primary | ICD-10-CM

## 2023-12-19 DIAGNOSIS — Z79.01 LONG TERM (CURRENT) USE OF ANTICOAGULANTS: ICD-10-CM

## 2023-12-19 LAB
POC INR: 2.9
POC PROTHROMBIN TIME: NORMAL

## 2023-12-19 PROCEDURE — 85610 PROTHROMBIN TIME: CPT | Mod: QW

## 2023-12-19 PROCEDURE — 99211 OFF/OP EST MAY X REQ PHY/QHP: CPT | Mod: 27 | Performed by: NURSE PRACTITIONER

## 2023-12-19 NOTE — PROGRESS NOTES
Patient identification verified with 2 identifiers.    Location: [facility] - [address] [place], [place] [address] [phone]     Referring Physician: CORWIN Bunch   Enrollment/ Re-enrollment date: 2024   INR Goal: 2.5-3.5  INR monitoring is per [facility] protocol.  Anticoagulation Medication: warfarin  Indication: Aortic Valve Replacement    Subjective   Bleeding signs/symptoms: No    Bruising: No   Major bleeding event: No  Thrombosis signs/symptoms: No  Thromboembolic event: No  Missed doses: No  Extra doses: No  Medication changes: No  Dietary changes: No  Change in health: No  Change in activity: No  Alcohol: No  Other concerns: No    Upcoming Surgeries:  Does the Patient Have any upcoming surgeries that require interruption in anticoagulation therapy? no  Does the patient require bridging? no      Anticoagulation Summary  As of 2023      INR goal:  2.5-3.5   TTR:  26.8 % (2 mo)   INR used for dosin.90 (2023)   Weekly warfarin total:  34.5 mg               Assessment/Plan   Therapeutic     1. New dose: pt increased by 1.5 mg  2. Next INR: 1 week      Education provided to patient during the visit:  Patient instructed to call in interim with questions, concerns and changes.

## 2023-12-26 ENCOUNTER — ANTICOAGULATION - WARFARIN VISIT (OUTPATIENT)
Dept: CARDIOLOGY | Facility: CLINIC | Age: 68
End: 2023-12-26
Payer: MEDICARE

## 2023-12-26 DIAGNOSIS — Z79.01 LONG TERM (CURRENT) USE OF ANTICOAGULANTS: ICD-10-CM

## 2023-12-26 DIAGNOSIS — Z95.2 AORTIC VALVE REPLACED: Primary | ICD-10-CM

## 2023-12-26 DIAGNOSIS — G45.9 TIA (TRANSIENT ISCHEMIC ATTACK): ICD-10-CM

## 2023-12-26 LAB
POC INR: 2.7
POC PROTHROMBIN TIME: NORMAL

## 2023-12-26 PROCEDURE — 99211 OFF/OP EST MAY X REQ PHY/QHP: CPT | Performed by: NURSE PRACTITIONER

## 2023-12-26 PROCEDURE — 85610 PROTHROMBIN TIME: CPT | Mod: QW

## 2023-12-26 NOTE — PROGRESS NOTES
Patient identification verified with 2 identifiers.    Location: [facility] - [address] [place], [place] [address] [phone]     Referring Physician: CORWIN Bunch   Enrollment/ Re-enrollment date: 2024   INR Goal: 2.5-3.5  INR monitoring is per [facility] protocol.  Anticoagulation Medication: warfarin  Indication: Aortic Valve Replacement    Subjective   Bleeding signs/symptoms: No    Bruising: No   Major bleeding event: No  Thrombosis signs/symptoms: No  Thromboembolic event: No  Missed doses: No  Extra doses: No  Medication changes: No  Dietary changes: No  Change in health: No  Change in activity: No  Alcohol: No  Other concerns: No    Upcoming Surgeries:  Does the Patient Have any upcoming surgeries that require interruption in anticoagulation therapy? no  Does the patient require bridging? no      Anticoagulation Summary  As of 2023      INR goal:  2.5-3.5   TTR:  33.8 % (2.2 mo)   INR used for dosin.70 (2023)   Weekly warfarin total:  34.5 mg               Assessment/Plan   Therapeutic     1. New dose: no change    2. Next INR: 2 weeks      Education provided to patient during the visit:  Patient instructed to call in interim with questions, concerns and changes.

## 2023-12-29 ENCOUNTER — OFFICE VISIT (OUTPATIENT)
Dept: PRIMARY CARE | Facility: CLINIC | Age: 68
End: 2023-12-29
Payer: MEDICARE

## 2023-12-29 VITALS
OXYGEN SATURATION: 95 % | DIASTOLIC BLOOD PRESSURE: 80 MMHG | TEMPERATURE: 97.5 F | HEART RATE: 71 BPM | BODY MASS INDEX: 32.42 KG/M2 | WEIGHT: 171.6 LBS | SYSTOLIC BLOOD PRESSURE: 147 MMHG

## 2023-12-29 DIAGNOSIS — M25.572 ARTHRALGIA OF LEFT FOOT: ICD-10-CM

## 2023-12-29 DIAGNOSIS — R22.0 NASAL SWELLING: Primary | ICD-10-CM

## 2023-12-29 PROCEDURE — 87081 CULTURE SCREEN ONLY: CPT

## 2023-12-29 PROCEDURE — 3048F LDL-C <100 MG/DL: CPT | Performed by: FAMILY MEDICINE

## 2023-12-29 PROCEDURE — 99214 OFFICE O/P EST MOD 30 MIN: CPT | Performed by: FAMILY MEDICINE

## 2023-12-29 PROCEDURE — 3052F HG A1C>EQUAL 8.0%<EQUAL 9.0%: CPT | Performed by: FAMILY MEDICINE

## 2023-12-29 PROCEDURE — 4010F ACE/ARB THERAPY RXD/TAKEN: CPT | Performed by: FAMILY MEDICINE

## 2023-12-29 PROCEDURE — 3077F SYST BP >= 140 MM HG: CPT | Performed by: FAMILY MEDICINE

## 2023-12-29 PROCEDURE — 1126F AMNT PAIN NOTED NONE PRSNT: CPT | Performed by: FAMILY MEDICINE

## 2023-12-29 PROCEDURE — 3008F BODY MASS INDEX DOCD: CPT | Performed by: FAMILY MEDICINE

## 2023-12-29 PROCEDURE — 1159F MED LIST DOCD IN RCRD: CPT | Performed by: FAMILY MEDICINE

## 2023-12-29 PROCEDURE — 3079F DIAST BP 80-89 MM HG: CPT | Performed by: FAMILY MEDICINE

## 2023-12-29 PROCEDURE — 1160F RVW MEDS BY RX/DR IN RCRD: CPT | Performed by: FAMILY MEDICINE

## 2023-12-29 RX ORDER — DARIFENACIN 7.5 MG/1
TABLET, EXTENDED RELEASE ORAL
COMMUNITY
Start: 2021-03-22 | End: 2023-12-29 | Stop reason: WASHOUT

## 2023-12-29 RX ORDER — MEDICAL SUPPLY, MISCELLANEOUS
EACH MISCELLANEOUS
COMMUNITY
Start: 2023-12-28

## 2023-12-29 RX ORDER — BLOOD SUGAR DIAGNOSTIC
STRIP MISCELLANEOUS
COMMUNITY
Start: 2023-12-28

## 2023-12-29 RX ORDER — DOXYCYCLINE 100 MG/1
100 CAPSULE ORAL 2 TIMES DAILY
Qty: 20 CAPSULE | Refills: 0 | Status: SHIPPED | OUTPATIENT
Start: 2023-12-29 | End: 2024-01-08
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
## 2023-12-31 LAB — STAPHYLOCOCCUS SPEC CULT: NORMAL

## 2024-01-01 NOTE — TELEPHONE ENCOUNTER
"I received a page today that patient \"messed up taking warfarin this week\"    I called her back 4 times on number provided, [phone], no answer, number just rings.  Called answered service and asked them to connect me to her -- same thing happened.  I called her mobile / home phone number provided in her chart and it goes straight to voicemail.     Awaiting patient callback.    "
I called and spoke with Veronica and they are unable to make that time or day (8/31/23 at 1)  due to her work schedule. I did schedule the patient with TLM on 9/7/23 at 6:30 AM.   
I called pt and scheduled her for an appt tomorrow at 1pm   I called her to confirm she can make it   
I left a message for pt to call back   *please see other task *  
I reviewed her records, ultrasounds negative for DVT.  She can be seen next available in office with any doc for further evaluation of symptoms.    
Left message for pt to call back to discuss   
Please call her again this morning to see how we can help with her coumadin -- please remind her that this medication is managed by her cardiologist through the coumadin clinic   
The patient called this morning and states she saw her Cardiologist yesterday, Tuesday 8/29. She sees this provider weekly. While there, they were concerned with the swelling in her leg and sent her to the ED.   Patients daughter took her to [facility]- records are in the chart to review.  The patient was told to follow up 8/31 and the office is full.  I explained I would put in a message to her PCP, but she needed to have the phone by her and confirmed we have the right number so that we can let her know what the PCP has to say.  I explained we have been leaving messages to get in tough, to which she states she is not getting.  Please review records and advise.  
No

## 2024-01-04 ENCOUNTER — TELEPHONE (OUTPATIENT)
Dept: PRIMARY CARE | Facility: CLINIC | Age: 69
End: 2024-01-04
Payer: COMMERCIAL

## 2024-01-04 DIAGNOSIS — Z95.2 H/O AORTIC VALVE REPLACEMENT: ICD-10-CM

## 2024-01-09 ENCOUNTER — ANTICOAGULATION - WARFARIN VISIT (OUTPATIENT)
Dept: CARDIOLOGY | Facility: CLINIC | Age: 69
End: 2024-01-09
Payer: COMMERCIAL

## 2024-01-09 ENCOUNTER — ANCILLARY PROCEDURE (OUTPATIENT)
Dept: RADIOLOGY | Facility: CLINIC | Age: 69
End: 2024-01-09
Payer: COMMERCIAL

## 2024-01-09 DIAGNOSIS — Z79.01 LONG TERM (CURRENT) USE OF ANTICOAGULANTS: ICD-10-CM

## 2024-01-09 DIAGNOSIS — Z95.2 AORTIC VALVE REPLACED: Primary | ICD-10-CM

## 2024-01-09 DIAGNOSIS — G45.9 TIA (TRANSIENT ISCHEMIC ATTACK): ICD-10-CM

## 2024-01-09 DIAGNOSIS — M25.572 ARTHRALGIA OF LEFT FOOT: ICD-10-CM

## 2024-01-09 LAB
POC INR: 2.1
POC PROTHROMBIN TIME: NORMAL

## 2024-01-09 PROCEDURE — 73630 X-RAY EXAM OF FOOT: CPT | Mod: LT

## 2024-01-09 PROCEDURE — 99211 OFF/OP EST MAY X REQ PHY/QHP: CPT | Performed by: NURSE PRACTITIONER

## 2024-01-09 PROCEDURE — 73630 X-RAY EXAM OF FOOT: CPT | Mod: LEFT SIDE | Performed by: RADIOLOGY

## 2024-01-09 PROCEDURE — 85610 PROTHROMBIN TIME: CPT | Mod: QW

## 2024-01-09 NOTE — PROGRESS NOTES
Patient identification verified with 2 identifiers.     Location: [facility] - [address]  [place], [place] [address] [phone]      Referring Physician: Pricila Beavers MD  Enrollment/ Re-enrollment date:  2024.  INR Goal: 2.5-3.5  INR monitoring is per [facility] protocol.  Anticoagulation Medication: warfarin  Indication: Aortic Valve Replacement    Subjective   Bleeding signs/symptoms: No    Bruising: No   Major bleeding event: No  Thrombosis signs/symptoms: No  Thromboembolic event: No  Missed doses: No  Extra doses: No  Medication changes: Yes  Patient will complete a course of Doxycycline today for treatment of a suspected staph infection.  Dietary changes: No  Change in health: No  Change in activity: No  Alcohol: No  Other concerns: No    Upcoming Surgeries:  Does the Patient Have any upcoming surgeries that require interruption in anticoagulation therapy? no  Does the patient require bridging? no      Anticoagulation Summary  As of 2024      INR goal:  2.5-3.5   TTR:  33.7 % (2.7 mo)   INR used for dosin.10 (2024)   Weekly warfarin total:  34.5 mg               Assessment/Plan   Subtherapeutic     1. New dose: no change    2. Next INR: 1 week      Education provided to patient during the visit:  Patient instructed to call in interim with questions, concerns and changes.   Patient educated on interactions between medications and warfarin.   Patient educated on dietary consistency in vitamin k consumption.   Patient educated on affects of alcohol consumption while taking warfarin.   Patient educated on signs of bleeding/clotting.   Patient educated on compliance with dosing, follow up appointments, and prescribed plan of care.

## 2024-01-10 ENCOUNTER — PATIENT OUTREACH (OUTPATIENT)
Dept: CARE COORDINATION | Facility: CLINIC | Age: 69
End: 2024-01-10
Payer: COMMERCIAL

## 2024-01-10 RX ORDER — WARFARIN 3 MG/1
TABLET ORAL
Qty: 180 TABLET | Refills: 2 | Status: SHIPPED | OUTPATIENT
Start: 2024-01-10

## 2024-01-10 NOTE — PROGRESS NOTES
90 day (final) call post hospital stay completed.  This CM called and spoke with pt  via phone to address any final questions or concerns regarding recent hospitalization.  Pt reports doing well and has no concerns at this time.  Contact info provided to pt for non-emergent concerns.           [facility] sp[facility] F[facility] [phone].

## 2024-01-16 ENCOUNTER — ANTICOAGULATION - WARFARIN VISIT (OUTPATIENT)
Dept: CARDIOLOGY | Facility: CLINIC | Age: 69
End: 2024-01-16
Payer: COMMERCIAL

## 2024-01-16 DIAGNOSIS — G45.9 TIA (TRANSIENT ISCHEMIC ATTACK): ICD-10-CM

## 2024-01-16 DIAGNOSIS — Z79.01 LONG TERM (CURRENT) USE OF ANTICOAGULANTS: ICD-10-CM

## 2024-01-16 DIAGNOSIS — Z95.2 AORTIC VALVE REPLACED: ICD-10-CM

## 2024-01-16 LAB
POC INR: 2.5
POC PROTHROMBIN TIME: NORMAL

## 2024-01-16 PROCEDURE — 85610 PROTHROMBIN TIME: CPT | Mod: QW

## 2024-01-16 PROCEDURE — 99211 OFF/OP EST MAY X REQ PHY/QHP: CPT | Performed by: NURSE PRACTITIONER

## 2024-01-16 NOTE — PROGRESS NOTES
Patient identification verified with 2 identifiers.     Location: [facility] - [address] [place], [place] [address] [phone]      Referring Physician: Pricila Beavers MD  Enrollment/ Re-enrollment date:  2024.  INR Goal: 2.5-3.5  INR monitoring is per [facility] protocol.  Anticoagulation Medication: warfarin  Indication: Aortic Valve Replacement    Subjective   Bleeding signs/symptoms: No    Bruising: No   Major bleeding event: No  Thrombosis signs/symptoms: No  Thromboembolic event: No  Missed doses: No  Extra doses: No  Medication changes: No  Dietary changes: No  Change in health: No  Change in activity: No  Alcohol: No  Other concerns: No    Upcoming Surgeries:  Does the Patient Have any upcoming surgeries that require interruption in anticoagulation therapy? no  Does the patient require bridging? no    TWD of warfarin was maintained at time of last POCT appointment.    Anticoagulation Summary  As of 2024      INR goal:  2.5-3.5   TTR:  31.1 % (2.9 mo)   INR used for dosin.50 (2024)   Weekly warfarin total:  34.5 mg               Assessment/Plan   Therapeutic     1. New dose: no change    2. Next INR: 1 week      Education provided to patient during the visit:  Patient instructed to call in interim with questions, concerns and changes.   Patient educated on interactions between medications and warfarin.   Patient educated on dietary consistency in vitamin k consumption.   Patient educated on affects of alcohol consumption while taking warfarin.   Patient educated on signs of bleeding/clotting.   Patient educated on compliance with dosing, follow up appointments, and prescribed plan of care.

## 2024-01-23 ENCOUNTER — ANTICOAGULATION - WARFARIN VISIT (OUTPATIENT)
Dept: CARDIOLOGY | Facility: CLINIC | Age: 69
End: 2024-01-23
Payer: COMMERCIAL

## 2024-01-23 DIAGNOSIS — Z79.01 LONG TERM (CURRENT) USE OF ANTICOAGULANTS: ICD-10-CM

## 2024-01-23 DIAGNOSIS — G45.9 TIA (TRANSIENT ISCHEMIC ATTACK): ICD-10-CM

## 2024-01-23 DIAGNOSIS — Z95.2 AORTIC VALVE REPLACED: Primary | ICD-10-CM

## 2024-01-23 LAB
POC INR: 1.9
POC PROTHROMBIN TIME: NORMAL

## 2024-01-23 PROCEDURE — 99211 OFF/OP EST MAY X REQ PHY/QHP: CPT | Performed by: NURSE PRACTITIONER

## 2024-01-23 PROCEDURE — 85610 PROTHROMBIN TIME: CPT | Mod: QW

## 2024-01-23 NOTE — PROGRESS NOTES
Patient identification verified with 2 identifiers.    Location: [facility] - [address] [place] [address] [address] [place], [place] [address] [phone]      Referring Physician: Pricila Beavers MD  Enrollment/ Re-enrollment date:  2024.  INR Goal: 2.5-3.5  INR monitoring is per [facility] protocol.  Anticoagulation Medication: warfarin  Indication: Aortic Valve Replacement    Subjective   Bleeding signs/symptoms: No    Bruising: No   Major bleeding event: No  Thrombosis signs/symptoms: No  Thromboembolic event: No  Missed doses: No  Extra doses: No  Medication changes: No  Dietary changes: No  Change in health: No  Change in activity: No  Alcohol: No  Other concerns: No    Upcoming Surgeries:  Does the Patient Have any upcoming surgeries that require interruption in anticoagulation therapy? no  Does the patient require bridging? no      Anticoagulation Summary  As of 2024      INR goal:  2.5-3.5   TTR:  29.0 % (3.2 mo)   INR used for dosin.90 (2024)   Weekly warfarin total:  36 mg               Assessment/Plan   Subtherapeutic     1. New dose:  Will increase weekly dose per protocol     2. Next INR: 1 week      Education provided to patient during the visit:  Patient instructed to call in interim with questions, concerns and changes.

## 2024-01-26 ENCOUNTER — APPOINTMENT (OUTPATIENT)
Dept: PRIMARY CARE | Facility: CLINIC | Age: 69
End: 2024-01-26
Payer: COMMERCIAL

## 2024-01-30 ENCOUNTER — ANTICOAGULATION - WARFARIN VISIT (OUTPATIENT)
Dept: CARDIOLOGY | Facility: CLINIC | Age: 69
End: 2024-01-30
Payer: COMMERCIAL

## 2024-01-30 DIAGNOSIS — Z95.2 AORTIC VALVE REPLACED: Primary | ICD-10-CM

## 2024-01-30 DIAGNOSIS — Z79.01 LONG TERM (CURRENT) USE OF ANTICOAGULANTS: ICD-10-CM

## 2024-01-30 DIAGNOSIS — G45.9 TIA (TRANSIENT ISCHEMIC ATTACK): ICD-10-CM

## 2024-01-30 LAB
POC INR: 2.2
POC PROTHROMBIN TIME: NORMAL

## 2024-01-30 PROCEDURE — 85610 PROTHROMBIN TIME: CPT | Mod: QW

## 2024-01-30 PROCEDURE — 99211 OFF/OP EST MAY X REQ PHY/QHP: CPT | Performed by: NURSE PRACTITIONER

## 2024-01-30 NOTE — PROGRESS NOTES
Patient identification verified with 2 identifiers.     Location: [facility] - [address] [place], [place] [address] [phone]      Referring Physician: Pricila Beavers MD  Enrollment/ Re-enrollment date:  2024.  INR Goal: 2.5-3.5  INR monitoring is per [facility] protocol.  Anticoagulation Medication: warfarin  Indication: Aortic Valve Replacement    Subjective   Bleeding signs/symptoms: No    Bruising: No   Major bleeding event: No  Thrombosis signs/symptoms: No  Thromboembolic event: No  Missed doses: No  Extra doses: No  Medication changes: No  Dietary changes: Yes  Possible temporary increase in vitamin k consumption.  Change in health: No  Change in activity: No  Alcohol: No  Other concerns: No    TWD of warfarin was increased at time of last appointment.    Upcoming Surgeries:  Does the Patient Have any upcoming surgeries that require interruption in anticoagulation therapy? no  Does the patient require bridging? no      Anticoagulation Summary  As of 2024      INR goal:  2.5-3.5   TTR:  26.8 % (3.4 mo)   INR used for dosin.20 (2024)   Weekly warfarin total:  37.5 mg               Assessment/Plan   Subtherapeutic     1. New dose:  will increase TWD of warfarin by 1.5mg (approx. 5-10%).      2. Next INR: 1 week      Education provided to patient during the visit:  Patient instructed to call in interim with questions, concerns and changes.   Patient educated on interactions between medications and warfarin.   Patient educated on dietary consistency in vitamin k consumption.   Patient educated on affects of alcohol consumption while taking warfarin.   Patient educated on signs of bleeding/clotting.   Patient educated on compliance with dosing, follow up appointments, and prescribed plan of care.

## 2024-02-06 ENCOUNTER — ANTICOAGULATION - WARFARIN VISIT (OUTPATIENT)
Dept: CARDIOLOGY | Facility: CLINIC | Age: 69
End: 2024-02-06
Payer: COMMERCIAL

## 2024-02-06 DIAGNOSIS — Z79.01 LONG TERM (CURRENT) USE OF ANTICOAGULANTS: ICD-10-CM

## 2024-02-06 DIAGNOSIS — G45.9 TIA (TRANSIENT ISCHEMIC ATTACK): ICD-10-CM

## 2024-02-06 DIAGNOSIS — Z95.2 AORTIC VALVE REPLACED: Primary | ICD-10-CM

## 2024-02-06 LAB
POC INR: 3.5
POC PROTHROMBIN TIME: NORMAL

## 2024-02-06 PROCEDURE — 99211 OFF/OP EST MAY X REQ PHY/QHP: CPT | Performed by: NURSE PRACTITIONER

## 2024-02-06 PROCEDURE — 85610 PROTHROMBIN TIME: CPT | Mod: QW

## 2024-02-06 NOTE — PROGRESS NOTES
Patient identification verified with 2 identifiers.     Location: [facility] - [address]  [place], [place] [address] [phone]      Referring Physician: Pricila Beavers MD  Enrollment/ Re-enrollment date:  December 8, 2024.  INR Goal: 2.5-3.5  INR monitoring is per [facility] protocol.  Anticoagulation Medication: warfarin  Indication: Aortic Valve Replacement    Subjective   Bleeding signs/symptoms:      Bruising:     Major bleeding event:    Thrombosis signs/symptoms:    Thromboembolic event:    Missed doses:    Extra doses:    Medication changes:    Dietary changes:    Change in health:    Change in activity:    Alcohol:    Other concerns:      TWD of warfarin was increased at time of last appointment.    Upcoming Procedures:  Does the Patient Have any upcoming procedures that require interruption in anticoagulation therapy? no  Does the patient require bridging? no      Anticoagulation Summary  As of 2/6/2024      INR goal:  2.5-3.5   TTR:  30.0 % (3.6 mo)   INR used for dosing:  3.50 (2/6/2024)   Weekly warfarin total:  37.5 mg               Assessment/Plan   Therapeutic     1. New dose: no change    2. Next INR: 1 week      Education provided to patient during the visit:  Patient instructed to call in interim with questions, concerns and changes.   Patient educated on interactions between medications and warfarin.   Patient educated on dietary consistency in vitamin k consumption.   Patient educated on affects of alcohol consumption while taking warfarin.   Patient educated on signs of bleeding/clotting.   Patient educated on compliance with dosing, follow up appointments, and prescribed plan of care.

## 2024-02-13 ENCOUNTER — ANTICOAGULATION - WARFARIN VISIT (OUTPATIENT)
Dept: CARDIOLOGY | Facility: CLINIC | Age: 69
End: 2024-02-13
Payer: COMMERCIAL

## 2024-02-13 DIAGNOSIS — Z95.2 AORTIC VALVE REPLACED: Primary | ICD-10-CM

## 2024-02-13 DIAGNOSIS — Z79.01 LONG TERM (CURRENT) USE OF ANTICOAGULANTS: ICD-10-CM

## 2024-02-13 DIAGNOSIS — G45.9 TIA (TRANSIENT ISCHEMIC ATTACK): ICD-10-CM

## 2024-02-13 LAB
POC INR: 3
POC PROTHROMBIN TIME: NORMAL

## 2024-02-13 PROCEDURE — 99211 OFF/OP EST MAY X REQ PHY/QHP: CPT | Performed by: NURSE PRACTITIONER

## 2024-02-13 PROCEDURE — 85610 PROTHROMBIN TIME: CPT | Mod: QW

## 2024-02-13 NOTE — PROGRESS NOTES
Patient identification verified with 2 identifiers.     Location: [facility] - [address] [place], [place] [address] [phone]      Referring Physician: Pricila Beavers MD  Enrollment/ Re-enrollment date:  December 8, 2024.  INR Goal: 2.5-3.5  INR monitoring is per [facility] protocol.  Anticoagulation Medication: warfarin  Indication: Aortic Valve Replacement    Subjective   Bleeding signs/symptoms: No    Bruising: No   Major bleeding event: No  Thrombosis signs/symptoms: No  Thromboembolic event: No  Missed doses: No  Extra doses: No  Medication changes: No  Dietary changes: No  Change in health: No  Change in activity: No  Alcohol: No  Other concerns: No    Upcoming Procedures:  Does the Patient Have any upcoming procedures that require interruption in anticoagulation therapy? no  Does the patient require bridging? no      Anticoagulation Summary  As of 2/13/2024      INR goal:  2.5-3.5   TTR:  34.2 % (3.9 mo)   INR used for dosing:  3.00 (2/13/2024)   Weekly warfarin total:  37.5 mg               Assessment/Plan   Therapeutic     1. New dose: no change    2. Next INR: 2 weeks      Education provided to patient during the visit:  Patient instructed to call in interim with questions, concerns and changes.   Patient educated on interactions between medications and warfarin.   Patient educated on dietary consistency in vitamin k consumption.   Patient educated on affects of alcohol consumption while taking warfarin.   Patient educated on signs of bleeding/clotting.   Patient educated on compliance with dosing, follow up appointments, and prescribed plan of care.

## 2024-02-23 ENCOUNTER — TELEPHONE (OUTPATIENT)
Dept: CARDIOLOGY | Facility: CLINIC | Age: 69
End: 2024-02-23
Payer: COMMERCIAL

## 2024-02-23 ENCOUNTER — ANTICOAGULATION - WARFARIN VISIT (OUTPATIENT)
Dept: CARDIOLOGY | Facility: CLINIC | Age: 69
End: 2024-02-23
Payer: COMMERCIAL

## 2024-02-23 DIAGNOSIS — G45.9 TIA (TRANSIENT ISCHEMIC ATTACK): ICD-10-CM

## 2024-02-23 DIAGNOSIS — Z95.2 AORTIC VALVE REPLACED: Primary | ICD-10-CM

## 2024-02-23 DIAGNOSIS — Z79.01 LONG TERM (CURRENT) USE OF ANTICOAGULANTS: ICD-10-CM

## 2024-02-23 NOTE — TELEPHONE ENCOUNTER
1221hrs:  Patient's daughter, Veronica, called to change the date of patient's next POCT appointment given a conflict with her work schedule.  Appointment changed from next Tuesday, February 27th, 2024, to Wednesday, February 28, 2024, per daughter's request.

## 2024-02-27 ENCOUNTER — APPOINTMENT (OUTPATIENT)
Dept: CARDIOLOGY | Facility: CLINIC | Age: 69
End: 2024-02-27
Payer: COMMERCIAL

## 2024-02-28 ENCOUNTER — ANTICOAGULATION - WARFARIN VISIT (OUTPATIENT)
Dept: CARDIOLOGY | Facility: CLINIC | Age: 69
End: 2024-02-28
Payer: COMMERCIAL

## 2024-02-28 DIAGNOSIS — Z95.2 AORTIC VALVE REPLACED: Primary | ICD-10-CM

## 2024-02-28 DIAGNOSIS — G45.9 TIA (TRANSIENT ISCHEMIC ATTACK): ICD-10-CM

## 2024-02-28 DIAGNOSIS — Z79.01 LONG TERM (CURRENT) USE OF ANTICOAGULANTS: ICD-10-CM

## 2024-02-28 LAB
POC INR: 3.2
POC PROTHROMBIN TIME: NORMAL

## 2024-02-28 PROCEDURE — 85610 PROTHROMBIN TIME: CPT | Mod: QW

## 2024-02-28 PROCEDURE — 99211 OFF/OP EST MAY X REQ PHY/QHP: CPT

## 2024-02-28 NOTE — PROGRESS NOTES
Patient identification verified with 2 identifiers.    Location: [facility] - [address] [place], [place] [id number] [phone]     Referring Physician: CORWIN Bunch   Enrollment/ Re-enrollment date: 12/8/2024   INR Goal: 2.5-3.5  INR monitoring is per [facility] protocol.  Anticoagulation Medication: warfarin  Indication: Aortic Valve Replacement    Subjective   Bleeding signs/symptoms: No    Bruising: No   Major bleeding event: No  Thrombosis signs/symptoms: No  Thromboembolic event: No  Missed doses: Missed dose one week ago  Extra doses: No  Medication changes: No  Dietary changes: No  Change in health: No  Change in activity: No  Alcohol: No  Other concerns: No    Upcoming Procedures:  Does the Patient Have any upcoming procedures that require interruption in anticoagulation therapy? no  Does the patient require bridging? no      Anticoagulation Summary  As of 2/28/2024      INR goal:  2.5-3.5   TTR:  41.4 % (4.4 mo)   INR used for dosing:  3.20 (2/28/2024)   Weekly warfarin total:  37.5 mg               Assessment/Plan   Therapeutic     1. New dose: no change    2. Next INR: 2 weeks      Education provided to patient during the visit:  Patient instructed to call in interim with questions, concerns and changes.

## 2024-03-04 ENCOUNTER — OFFICE VISIT (OUTPATIENT)
Dept: PRIMARY CARE | Facility: CLINIC | Age: 69
End: 2024-03-04
Payer: COMMERCIAL

## 2024-03-04 ENCOUNTER — LAB (OUTPATIENT)
Dept: LAB | Facility: LAB | Age: 69
End: 2024-03-04
Payer: COMMERCIAL

## 2024-03-04 VITALS
SYSTOLIC BLOOD PRESSURE: 130 MMHG | HEIGHT: 61 IN | BODY MASS INDEX: 31.49 KG/M2 | DIASTOLIC BLOOD PRESSURE: 80 MMHG | TEMPERATURE: 96.8 F | RESPIRATION RATE: 16 BRPM | OXYGEN SATURATION: 95 % | WEIGHT: 166.8 LBS | HEART RATE: 73 BPM

## 2024-03-04 DIAGNOSIS — F03.A3 MILD DEMENTIA WITH MOOD DISTURBANCE, UNSPECIFIED DEMENTIA TYPE (MULTI): ICD-10-CM

## 2024-03-04 DIAGNOSIS — Z87.898 HISTORY OF SEIZURE: ICD-10-CM

## 2024-03-04 DIAGNOSIS — Z11.59 NEED FOR HEPATITIS C SCREENING TEST: ICD-10-CM

## 2024-03-04 DIAGNOSIS — E11.9 TYPE 2 DIABETES MELLITUS WITHOUT COMPLICATION, WITHOUT LONG-TERM CURRENT USE OF INSULIN (MULTI): ICD-10-CM

## 2024-03-04 DIAGNOSIS — F33.1 RECURRENT MODERATE MAJOR DEPRESSIVE DISORDER WITH ANXIETY (MULTI): ICD-10-CM

## 2024-03-04 DIAGNOSIS — F41.9 RECURRENT MODERATE MAJOR DEPRESSIVE DISORDER WITH ANXIETY (MULTI): ICD-10-CM

## 2024-03-04 DIAGNOSIS — F17.210 CIGARETTE NICOTINE DEPENDENCE WITHOUT COMPLICATION: ICD-10-CM

## 2024-03-04 DIAGNOSIS — M85.80 OSTEOPENIA, UNSPECIFIED LOCATION: ICD-10-CM

## 2024-03-04 DIAGNOSIS — Z95.2 AORTIC VALVE REPLACED: ICD-10-CM

## 2024-03-04 DIAGNOSIS — E55.9 VITAMIN D DEFICIENCY: ICD-10-CM

## 2024-03-04 DIAGNOSIS — Z78.0 POSTMENOPAUSAL: ICD-10-CM

## 2024-03-04 DIAGNOSIS — N32.81 OVERACTIVE BLADDER: ICD-10-CM

## 2024-03-04 DIAGNOSIS — R31.21 ASYMPTOMATIC MICROSCOPIC HEMATURIA: ICD-10-CM

## 2024-03-04 DIAGNOSIS — E66.9 CLASS 1 OBESITY WITHOUT SERIOUS COMORBIDITY WITH BODY MASS INDEX (BMI) OF 31.0 TO 31.9 IN ADULT, UNSPECIFIED OBESITY TYPE: ICD-10-CM

## 2024-03-04 DIAGNOSIS — Z85.820 HISTORY OF MELANOMA: ICD-10-CM

## 2024-03-04 DIAGNOSIS — J44.9 CHRONIC OBSTRUCTIVE PULMONARY DISEASE, UNSPECIFIED COPD TYPE (MULTI): ICD-10-CM

## 2024-03-04 DIAGNOSIS — F41.9 ANXIETY: ICD-10-CM

## 2024-03-04 DIAGNOSIS — R42 VERTIGO: ICD-10-CM

## 2024-03-04 DIAGNOSIS — I25.10 CAD, MULTIPLE VESSEL: ICD-10-CM

## 2024-03-04 DIAGNOSIS — G47.33 OSA (OBSTRUCTIVE SLEEP APNEA): ICD-10-CM

## 2024-03-04 DIAGNOSIS — E78.5 HYPERLIPIDEMIA, UNSPECIFIED HYPERLIPIDEMIA TYPE: ICD-10-CM

## 2024-03-04 DIAGNOSIS — I10 BENIGN ESSENTIAL HYPERTENSION: Primary | ICD-10-CM

## 2024-03-04 DIAGNOSIS — Z79.01 LONG TERM (CURRENT) USE OF ANTICOAGULANTS: ICD-10-CM

## 2024-03-04 DIAGNOSIS — Z86.73 HISTORY OF STROKE: ICD-10-CM

## 2024-03-04 PROBLEM — F03.90 DEMENTIA (MULTI): Status: ACTIVE | Noted: 2024-03-04

## 2024-03-04 PROBLEM — R41.3 MEMORY LOSS: Status: RESOLVED | Noted: 2023-03-18 | Resolved: 2024-03-04

## 2024-03-04 PROBLEM — I71.9 AORTIC ANEURYSM WITHOUT RUPTURE (CMS-HCC): Status: RESOLVED | Noted: 2023-03-18 | Resolved: 2024-03-04

## 2024-03-04 PROCEDURE — 82570 ASSAY OF URINE CREATININE: CPT

## 2024-03-04 PROCEDURE — 1126F AMNT PAIN NOTED NONE PRSNT: CPT | Performed by: FAMILY MEDICINE

## 2024-03-04 PROCEDURE — 83036 HEMOGLOBIN GLYCOSYLATED A1C: CPT

## 2024-03-04 PROCEDURE — 1160F RVW MEDS BY RX/DR IN RCRD: CPT | Performed by: FAMILY MEDICINE

## 2024-03-04 PROCEDURE — 1157F ADVNC CARE PLAN IN RCRD: CPT | Performed by: FAMILY MEDICINE

## 2024-03-04 PROCEDURE — 1159F MED LIST DOCD IN RCRD: CPT | Performed by: FAMILY MEDICINE

## 2024-03-04 PROCEDURE — 82043 UR ALBUMIN QUANTITATIVE: CPT

## 2024-03-04 PROCEDURE — 3075F SYST BP GE 130 - 139MM HG: CPT | Performed by: FAMILY MEDICINE

## 2024-03-04 PROCEDURE — 3008F BODY MASS INDEX DOCD: CPT | Performed by: FAMILY MEDICINE

## 2024-03-04 PROCEDURE — 82306 VITAMIN D 25 HYDROXY: CPT

## 2024-03-04 PROCEDURE — 85025 COMPLETE CBC W/AUTO DIFF WBC: CPT

## 2024-03-04 PROCEDURE — 99214 OFFICE O/P EST MOD 30 MIN: CPT | Performed by: FAMILY MEDICINE

## 2024-03-04 PROCEDURE — 86803 HEPATITIS C AB TEST: CPT

## 2024-03-04 PROCEDURE — 3079F DIAST BP 80-89 MM HG: CPT | Performed by: FAMILY MEDICINE

## 2024-03-04 PROCEDURE — 4010F ACE/ARB THERAPY RXD/TAKEN: CPT | Performed by: FAMILY MEDICINE

## 2024-03-04 PROCEDURE — 36415 COLL VENOUS BLD VENIPUNCTURE: CPT

## 2024-03-04 PROCEDURE — 1123F ACP DISCUSS/DSCN MKR DOCD: CPT | Performed by: FAMILY MEDICINE

## 2024-03-04 RX ORDER — FLUOXETINE HYDROCHLORIDE 40 MG/1
40 CAPSULE ORAL DAILY
COMMUNITY
End: 2024-03-04 | Stop reason: SDUPTHER

## 2024-03-04 RX ORDER — ALBUTEROL SULFATE 90 UG/1
2 AEROSOL, METERED RESPIRATORY (INHALATION) EVERY 4 HOURS PRN
Qty: 8.5 G | Refills: 0 | Status: SHIPPED | OUTPATIENT
Start: 2024-03-04 | End: 2025-03-04

## 2024-03-04 RX ORDER — FLUOXETINE HYDROCHLORIDE 40 MG/1
40 CAPSULE ORAL DAILY
Qty: 90 CAPSULE | Refills: 0 | Status: SHIPPED | OUTPATIENT
Start: 2024-03-04

## 2024-03-04 ASSESSMENT — PATIENT HEALTH QUESTIONNAIRE - PHQ9
5. POOR APPETITE OR OVEREATING: SEVERAL DAYS
7. TROUBLE CONCENTRATING ON THINGS, SUCH AS READING THE NEWSPAPER OR WATCHING TELEVISION: NOT AT ALL
2. FEELING DOWN, DEPRESSED OR HOPELESS: SEVERAL DAYS
9. THOUGHTS THAT YOU WOULD BE BETTER OFF DEAD, OR OF HURTING YOURSELF: NOT AT ALL
10. IF YOU CHECKED OFF ANY PROBLEMS, HOW DIFFICULT HAVE THESE PROBLEMS MADE IT FOR YOU TO DO YOUR WORK, TAKE CARE OF THINGS AT HOME, OR GET ALONG WITH OTHER PEOPLE: SOMEWHAT DIFFICULT
4. FEELING TIRED OR HAVING LITTLE ENERGY: MORE THAN HALF THE DAYS
SUM OF ALL RESPONSES TO PHQ9 QUESTIONS 1 AND 2: 3
6. FEELING BAD ABOUT YOURSELF - OR THAT YOU ARE A FAILURE OR HAVE LET YOURSELF OR YOUR FAMILY DOWN: SEVERAL DAYS
1. LITTLE INTEREST OR PLEASURE IN DOING THINGS: MORE THAN HALF THE DAYS
SUM OF ALL RESPONSES TO PHQ QUESTIONS 1-9: 9
8. MOVING OR SPEAKING SO SLOWLY THAT OTHER PEOPLE COULD HAVE NOTICED. OR THE OPPOSITE, BEING SO FIGETY OR RESTLESS THAT YOU HAVE BEEN MOVING AROUND A LOT MORE THAN USUAL: NOT AT ALL
3. TROUBLE FALLING OR STAYING ASLEEP OR SLEEPING TOO MUCH: MORE THAN HALF THE DAYS

## 2024-03-04 ASSESSMENT — ENCOUNTER SYMPTOMS
SHORTNESS OF BREATH: 0
FEVER: 0
DIZZINESS: 1
FATIGUE: 0
NUMBNESS: 0
SEIZURES: 0
COUGH: 0
CHILLS: 0
HEADACHES: 0
WEAKNESS: 0
PALPITATIONS: 0
SPEECH DIFFICULTY: 0
LIGHT-HEADEDNESS: 1
FACIAL ASYMMETRY: 0

## 2024-03-04 ASSESSMENT — ANXIETY QUESTIONNAIRES
4. TROUBLE RELAXING: NOT AT ALL
7. FEELING AFRAID AS IF SOMETHING AWFUL MIGHT HAPPEN: NOT AT ALL
GAD7 TOTAL SCORE: 4
1. FEELING NERVOUS, ANXIOUS, OR ON EDGE: SEVERAL DAYS
5. BEING SO RESTLESS THAT IT IS HARD TO SIT STILL: NOT AT ALL
3. WORRYING TOO MUCH ABOUT DIFFERENT THINGS: SEVERAL DAYS
2. NOT BEING ABLE TO STOP OR CONTROL WORRYING: MORE THAN HALF THE DAYS
IF YOU CHECKED OFF ANY PROBLEMS ON THIS QUESTIONNAIRE, HOW DIFFICULT HAVE THESE PROBLEMS MADE IT FOR YOU TO DO YOUR WORK, TAKE CARE OF THINGS AT HOME, OR GET ALONG WITH OTHER PEOPLE: SOMEWHAT DIFFICULT
6. BECOMING EASILY ANNOYED OR IRRITABLE: NOT AT ALL

## 2024-03-04 NOTE — ASSESSMENT & PLAN NOTE
DEXA due   Bone health support: Make sure you are getting at least 1,200 mg daily of calcium (preferred via dietary intake, okay for supplements if needed), and 2,000 IU of vitamin D3 daily.   Encourage weight bearing exercise as able, along with balance and strength training  Reduce fall risk in the home

## 2024-03-04 NOTE — PROGRESS NOTES
Subjective   Patient ID: Suze Najera is a 68 y.o. female who presents for Follow-up, Hypertension (Was told has mild dementia ), and Diabetes.    HPI     Here for follow up-    Living with daughter, grandson    She has been having some occasional dizzy episodes, falling when leaning forward, getting lightheaded, also vertigo when turning over in bed    She was seen in Dec 2022 for memory loss- SLUMS was 25/30   Referred to geriatrics- saw Brent Diaz NP at [facility] 7/19/23- MOCA 20/30- states was diagnosed with mild dementia but due to insurance, can't go back there.  Would like referral to geriatrics.    Labs neg   CT head neg      DM, type 2-  Last A1c 8.5 Sept 2023   Meds include metformin 500 mg BID  She had side effects to jardiance (UTIs)   Eye doctor- needs to schedule - trying to find one that takes her insurance      Depression and anxiety- on prozac 40 mg daily   She had formal neuropsychological testing done by Dr. Natty Foreman in 2017 which confirmed major depression, anxiety, anger management issues - and recommended psychotherapy in addition to medication management.    Going to [place] [facility] now- no med changes yet  Scheduled with psychiatry later this month   Also doing counseling   Her boyfriend is an alcoholic (does not live with him) - this causes a lot of the issues - states he went to rehab and also got diagnosed with dementia and this has been hard on her    She restarted smoking - not sure why- now at 1/2 ppd - not desiring to quit      She has a hx of stroke/ruptured aneurysm / SAH with craniotomy 2014, ventriculostomy placed due to hydrocephalus, seizures, aphasia, and memory loss- was seeing Dr. Raymond previously but she failed to follow up thereafter.   EEG was abnormal 2017; she stopped her seizure meds on her own without recurrence since   MRI showed right frontal large surgical resection cavity 2018      Sleep study showed mild GEOFF- not on PAP      She is on warfarin for  mechanical aortic valve replacement for prior bicuspid valve   Cardiology- seeing Pricila Nickerson    NM stress neg Dec 2022   [facility] 10/2/23: LM patent, LAD 20% prox, 50-60% mid, LCx small nondominant, RCA dominant 90% prox, 40% mid   She had an ascending aorta replacement due to aortic aneurysm and CABG x 1 to RCA- in Oct 2023- with Dr. Hayden   INR 3.2 on 24   Repeat echo pending      Osteopenia- Dexa - not taking vit D      HTN- on losartan, metoprolol  Not checking BP at home   Patient denies symptoms including headaches, dizziness, vision changes, syncope, chest pain, palpitations, and edema.      HPL- on crestor     OAB, Recurrent UTI ; hx microscopic hematuria  Seeing Dr. Gaming  - had cystoscopy done in Dec 2023- normal   Myrbetriq and gemtasa were both cost prohibitive - recommended botox, she wanted to consider - will follow up with her   CT urogram neg      Saw hematology for leukocytosis and polycythemia- told can follow up prn      Hx melanoma right arm- [facility] dermatology - also has scalp psoriasis and rosacea      COPD- on trelegy with albuterol prn - not using her trelegy inhaler   Occasional cough and dyspnea   Saw Dr. Russell previously   Restart smoking 1/2 ppd also smokes marijuana      Recent labs reviewed- CMP, lipid, INR    Last pap/cervical cancer screenin20 NILM HPV neg   Last mammogram: 23 neg   Hx of colonoscopy: 1/10/17 Dr. Donaldson-Normal colonoscopy   Hx of DXA: - due         Current Outpatient Medications   Medication Sig Dispense Refill    acetaminophen (Tylenol) 325 mg tablet Take 1-2 tablets (325-650 mg) by mouth every 6 hours if needed for mild pain (1 - 3) or moderate pain (4 - 6). 30 tablet 0    fluticasone-umeclidin-vilanter (TRELEGY-ELLIPTA) 100-62.5-25 mcg blister with device Inhale 1 puff once daily.      glucosamine sulfate 1,000 mg tablet Take 1 tablet by mouth once daily.      ketoconazole (NIZOral) 2 % cream Apply topically 2 times a day. To affected  "forehead and facial areas. 30 g 1    losartan (Cozaar) 25 mg tablet Take 1 tablet (25 mg) by mouth once daily.      metFORMIN XR (Glucophage-XR) 500 mg 24 hr tablet Take 1 tablet (500 mg) by mouth 2 times a day. 180 tablet 3    metoprolol succinate XL (Toprol-XL) 50 mg 24 hr tablet Take 1 tablet (50 mg) by mouth once daily. 90 tablet 3    multivitamin with minerals tablet Take 1 tablet by mouth once daily. Do not start before October 19, 2023.  0    OneTouch Ultra Control solution       OneTouch Ultra Test strip       rosuvastatin (Crestor) 40 mg tablet Take 1 tablet (40 mg) by mouth once daily. 90 tablet 0    warfarin (Coumadin) 3 mg tablet Take 1.5 tablets by mouth once a day or as directed by  Coumadin Clinic. 180 tablet 2    albuterol (ProAir HFA) 90 mcg/actuation inhaler Inhale 2 puffs every 4 hours if needed for wheezing or shortness of breath. 8.5 g 0    FLUoxetine (PROzac) 40 mg capsule Take 1 capsule (40 mg) by mouth once daily. 90 capsule 0     No current facility-administered medications for this visit.       Review of Systems   Constitutional:  Negative for chills, fatigue and fever.   Respiratory:  Negative for cough and shortness of breath.    Cardiovascular:  Negative for chest pain and palpitations.   Neurological:  Positive for dizziness and light-headedness. Negative for seizures, syncope, facial asymmetry, speech difficulty, weakness, numbness and headaches.         Scales reviewed            Objective   /80 (BP Location: Right arm, Patient Position: Sitting, BP Cuff Size: Adult)   Pulse 73   Temp 36 °C (96.8 °F) (Temporal)   Resp 16   Ht 1.549 m (5' 1\")   Wt 75.7 kg (166 lb 12.8 oz)   SpO2 95%   BMI 31.52 kg/m²     Physical Exam    Constitutional: Well developed, well nourished, alert and in no acute distress.  Head and Face: Normocephalic, atraumatic.  Eyes: Normal external exam. Pupils equally round and reactive to light with normal accommodation and extraocular movements intact. "   ENT: External inspection of ears normal, tympanic membranes visualized and normal.   Neck: Supple, no lymphadenopathy or masses. Thyroid not enlarged, no palpable nodules.   Cardiovascular: Regular rate and rhythm, normal S1 and S2, no murmurs, gallops, or rubs. Radial pulses normal. No peripheral edema. No carotid bruits.   Pulmonary: No respiratory distress, lungs clear to auscultation bilaterally. No wheezes, rhonchi, rales.   Musculoskeletal: Gait normal. Muscle strength/tone normal of all 4 extremities. Normal range of motion of all extremities.   Skin: Warm, well perfused, normal skin turgor and color, no lesions or rashes noted.   Neurologic: Cranial nerves II-XII grossly intact. Sensation normal bilaterally.   Psychiatric: Mood calm and affect normal.    Assessment/Plan   Problem List Items Addressed This Visit             ICD-10-CM    Anxiety F41.9    Asymptomatic microscopic hematuria R31.21     Negative work up with Dr. Gaming- CT urogram and cystoscopy            Benign essential hypertension - Primary I10     Controlled- continue losartan, metoprolol          CAD, multiple vessel I25.10     Continue statin, coumadin- follows with cardiology          COPD (chronic obstructive pulmonary disease) (CMS/[facility]) J44.9     Smoking cessation encouraged  Resume Trelegy daily and save albuterol for rescue          Relevant Medications    albuterol (ProAir HFA) 90 mcg/actuation inhaler    Diabetes mellitus, type 2 (CMS/[facility]) E11.9     Check A1c, can increase metformin if needed or add on Tradjenta   Schedule with eye doctor          Relevant Orders    Hemoglobin A1C    CBC and Auto Differential    Albumin , Urine Random    Follow Up In [facility] - PCP - Established    Aortic valve replaced Z95.2     Continue coumadin- managed by coumadin clinic  Cardiology - Pricila Ruth          Hyperlipidemia E78.5    Cigarette nicotine dependence without complication F17.210     Cessation encouraged          GEOFF  (obstructive sleep apnea) G47.33     Mild- sleep specialist did not recommend CPAP          Osteopenia M85.80     DEXA due   Bone health support: Make sure you are getting at least 1,200 mg daily of calcium (preferred via dietary intake, okay for supplements if needed), and 2,000 IU of vitamin D3 daily.   Encourage weight bearing exercise as able, along with balance and strength training  Reduce fall risk in the home           Overactive bladder N32.81     Follow up with Dr. Gaming- considering botox          Recurrent moderate major depressive disorder with anxiety (CMS/HCC) F33.1, F41.9     Now following with [facility]- on prozac 40 mg   Encouraged counseling          Relevant Medications    FLUoxetine (PROzac) 40 mg capsule    Vitamin D deficiency E55.9    Relevant Orders    Vitamin D 25-Hydroxy,Total (for eval of Vitamin D levels)    Class 1 obesity with body mass index (BMI) of 31.0 to 31.9 in adult E66.9, Z68.31    History of seizure Z87.898    History of stroke Z86.73    History of melanoma Z85.820     Please follow up with dermatology for full skin examination           Long term (current) use of anticoagulants Z79.01    Dementia (CMS/[facility]) F03.90     Mild, MOCA 20/30 July 2023  Refer to geriatrics for monitoring          Relevant Orders    Referral to Geriatrics     Other Visit Diagnoses         Codes    Postmenopausal     Z78.0    Relevant Orders    XR DEXA bone density    Need for hepatitis C screening test     Z11.59    Relevant Orders    Hepatitis C Antibody    Vertigo     R42    Relevant Orders    Referral to Physical Therapy          Follow up 3 months   Jo Catalan,   3/4/2024

## 2024-03-05 ENCOUNTER — HOSPITAL ENCOUNTER (OUTPATIENT)
Dept: RADIOLOGY | Facility: CLINIC | Age: 69
End: 2024-03-05
Payer: COMMERCIAL

## 2024-03-05 ENCOUNTER — HOSPITAL ENCOUNTER (OUTPATIENT)
Dept: RADIOLOGY | Facility: CLINIC | Age: 69
Discharge: HOME | End: 2024-03-05
Payer: COMMERCIAL

## 2024-03-05 DIAGNOSIS — Z78.0 POSTMENOPAUSAL: ICD-10-CM

## 2024-03-05 LAB
25(OH)D3 SERPL-MCNC: 30 NG/ML (ref 30–100)
BASOPHILS # BLD AUTO: 0.11 X10*3/UL (ref 0–0.1)
BASOPHILS NFR BLD AUTO: 1 %
EOSINOPHIL # BLD AUTO: 0.2 X10*3/UL (ref 0–0.7)
EOSINOPHIL NFR BLD AUTO: 1.9 %
ERYTHROCYTE [DISTWIDTH] IN BLOOD BY AUTOMATED COUNT: 17.7 % (ref 11.5–14.5)
EST. AVERAGE GLUCOSE BLD GHB EST-MCNC: 143 MG/DL
HBA1C MFR BLD: 6.6 %
HCT VFR BLD AUTO: 46.5 % (ref 36–46)
HCV AB SER QL: NONREACTIVE
HGB BLD-MCNC: 13.5 G/DL (ref 12–16)
IMM GRANULOCYTES # BLD AUTO: 0.03 X10*3/UL (ref 0–0.7)
IMM GRANULOCYTES NFR BLD AUTO: 0.3 % (ref 0–0.9)
LYMPHOCYTES # BLD AUTO: 2.41 X10*3/UL (ref 1.2–4.8)
LYMPHOCYTES NFR BLD AUTO: 22.4 %
MCH RBC QN AUTO: 26.1 PG (ref 26–34)
MCHC RBC AUTO-ENTMCNC: 29 G/DL (ref 32–36)
MCV RBC AUTO: 90 FL (ref 80–100)
MONOCYTES # BLD AUTO: 0.69 X10*3/UL (ref 0.1–1)
MONOCYTES NFR BLD AUTO: 6.4 %
NEUTROPHILS # BLD AUTO: 7.32 X10*3/UL (ref 1.2–7.7)
NEUTROPHILS NFR BLD AUTO: 68 %
NRBC BLD-RTO: 0 /100 WBCS (ref 0–0)
PLATELET # BLD AUTO: 307 X10*3/UL (ref 150–450)
RBC # BLD AUTO: 5.17 X10*6/UL (ref 4–5.2)
WBC # BLD AUTO: 10.8 X10*3/UL (ref 4.4–11.3)

## 2024-03-05 PROCEDURE — 77080 DXA BONE DENSITY AXIAL: CPT

## 2024-03-05 PROCEDURE — 77080 DXA BONE DENSITY AXIAL: CPT | Performed by: RADIOLOGY

## 2024-03-06 ENCOUNTER — LAB (OUTPATIENT)
Dept: LAB | Facility: LAB | Age: 69
End: 2024-03-06
Payer: COMMERCIAL

## 2024-03-06 PROBLEM — M62.838 MUSCLE SPASMS OF NECK: Status: ACTIVE | Noted: 2024-03-06

## 2024-03-06 PROBLEM — D49.7 NEOPLASM OF MENINGES: Status: ACTIVE | Noted: 2024-03-06

## 2024-03-06 PROBLEM — U07.1 DISEASE DUE TO SEVERE ACUTE RESPIRATORY SYNDROME CORONAVIRUS 2 (SARS-COV-2): Status: ACTIVE | Noted: 2024-03-06

## 2024-03-06 PROBLEM — J32.9 CHRONIC SINUSITIS: Status: ACTIVE | Noted: 2024-03-06

## 2024-03-06 PROBLEM — R60.9 EDEMA: Status: ACTIVE | Noted: 2023-08-28

## 2024-03-06 PROBLEM — R42 DIZZINESS AND GIDDINESS: Status: ACTIVE | Noted: 2024-03-06

## 2024-03-06 PROBLEM — K52.9 CHRONIC DIARRHEA OF UNKNOWN ORIGIN: Status: ACTIVE | Noted: 2024-03-06

## 2024-03-06 PROBLEM — Z86.79 HISTORY OF THORACIC AORTIC ANEURYSM REPAIR: Status: ACTIVE | Noted: 2024-03-06

## 2024-03-06 PROBLEM — R22.0 SWELLING OF NOSE: Status: ACTIVE | Noted: 2024-03-06

## 2024-03-06 PROBLEM — R40.0 DAYTIME SOMNOLENCE: Status: ACTIVE | Noted: 2024-03-06

## 2024-03-06 PROBLEM — G44.209 TENSION TYPE HEADACHE: Status: ACTIVE | Noted: 2024-03-06

## 2024-03-06 PROBLEM — S16.1XXA STRAIN OF NECK MUSCLE: Status: ACTIVE | Noted: 2024-03-06

## 2024-03-06 PROBLEM — A49.8 CLOSTRIDIOIDES DIFFICILE INFECTION: Status: ACTIVE | Noted: 2024-03-06

## 2024-03-06 PROBLEM — Z98.890 HISTORY OF THORACIC AORTIC ANEURYSM REPAIR: Status: ACTIVE | Noted: 2024-03-06

## 2024-03-06 PROBLEM — M25.572 ARTHRALGIA OF LEFT FOOT: Status: ACTIVE | Noted: 2024-01-09

## 2024-03-06 PROBLEM — L21.9 SEBORRHEIC DERMATITIS: Status: ACTIVE | Noted: 2024-03-06

## 2024-03-06 PROBLEM — G83.10 PARESIS OF SINGLE LOWER EXTREMITY (MULTI): Status: ACTIVE | Noted: 2024-03-06

## 2024-03-06 PROBLEM — D72.829 LEUKOCYTOSIS: Status: ACTIVE | Noted: 2024-03-06

## 2024-03-07 ENCOUNTER — TELEPHONE (OUTPATIENT)
Dept: OBSTETRICS AND GYNECOLOGY | Facility: CLINIC | Age: 69
End: 2024-03-07
Payer: COMMERCIAL

## 2024-03-07 LAB
CREAT UR-MCNC: 102 MG/DL (ref 20–320)
MICROALBUMIN UR-MCNC: 14.3 MG/L
MICROALBUMIN/CREAT UR: 14 UG/MG CREAT

## 2024-03-07 NOTE — TELEPHONE ENCOUNTER
Pt called wanting to setup appt for botox   Advised  to call patient to call pt and have her setup UDS appt first before PA for Botox and or setting up appt can be done.  Winter tried to call pt to advise her and pts voicemail is not setup, she will try a couple more time.

## 2024-03-12 ENCOUNTER — APPOINTMENT (OUTPATIENT)
Dept: CARDIOLOGY | Facility: CLINIC | Age: 69
End: 2024-03-12
Payer: COMMERCIAL

## 2024-03-12 NOTE — PROGRESS NOTES
Suze Najera is a 68 y.o. female     History Of Present Illness   Mrs Najera is a 68 year old female, status post 10/13/23 redo ascending aorta svg-rda , CABG x1,, COPD, DM II, hypertension, here for a routine follow up.       Social HX  Social History     Tobacco Use    Smoking status: Every Day     Packs/day: 1.00     Years: 51.00     Additional pack years: 0.00     Total pack years: 51.00     Types: Cigarettes     Passive exposure: Current    Smokeless tobacco: Never    Tobacco comments:     She did quit for  4 months    Vaping Use    Vaping Use: Never used   Substance Use Topics    Alcohol use: Not Currently    Drug use: Yes     Types: Marijuana     Comment: states sometimes use          Family HX  Family History   Problem Relation Name Age of Onset    Alzheimer's disease Mother      Breast cancer Mother          age 52    Other (Brain tumor) Father      Breast cancer Mother's Sister            Review Of Systems   Constitutional: not feeling tired.   Eyes: no eyesight problems.  No vision loss or change in vision  ENT: no hearing loss and no nosebleeds.   Cardiovascular: No intermittent leg claudication,   No chest pain, no tightness or heavy pressure  No shortness of breath,  No palpitations,  No lower extremity edema  The heart rate is regular  Respiratory: no chronic cough and no shortness of breath.   Gastrointestinal: no change in bowel habits and no blood in stools.   Genitourinary: no urinary frequency.   Skin: no skin rashes.   Neurological: No frequent falls.   No dizziness  No weakness  Denies headaches  Psychiatric: no depression and not suicidal.   All other systems have been reviewed and are negative for complaint.        Allergies  Allergies   Allergen Reactions    Ceftizoxime Hives    Cefuroxime Hives    Erythromycin Nausea/vomiting    Ibuprofen Hives    Penicillins Hives    Sulfa (Sulfonamide Antibiotics) Hives    Acetazolamide Unknown          Vitals  There were no vitals taken for this  visit.        Physical Exam  Constitutional: alert and in no acute distress.   Eyes: no erythema, swelling or discharge from the eye .   Neck: neck is supple, symmetric, trachea midline, no masses  and no thyromegaly .   Pulmonary: No increased work of breathing or signs of respiratory distress    Lungs clear to auscultation.    Auscultation of the lungs revealed no expiratory wheezing, normal expiratory time and no inspiratory wheezing. no rales or crackles were heard bilaterally. no rhonchi  No friction rub. no wheezing.   No diminished breath sounds. no bronchial breath sounds.   Cardiovascular: carotid pulses 2+ bilaterally with no bruit    JVP was normal, no thrills ,   Regular rhythm, normal S1 and S2, no murmurs  the heart rate was normal normal S1, normal S2, no S3, no S4 no murmurs were heard.,   Pedal pulses 2+ bilaterally    No edema .   Abdomen: abdomen non-tender, no masses  and no hepatomegaly . No pulsatile mass noted  Skin: skin warm and dry, normal skin turgor .   Psychiatric judgment and insight is normal  and oriented to person, place and time .           Current/Home Meds    Current Outpatient Medications:     acetaminophen (Tylenol) 325 mg tablet, Take 1-2 tablets (325-650 mg) by mouth every 6 hours if needed for mild pain (1 - 3) or moderate pain (4 - 6)., Disp: 30 tablet, Rfl: 0    albuterol (ProAir HFA) 90 mcg/actuation inhaler, Inhale 2 puffs every 4 hours if needed for wheezing or shortness of breath., Disp: 8.5 g, Rfl: 0    FLUoxetine (PROzac) 40 mg capsule, Take 1 capsule (40 mg) by mouth once daily., Disp: 90 capsule, Rfl: 0    fluticasone-umeclidin-vilanter (TRELEGY-ELLIPTA) 100-62.5-25 mcg blister with device, Inhale 1 puff once daily., Disp: , Rfl:     glucosamine sulfate 1,000 mg tablet, Take 1 tablet by mouth once daily., Disp: , Rfl:     ketoconazole (NIZOral) 2 % cream, Apply topically 2 times a day. To affected forehead and facial areas., Disp: 30 g, Rfl: 1    losartan (Cozaar)  "25 mg tablet, Take 1 tablet (25 mg) by mouth once daily., Disp: , Rfl:     metFORMIN XR (Glucophage-XR) 500 mg 24 hr tablet, Take 1 tablet (500 mg) by mouth 2 times a day., Disp: 180 tablet, Rfl: 3    metoprolol succinate XL (Toprol-XL) 50 mg 24 hr tablet, Take 1 tablet (50 mg) by mouth once daily., Disp: 90 tablet, Rfl: 3    multivitamin with minerals tablet, Take 1 tablet by mouth once daily. Do not start before October 19, 2023., Disp: , Rfl: 0    OneTouch Ultra Control solution, , Disp: , Rfl:     OneTouch Ultra Test strip, , Disp: , Rfl:     rosuvastatin (Crestor) 40 mg tablet, Take 1 tablet (40 mg) by mouth once daily., Disp: 90 tablet, Rfl: 0    warfarin (Coumadin) 3 mg tablet, Take 1.5 tablets by mouth once a day or as directed by  Coumadin Clinic., Disp: 180 tablet, Rfl: 2       Labs           EKG Findings  EKG: {ekg findings:231173::\"normal EKG, normal sinus rhythm\",\"unchanged from previous tracings\"}.    {cardiac diagnosis history:79831}.        Cardiac Service Results:  No results found for this or any previous visit from the past 365 days.        Assessment/Plan      {Assess/Plan SmartLinks (Optional):16305::\"***\"}    "

## 2024-03-13 ENCOUNTER — APPOINTMENT (OUTPATIENT)
Dept: CARDIOLOGY | Facility: CLINIC | Age: 69
End: 2024-03-13
Payer: COMMERCIAL

## 2024-03-13 ENCOUNTER — ANTICOAGULATION - WARFARIN VISIT (OUTPATIENT)
Dept: CARDIOLOGY | Facility: CLINIC | Age: 69
End: 2024-03-13
Payer: COMMERCIAL

## 2024-03-13 DIAGNOSIS — G45.9 TIA (TRANSIENT ISCHEMIC ATTACK): ICD-10-CM

## 2024-03-13 DIAGNOSIS — Z79.01 LONG TERM (CURRENT) USE OF ANTICOAGULANTS: ICD-10-CM

## 2024-03-13 DIAGNOSIS — Z95.2 AORTIC VALVE REPLACED: Primary | ICD-10-CM

## 2024-03-14 ENCOUNTER — TELEPHONE (OUTPATIENT)
Dept: PRIMARY CARE | Facility: CLINIC | Age: 69
End: 2024-03-14
Payer: COMMERCIAL

## 2024-03-14 DIAGNOSIS — F17.200 TOBACCO USE DISORDER: Primary | ICD-10-CM

## 2024-03-14 RX ORDER — BUPROPION HYDROCHLORIDE 150 MG/1
150 TABLET, EXTENDED RELEASE ORAL 2 TIMES DAILY
Qty: 60 TABLET | Refills: 1 | Status: SHIPPED | OUTPATIENT
Start: 2024-03-14 | End: 2024-04-05

## 2024-03-14 NOTE — TELEPHONE ENCOUNTER
Ok, I can start her on bupropion for smoking cessation but she should be aware this is also an antidepressant medication so if moods are worsening in interim she needs to call to let us know

## 2024-03-14 NOTE — TELEPHONE ENCOUNTER
The 2 medications are wellbutrin and chantix    Please have her check with her psychiatrist first to see which one they would prefer or have any concerns with

## 2024-03-14 NOTE — TELEPHONE ENCOUNTER
Patient called back and said that she called them and they cannot make the recommendation yet because she has not seen the psychiatrist. She goes at the end of the month.

## 2024-03-14 NOTE — TELEPHONE ENCOUNTER
Patient called and is asking if there is an rx she can use to help her quit smoking. She has been trying the patches and they are not helping her at all. She states that she really wants to quit.     Patient uses the [facility] in [place]

## 2024-03-15 ENCOUNTER — OFFICE VISIT (OUTPATIENT)
Dept: CARDIOLOGY | Facility: CLINIC | Age: 69
End: 2024-03-15
Payer: COMMERCIAL

## 2024-03-15 ENCOUNTER — ANTICOAGULATION - WARFARIN VISIT (OUTPATIENT)
Dept: CARDIOLOGY | Facility: CLINIC | Age: 69
End: 2024-03-15
Payer: COMMERCIAL

## 2024-03-15 VITALS
SYSTOLIC BLOOD PRESSURE: 146 MMHG | HEIGHT: 62 IN | WEIGHT: 163 LBS | OXYGEN SATURATION: 96 % | HEART RATE: 83 BPM | BODY MASS INDEX: 30 KG/M2 | DIASTOLIC BLOOD PRESSURE: 81 MMHG

## 2024-03-15 DIAGNOSIS — I10 BENIGN ESSENTIAL HYPERTENSION: ICD-10-CM

## 2024-03-15 DIAGNOSIS — Z95.2 AORTIC VALVE REPLACED: Primary | ICD-10-CM

## 2024-03-15 DIAGNOSIS — G45.9 TIA (TRANSIENT ISCHEMIC ATTACK): ICD-10-CM

## 2024-03-15 DIAGNOSIS — E78.2 MIXED HYPERLIPIDEMIA: ICD-10-CM

## 2024-03-15 DIAGNOSIS — Z95.2 AORTIC VALVE REPLACED: ICD-10-CM

## 2024-03-15 DIAGNOSIS — Z79.01 LONG TERM (CURRENT) USE OF ANTICOAGULANTS: ICD-10-CM

## 2024-03-15 DIAGNOSIS — I25.10 CAD, MULTIPLE VESSEL: Primary | ICD-10-CM

## 2024-03-15 DIAGNOSIS — Z98.890 HISTORY OF THORACIC AORTIC ANEURYSM REPAIR: ICD-10-CM

## 2024-03-15 DIAGNOSIS — T82.09XS: ICD-10-CM

## 2024-03-15 DIAGNOSIS — Z86.79 HISTORY OF THORACIC AORTIC ANEURYSM REPAIR: ICD-10-CM

## 2024-03-15 LAB
INR IN PPP BY COAGULATION ASSAY EXTERNAL: 4.9
PROTHROMBIN TIME (PT) IN PPP BY COAGULATION ASSAY EXTERNAL: NORMAL SECONDS

## 2024-03-15 PROCEDURE — 99211 OFF/OP EST MAY X REQ PHY/QHP: CPT

## 2024-03-15 PROCEDURE — 1160F RVW MEDS BY RX/DR IN RCRD: CPT | Performed by: NURSE PRACTITIONER

## 2024-03-15 PROCEDURE — 99214 OFFICE O/P EST MOD 30 MIN: CPT | Performed by: NURSE PRACTITIONER

## 2024-03-15 PROCEDURE — 3044F HG A1C LEVEL LT 7.0%: CPT | Performed by: NURSE PRACTITIONER

## 2024-03-15 PROCEDURE — 3008F BODY MASS INDEX DOCD: CPT | Performed by: NURSE PRACTITIONER

## 2024-03-15 PROCEDURE — 1123F ACP DISCUSS/DSCN MKR DOCD: CPT | Performed by: NURSE PRACTITIONER

## 2024-03-15 PROCEDURE — 4010F ACE/ARB THERAPY RXD/TAKEN: CPT | Performed by: NURSE PRACTITIONER

## 2024-03-15 PROCEDURE — 1159F MED LIST DOCD IN RCRD: CPT | Performed by: NURSE PRACTITIONER

## 2024-03-15 PROCEDURE — 1157F ADVNC CARE PLAN IN RCRD: CPT | Performed by: NURSE PRACTITIONER

## 2024-03-15 PROCEDURE — 3077F SYST BP >= 140 MM HG: CPT | Performed by: NURSE PRACTITIONER

## 2024-03-15 PROCEDURE — 3079F DIAST BP 80-89 MM HG: CPT | Performed by: NURSE PRACTITIONER

## 2024-03-15 PROCEDURE — 3061F NEG MICROALBUMINURIA REV: CPT | Performed by: NURSE PRACTITIONER

## 2024-03-15 ASSESSMENT — ENCOUNTER SYMPTOMS
LOSS OF SENSATION IN FEET: 0
OCCASIONAL FEELINGS OF UNSTEADINESS: 0
DEPRESSION: 0

## 2024-03-15 NOTE — PROGRESS NOTES
Suze Najera is a 68 y.o. female     History Of Present Illness   Mrs Najera is a 68 year old female, status post 10/13/23 redo ascending aorta svg-rda , CABG x1,, COPD, DM II, hypertension, here for a routine follow up. She admits she is not exercising.  She started smoking 1 ppd of cigarettes.  She denies any complaints of chest pain, palpitations, lower extremity edema or syncope, however she is complaining of shortness of breath with exertion and episodes of vertigo and near syncope.  She is scheduled to be seen by the vertigo clinic.        Social HX  Social History     Tobacco Use    Smoking status: Every Day     Packs/day: 1.00     Years: 51.00     Additional pack years: 0.00     Total pack years: 51.00     Types: Cigarettes     Passive exposure: Current    Smokeless tobacco: Never    Tobacco comments:     She did quit for  4 months    Vaping Use    Vaping Use: Never used   Substance Use Topics    Alcohol use: Not Currently    Drug use: Yes     Types: Marijuana     Comment: states sometimes use          Family HX  Family History   Problem Relation Name Age of Onset    Alzheimer's disease Mother      Breast cancer Mother          age 52    Other (Brain tumor) Father      Breast cancer Mother's Sister            Review Of Systems   Constitutional: not feeling tired.   Eyes: no eyesight problems.  No vision loss or change in vision  ENT: no hearing loss and no nosebleeds.   Cardiovascular: No intermittent leg claudication,   No chest pain, no tightness or heavy pressure  + shortness of breath with exertion,  No palpitations,  No lower extremity edema  The heart rate is regular  Respiratory: no chronic cough and no shortness of breath.   Gastrointestinal: no change in bowel habits and no blood in stools.   Genitourinary: no urinary frequency.   Skin: no skin rashes.   Neurological: No frequent falls.   + vertigo and dizziness, + near syncopal episodes  No weakness  Denies headaches  Psychiatric: no  depression and not suicidal.   All other systems have been reviewed and are negative for complaint.        Allergies  Allergies   Allergen Reactions    Ceftizoxime Hives    Cefuroxime Hives    Erythromycin Nausea/vomiting    Ibuprofen Hives    Penicillins Hives    Sulfa (Sulfonamide Antibiotics) Hives    Acetazolamide Unknown          Vitals  There were no vitals taken for this visit.        Physical Exam  Constitutional: alert and in no acute distress.   Eyes: no erythema, swelling or discharge from the eye .   Neck: neck is supple, symmetric, trachea midline, no masses  and no thyromegaly .   Pulmonary: No increased work of breathing or signs of respiratory distress    Lungs clear to auscultation.    Auscultation of the lungs revealed no expiratory wheezing, normal expiratory time and no inspiratory wheezing. no rales or crackles were heard bilaterally. no rhonchi  No friction rub. no wheezing.   No diminished breath sounds. no bronchial breath sounds.   Cardiovascular: carotid pulses 2+ bilaterally with no bruit    JVP was normal, no thrills ,   Regular rhythm, normal S1 and S2, + click  Pedal pulses 1+ bilaterally    Trace ankle edema .   Abdomen: abdomen non-tender, no masses  and no hepatomegaly . No pulsatile mass noted  Skin: skin warm and dry, normal skin turgor .   Psychiatric judgment and insight is normal  and oriented to person, place and time .           Current/Home Meds    Current Outpatient Medications:     acetaminophen (Tylenol) 325 mg tablet, Take 1-2 tablets (325-650 mg) by mouth every 6 hours if needed for mild pain (1 - 3) or moderate pain (4 - 6)., Disp: 30 tablet, Rfl: 0    albuterol (ProAir HFA) 90 mcg/actuation inhaler, Inhale 2 puffs every 4 hours if needed for wheezing or shortness of breath., Disp: 8.5 g, Rfl: 0    buPROPion SR (Wellbutrin SR) 150 mg 12 hr tablet, Take 1 tablet (150 mg) by mouth 2 times a day. Do not crush, chew, or split., Disp: 60 tablet, Rfl: 1    FLUoxetine (PROzac)  40 mg capsule, Take 1 capsule (40 mg) by mouth once daily., Disp: 90 capsule, Rfl: 0    fluticasone-umeclidin-vilanter (TRELEGY-ELLIPTA) 100-62.5-25 mcg blister with device, Inhale 1 puff once daily., Disp: , Rfl:     glucosamine sulfate 1,000 mg tablet, Take 1 tablet by mouth once daily., Disp: , Rfl:     ketoconazole (NIZOral) 2 % cream, Apply topically 2 times a day. To affected forehead and facial areas., Disp: 30 g, Rfl: 1    losartan (Cozaar) 25 mg tablet, Take 1 tablet (25 mg) by mouth once daily., Disp: , Rfl:     metFORMIN XR (Glucophage-XR) 500 mg 24 hr tablet, Take 1 tablet (500 mg) by mouth 2 times a day., Disp: 180 tablet, Rfl: 3    metoprolol succinate XL (Toprol-XL) 50 mg 24 hr tablet, Take 1 tablet (50 mg) by mouth once daily., Disp: 90 tablet, Rfl: 3    multivitamin with minerals tablet, Take 1 tablet by mouth once daily. Do not start before October 19, 2023., Disp: , Rfl: 0    OneTouch Ultra Control solution, , Disp: , Rfl:     OneTouch Ultra Test strip, , Disp: , Rfl:     rosuvastatin (Crestor) 40 mg tablet, Take 1 tablet (40 mg) by mouth once daily., Disp: 90 tablet, Rfl: 0    warfarin (Coumadin) 3 mg tablet, Take 1.5 tablets by mouth once a day or as directed by  Coumadin Clinic., Disp: 180 tablet, Rfl: 2       Labs               Cardiac Service Results:  10/13/2023 Operation Procedures: ana Hayden  #1 a standard median redo sternotomy  #2 innominate trunk arterial cannulation  #3 ascending aorta replacement with a Gelweave 34 mm  #4 aortic root noncoronary sinus plication  #5 CABG x1: SVG to RCA    10/6/23 CARDIAC CATHETERIZATION:  1. Severe 90% stenosis of proximal RCA, and 50-60% proximal to mid LAD stenosis. The circumflex is small and without signiifcant disease.   2. Right dominant coronary system.    9/29/23: CAROTID DUPLEX SCAN: Right                       Left  PSV     EDV                PSV      EDV  90 cm/s           CCA P    69 cm/s  61 cm/s           CCA D    66 cm/s  35  cm/s 11 cm/s   ICA P    38 cm/s  11 cm/s  43 cm/s 13 cm/s   ICA M    52 cm/s  16 cm/s  48 cm/s 16 cm/s   ICA D    89 cm/s  17 cm/s  59 cm/s            ECA     62 cm/s  56 cm/s 13 cm/s Vertebral  42 cm/s  10 cm/s  68 cm/s         Subclavian 159 cm/s     Right Left  ICA/CCA Ratio  0.6  0.6      9/2022 NM STRESS: 1. No definite evidence of ischemia or infarct.  2. Well-maintained left ventricular function with an ejection  fraction of 59%.  3. Normal left ventricular size.  Assessment/Plan        HYPERTENSION:  BP is elevated today at 146/81.  Her daughter reports that her BP is normal at home.  I have asked her daughter to monitor her BP at home 2-3 times a week and bring these readings with her. She is to continue losartan and metoprolol.      CAD:  s/p CABG of the RCA with ascending aorta replacement with a Gelweave 34 mm  #4 aortic root noncoronary sinus plication.  She denies any complaints of chest pain, however she does admit to shortness of breath with exertion, vertigo and near syncopal episodes.  She is scheduled to be seen in the vertigo clinic, however if no cause is found to her vertigo, her daughter is to call me.  She did not under go her echo as directed.  We will reschedule this today.    She is to continue losartan, metoprolol, statin     HYPERLIPIDEMIA:   CHOLESTEROL 126  HDL 43  LDL 34    Continue statin and a low saturated fat diet.        I have strongly encouraged her to quit smoking and to start an exercise regimen for 150 mins a week.  I would like her to lose 1-2 pounds per week and consume a low saturated fat, low sodium diet.  We will reschedule her echo that she missed and have her follow up after her echo.  She is to have her INR checked in the coumadin clinic this am.  She is to call me for any questions, concerns or complaints.

## 2024-03-15 NOTE — PROGRESS NOTES
Patient identification verified with 2 identifiers.     Location: [facility] - [address] [place], [place] [address] [phone]      Referring Physician: Pricila Beavers MD  Enrollment/ Re-enrollment date:  2024.  INR Goal: 2.5-3.5  INR monitoring is per [facility] protocol.  Anticoagulation Medication: warfarin  Indication: Aortic Valve Replacement    Subjective   Bleeding signs/symptoms: No    Bruising: No   Major bleeding event: No  Thrombosis signs/symptoms: No  Thromboembolic event: No  Missed doses: No  Extra doses: No  Medication changes: Yes  Patient started taking WELLBUTRIN yesterday. Patient also taking VITAMIN D supplements/daily.  Dietary changes: No  Change in health: No  Change in activity: No  Alcohol: No  Other concerns: No    Upcoming Procedures:  Does the Patient Have any upcoming procedures that require interruption in anticoagulation therapy? no  Does the patient require bridging? no      Anticoagulation Summary  As of 3/15/2024      INR goal:  2.5-3.5   TTR:  38.9 % (4.9 mo)   INR used for dosin.90 (3/15/2024)   Weekly warfarin total:  36 mg               Assessment/Plan   Supratherapeutic     1. New dose: will HOLD TWO DOSES of warfarin and REDUCE TWD of warfarin by 1.5mg - patient can only RTC next Tuesday (no transportation the rest of the week), so may need additional dose adjustment at that time.   2. Next INR:  3-5 days      Education provided to patient during the visit:  Patient instructed to call in interim with questions, concerns and changes.   Patient educated on interactions between medications and warfarin.   Patient educated on dietary consistency in vitamin k consumption.   Patient educated on affects of alcohol consumption while taking warfarin.   Patient educated on signs of bleeding/clotting.   Patient educated on compliance with dosing, follow up appointments, and prescribed plan of care.

## 2024-03-19 ENCOUNTER — ANTICOAGULATION - WARFARIN VISIT (OUTPATIENT)
Dept: CARDIOLOGY | Facility: CLINIC | Age: 69
End: 2024-03-19
Payer: COMMERCIAL

## 2024-03-19 ENCOUNTER — CLINICAL SUPPORT (OUTPATIENT)
Dept: PHYSICAL THERAPY | Facility: CLINIC | Age: 69
End: 2024-03-19
Payer: COMMERCIAL

## 2024-03-19 DIAGNOSIS — G45.9 TIA (TRANSIENT ISCHEMIC ATTACK): ICD-10-CM

## 2024-03-19 DIAGNOSIS — R42 VERTIGO: Primary | ICD-10-CM

## 2024-03-19 DIAGNOSIS — Z79.01 LONG TERM (CURRENT) USE OF ANTICOAGULANTS: ICD-10-CM

## 2024-03-19 DIAGNOSIS — Z95.2 AORTIC VALVE REPLACED: Primary | ICD-10-CM

## 2024-03-19 LAB
POC INR: 2.7
POC PROTHROMBIN TIME: NORMAL

## 2024-03-19 PROCEDURE — 85610 PROTHROMBIN TIME: CPT | Mod: QW

## 2024-03-19 PROCEDURE — 97162 PT EVAL MOD COMPLEX 30 MIN: CPT | Mod: GP | Performed by: PHYSICAL THERAPIST

## 2024-03-19 PROCEDURE — 99211 OFF/OP EST MAY X REQ PHY/QHP: CPT

## 2024-03-19 PROCEDURE — 95992 CANALITH REPOSITIONING PROC: CPT | Mod: GP | Performed by: PHYSICAL THERAPIST

## 2024-03-19 ASSESSMENT — ENCOUNTER SYMPTOMS
DEPRESSION: 0
LOSS OF SENSATION IN FEET: 0
OCCASIONAL FEELINGS OF UNSTEADINESS: 0

## 2024-03-19 ASSESSMENT — PAIN SCALES - GENERAL: PAINLEVEL_OUTOF10: 4

## 2024-03-19 ASSESSMENT — PAIN - FUNCTIONAL ASSESSMENT: PAIN_FUNCTIONAL_ASSESSMENT: 0-10

## 2024-03-19 NOTE — PROGRESS NOTES
Patient identification verified with 2 identifiers.     Location: [facility] - [address] [place] [address] [place] [place], [place] [address] [phone]      Referring Physician: Pricila Beavers MD  Enrollment/ Re-enrollment date:  2024.  INR Goal: 2.5-3.5  INR monitoring is per [facility] protocol.  Anticoagulation Medication: warfarin  Indication: Aortic Valve Replacement    Subjective   Bleeding signs/symptoms: No    Bruising: No   Major bleeding event: No  Thrombosis signs/symptoms: No  Thromboembolic event: No  Missed doses: No  Extra doses: No  Medication changes: Yes  Patient will begin taking OSTEO-BIFLEX today.  Patient previously taking a different glucosamine supplement.  Dietary changes: No  Change in health: No  Change in activity: No  Alcohol: No  Other concerns: No    Upcoming Procedures:  Does the Patient Have any upcoming procedures that require interruption in anticoagulation therapy? no  Does the patient require bridging? no      Anticoagulation Summary  As of 3/19/2024      INR goal:  2.5-3.5   TTR:  38.8 % (5 mo)   INR used for dosin.70 (3/19/2024)   Weekly warfarin total:  36 mg               Assessment/Plan   Therapeutic     1. New dose: no change    2. Next INR: 1 week      Education provided to patient during the visit:  Patient instructed to call in interim with questions, concerns and changes.   Patient educated on interactions between medications and warfarin.   Patient educated on dietary consistency in vitamin k consumption.   Patient educated on affects of alcohol consumption while taking warfarin.   Patient educated on signs of bleeding/clotting.   Patient educated on compliance with dosing, follow up appointments, and prescribed plan of care.

## 2024-03-19 NOTE — PROGRESS NOTES
Physical Therapy    Physical Therapy Vertigo Evaluation    Patient Name: Suze Najera  MRN: [id number]  Today's Date: 3/19/2024  Time Calculation  Start Time: 1230  Stop Time: 1330  Time Calculation (min): 60 min  PT Evaluation Time Entry  PT Evaluation (Moderate) Time Entry: 45  PT Modalities Time Entry  Canalith Repositioning Time Entry: 15  Payor: Shozu / Plan: Shozu / Product Type: *No Product type* /     General Comment: Visit 1 of MN    Current Problem  Problem List Items Addressed This Visit    None  Visit Diagnoses         Codes    Vertigo    -  Primary R42    Relevant Orders    Follow Up In Physical Therapy           Precautions  Precautions  STEADI Fall Risk Score (The score of 4 or more indicates an increased risk of falling): 2  Precautions Comment: fall risk     Pain  Pain Assessment: 0-10  Pain Score: 4  Pain Location: Knee  Pain Orientation: Right, Lower    SUBJECTIVE:   Chief complaint:  Pt reports she had an onset of vertigo on about 2 months ago ( Alberto 15 th 2024) with no known injury.   Notes symptoms occur when bending over and getting in out of bed. Notes symptoms only last a few minutes. Notes is symptom free between attacks. Notes has no issues with balance but has fallen in past 2 weeks due to vertigo. Denies use of A.D.     Vertigo Better: rest, sitting still     Vertigo Worse: head movements and bending down     Imaging: no imaging for vertigo      Prior level of function: previously no occurrence of dizziness with movement  Current limitations: bending forward, rolling and getting in out of bed.     Home setup:reviewed and no concern lives with daughter and grandson     Work: retired    Patients goal: reduce or eliminate vertigo     Prior tx: no prior PT tx this yr.     Medical hx has been reviewed with the patient.     OBJECTIVE:  Posture: forward head and rounded shoulders.     ROM: WFL cervical ROM     Strength: UE WNL       LE WNL  Vertigo:   BPPV Test Right  Left  Symptoms Nystagmus Direction Latency   (in sec) Duration (in sec)   Thrust pos Neg  Mild dizziness      Hallpike Seguin pos neg Vertigo Right torsional nystagmus  0 30 sec   Roll test neg neg         Oculomotor exam:  Test Abnormal Y/N Symptoms  Dizziness Rating (0-10)   Smooth Pursuits  N     Saccades Horizontal N     Saccades Vertical N     VOR-Horizontal N     VOR-Vertical N     Head Shake horizontal N     Head Shake Vertical  N     Convergence N       Spontaneous Nystagmus: neg  Gaze evoked nystagmus: neg     Other Measures  Dizziness Handicap Inventory: 36/100 (P:10, E: 16, F:10)     Romberg stand 60 sec, EC 30 sec     TREATMENT:  Eval  2. Epley's R ear posterior canal 2 reps with clearing of nystagmus and symptoms     ASSESSEMENT:  Pt is a 68 yr old referred to therapy for a dx of Vertigo by Dr Tianna Catalan. Pt tested positive in the clinic with a Seguin lin pike R ear position for R posterior canal BPPV. Pt would benefit from a skilled therapy program with use of canalith repositioning maneuvers to eliminate or reduce vertigo symptoms and return pt to a prior level of function.     The physical therapy prognosis is good for the patient to achieve their goals.   The pt tolerated therapy treatment today well with no adverse effects.  Barriers to therapy include:  CABG, Memory loss, DM type 2     PLAN:  The pt will be seen 1 days a week for 4 weeks.      The pt has been educated about the risks and benefits of physical therapy including manual therapy treatments. Pt agrees with POC and gives consent for treatment.     The patient will benefit from physical therapy treatment to include: therapeutic exercises, therapeutic activities, neurological re-education, canalith repositioning maneuvers, and a home exercise program.     Goals:  Active       PT Problem       Pt to report reduced vertigo symptoms by 25-50% or more with all prior movements and ADL's.        Start:  03/19/24            Pt to report  reduced vertigo symptoms by 75% or more with all prior movements and ADL's.        Start:  03/19/24            Reduce DHI by 10 points to display improved emotional, physical and functional aspects of vertigo        Start:  03/19/24            Pt to be independent with a HEP for self management.        Start:  03/19/24

## 2024-03-25 ENCOUNTER — TREATMENT (OUTPATIENT)
Dept: PHYSICAL THERAPY | Facility: CLINIC | Age: 69
End: 2024-03-25
Payer: COMMERCIAL

## 2024-03-25 ENCOUNTER — ANTICOAGULATION - WARFARIN VISIT (OUTPATIENT)
Dept: CARDIOLOGY | Facility: CLINIC | Age: 69
End: 2024-03-25
Payer: COMMERCIAL

## 2024-03-25 DIAGNOSIS — Z79.01 LONG TERM (CURRENT) USE OF ANTICOAGULANTS: ICD-10-CM

## 2024-03-25 DIAGNOSIS — R42 VERTIGO: ICD-10-CM

## 2024-03-25 DIAGNOSIS — Z95.2 AORTIC VALVE REPLACED: Primary | ICD-10-CM

## 2024-03-25 DIAGNOSIS — G45.9 TIA (TRANSIENT ISCHEMIC ATTACK): ICD-10-CM

## 2024-03-25 LAB
POC INR: 2.7
POC PROTHROMBIN TIME: NORMAL

## 2024-03-25 PROCEDURE — 97110 THERAPEUTIC EXERCISES: CPT | Mod: GP | Performed by: PHYSICAL THERAPIST

## 2024-03-25 PROCEDURE — G0248 DEMONSTRATE USE HOME INR MON: HCPCS | Performed by: NURSE PRACTITIONER

## 2024-03-25 PROCEDURE — 85610 PROTHROMBIN TIME: CPT | Mod: QW

## 2024-03-25 ASSESSMENT — PAIN - FUNCTIONAL ASSESSMENT: PAIN_FUNCTIONAL_ASSESSMENT: 0-10

## 2024-03-25 ASSESSMENT — PAIN SCALES - GENERAL: PAINLEVEL_OUTOF10: 4

## 2024-03-25 NOTE — PROGRESS NOTES
Physical Therapy    Physical Therapy Treatment    Patient Name: Suze Najera  MRN: [id number]  Today's Date: 3/25/2024  Time Calculation  Start Time: 1750  Stop Time: 1805  Time Calculation (min): 15 min  PT Therapeutic Procedures Time Entry  Therapeutic Exercise Time Entry: 15  Payor: HZO / Plan: HZO / Product Type: *No Product type* /     General Comment: Visit 2 of MN    Current Problem    Problem List Items Addressed This Visit    None  Visit Diagnoses         Codes    Vertigo     R42          Subjective   Pt reports she has been doing well since her initial eval and treatment. Notes no issues bending forward.   Compliance with HEP-no HEP given    Precautions  Precautions  Precautions Comment: fall risk    Pain  Pain Assessment: 0-10  Pain Score: 4  Pain Location: Knee  Pain Orientation: Right, Lower    Objective   (-) [place] Hallpike R ear    Treatments:  Instruction in self Epley's for home to perform PRN for dizziness. Pt verbalized and demonstrated correct technique to perform self Epley.     Access Code: [email or web address]  URL: [email or web address]  Date: 03/25/2024  Prepared by: ANGELIQUE Castro    Exercises  - Right Self Epley Maneuver   - Self-Epley Maneuver Right Ear     Assessment:  Pt was negative for BPPV today and was instructed in self canalith repositioning with Epley's PRN. Recommended pt to hold on therapy and if any need arises within the next 30 days to call and schedule. If pt does not return within 30 days plan to formally discharge at that time. Pt is agreeable with this plan.     Plan:  Hold 30 days and if pt does not return in that time will formally discharge.     Goals:  Active       PT Problem       Pt to report reduced vertigo symptoms by 25-50% or more with all prior movements and ADL's.  (Met)       Start:  03/19/24    Resolved:  03/25/24         Pt to report reduced vertigo symptoms by 75% or more with all prior movements and ADL's.  (Met)        Start:  03/19/24    Resolved:  03/25/24         Reduce DHI by 10 points to display improved emotional, physical and functional aspects of vertigo        Start:  03/19/24    Expected End:  04/14/24            Pt to be independent with a HEP for self management.        Start:  03/19/24    Expected End:  04/14/24

## 2024-03-25 NOTE — PROGRESS NOTES
Patient identification verified with 2 identifiers.  Patient Self Testing program  [facility].     Referring Physician: DAMIÁN Harrison CNP  Enrollment/ Re-enrollment date: 2024   INR Goal: 2.5-3.5  INR monitoring is per [facility] protocol.  Anticoagulation Medication: warfarin  Indication: Aortic Valve Replacement    Subjective   Bleeding signs/symptoms: No    Bruising: No   Major bleeding event: No  Thrombosis signs/symptoms: No  Thromboembolic event: No  Missed doses: No  Extra doses: No  Medication changes: No  Dietary changes: No  Change in health: No  Change in activity: No  Alcohol: No  Other concerns: No    Upcoming Procedures:  Does the Patient Have any upcoming procedures that require interruption in anticoagulation therapy? no  Does the patient require bridging? no      Anticoagulation Summary  As of 3/25/2024      INR goal:  2.5-3.5   TTR:  40.9 % (5.2 mo)   INR used for dosin.70 (3/25/2024)   Weekly warfarin total:  36 mg               Assessment/Plan   Therapeutic     1. New dose: no change    2. Next INR: 1 week      Education provided to patient during the visit:  Patient instructed to call in interim with questions, concerns and changes.

## 2024-03-26 ENCOUNTER — APPOINTMENT (OUTPATIENT)
Dept: PHYSICAL THERAPY | Facility: CLINIC | Age: 69
End: 2024-03-26
Payer: COMMERCIAL

## 2024-03-26 ENCOUNTER — APPOINTMENT (OUTPATIENT)
Dept: CARDIOLOGY | Facility: CLINIC | Age: 69
End: 2024-03-26
Payer: COMMERCIAL

## 2024-04-02 ENCOUNTER — ANTICOAGULATION - WARFARIN VISIT (OUTPATIENT)
Dept: CARDIOLOGY | Facility: CLINIC | Age: 69
End: 2024-04-02
Payer: COMMERCIAL

## 2024-04-02 DIAGNOSIS — Z79.01 LONG TERM (CURRENT) USE OF ANTICOAGULANTS: ICD-10-CM

## 2024-04-02 DIAGNOSIS — G45.9 TIA (TRANSIENT ISCHEMIC ATTACK): ICD-10-CM

## 2024-04-02 DIAGNOSIS — Z95.2 AORTIC VALVE REPLACED: Primary | ICD-10-CM

## 2024-04-02 DIAGNOSIS — Z95.2 AORTIC VALVE REPLACED: ICD-10-CM

## 2024-04-02 LAB
INR IN PPP BY COAGULATION ASSAY EXTERNAL: 3.1
PROTHROMBIN TIME (PT) IN PPP BY COAGULATION ASSAY EXTERNAL: NORMAL SECONDS

## 2024-04-02 NOTE — PROGRESS NOTES
Patient identification verified with 2 identifiers.  Patient Self Testing program  [facility].     Referring Physician: DAMIÁN Harrison CNP  Enrollment/ Re-enrollment date: 12/8/2024   INR Goal: 2.5-3.5  INR monitoring is per [facility] protocol.  Anticoagulation Medication: warfarin  Indication: Aortic Valve Replacement    Subjective   Bleeding signs/symptoms: No    Bruising: No   Major bleeding event: No  Thrombosis signs/symptoms: No  Thromboembolic event: No  Missed doses: No  Extra doses: No  Medication changes: No  Dietary changes: No  Change in health: No  Change in activity: No  Alcohol: No  Other concerns: No    Upcoming Procedures:  Does the Patient Have any upcoming procedures that require interruption in anticoagulation therapy? no  Does the patient require bridging? no      Anticoagulation Summary  As of 4/2/2024      INR goal:  2.5-3.5   TTR:  43.7 % (5.5 mo)   INR used for dosing:  3.10 (4/2/2024)   Weekly warfarin total:  36 mg               Assessment/Plan   Therapeutic     1. New dose: no change    2. Next INR: 2 weeks      Education provided to patient during the visit:  Patient instructed to call in interim with questions, concerns and changes.

## 2024-04-05 DIAGNOSIS — F17.200 TOBACCO USE DISORDER: ICD-10-CM

## 2024-04-05 RX ORDER — BUPROPION HYDROCHLORIDE 150 MG/1
150 TABLET, EXTENDED RELEASE ORAL 2 TIMES DAILY
Qty: 180 TABLET | Refills: 0 | Status: SHIPPED | OUTPATIENT
Start: 2024-04-05

## 2024-04-08 ENCOUNTER — APPOINTMENT (OUTPATIENT)
Dept: OBSTETRICS AND GYNECOLOGY | Facility: CLINIC | Age: 69
End: 2024-04-08
Payer: COMMERCIAL

## 2024-04-16 ENCOUNTER — ANTICOAGULATION - WARFARIN VISIT (OUTPATIENT)
Dept: CARDIOLOGY | Facility: CLINIC | Age: 69
End: 2024-04-16
Payer: COMMERCIAL

## 2024-04-16 DIAGNOSIS — Z79.01 LONG TERM (CURRENT) USE OF ANTICOAGULANTS: ICD-10-CM

## 2024-04-16 DIAGNOSIS — G45.9 TIA (TRANSIENT ISCHEMIC ATTACK): ICD-10-CM

## 2024-04-16 DIAGNOSIS — Z95.2 AORTIC VALVE REPLACED: ICD-10-CM

## 2024-04-16 LAB
INR IN PPP BY COAGULATION ASSAY EXTERNAL: 3.7
PROTHROMBIN TIME (PT) IN PPP BY COAGULATION ASSAY EXTERNAL: NORMAL SECONDS

## 2024-04-16 NOTE — PROGRESS NOTES
Patient identification verified with 2 identifiers.    Location: [facility] Patient Self-Testing Program [phone]    Referring Physician:Pricila Bishop  Enrollment/ Re-enrollment date: 12/08/2024   INR Goal: 2.5-3.5  INR monitoring is per [facility] protocol.  Anticoagulation Medication: warfarin  Indication: Atrial Fibrillation/Atrial Flutter, Aortic Valve Replacement, and Stroke/TIA    Subjective   Bleeding signs/symptoms: No    Bruising: No   Major bleeding event: No  Thrombosis signs/symptoms: No  Thromboembolic event: No  Missed doses: No  Extra doses: No  Medication changes: Yes  PT STARTED TAKING ARICEPT AND SEREQUAL YESTERDAY APRIL 15TH  Dietary changes: No  Change in health: No  Change in activity: No  Alcohol: No  Other concerns: No    Upcoming Procedures:  Does the Patient Have any upcoming procedures that require interruption in anticoagulation therapy? no  Does the patient require bridging? no      Anticoagulation Summary  As of 4/16/2024      INR goal:  2.5-3.5   TTR:  45.5% (6 mo)   INR used for dosing:  3.70 (4/16/2024)   Weekly warfarin total:  34.5 mg               Assessment/Plan   Supratherapeutic     1. New dose:  reduced weekly dose     2. Next INR:  3 days      Education provided to patient during the visit:  Patient instructed to call in interim with questions, concerns and changes.   Patient educated on interactions between medications and warfarin.   Patient educated on dietary consistency in vitamin k consumption.   Patient educated on affects of alcohol consumption while taking warfarin.   Patient educated on signs of bleeding/clotting.   Patient educated on compliance with dosing, follow up appointments, and prescribed plan of care.

## 2024-04-17 ENCOUNTER — ANTICOAGULATION - WARFARIN VISIT (OUTPATIENT)
Dept: CARDIOLOGY | Facility: CLINIC | Age: 69
End: 2024-04-17
Payer: COMMERCIAL

## 2024-04-17 DIAGNOSIS — Z95.2 AORTIC VALVE REPLACED: Primary | ICD-10-CM

## 2024-04-17 DIAGNOSIS — G45.9 TIA (TRANSIENT ISCHEMIC ATTACK): ICD-10-CM

## 2024-04-17 DIAGNOSIS — Z79.01 LONG TERM (CURRENT) USE OF ANTICOAGULANTS: ICD-10-CM

## 2024-04-22 ENCOUNTER — ANTICOAGULATION - WARFARIN VISIT (OUTPATIENT)
Dept: CARDIOLOGY | Facility: CLINIC | Age: 69
End: 2024-04-22

## 2024-04-22 ENCOUNTER — TELEPHONE (OUTPATIENT)
Dept: CARDIOLOGY | Facility: CLINIC | Age: 69
End: 2024-04-22

## 2024-04-22 ENCOUNTER — PROCEDURE VISIT (OUTPATIENT)
Dept: OBSTETRICS AND GYNECOLOGY | Facility: CLINIC | Age: 69
End: 2024-04-22
Payer: COMMERCIAL

## 2024-04-22 DIAGNOSIS — N32.81 OAB (OVERACTIVE BLADDER): ICD-10-CM

## 2024-04-22 DIAGNOSIS — Z79.01 LONG TERM (CURRENT) USE OF ANTICOAGULANTS: ICD-10-CM

## 2024-04-22 DIAGNOSIS — G45.9 TIA (TRANSIENT ISCHEMIC ATTACK): ICD-10-CM

## 2024-04-22 DIAGNOSIS — Z95.2 AORTIC VALVE REPLACED: Primary | ICD-10-CM

## 2024-04-22 LAB
INR IN PPP BY COAGULATION ASSAY EXTERNAL: 5.1
PROTHROMBIN TIME (PT) IN PPP BY COAGULATION ASSAY EXTERNAL: NORMAL SECONDS

## 2024-04-22 PROCEDURE — 51797 INTRAABDOMINAL PRESSURE TEST: CPT | Performed by: STUDENT IN AN ORGANIZED HEALTH CARE EDUCATION/TRAINING PROGRAM

## 2024-04-22 PROCEDURE — 51729 CYSTOMETROGRAM W/VP&UP: CPT | Performed by: STUDENT IN AN ORGANIZED HEALTH CARE EDUCATION/TRAINING PROGRAM

## 2024-04-22 PROCEDURE — 51784 ANAL/URINARY MUSCLE STUDY: CPT | Performed by: STUDENT IN AN ORGANIZED HEALTH CARE EDUCATION/TRAINING PROGRAM

## 2024-04-22 NOTE — PROGRESS NOTES
Patient identification verified with 2 identifiers.    Location: [facility] Patient Self-Testing Program [phone]    Referring Physician:Pricila Bishop  Enrollment/ Re-enrollment date: 12/08/2024   INR Goal: 2.5-3.5  INR monitoring is per [facility] protocol.  Anticoagulation Medication: warfarin  Indication: Atrial Fibrillation/Atrial Flutter, Aortic Valve Replacement, and Stroke/TIA    Subjective   Bleeding signs/symptoms: No    Bruising: No   Major bleeding event: No  Thrombosis signs/symptoms: No  Thromboembolic event: No  Missed doses: No  Extra doses: No  Medication changes: Yes  Patient started Seroquel and Aricept this past week  Dietary changes: No  Change in health: No  Change in activity: No  Alcohol: No  Other concerns: No    Upcoming Procedures:  Does the Patient Have any upcoming procedures that require interruption in anticoagulation therapy? no  Does the patient require bridging? no      Anticoagulation Summary  As of 4/22/2024      INR goal:  2.5-3.5   TTR:  45.5% (6 mo)   INR used for dosing:                 Assessment/Plan   Supratherapeutic     1. New dose: Spoke to Jordyn Bishop via Secure chat.  She ordered a 3 day dose hold and have the patient retest in 3 days.  Spoke to daughter, Veronica with instructions.    2. Next INR:  3 days      Education provided to patient during the visit:  Patient instructed to call in interim with questions, concerns and changes.

## 2024-04-22 NOTE — TELEPHONE ENCOUNTER
Received a call from Keith at [facility] Cou[facility], INR is 5.1 today. He messaged Kiera Bishop, he just wanted to know if Kiera is here, he already sent a secure reach message.    Confirmed with Kiera, she received the notification.

## 2024-04-22 NOTE — PROGRESS NOTES
Patient ID: Suze Najera is a 69 y.o. female.    Uroflowmetry    Date/Time: 4/22/2024 12:45 PM    Performed by: Nydia Cavazos MA  Authorized by: Elise Gaming MD    Procedure Details     Procedure: CMG with UPP/voiding pressures, EMG and intra-abdominal voiding study      CMG with UPP/Voiding Pressures Details:     First urge to void (mL): 46    Urgency to void (mL): 85    Bladder capacity (mL): 248    Additional Details      No uroflow was detected.. she was cathed for 25 ml prior to testing. She was positive for stress incontinence and she did have d/o  with leakage.     Urodynamics Results    Uroflowmetry:   Not detected  Cath PVR 25 ml    Cystometry:   First desire: 46 ml   P detrusor: -4 cm H2O   Strong desire: 85  ml   P detrusor: -4 cm H2O  Capacity 217 ml  Bladder sensation: hypersensitivity    Detrusor activity: +DO starting at 248 ml    Compliance: normal  AUGUST: + cough leak at 248 ml  Urethral pressure profile (UPP): fair  Maximum urethral closure pressure: 12 cm H2O    Voiding Study:   Maximum flow: 10.4 ml/s   Average flow: 7.8 ml/s   P detrusor at peak flow: 34.3 cm H2O   Volume voided: 122.4 ml   Pattern:  bell curve   Mechanism of voiding: detrusor contraction  Detrusor contraction with void: good    UDS INTERPRETATION    Uroflow: not detected, PVR 25 ml by cath    CMG: normal compliance, hypersensitivity with reduced capacity, + DO with leak, + AUGUST    Voiding: normal flow pattern, normal detrusor pressure, normal voiding mechanism    SUMMARY:  +DO with reduced capacity/hypersensitivity  +AUGUST    Candidate for botox    Elise Gaming MD

## 2024-04-23 ENCOUNTER — DOCUMENTATION (OUTPATIENT)
Dept: PHYSICAL THERAPY | Facility: CLINIC | Age: 69
End: 2024-04-23

## 2024-04-23 DIAGNOSIS — E66.9 CLASS 1 OBESITY WITH BODY MASS INDEX (BMI) OF 31.0 TO 31.9 IN ADULT, UNSPECIFIED OBESITY TYPE, UNSPECIFIED WHETHER SERIOUS COMORBIDITY PRESENT: ICD-10-CM

## 2024-04-23 RX ORDER — ROSUVASTATIN CALCIUM 40 MG/1
40 TABLET, COATED ORAL DAILY
Qty: 90 TABLET | Refills: 1 | Status: SHIPPED | OUTPATIENT
Start: 2024-04-23

## 2024-04-23 NOTE — PROGRESS NOTES
Physical Therapy    Discharge Summary    Name: Suze Najera  MRN: [id number]  : 1955  Date: 24    Discharge Summary: PT    Discharge Information: Date of discharge 24, Date of last visit 3/25/24, Date of evaluation 3/19/24, Number of attended visits 2, Referred by Jo Catalan , and Referred for vertigo     Therapy Summary: Seen for 2 visits for BPPV with clearing of her nystagmus and symptoms.     Discharge Status: Pt was seen for 2 visits and was cleared of her vertigo and was held for 30 days. Since 30 days have passed will formally discharge patients POC      Rehab Discharge Reason: Achieved all and/or the most significant goals(s)

## 2024-04-25 ENCOUNTER — ANTICOAGULATION - WARFARIN VISIT (OUTPATIENT)
Dept: CARDIOLOGY | Facility: CLINIC | Age: 69
End: 2024-04-25
Payer: COMMERCIAL

## 2024-04-25 DIAGNOSIS — G45.9 TIA (TRANSIENT ISCHEMIC ATTACK): ICD-10-CM

## 2024-04-25 DIAGNOSIS — Z79.01 LONG TERM (CURRENT) USE OF ANTICOAGULANTS: ICD-10-CM

## 2024-04-25 DIAGNOSIS — Z95.2 AORTIC VALVE REPLACED: Primary | ICD-10-CM

## 2024-04-25 LAB
INR IN PPP BY COAGULATION ASSAY EXTERNAL: 1.3
PROTHROMBIN TIME (PT) IN PPP BY COAGULATION ASSAY EXTERNAL: NORMAL SECONDS

## 2024-04-25 NOTE — PROGRESS NOTES
Patient identification verified with 2 identifiers.    Location: [facility] Patient Self-Testing Program [phone]    Referring Physician: Pricila Bishop  Enrollment/ Re-enrollment date: 2024   INR Goal: 2.5-3.5  INR monitoring is per [facility] protocol.  Anticoagulation Medication: warfarin  Indication: Atrial Fibrillation/Atrial Flutter, Aortic Valve Replacement, and Stroke/TIA    Subjective   Bleeding signs/symptoms: No    Bruising: No   Major bleeding event: No  Thrombosis signs/symptoms: No  Thromboembolic event: No  Missed doses: No  Extra doses: No  Medication changes: No  Dietary changes: No  Change in health: No  Change in activity: No  Alcohol: No  Other concerns: No    Upcoming Procedures:  Does the Patient Have any upcoming procedures that require interruption in anticoagulation therapy? no  Does the patient require bridging? no      Anticoagulation Summary  As of 2024      INR goal:  2.5-3.5   TTR:  43.8% (6.3 mo)   INR used for dosin.30 (2024)   Weekly warfarin total:  34.5 mg               Assessment/Plan   Subtherapeutic     1. New dose: Spoke to Jordyn Bishpo CNP via secure chat.  Patient will receive a one time dose of 4.5mg today, maintain weekly dose and test in 4 days.  Veronica verbalized understanding.    2. Next INR: 4 days      Education provided to patient during the visit:  Patient instructed to call in interim with questions, concerns and changes.

## 2024-04-29 ENCOUNTER — TELEPHONE (OUTPATIENT)
Dept: PRIMARY CARE | Facility: CLINIC | Age: 69
End: 2024-04-29
Payer: COMMERCIAL

## 2024-04-29 ENCOUNTER — ANTICOAGULATION - WARFARIN VISIT (OUTPATIENT)
Dept: CARDIOLOGY | Facility: CLINIC | Age: 69
End: 2024-04-29
Payer: COMMERCIAL

## 2024-04-29 DIAGNOSIS — G45.9 TIA (TRANSIENT ISCHEMIC ATTACK): ICD-10-CM

## 2024-04-29 DIAGNOSIS — Z95.2 AORTIC VALVE REPLACED: Primary | ICD-10-CM

## 2024-04-29 DIAGNOSIS — Z79.01 LONG TERM (CURRENT) USE OF ANTICOAGULANTS: ICD-10-CM

## 2024-04-29 LAB
INR IN PPP BY COAGULATION ASSAY EXTERNAL: 2.2
PROTHROMBIN TIME (PT) IN PPP BY COAGULATION ASSAY EXTERNAL: NORMAL SECONDS

## 2024-04-29 NOTE — PROGRESS NOTES
Patient identification verified with 2 identifiers.    Location: [facility] Patient Self-Testing Program [phone]    Referring Physician: Pricila Bishop  Enrollment/ Re-enrollment date: 2024   INR Goal: 2.5-3.5  INR monitoring is per [facility] protocol.  Anticoagulation Medication: warfarin  Indication: Atrial Fibrillation/Atrial Flutter, Aortic Valve Replacement, and Stroke/TIA    Subjective   Bleeding signs/symptoms: No    Bruising: No   Major bleeding event: No  Thrombosis signs/symptoms: No  Thromboembolic event: No  Missed doses: No  Extra doses: No  Medication changes: Yes  pt started remeron and stopped seroquel  Dietary changes: No  Change in health: No  Change in activity: No  Alcohol: No  Other concerns: No    Upcoming Procedures:  Does the Patient Have any upcoming procedures that require interruption in anticoagulation therapy? no  Does the patient require bridging? no      Anticoagulation Summary  As of 2024      INR goal:  2.5-3.5   TTR:  42.9% (6.4 mo)   INR used for dosin.20 (2024)   Weekly warfarin total:  34.5 mg               Assessment/Plan   Subtherapeutic     1. New dose: Will maintain dose as patient recently started Remeron and stopped Seroquel. Pt will retest in 3 days. Veronica verbalized understanding.  2. Next INR: 3 days      Education provided to patient during the visit:  Patient instructed to call in interim with questions, concerns and changes.   Patient educated on interactions between medications and warfarin.   Patient educated on compliance with dosing, follow up appointments, and prescribed plan of care.

## 2024-04-29 NOTE — TELEPHONE ENCOUNTER
Camron called from [facility]    Dr. Clifton put in a referral , starting to get dementia, CAB, COPD, hypertension .  He will not follow her so they want to know if you will follow her for pt,ot and skilled nursing.      Please  [phone]

## 2024-04-29 NOTE — TELEPHONE ENCOUNTER
[facility] are [facility] of network for insurance if someone could call back [phone]

## 2024-04-30 ENCOUNTER — TELEMEDICINE (OUTPATIENT)
Dept: OBSTETRICS AND GYNECOLOGY | Facility: CLINIC | Age: 69
End: 2024-04-30
Payer: COMMERCIAL

## 2024-04-30 DIAGNOSIS — N39.41 URGE INCONTINENCE: ICD-10-CM

## 2024-04-30 DIAGNOSIS — N32.81 OAB (OVERACTIVE BLADDER): Primary | ICD-10-CM

## 2024-04-30 PROCEDURE — 3008F BODY MASS INDEX DOCD: CPT | Performed by: STUDENT IN AN ORGANIZED HEALTH CARE EDUCATION/TRAINING PROGRAM

## 2024-04-30 PROCEDURE — 1159F MED LIST DOCD IN RCRD: CPT | Performed by: STUDENT IN AN ORGANIZED HEALTH CARE EDUCATION/TRAINING PROGRAM

## 2024-04-30 PROCEDURE — 3061F NEG MICROALBUMINURIA REV: CPT | Performed by: STUDENT IN AN ORGANIZED HEALTH CARE EDUCATION/TRAINING PROGRAM

## 2024-04-30 PROCEDURE — 1160F RVW MEDS BY RX/DR IN RCRD: CPT | Performed by: STUDENT IN AN ORGANIZED HEALTH CARE EDUCATION/TRAINING PROGRAM

## 2024-04-30 PROCEDURE — 3044F HG A1C LEVEL LT 7.0%: CPT | Performed by: STUDENT IN AN ORGANIZED HEALTH CARE EDUCATION/TRAINING PROGRAM

## 2024-04-30 PROCEDURE — 4010F ACE/ARB THERAPY RXD/TAKEN: CPT | Performed by: STUDENT IN AN ORGANIZED HEALTH CARE EDUCATION/TRAINING PROGRAM

## 2024-04-30 PROCEDURE — 1123F ACP DISCUSS/DSCN MKR DOCD: CPT | Performed by: STUDENT IN AN ORGANIZED HEALTH CARE EDUCATION/TRAINING PROGRAM

## 2024-04-30 PROCEDURE — 1157F ADVNC CARE PLAN IN RCRD: CPT | Performed by: STUDENT IN AN ORGANIZED HEALTH CARE EDUCATION/TRAINING PROGRAM

## 2024-04-30 PROCEDURE — 99442 PR PHYS/QHP TELEPHONE EVALUATION 11-20 MIN: CPT | Performed by: STUDENT IN AN ORGANIZED HEALTH CARE EDUCATION/TRAINING PROGRAM

## 2024-04-30 NOTE — TELEPHONE ENCOUNTER
I called sam back and she stated someone else already called her and stated she went with a different agency

## 2024-04-30 NOTE — PROGRESS NOTES
Telemedicine Visit - Urogynecology    A telephone visit (audio only) between the patient (at the originating site) and provider (at the distant site) was utilized to provide this telehealth service  Verbal consent was obtained from Suze Najera on this date 24 for a telehealth visit.  The patient's identity was confirmed and that she is located in [place]. Elise Gaming MD identified herself and the patient consented to a telehealth visit today.    HISTORY OF PRESENT ILLNESS:  Suze Najera is a 69 y.o. female, here today for a virtual visit in follow up for urinary incontinence     During last encounter on 23, reviewed and agreed to the followin) Reviewed cystoscopy results, Suze Najera watched procedure on video screen.     2) OAB  - myrbetriq and gemtesa were both cost prohibitive  - not a candidate for anticholinergics given baseline cognitive dysfunction  - reviewed next option would be botox or SNM  - not interested in surgical intervention so if anything would consider botox  - Reviewed procedure steps for intradetrusor botox injection, and reviewed this is typically done in the office  - discussed success rate around 80%  - counseled on risk of retention following injection (5% retention risk with injection of 100 U, higher with higher doses) and that this can require CIC to manage  - unsure if she would want to proceed with this  - will consider options and call back if she opts to pursue  - would need UDS prior to botox scheduling     RTC as needed, or if opting for UDS/Botox    Today she reports   OAB very bothersome, AUGUST not bothersome. Interested in third line therapies for OAB/UUI    The following were reviewed to gain additional history:  External notes: [facility] note re follow up after ascending aorta svg-[id number], CABGx1  Test results: INR 2.20 24    UDS completed 24   SUMMARY:  +DO with reduced capacity/hypersensitivity  +AUGUST    Candidate for botox             IMPRESSION AND PLAN:  68 y/o with OAB/UUI    OAB/UUI  - discussed pathophysiology of OAB  - myrbetriq and gemtesa were cost-prohibitive  - not a candidate for anticholinergics due to baseline cognitive impairment  - Reviewed procedure steps for intradetrusor botox injection, and reviewed this is typically done in the office  - discussed success rate around 80%  - counseled on risk of retention following injection (5% retention risk with injection of 100 U, higher with higher doses) and that this can require CIC to manage  - Patient currently receiving anticoagulation? Yes, taking warfarin  - Advised will need to touch base with coumadin clinic re stopping coumadin 5 days prior to botox in office      All questions and concerns were answered and addressed.  The patient expressed understanding and agrees with the plan.     Elise Gaming MD

## 2024-05-01 ENCOUNTER — DOCUMENTATION (OUTPATIENT)
Dept: OBSTETRICS AND GYNECOLOGY | Facility: CLINIC | Age: 69
End: 2024-05-01
Payer: COMMERCIAL

## 2024-05-01 ENCOUNTER — LAB (OUTPATIENT)
Dept: LAB | Facility: LAB | Age: 69
End: 2024-05-01
Payer: COMMERCIAL

## 2024-05-01 DIAGNOSIS — F03.90 UNSPECIFIED DEMENTIA, UNSPECIFIED SEVERITY, WITHOUT BEHAVIORAL DISTURBANCE, PSYCHOTIC DISTURBANCE, MOOD DISTURBANCE, AND ANXIETY (MULTI): Primary | ICD-10-CM

## 2024-05-01 DIAGNOSIS — Z95.828 STATUS POST ASCENDING AORTIC REPLACEMENT: ICD-10-CM

## 2024-05-01 PROCEDURE — 80048 BASIC METABOLIC PNL TOTAL CA: CPT

## 2024-05-01 PROCEDURE — 84443 ASSAY THYROID STIM HORMONE: CPT

## 2024-05-01 PROCEDURE — 82746 ASSAY OF FOLIC ACID SERUM: CPT

## 2024-05-01 PROCEDURE — 36415 COLL VENOUS BLD VENIPUNCTURE: CPT

## 2024-05-01 PROCEDURE — 82607 VITAMIN B-12: CPT

## 2024-05-01 NOTE — PROGRESS NOTES
5/8/2024 @ 2:29 spoke to Veronica, Suze's daughter and advised her to make sure Suze stops her warfrin tomorrow. Veronica will make sure she does.    5/8/2024 Botox received and LM for pt to call back to reminder her she needs to stop her blood thinners starting tomorrow for her cysto w/ Botox on Monday as advised per Dr. Gaming.    5/7/2024 Botox ordered    5/2/2024 PA was approved  Auth #: [id number]      4/30/24  Faxed PA to [employer]  Awaiting results    MD Tali Omalley, EDWIN  Planning bladder botox 100U at [facility]. UDS is done so she is fine to schedule whenever. She is on warfarin and I told her she will need to stop it 5 days before the procedure but she has some memory issues. I'm not sure if there's a way for you to make a note for the day of the botox appointment that we have to confirm with her that she's stopped it in time?

## 2024-05-02 ENCOUNTER — ANTICOAGULATION - WARFARIN VISIT (OUTPATIENT)
Dept: CARDIOLOGY | Facility: CLINIC | Age: 69
End: 2024-05-02
Payer: COMMERCIAL

## 2024-05-02 DIAGNOSIS — G45.9 TIA (TRANSIENT ISCHEMIC ATTACK): ICD-10-CM

## 2024-05-02 DIAGNOSIS — Z79.01 LONG TERM (CURRENT) USE OF ANTICOAGULANTS: ICD-10-CM

## 2024-05-02 DIAGNOSIS — Z95.2 AORTIC VALVE REPLACED: Primary | ICD-10-CM

## 2024-05-02 LAB
ANION GAP SERPL CALC-SCNC: 12 MMOL/L (ref 10–20)
BUN SERPL-MCNC: 20 MG/DL (ref 6–23)
CALCIUM SERPL-MCNC: 9.2 MG/DL (ref 8.6–10.6)
CHLORIDE SERPL-SCNC: 105 MMOL/L (ref 98–107)
CO2 SERPL-SCNC: 26 MMOL/L (ref 21–32)
CREAT SERPL-MCNC: 0.96 MG/DL (ref 0.5–1.05)
EGFRCR SERPLBLD CKD-EPI 2021: 65 ML/MIN/1.73M*2
FOLATE SERPL-MCNC: >24 NG/ML
GLUCOSE SERPL-MCNC: 195 MG/DL (ref 74–99)
INR IN PPP BY COAGULATION ASSAY EXTERNAL: 2.1
POTASSIUM SERPL-SCNC: 4.2 MMOL/L (ref 3.5–5.3)
PROTHROMBIN TIME (PT) IN PPP BY COAGULATION ASSAY EXTERNAL: NORMAL SECONDS
SODIUM SERPL-SCNC: 139 MMOL/L (ref 136–145)
TSH SERPL-ACNC: 1.33 MIU/L (ref 0.44–3.98)
VIT B12 SERPL-MCNC: 462 PG/ML (ref 211–911)

## 2024-05-02 NOTE — PROGRESS NOTES
Patient identification verified with 2 identifiers.    Location: [facility] Patient Self-Testing Program [phone]    Referring Physician: Pricila Bishop  Enrollment/ Re-enrollment date: 2024   INR Goal: 2.5-3.5  INR monitoring is per [facility] protocol.  Anticoagulation Medication: warfarin  Indication: Atrial Fibrillation/Atrial Flutter, Aortic Valve Replacement, and Stroke/TIA    Subjective   Bleeding signs/symptoms: No    Bruising: No   Major bleeding event: No  Thrombosis signs/symptoms: No  Thromboembolic event: No  Missed doses: No  Extra doses: No  Medication changes: No  Dietary changes: No  Change in health: No  Change in activity: No  Alcohol: No  Other concerns: No    Upcoming Procedures:  Does the Patient Have any upcoming procedures that require interruption in anticoagulation therapy? no  Does the patient require bridging? no      Anticoagulation Summary  As of 2024      INR goal:  2.5-3.5   TTR:  42.2% (6.5 mo)   INR used for dosin.10 (2024)   Weekly warfarin total:  34.5 mg               Assessment/Plan   Subtherapeutic     1. New dose: Will give a one-time dose today and maintain weekly dose.  Patient is very sensitive to dose changes. Veronica verbalized understanding.    2. Next INR:  4 days      Education provided to patient during the visit:  Patient instructed to call in interim with questions, concerns and changes.

## 2024-05-06 ENCOUNTER — ANTICOAGULATION - WARFARIN VISIT (OUTPATIENT)
Dept: CARDIOLOGY | Facility: CLINIC | Age: 69
End: 2024-05-06
Payer: COMMERCIAL

## 2024-05-06 DIAGNOSIS — Z95.2 AORTIC VALVE REPLACED: Primary | ICD-10-CM

## 2024-05-06 DIAGNOSIS — Z79.01 LONG TERM (CURRENT) USE OF ANTICOAGULANTS: ICD-10-CM

## 2024-05-06 DIAGNOSIS — G45.9 TIA (TRANSIENT ISCHEMIC ATTACK): ICD-10-CM

## 2024-05-06 LAB
INR IN PPP BY COAGULATION ASSAY EXTERNAL: 2.4
PROTHROMBIN TIME (PT) IN PPP BY COAGULATION ASSAY EXTERNAL: NORMAL SECONDS

## 2024-05-06 NOTE — PROGRESS NOTES
Patient identification verified with 2 identifiers.    Location: [facility] Patient Self-Testing Program [phone]    Referring Physician: ANTHONY Beavers  Enrollment/ Re-enrollment date: 3/25/2025   INR Goal: 2.5-3.5  INR monitoring is per [facility] protocol.  Anticoagulation Medication: warfarin  Indication:  Valve    Received faxed INR self-test result, called and spoke to Veronica. Patient identification verified with two patient identifiers.    Subjective   Bleeding signs/symptoms: No    Bruising: No   Major bleeding event: No  Thrombosis signs/symptoms: No  Thromboembolic event: No  Missed doses: Yes  Reviewed dose with Veronica and Veronica inadvertently gave patient 1.5 tablets yesterday () instead of the scheduled 2 tablets.  Extra doses: No  Medication changes: No  Dietary changes: No  Change in health: No  Change in activity: No  Alcohol: No  Other concerns: No    Upcoming Procedures:  Does the Patient Have any upcoming procedures that require interruption in anticoagulation therapy? no  Does the patient require bridging? no      Anticoagulation Summary  As of 2024      INR goal:  2.5-3.5   TTR:  41.2% (6.6 mo)   INR used for dosin.40 (2024)   Weekly warfarin total:  34.5 mg               Assessment/Plan   Subtherapeutic Reviewed dose with Veronica and Veronica inadvertently gave patient 1.5 tablets yesterday () instead of the scheduled 2 tablets.  Will adjust daily dosing and maintain total weekly dose.     1. New dose:  Maintain dose     2. Next INR:  3 days    Reviewed dosage schedule and Veronica read back dosing correctly.  Provided Veronica date of next INR test.  Instructed Veronica to call PST voicemail at [phone] in interim with questions, concerns and changes    Education provided to patient during the visit:  Patient instructed to call in interim with questions, concerns and changes.   Patient educated on interactions between medications and warfarin.   Patient educated on dietary consistency in  vitamin k consumption.   Patient educated on affects of alcohol consumption while taking warfarin.   Patient educated on signs of bleeding/clotting.   Patient educated on compliance with dosing, follow up appointments, and prescribed plan of care.

## 2024-05-09 ENCOUNTER — ANTICOAGULATION - WARFARIN VISIT (OUTPATIENT)
Dept: CARDIOLOGY | Facility: CLINIC | Age: 69
End: 2024-05-09
Payer: COMMERCIAL

## 2024-05-09 DIAGNOSIS — Z95.2 AORTIC VALVE REPLACED: Primary | ICD-10-CM

## 2024-05-09 DIAGNOSIS — G45.9 TIA (TRANSIENT ISCHEMIC ATTACK): ICD-10-CM

## 2024-05-09 DIAGNOSIS — Z79.01 LONG TERM (CURRENT) USE OF ANTICOAGULANTS: ICD-10-CM

## 2024-05-09 LAB
INR IN PPP BY COAGULATION ASSAY EXTERNAL: 2
PROTHROMBIN TIME (PT) IN PPP BY COAGULATION ASSAY EXTERNAL: NORMAL SECONDS

## 2024-05-09 NOTE — PROGRESS NOTES
Patient identification verified with 2 identifiers.    Location: [facility] Patient Self-Testing Program [phone]    Referring Physician: Pricila Bishop  Enrollment/ Re-enrollment date: 2024   INR Goal: 2.5-3.5  INR monitoring is per [facility] protocol.  Anticoagulation Medication: warfarin  Indication: Atrial Fibrillation/Atrial Flutter, Aortic Valve Replacement, and Stroke/TIA    Subjective   Bleeding signs/symptoms: No    Bruising: No   Major bleeding event: No  Thrombosis signs/symptoms: No  Thromboembolic event: No  Missed doses: No  Extra doses: No  Medication changes: No  Dietary changes: No  Change in health: No  Change in activity: No  Alcohol: No  Other concerns: No    Upcoming Procedures:  Does the Patient Have any upcoming procedures that require interruption in anticoagulation therapy? yes  Does the patient require bridging? no      Anticoagulation Summary  As of 2024      INR goal:  2.5-3.5   TTR:  40.7% (6.7 mo)   INR used for dosin.00 (2024)   Weekly warfarin total:  34.5 mg               Assessment/Plan   Subtherapeutic     1. New dose: Patient will be stopping Coumadin as of today() in preparation for a bladder procedure patient is having on .  Patient's urologist, Dr. Fernandez ordered the dose hold.  Secure chat sent to Kiera Bishop since no bridging was ordered, however patient always refuses Lovenox when her INR is lower than 2.0.  Jordyn aware of dose hold.    2. Next INR: 1 week      Education provided to patient during the visit:  Patient instructed to call in interim with questions, concerns and changes.

## 2024-05-13 ENCOUNTER — TELEPHONE (OUTPATIENT)
Dept: CARDIOLOGY | Facility: CLINIC | Age: 69
End: 2024-05-13

## 2024-05-13 ENCOUNTER — PROCEDURE VISIT (OUTPATIENT)
Dept: OBSTETRICS AND GYNECOLOGY | Facility: CLINIC | Age: 69
End: 2024-05-13
Payer: COMMERCIAL

## 2024-05-13 ENCOUNTER — ANTICOAGULATION - WARFARIN VISIT (OUTPATIENT)
Dept: CARDIOLOGY | Facility: CLINIC | Age: 69
End: 2024-05-13

## 2024-05-13 VITALS
DIASTOLIC BLOOD PRESSURE: 82 MMHG | WEIGHT: 166 LBS | SYSTOLIC BLOOD PRESSURE: 156 MMHG | HEIGHT: 62 IN | BODY MASS INDEX: 30.55 KG/M2

## 2024-05-13 DIAGNOSIS — Z79.01 LONG TERM (CURRENT) USE OF ANTICOAGULANTS: ICD-10-CM

## 2024-05-13 DIAGNOSIS — N39.41 URGE URINARY INCONTINENCE: ICD-10-CM

## 2024-05-13 DIAGNOSIS — N32.81 OAB (OVERACTIVE BLADDER): ICD-10-CM

## 2024-05-13 DIAGNOSIS — Z95.2 AORTIC VALVE REPLACED: Primary | ICD-10-CM

## 2024-05-13 DIAGNOSIS — N39.0 ACUTE UTI: Primary | ICD-10-CM

## 2024-05-13 DIAGNOSIS — G45.9 TIA (TRANSIENT ISCHEMIC ATTACK): ICD-10-CM

## 2024-05-13 LAB
POC APPEARANCE, URINE: CLEAR
POC BILIRUBIN, URINE: NEGATIVE
POC BLOOD, URINE: NEGATIVE
POC COLOR, URINE: YELLOW
POC GLUCOSE, URINE: NEGATIVE MG/DL
POC KETONES, URINE: NEGATIVE MG/DL
POC LEUKOCYTES, URINE: ABNORMAL
POC NITRITE,URINE: POSITIVE
POC PH, URINE: 7 PH
POC PROTEIN, URINE: NEGATIVE MG/DL
POC SPECIFIC GRAVITY, URINE: 1.02
POC UROBILINOGEN, URINE: 0.2 EU/DL

## 2024-05-13 PROCEDURE — 87086 URINE CULTURE/COLONY COUNT: CPT

## 2024-05-13 PROCEDURE — 52287 CYSTOSCOPY CHEMODENERVATION: CPT | Performed by: STUDENT IN AN ORGANIZED HEALTH CARE EDUCATION/TRAINING PROGRAM

## 2024-05-13 PROCEDURE — 87186 SC STD MICRODIL/AGAR DIL: CPT

## 2024-05-13 PROCEDURE — 99213 OFFICE O/P EST LOW 20 MIN: CPT | Performed by: STUDENT IN AN ORGANIZED HEALTH CARE EDUCATION/TRAINING PROGRAM

## 2024-05-13 PROCEDURE — 81003 URINALYSIS AUTO W/O SCOPE: CPT | Performed by: STUDENT IN AN ORGANIZED HEALTH CARE EDUCATION/TRAINING PROGRAM

## 2024-05-13 RX ORDER — RIVASTIGMINE 4.6 MG/24H
PATCH, EXTENDED RELEASE TRANSDERMAL
COMMUNITY

## 2024-05-13 RX ORDER — MIRTAZAPINE 7.5 MG/1
1 TABLET, FILM COATED ORAL NIGHTLY
COMMUNITY
Start: 2024-04-26 | End: 2024-07-25

## 2024-05-13 RX ORDER — NITROFURANTOIN 25; 75 MG/1; MG/1
100 CAPSULE ORAL 2 TIMES DAILY
Qty: 10 CAPSULE | Refills: 0 | Status: SHIPPED | OUTPATIENT
Start: 2024-05-13 | End: 2024-05-18

## 2024-05-13 RX ORDER — QUETIAPINE FUMARATE 25 MG/1
25 TABLET, FILM COATED ORAL
COMMUNITY

## 2024-05-13 ASSESSMENT — PAIN SCALES - GENERAL: PAINLEVEL: 0-NO PAIN

## 2024-05-13 NOTE — PROGRESS NOTES
Patient ID: Suze Najera is a 69 y.o. female.    Cystoscopy    Date/Time: 5/13/2024 9:37 AM    Performed by: Elise Gaming MD  Authorized by: Elise Gaming MD    Consent:     Consent obtained:  Written    Consent given by:  Healthcare agent  Universal protocol:     Procedure explained and questions answered to patient or proxy's satisfaction: yes      Relevant documents present and verified: yes      Test results available: yes      Imaging studies available: yes      Required blood products, implants, devices, and special equipment available: yes      Site/side marked: yes      Immediately prior to procedure, a time out was called: yes    CYSTOSCOPY WITH BOTULINUM TOXIN INJECTION      This is 69 y.o. year old who presents for cystoscopy with botulinum toxin injection.   Indication: OAB/UUI    Last Visit  4/30/24   68 y/o with OAB/UUI     OAB/UUI  - discussed pathophysiology of OAB  - myrbetriq and gemtesa were cost-prohibitive  - not a candidate for anticholinergics due to baseline cognitive impairment  - Reviewed procedure steps for intradetrusor botox injection, and reviewed this is typically done in the office  - discussed success rate around 80%  - counseled on risk of retention following injection (5% retention risk with injection of 100 U, higher with higher doses) and that this can require CIC to manage  - Patient currently receiving anticoagulation? Yes, taking warfarin  - Advised will need to touch base with coumadin clinic re stopping coumadin 5 days prior to botox in office    Today she reports  Office urine dip positive for nitrites. Reviewed risks of proceeding with botox (theoretical increased risk of urosepsis). However she is asymptomatic of any UTI symptoms. Reviewed options: defer botox today versus proceed and initiate antibiotic course. Opting to proceed today.        Consent:  The alternatives, risks and benefits of the procedure were explained to the patient. Risks including, but not  limited to discomfort, pain, bleeding, infection, injury to nearby structures, inability to perform the procedure, failure of the procedure, difficulty voiding potentially requiring catheterization and/or urinary tract infection were discussed.  All questions were answered and the patient wished to proceed.  The patient signed the Informed Consent Form which was made part of her permanent medical record and a time out was performed.    Procedure:  There were no vitals taken for this visit.  Chaperone for procedure: Sydni Carli  After confirming she did not have symptoms of a urinary tract infection, the patient was positioned in lithotomy. The urethral meatus was prepped. The bladder was drained and the urine dipped to confirm no evidence of infection. 2% lidocaine jelly was inserted into the urethra. Ten minutes was allowed for local anesthetic to take effect.    Botulinum Toxin Type A: 100 units of Botulinum toxin was mixed with a sodium chloride diluent.    The flexible Ambu cystoscope was inserted and visual inspection of the bladder was normal. The base of the bladder was injected while avoiding the trigone region, using a Laborie injection needle at four sites at a depth of 4 mm. A fifth site was injected with 3 ml of a saline flush.     The patient was instructed to call if she develops any difficulties voiding or symptoms of a urinary tract infection. Also reviewed should proceed to nearest ER for any significant systemic symptoms: fever, nausea/vomiting, altered mental status. Rx for macrobid 5 day course sent to [facility] pharmacy given nitrites on dip today. Will also send for culture.  Advised to resume coumadin today.  She will follow up in  2 weeks for a PVR check. The patient has no barriers to learning and was instructed on her treatment plan and follow up. She verbalizes understanding of these instructions.      Elise Gaming MD

## 2024-05-16 LAB — BACTERIA UR CULT: ABNORMAL

## 2024-05-17 NOTE — PROGRESS NOTES
Patient identification verified with 2 identifiers.    Location: [facility] Patient Self-Testing Program [phone]    Referring Physician: Jordyn Beavers CNP  Enrollment/ Re-enrollment date: 2024   INR Goal: 2.5-3.5  INR monitoring is per [facility] protocol.  Anticoagulation Medication: warfarin  Indication: Aortic Valve Replacement    Subjective   Bleeding signs/symptoms: No    Bruising: No   Major bleeding event: No  Thrombosis signs/symptoms: No  Thromboembolic event: No  Missed doses: No  Extra doses: No  Medication changes: No  Dietary changes: No  Change in health: No  Change in activity: No  Alcohol: No  Other concerns: No    Upcoming Procedures:  Does the Patient Have any upcoming procedures that require interruption in anticoagulation therapy? no  Does the patient require bridging? no      Anticoagulation Summary  As of 2024      INR goal:  2.5-3.5   TTR:  --   INR used for dosin.30 (2024)   Weekly warfarin total:  34.5 mg               Assessment/Plan   Subtherapeutic     1. New dose:  6 mg today and tomorrow,  4.5 mg      2. Next INR: 24      Education provided to patient during the visit:  Patient instructed to call in interim with questions, concerns and changes.

## 2024-05-20 ENCOUNTER — ANTICOAGULATION - WARFARIN VISIT (OUTPATIENT)
Dept: CARDIOLOGY | Facility: CLINIC | Age: 69
End: 2024-05-20
Payer: COMMERCIAL

## 2024-05-20 DIAGNOSIS — Z79.01 LONG TERM (CURRENT) USE OF ANTICOAGULANTS: ICD-10-CM

## 2024-05-20 DIAGNOSIS — Z95.2 AORTIC VALVE REPLACED: Primary | ICD-10-CM

## 2024-05-20 DIAGNOSIS — G45.9 TIA (TRANSIENT ISCHEMIC ATTACK): ICD-10-CM

## 2024-05-21 ENCOUNTER — ANTICOAGULATION - WARFARIN VISIT (OUTPATIENT)
Dept: CARDIOLOGY | Facility: CLINIC | Age: 69
End: 2024-05-21
Payer: COMMERCIAL

## 2024-05-21 DIAGNOSIS — Z95.2 AORTIC VALVE REPLACED: Primary | ICD-10-CM

## 2024-05-21 DIAGNOSIS — G45.9 TIA (TRANSIENT ISCHEMIC ATTACK): ICD-10-CM

## 2024-05-21 DIAGNOSIS — Z79.01 LONG TERM (CURRENT) USE OF ANTICOAGULANTS: ICD-10-CM

## 2024-05-21 LAB
INR IN PPP BY COAGULATION ASSAY EXTERNAL: 2.1
PROTHROMBIN TIME (PT) IN PPP BY COAGULATION ASSAY EXTERNAL: NORMAL SECONDS

## 2024-05-21 NOTE — PROGRESS NOTES
Patient identification verified with 2 identifiers.    Location: [facility] Patient Self-Testing Program [phone]    Referring Physician: Jordyn Beavers CNP  Enrollment/ Re-enrollment date: 2024   INR Goal: 2.5-3.5  INR monitoring is per [facility] protocol.  Anticoagulation Medication: warfarin  Indication: Aortic Valve Replacement    Subjective   Bleeding signs/symptoms: No    Bruising: No   Major bleeding event: No  Thrombosis signs/symptoms: No  Thromboembolic event: No  Missed doses: Yes  Extra doses: No  Medication changes: No  Dietary changes: No  Change in health: No  Change in activity: No  Alcohol: No  Other concerns: No    Upcoming Procedures:  Does the Patient Have any upcoming procedures that require interruption in anticoagulation therapy? no  Does the patient require bridging? no      Anticoagulation Summary  As of 2024      INR goal:  2.5-3.5   TTR:  38.5% (7.1 mo)   INR used for dosin.10 (2024)   Weekly warfarin total:  34.5 mg               Assessment/Plan   Subtherapeutic   Patient held warfarin for procedure last week  Maintain weekly dose.  Repeat INR in 3 days    Education provided to patient during the visit:  Patient instructed to call in interim with questions, concerns and changes.

## 2024-05-24 ENCOUNTER — ANTICOAGULATION - WARFARIN VISIT (OUTPATIENT)
Dept: CARDIOLOGY | Facility: CLINIC | Age: 69
End: 2024-05-24
Payer: COMMERCIAL

## 2024-05-24 DIAGNOSIS — G45.9 TIA (TRANSIENT ISCHEMIC ATTACK): ICD-10-CM

## 2024-05-24 DIAGNOSIS — Z79.01 LONG TERM (CURRENT) USE OF ANTICOAGULANTS: ICD-10-CM

## 2024-05-24 DIAGNOSIS — Z95.2 AORTIC VALVE REPLACED: Primary | ICD-10-CM

## 2024-05-24 LAB
INR IN PPP BY COAGULATION ASSAY EXTERNAL: 2.2 (ref 2.5–3.5)
PROTHROMBIN TIME (PT) IN PPP BY COAGULATION ASSAY EXTERNAL: ABNORMAL SECONDS

## 2024-05-24 NOTE — PROGRESS NOTES
Patient identification verified with 2 identifiers.    Location: [facility] Patient Self-Testing Program [phone]    Referring Physician: ANTHONY Beavers  Enrollment/ Re-enrollment date: 3/25/2025   INR Goal: 2.5-3.5  INR monitoring is per [facility] protocol.  Anticoagulation Medication: warfarin  Indication:  Valve    Received faxed INR self-test result, called and spoke to Veronica.     Subjective   Bleeding signs/symptoms: No    Bruising: No   Major bleeding event: No  Thrombosis signs/symptoms: No  Thromboembolic event: No  Missed doses: No  Extra doses: No  Medication changes: No  Dietary changes: No  Change in health: No  Change in activity: No  Alcohol: No  Other concerns: No    Upcoming Procedures:  Does the Patient Have any upcoming procedures that require interruption in anticoagulation therapy? no  Does the patient require bridging? no      Anticoagulation Summary  As of 2024      INR goal:  2.5-3.5   TTR:  37.9% (7.2 mo)   INR used for dosin.20 (2024)   Weekly warfarin total:  37.5 mg               Assessment/Plan   Subtherapeutic      1. New dose:  Total weekly dose increased approximately 10% per protocol.  Daughter Veronica states understanding.   2. Next INR: 1 week      Education provided to patient during the visit:  Patient instructed to call in interim with questions, concerns and changes.   Patient educated on interactions between medications and warfarin.   Patient educated on dietary consistency in vitamin k consumption.   Patient educated on affects of alcohol consumption while taking warfarin.   Patient educated on signs of bleeding/clotting.   Patient educated on compliance with dosing, follow up appointments, and prescribed plan of care.

## 2024-05-28 ENCOUNTER — APPOINTMENT (OUTPATIENT)
Dept: OBSTETRICS AND GYNECOLOGY | Facility: CLINIC | Age: 69
End: 2024-05-28
Payer: COMMERCIAL

## 2024-05-29 ENCOUNTER — CLINICAL SUPPORT (OUTPATIENT)
Dept: OBSTETRICS AND GYNECOLOGY | Facility: CLINIC | Age: 69
End: 2024-05-29
Payer: COMMERCIAL

## 2024-05-29 ENCOUNTER — APPOINTMENT (OUTPATIENT)
Dept: CARDIAC SURGERY | Facility: HOSPITAL | Age: 69
End: 2024-05-29
Payer: COMMERCIAL

## 2024-05-29 DIAGNOSIS — N32.81 OVERACTIVE BLADDER: Primary | ICD-10-CM

## 2024-05-30 ENCOUNTER — OFFICE VISIT (OUTPATIENT)
Dept: PRIMARY CARE | Facility: CLINIC | Age: 69
End: 2024-05-30
Payer: COMMERCIAL

## 2024-05-30 ENCOUNTER — ANTICOAGULATION - WARFARIN VISIT (OUTPATIENT)
Dept: CARDIOLOGY | Facility: CLINIC | Age: 69
End: 2024-05-30

## 2024-05-30 ENCOUNTER — TELEPHONE (OUTPATIENT)
Dept: PRIMARY CARE | Facility: CLINIC | Age: 69
End: 2024-05-30

## 2024-05-30 VITALS
SYSTOLIC BLOOD PRESSURE: 148 MMHG | TEMPERATURE: 97.2 F | OXYGEN SATURATION: 96 % | HEART RATE: 77 BPM | BODY MASS INDEX: 30.71 KG/M2 | WEIGHT: 167.9 LBS | DIASTOLIC BLOOD PRESSURE: 72 MMHG

## 2024-05-30 DIAGNOSIS — G45.9 TIA (TRANSIENT ISCHEMIC ATTACK): ICD-10-CM

## 2024-05-30 DIAGNOSIS — Z95.2 AORTIC VALVE REPLACED: Primary | ICD-10-CM

## 2024-05-30 DIAGNOSIS — J44.1 COPD EXACERBATION (MULTI): Primary | ICD-10-CM

## 2024-05-30 DIAGNOSIS — Z79.01 LONG TERM (CURRENT) USE OF ANTICOAGULANTS: ICD-10-CM

## 2024-05-30 LAB
INR IN PPP BY COAGULATION ASSAY EXTERNAL: 4.1
PROTHROMBIN TIME (PT) IN PPP BY COAGULATION ASSAY EXTERNAL: NORMAL SECONDS

## 2024-05-30 PROCEDURE — 3077F SYST BP >= 140 MM HG: CPT | Performed by: FAMILY MEDICINE

## 2024-05-30 PROCEDURE — 3078F DIAST BP <80 MM HG: CPT | Performed by: FAMILY MEDICINE

## 2024-05-30 PROCEDURE — 1160F RVW MEDS BY RX/DR IN RCRD: CPT | Performed by: FAMILY MEDICINE

## 2024-05-30 PROCEDURE — 99214 OFFICE O/P EST MOD 30 MIN: CPT | Performed by: FAMILY MEDICINE

## 2024-05-30 PROCEDURE — 1123F ACP DISCUSS/DSCN MKR DOCD: CPT | Performed by: FAMILY MEDICINE

## 2024-05-30 PROCEDURE — 3061F NEG MICROALBUMINURIA REV: CPT | Performed by: FAMILY MEDICINE

## 2024-05-30 PROCEDURE — 4010F ACE/ARB THERAPY RXD/TAKEN: CPT | Performed by: FAMILY MEDICINE

## 2024-05-30 PROCEDURE — 1159F MED LIST DOCD IN RCRD: CPT | Performed by: FAMILY MEDICINE

## 2024-05-30 PROCEDURE — 3044F HG A1C LEVEL LT 7.0%: CPT | Performed by: FAMILY MEDICINE

## 2024-05-30 PROCEDURE — 1157F ADVNC CARE PLAN IN RCRD: CPT | Performed by: FAMILY MEDICINE

## 2024-05-30 PROCEDURE — 3008F BODY MASS INDEX DOCD: CPT | Performed by: FAMILY MEDICINE

## 2024-05-30 RX ORDER — PREDNISONE 20 MG/1
TABLET ORAL
Qty: 21 TABLET | Refills: 0 | Status: SHIPPED | OUTPATIENT
Start: 2024-05-30

## 2024-05-30 NOTE — TELEPHONE ENCOUNTER
Patient was here to see Dr. Michele today for a cough but had a question regarding her Wellbutrin    Her daughter states the pharmacy changed her dose and she is now only taking one tab of wellbutrin daily which isnt helping as well as bid. She states the pills also look different and the pharmacy said we changed the rx.    I am showing still wellbutrin SR bid in her chart which is correct.    Can you look into this?

## 2024-05-30 NOTE — PROGRESS NOTES
Subjective   Patient ID: Suze Najera is a 69 y.o. female who presents for Cough (Sx for about 4-5 days /Head congestion /Sinus pressure /Headache /Wet cough but non productive /).  Here w dtr  , pt and dtr provide hx.    HPI  Smoker, on/off currently ON ,  hx of emphysema , nonadherence to Daily Trelegy .  C/o cough x 5 d. No fever chills wheezing. Nonproductive cough but sounds wet  per dtr.  No chest pain, palpitations    Review of Systemsno known ill contacts   ;  tried otc cough medications but not doing anything to change the sxs .   Denies ear ache. Denies eye pain, sinus pain .  Throat sl sore .  Has seasonal allergies .        Objective   /72   Pulse 77   Temp 36.2 °C (97.2 °F)   Wt 76.2 kg (167 lb 14.4 oz)   SpO2 96%   BMI 30.71 kg/m²     Physical Exam  Vitals reviewed.   Constitutional:       General: She is not in acute distress.  HENT:      Right Ear: Tympanic membrane normal.      Left Ear: Tympanic membrane normal.      Nose: Nose normal.      Mouth/Throat:      Pharynx: Posterior oropharyngeal erythema present.   Eyes:      Extraocular Movements: Extraocular movements intact.      Conjunctiva/sclera: Conjunctivae normal.      Pupils: Pupils are equal, round, and reactive to light.   Cardiovascular:      Rate and Rhythm: Normal rate and regular rhythm.   Pulmonary:      Effort: Pulmonary effort is normal.      Breath sounds: Wheezing present. No rales.   Musculoskeletal:      Cervical back: Normal range of motion and neck supple.   Lymphadenopathy:      Cervical: No cervical adenopathy.   Neurological:      Mental Status: She is alert.       Assessment/Plan   Problem List Items Addressed This Visit    None  Visit Diagnoses       COPD exacerbation (Multi)    -  Primary    Relevant Medications    predniSONE (Deltasone) 20 mg tablet    fluticasone-umeclidin-vilanter (TRELEGY-ELLIPTA) 100-62.5-25 mcg blister with device          Restart Trelegy inhaler   Avoid allergen/ keep AC on,   windows shut, as she has been .     Oral prednisone.   Call if not improving, in 3 d .   May warrant imaging and /or additional tx.    RADHA Michele MD

## 2024-05-30 NOTE — TELEPHONE ENCOUNTER
Per my chart review it is BID as well which is what we sent    Please let patient know and call pharmacy if needed to confirm

## 2024-05-30 NOTE — TELEPHONE ENCOUNTER
Called and spoke with pt's daughter Veronica and she was informed and verbalized understanding. I also called and spoke with pharmacy and verified pt medication.

## 2024-05-30 NOTE — PROGRESS NOTES
Patient identification verified with 2 identifiers.    Location: [facility] Patient Self-Testing Program [phone]    Referring Physician: ANTHONY Beavers  Enrollment/ Re-enrollment date: 3/25/2025   INR Goal: 2.5-3.5  INR monitoring is per [facility] protocol.  Anticoagulation Medication: warfarin  Indication: Valve    Subjective   Bleeding signs/symptoms: No    Bruising: No   Major bleeding event: No  Thrombosis signs/symptoms: No  Thromboembolic event: No  Missed doses: No  Extra doses: No  Medication changes: Yes  Dietary changes: No  Change in health: No  Change in activity: No  Alcohol: No  Other concerns: No    Upcoming Procedures:  Does the Patient Have any upcoming procedures that require interruption in anticoagulation therapy? no  Does the patient require bridging? no      Anticoagulation Summary  As of 2024      INR goal:  2.5-3.5   TTR:  38.3% (7.4 mo)   INR used for dosin.10 (2024)   Weekly warfarin total:  37.5 mg               Assessment/Plan   Supratherapeutic     1. New dose: Will hold today's dose and patient will test in 4 days due to the steroid.    2. Next INR:  4 days      Education provided to patient during the visit:  Patient instructed to call in interim with questions, concerns and changes.

## 2024-05-31 ENCOUNTER — APPOINTMENT (OUTPATIENT)
Dept: PRIMARY CARE | Facility: CLINIC | Age: 69
End: 2024-05-31
Payer: COMMERCIAL

## 2024-06-03 ENCOUNTER — ANTICOAGULATION - WARFARIN VISIT (OUTPATIENT)
Dept: CARDIOLOGY | Facility: CLINIC | Age: 69
End: 2024-06-03
Payer: COMMERCIAL

## 2024-06-03 DIAGNOSIS — Z79.01 LONG TERM (CURRENT) USE OF ANTICOAGULANTS: ICD-10-CM

## 2024-06-03 DIAGNOSIS — Z95.2 AORTIC VALVE REPLACED: Primary | ICD-10-CM

## 2024-06-03 DIAGNOSIS — G45.9 TIA (TRANSIENT ISCHEMIC ATTACK): ICD-10-CM

## 2024-06-03 LAB
INR IN PPP BY COAGULATION ASSAY EXTERNAL: 3.9
PROTHROMBIN TIME (PT) IN PPP BY COAGULATION ASSAY EXTERNAL: NORMAL SECONDS

## 2024-06-03 NOTE — PROGRESS NOTES
Patient identification verified with 2 identifiers.    Location: [facility] Patient Self-Testing Program [phone]    Referring Physician: MAURA DOOLEY CNP  Enrollment/ Re-enrollment date: 12/8/24   INR Goal: 2.5-3.5  INR monitoring is per [facility] protocol.  Anticoagulation Medication: warfarin  Indication: Aortic Valve Replacement    Subjective   Bleeding signs/symptoms: No  DENIES ANY BLEEDING, BLACK STOOLS OR BRUISING.    Bruising: No   Major bleeding event: No  Thrombosis signs/symptoms: No  Thromboembolic event: No  Missed doses: No  Extra doses: No  Medication changes: Yes  PT CONTINUES ON PREDNISONE TAPER FOR ANOTHER  WEEK.  Dietary changes: Yes  DTR HAS BEEN INCREASING VITAMIN K INTAKE.  Change in health: No  Change in activity: No  Alcohol: No  Other concerns: No    Upcoming Procedures:  Does the Patient Have any upcoming procedures that require interruption in anticoagulation therapy? no  Does the patient require bridging? no      Anticoagulation Summary  As of 6/3/2024      INR goal:  2.5-3.5   TTR:  37.6% (7.6 mo)   INR used for dosing:  3.90 (6/3/2024)   Weekly warfarin total:  36 mg               Assessment/Plan   Supratherapeutic     1. New dose:  PT WILL HOLD TODAY'S DOSE OF WARFARIN THEN DECREASE WEEKLY DOSE.      2. Next INR:  3 DAYS    I SPOKE TO PT 'S DAUGHTER CONFIRMING CURRENT WARFARIN DOSING.   PT WILL HOLD TODAY'S DOSE OF WARFARIN AND DECREASE  WEEKLY DOSE SCHEDULE.  PT VERBALIZED UNDERSTANDING OF THESE DOSING INSTRUCTIONS.      Education provided to patient during the visit:  Patient instructed to call in interim with questions, concerns and changes.   Patient educated on interactions between medications and warfarin.   Patient educated on dietary consistency in vitamin k consumption.   Patient educated on signs of bleeding/clotting.

## 2024-06-06 ENCOUNTER — ANTICOAGULATION - WARFARIN VISIT (OUTPATIENT)
Dept: CARDIOLOGY | Facility: CLINIC | Age: 69
End: 2024-06-06
Payer: COMMERCIAL

## 2024-06-06 DIAGNOSIS — G45.9 TIA (TRANSIENT ISCHEMIC ATTACK): ICD-10-CM

## 2024-06-06 DIAGNOSIS — Z95.2 AORTIC VALVE REPLACED: Primary | ICD-10-CM

## 2024-06-06 DIAGNOSIS — Z79.01 LONG TERM (CURRENT) USE OF ANTICOAGULANTS: ICD-10-CM

## 2024-06-07 LAB
INR IN PPP BY COAGULATION ASSAY EXTERNAL: 3.6
PROTHROMBIN TIME (PT) IN PPP BY COAGULATION ASSAY EXTERNAL: NORMAL SECONDS

## 2024-06-07 NOTE — PROGRESS NOTES
Patient identification verified with 2 identifiers.    Location: [facility] Patient Self-Testing Program [phone]    Referring Physician: MAURA DOOLEY CNP  Enrollment/ Re-enrollment date: 3/25/25   INR Goal: 2.5-3.5  INR monitoring is per [facility] protocol.  Anticoagulation Medication: warfarin  Indication: Aortic Valve Replacement    Subjective   Bleeding signs/symptoms: No    Bruising: No   Major bleeding event: No  Thrombosis signs/symptoms: No  Thromboembolic event: No  Missed doses: No  Extra doses: No  Medication changes: Yes  PT CONTINUES ON PREDNISONE TAPER  Dietary changes: No  Change in health: No  Change in activity: No  Alcohol: No  Other concerns: No    Upcoming Procedures:  Does the Patient Have any upcoming procedures that require interruption in anticoagulation therapy? no  Does the patient require bridging? no      Anticoagulation Summary  As of 6/6/2024      INR goal:  2.5-3.5   TTR:  37.0% (7.7 mo)   INR used for dosing:  3.60 (6/7/2024)   Weekly warfarin total:  36 mg               Assessment/Plan   Supratherapeutic     1. New dose: PT CONTINUES ON A PREDNISONE TAPER. SPOKE TO PT'S DAUGHTER CHANTALE, INSTRUCTIONS GIVEN TO HOLD TODAY'S DOSE THEN MAINTAIN TWD AND RETEST IN 4 DAYS    2. Next INR:  6/11/24      Education provided to patient during the visit:  Patient instructed to call in interim with questions, concerns and changes.   Patient educated on interactions between medications and warfarin.   Patient educated on compliance with dosing, follow up appointments, and prescribed plan of care.

## 2024-06-11 ENCOUNTER — ANTICOAGULATION - WARFARIN VISIT (OUTPATIENT)
Dept: CARDIOLOGY | Facility: CLINIC | Age: 69
End: 2024-06-11
Payer: COMMERCIAL

## 2024-06-11 DIAGNOSIS — G45.9 TIA (TRANSIENT ISCHEMIC ATTACK): ICD-10-CM

## 2024-06-11 DIAGNOSIS — Z95.2 AORTIC VALVE REPLACED: Primary | ICD-10-CM

## 2024-06-11 DIAGNOSIS — Z79.01 LONG TERM (CURRENT) USE OF ANTICOAGULANTS: ICD-10-CM

## 2024-06-11 LAB
POC INR: 4.5
POC PROTHROMBIN TIME: NORMAL

## 2024-06-11 PROCEDURE — 99211 OFF/OP EST MAY X REQ PHY/QHP: CPT

## 2024-06-11 PROCEDURE — 85610 PROTHROMBIN TIME: CPT | Mod: QW

## 2024-06-11 NOTE — PROGRESS NOTES
Patient identification verified with 2 identifiers.     Location: [facility] - [address] [place], [place] [id number] [phone]      Referring Physician: Pricila Beavers MD  Enrollment/ Re-enrollment date:  2024.  INR Goal: 2.5-3.5  INR monitoring is per [facility] protocol.  Anticoagulation Medication: warfarin  Indication: Aortic Valve Replacement    Subjective   Bleeding signs/symptoms: No    Bruising: No   Major bleeding event: No  Thrombosis signs/symptoms: No  Thromboembolic event: No  Missed doses: No  Extra doses: No  Medication changes: Yes  Patient will complete PREDNSIONE TAPER in two days. Patient also began taking KEPPRA/BID two days ago for treatment of new onset seizures.  Dietary changes: No  Change in health: No  Change in activity: No  Alcohol: No  Other concerns: No    ONE dose of warfarin was HELD and TWD was MAINTAINED at time of last PST conversation on 2024.    Upcoming Procedures:  Does the Patient Have any upcoming procedures that require interruption in anticoagulation therapy? no  Does the patient require bridging? no      Anticoagulation Summary  As of 2024      INR goal:  2.5-3.5   TTR:  36.3% (7.8 mo)   INR used for dosin.50 (2024)   Weekly warfarin total:  34.5 mg               Assessment/Plan   Supratherapeutic     1. New dose:  will HOLD TWO doses of warfarin and REDUCE TWD of warfarin by 1.5mg (approximately 5%).  Patient MAY require a further reduction in TWD at time of next test.     2. Next INR:  2-4 days (2024).      Education provided to patient during the visit:  Patient instructed to call in interim with questions, concerns and changes.   Patient educated on interactions between medications and warfarin.   Patient educated on dietary consistency in vitamin k consumption.   Patient educated on affects of alcohol consumption while taking warfarin.   Patient educated on signs of bleeding/clotting.   Patient  educated on compliance with dosing, follow up appointments, and prescribed plan of care.

## 2024-06-12 ENCOUNTER — HOSPITAL ENCOUNTER (OUTPATIENT)
Dept: RADIOLOGY | Facility: CLINIC | Age: 69
Discharge: HOME | End: 2024-06-12
Payer: COMMERCIAL

## 2024-06-12 ENCOUNTER — APPOINTMENT (OUTPATIENT)
Dept: RADIOLOGY | Facility: CLINIC | Age: 69
End: 2024-06-12
Payer: COMMERCIAL

## 2024-06-12 DIAGNOSIS — Z95.828 STATUS POST ASCENDING AORTIC REPLACEMENT: ICD-10-CM

## 2024-06-12 PROCEDURE — 2550000001 HC RX 255 CONTRASTS: Performed by: THORACIC SURGERY (CARDIOTHORACIC VASCULAR SURGERY)

## 2024-06-12 PROCEDURE — 71275 CT ANGIOGRAPHY CHEST: CPT

## 2024-06-14 ENCOUNTER — ANTICOAGULATION - WARFARIN VISIT (OUTPATIENT)
Dept: CARDIOLOGY | Facility: CLINIC | Age: 69
End: 2024-06-14
Payer: COMMERCIAL

## 2024-06-14 DIAGNOSIS — G45.9 TIA (TRANSIENT ISCHEMIC ATTACK): ICD-10-CM

## 2024-06-14 DIAGNOSIS — Z95.2 AORTIC VALVE REPLACED: Primary | ICD-10-CM

## 2024-06-14 DIAGNOSIS — Z79.01 LONG TERM (CURRENT) USE OF ANTICOAGULANTS: ICD-10-CM

## 2024-06-14 LAB
POC INR: 1.4
POC PROTHROMBIN TIME: NORMAL

## 2024-06-14 PROCEDURE — 85610 PROTHROMBIN TIME: CPT | Mod: QW

## 2024-06-14 PROCEDURE — 99211 OFF/OP EST MAY X REQ PHY/QHP: CPT

## 2024-06-14 NOTE — PROGRESS NOTES
Patient identification verified with 2 identifiers.    Location: [facility] - [address] [place] [address] [address] [place], [place] [address] [phone]      Referring Physician: Pricila Beavers MD  Enrollment/ Re-enrollment date:  2024.  INR Goal: 2.5-3.5  INR monitoring is per [facility] protocol.  Anticoagulation Medication: warfarin  Indication: Aortic Valve Replacement    Subjective   Bleeding signs/symptoms: No    Bruising: No   Major bleeding event: No  Thrombosis signs/symptoms: No  Thromboembolic event: No  Missed doses: No  Extra doses: No  Medication changes: Yes  Patient is now taking Keppra BID.  Dietary changes: No  Change in health: No  Change in activity: No  Alcohol: No  Other concerns: No    Upcoming Procedures:  Does the Patient Have any upcoming procedures that require interruption in anticoagulation therapy? no  Does the patient require bridging? no      Anticoagulation Summary  As of 2024      INR goal:  2.5-3.5   TTR:  36.2% (7.9 mo)   INR used for dosin.40 (2024)   Weekly warfarin total:  37.5 mg               Assessment/Plan   Subtherapeutic     1. New dose:  Will increase dose per protocol     2. Next INR:  5 days      Education provided to patient during the visit:  Patient instructed to call in interim with questions, concerns and changes.   Patient educated on interactions between medications and warfarin.

## 2024-06-19 ENCOUNTER — ANTICOAGULATION - WARFARIN VISIT (OUTPATIENT)
Dept: CARDIOLOGY | Facility: CLINIC | Age: 69
End: 2024-06-19
Payer: COMMERCIAL

## 2024-06-19 DIAGNOSIS — Z95.2 AORTIC VALVE REPLACED: Primary | ICD-10-CM

## 2024-06-19 DIAGNOSIS — Z79.4 TYPE 2 DIABETES MELLITUS WITH OTHER SPECIFIED COMPLICATION, WITH LONG-TERM CURRENT USE OF INSULIN (MULTI): ICD-10-CM

## 2024-06-19 DIAGNOSIS — E11.69 TYPE 2 DIABETES MELLITUS WITH OTHER SPECIFIED COMPLICATION, WITH LONG-TERM CURRENT USE OF INSULIN (MULTI): ICD-10-CM

## 2024-06-19 DIAGNOSIS — G45.9 TIA (TRANSIENT ISCHEMIC ATTACK): ICD-10-CM

## 2024-06-19 DIAGNOSIS — Z79.01 LONG TERM (CURRENT) USE OF ANTICOAGULANTS: ICD-10-CM

## 2024-06-19 LAB
INR IN PPP BY COAGULATION ASSAY EXTERNAL: 3.6 (ref 2.5–3.5)
PROTHROMBIN TIME (PT) IN PPP BY COAGULATION ASSAY EXTERNAL: ABNORMAL SECONDS

## 2024-06-19 NOTE — PROGRESS NOTES
Patient identification verified with 2 identifiers.    Location: [facility] - [address] [place], [place] [address] [phone]      Referring Physician: Pricila Beavers MD  Enrollment/ Re-enrollment date:  December 8, 2024.  INR Goal: 2.5-3.5  INR monitoring is per [facility] protocol.  Anticoagulation Medication: warfarin  Indication: Aortic Valve Replacement    Subjective   Bleeding signs/symptoms: No    Bruising: No   Major bleeding event: No  Thrombosis signs/symptoms: No  Thromboembolic event: No  Missed doses: No  Extra doses: No  Medication changes: No  Dietary changes: No  Change in health: No  Change in activity: No  Alcohol: No  Other concerns: No    Upcoming Procedures:  Does the Patient Have any upcoming procedures that require interruption in anticoagulation therapy? no  Does the patient require bridging? no      Anticoagulation Summary  As of 6/19/2024      INR goal:  2.5-3.5   TTR:  36.4% (8.1 mo)   INR used for dosing:  3.60 (6/19/2024)   Weekly warfarin total:  36 mg               Assessment/Plan   Supratherapeutic     1. New dose:  Decrease dose approximately 5% per protocol     2. Next INR: 1 week      Education provided to patient during the visit:  Patient instructed to call in interim with questions, concerns and changes.   Patient educated on interactions between medications and warfarin.

## 2024-06-20 RX ORDER — METFORMIN HYDROCHLORIDE 500 MG/1
500 TABLET, EXTENDED RELEASE ORAL 2 TIMES DAILY
Qty: 180 TABLET | Refills: 0 | Status: SHIPPED | OUTPATIENT
Start: 2024-06-20

## 2024-06-25 ENCOUNTER — ANTICOAGULATION - WARFARIN VISIT (OUTPATIENT)
Dept: CARDIOLOGY | Facility: CLINIC | Age: 69
End: 2024-06-25

## 2024-06-25 ENCOUNTER — APPOINTMENT (OUTPATIENT)
Dept: PRIMARY CARE | Facility: CLINIC | Age: 69
End: 2024-06-25
Payer: COMMERCIAL

## 2024-06-25 VITALS
DIASTOLIC BLOOD PRESSURE: 76 MMHG | WEIGHT: 169.8 LBS | TEMPERATURE: 97.8 F | RESPIRATION RATE: 16 BRPM | HEART RATE: 75 BPM | SYSTOLIC BLOOD PRESSURE: 132 MMHG | OXYGEN SATURATION: 94 % | BODY MASS INDEX: 31.25 KG/M2 | HEIGHT: 62 IN

## 2024-06-25 DIAGNOSIS — E66.9 CLASS 1 OBESITY WITHOUT SERIOUS COMORBIDITY WITH BODY MASS INDEX (BMI) OF 31.0 TO 31.9 IN ADULT, UNSPECIFIED OBESITY TYPE: ICD-10-CM

## 2024-06-25 DIAGNOSIS — N32.81 OVERACTIVE BLADDER: ICD-10-CM

## 2024-06-25 DIAGNOSIS — G45.9 TIA (TRANSIENT ISCHEMIC ATTACK): ICD-10-CM

## 2024-06-25 DIAGNOSIS — E11.9 TYPE 2 DIABETES MELLITUS WITHOUT COMPLICATION, WITHOUT LONG-TERM CURRENT USE OF INSULIN (MULTI): ICD-10-CM

## 2024-06-25 DIAGNOSIS — F41.9 ANXIETY: ICD-10-CM

## 2024-06-25 DIAGNOSIS — J44.9 CHRONIC OBSTRUCTIVE PULMONARY DISEASE, UNSPECIFIED COPD TYPE (MULTI): ICD-10-CM

## 2024-06-25 DIAGNOSIS — Z79.01 LONG TERM (CURRENT) USE OF ANTICOAGULANTS: ICD-10-CM

## 2024-06-25 DIAGNOSIS — H61.22 IMPACTED CERUMEN OF LEFT EAR: ICD-10-CM

## 2024-06-25 DIAGNOSIS — D72.829 LEUKOCYTOSIS, UNSPECIFIED TYPE: ICD-10-CM

## 2024-06-25 DIAGNOSIS — E78.2 MIXED HYPERLIPIDEMIA: ICD-10-CM

## 2024-06-25 DIAGNOSIS — I25.10 CAD, MULTIPLE VESSEL: ICD-10-CM

## 2024-06-25 DIAGNOSIS — M85.80 OSTEOPENIA, UNSPECIFIED LOCATION: ICD-10-CM

## 2024-06-25 DIAGNOSIS — Z95.2 AORTIC VALVE REPLACED: Primary | ICD-10-CM

## 2024-06-25 DIAGNOSIS — Z85.820 HISTORY OF MELANOMA: ICD-10-CM

## 2024-06-25 DIAGNOSIS — F17.210 CIGARETTE NICOTINE DEPENDENCE WITHOUT COMPLICATION: ICD-10-CM

## 2024-06-25 DIAGNOSIS — I10 BENIGN ESSENTIAL HYPERTENSION: ICD-10-CM

## 2024-06-25 DIAGNOSIS — H91.92 HEARING PROBLEM OF LEFT EAR: ICD-10-CM

## 2024-06-25 DIAGNOSIS — Z95.2 AORTIC VALVE REPLACED: ICD-10-CM

## 2024-06-25 DIAGNOSIS — Z00.00 ROUTINE GENERAL MEDICAL EXAMINATION AT HEALTH CARE FACILITY: Primary | ICD-10-CM

## 2024-06-25 DIAGNOSIS — Z87.898 HISTORY OF SEIZURE: ICD-10-CM

## 2024-06-25 DIAGNOSIS — F33.1 RECURRENT MODERATE MAJOR DEPRESSIVE DISORDER WITH ANXIETY (MULTI): ICD-10-CM

## 2024-06-25 DIAGNOSIS — F41.9 RECURRENT MODERATE MAJOR DEPRESSIVE DISORDER WITH ANXIETY (MULTI): ICD-10-CM

## 2024-06-25 DIAGNOSIS — Z00.00 MEDICARE ANNUAL WELLNESS VISIT, SUBSEQUENT: ICD-10-CM

## 2024-06-25 DIAGNOSIS — Z86.73 HISTORY OF STROKE: ICD-10-CM

## 2024-06-25 PROBLEM — R60.9 EDEMA: Status: RESOLVED | Noted: 2023-08-28 | Resolved: 2024-06-25

## 2024-06-25 PROBLEM — G83.10 PARESIS OF SINGLE LOWER EXTREMITY (MULTI): Status: RESOLVED | Noted: 2024-03-06 | Resolved: 2024-06-25

## 2024-06-25 PROBLEM — D49.7 NEOPLASM OF MENINGES: Status: RESOLVED | Noted: 2024-03-06 | Resolved: 2024-06-25

## 2024-06-25 PROBLEM — R22.0 SWELLING OF NOSE: Status: RESOLVED | Noted: 2024-03-06 | Resolved: 2024-06-25

## 2024-06-25 PROBLEM — R47.01 APHASIA OF UNKNOWN ORIGIN: Status: RESOLVED | Noted: 2023-03-18 | Resolved: 2024-06-25

## 2024-06-25 PROBLEM — U07.1 DISEASE DUE TO SEVERE ACUTE RESPIRATORY SYNDROME CORONAVIRUS 2 (SARS-COV-2): Status: RESOLVED | Noted: 2024-03-06 | Resolved: 2024-06-25

## 2024-06-25 PROBLEM — S16.1XXA STRAIN OF NECK MUSCLE: Status: RESOLVED | Noted: 2024-03-06 | Resolved: 2024-06-25

## 2024-06-25 PROBLEM — K52.9 CHRONIC DIARRHEA OF UNKNOWN ORIGIN: Status: RESOLVED | Noted: 2024-03-06 | Resolved: 2024-06-25

## 2024-06-25 PROBLEM — M62.838 MUSCLE SPASMS OF NECK: Status: RESOLVED | Noted: 2024-03-06 | Resolved: 2024-06-25

## 2024-06-25 PROBLEM — T82.09XA: Status: RESOLVED | Noted: 2023-10-02 | Resolved: 2024-06-25

## 2024-06-25 PROBLEM — R42 DIZZINESS AND GIDDINESS: Status: RESOLVED | Noted: 2024-03-06 | Resolved: 2024-06-25

## 2024-06-25 LAB
INR IN PPP BY COAGULATION ASSAY EXTERNAL: 4.4 (ref 2.5–3.5)
PROTHROMBIN TIME (PT) IN PPP BY COAGULATION ASSAY EXTERNAL: ABNORMAL SECONDS

## 2024-06-25 PROCEDURE — 4010F ACE/ARB THERAPY RXD/TAKEN: CPT | Performed by: FAMILY MEDICINE

## 2024-06-25 PROCEDURE — 3044F HG A1C LEVEL LT 7.0%: CPT | Performed by: FAMILY MEDICINE

## 2024-06-25 PROCEDURE — 3078F DIAST BP <80 MM HG: CPT | Performed by: FAMILY MEDICINE

## 2024-06-25 PROCEDURE — 1123F ACP DISCUSS/DSCN MKR DOCD: CPT | Performed by: FAMILY MEDICINE

## 2024-06-25 PROCEDURE — 1170F FXNL STATUS ASSESSED: CPT | Performed by: FAMILY MEDICINE

## 2024-06-25 PROCEDURE — 1160F RVW MEDS BY RX/DR IN RCRD: CPT | Performed by: FAMILY MEDICINE

## 2024-06-25 PROCEDURE — 99214 OFFICE O/P EST MOD 30 MIN: CPT | Performed by: FAMILY MEDICINE

## 2024-06-25 PROCEDURE — 1158F ADVNC CARE PLAN TLK DOCD: CPT | Performed by: FAMILY MEDICINE

## 2024-06-25 PROCEDURE — G0439 PPPS, SUBSEQ VISIT: HCPCS | Performed by: FAMILY MEDICINE

## 2024-06-25 PROCEDURE — 1159F MED LIST DOCD IN RCRD: CPT | Performed by: FAMILY MEDICINE

## 2024-06-25 PROCEDURE — 3075F SYST BP GE 130 - 139MM HG: CPT | Performed by: FAMILY MEDICINE

## 2024-06-25 PROCEDURE — 69209 REMOVE IMPACTED EAR WAX UNI: CPT | Performed by: FAMILY MEDICINE

## 2024-06-25 PROCEDURE — 3061F NEG MICROALBUMINURIA REV: CPT | Performed by: FAMILY MEDICINE

## 2024-06-25 PROCEDURE — 3008F BODY MASS INDEX DOCD: CPT | Performed by: FAMILY MEDICINE

## 2024-06-25 PROCEDURE — 1157F ADVNC CARE PLAN IN RCRD: CPT | Performed by: FAMILY MEDICINE

## 2024-06-25 RX ORDER — MIRTAZAPINE 15 MG/1
15 TABLET, FILM COATED ORAL NIGHTLY
Qty: 30 TABLET | Refills: 5 | Status: SHIPPED | OUTPATIENT
Start: 2024-06-25

## 2024-06-25 RX ORDER — ALBUTEROL SULFATE 90 UG/1
2 AEROSOL, METERED RESPIRATORY (INHALATION) EVERY 4 HOURS PRN
Qty: 8.5 G | Refills: 0 | Status: SHIPPED | OUTPATIENT
Start: 2024-06-25 | End: 2025-06-25

## 2024-06-25 RX ORDER — LEVETIRACETAM 500 MG/1
500 TABLET ORAL 2 TIMES DAILY
COMMUNITY
Start: 2024-06-08 | End: 2025-06-24

## 2024-06-25 ASSESSMENT — PATIENT HEALTH QUESTIONNAIRE - PHQ9
5. POOR APPETITE OR OVEREATING: SEVERAL DAYS
SUM OF ALL RESPONSES TO PHQ9 QUESTIONS 1 AND 2: 4
1. LITTLE INTEREST OR PLEASURE IN DOING THINGS: MORE THAN HALF THE DAYS
2. FEELING DOWN, DEPRESSED OR HOPELESS: MORE THAN HALF THE DAYS
SUM OF ALL RESPONSES TO PHQ QUESTIONS 1-9: 11
7. TROUBLE CONCENTRATING ON THINGS, SUCH AS READING THE NEWSPAPER OR WATCHING TELEVISION: SEVERAL DAYS
4. FEELING TIRED OR HAVING LITTLE ENERGY: MORE THAN HALF THE DAYS
3. TROUBLE FALLING OR STAYING ASLEEP OR SLEEPING TOO MUCH: MORE THAN HALF THE DAYS
10. IF YOU CHECKED OFF ANY PROBLEMS, HOW DIFFICULT HAVE THESE PROBLEMS MADE IT FOR YOU TO DO YOUR WORK, TAKE CARE OF THINGS AT HOME, OR GET ALONG WITH OTHER PEOPLE: SOMEWHAT DIFFICULT
8. MOVING OR SPEAKING SO SLOWLY THAT OTHER PEOPLE COULD HAVE NOTICED. OR THE OPPOSITE, BEING SO FIGETY OR RESTLESS THAT YOU HAVE BEEN MOVING AROUND A LOT MORE THAN USUAL: NOT AT ALL
9. THOUGHTS THAT YOU WOULD BE BETTER OFF DEAD, OR OF HURTING YOURSELF: NOT AT ALL
6. FEELING BAD ABOUT YOURSELF - OR THAT YOU ARE A FAILURE OR HAVE LET YOURSELF OR YOUR FAMILY DOWN: SEVERAL DAYS

## 2024-06-25 ASSESSMENT — LIFESTYLE VARIABLES
HOW MANY STANDARD DRINKS CONTAINING ALCOHOL DO YOU HAVE ON A TYPICAL DAY: PATIENT DOES NOT DRINK
HAS A RELATIVE, FRIEND, DOCTOR, OR ANOTHER HEALTH PROFESSIONAL EXPRESSED CONCERN ABOUT YOUR DRINKING OR SUGGESTED YOU CUT DOWN: NO
HAVE YOU OR SOMEONE ELSE BEEN INJURED AS A RESULT OF YOUR DRINKING: NO
HOW OFTEN DURING THE LAST YEAR HAVE YOU HAD A FEELING OF GUILT OR REMORSE AFTER DRINKING: NEVER
HOW OFTEN DURING THE LAST YEAR HAVE YOU FOUND THAT YOU WERE NOT ABLE TO STOP DRINKING ONCE YOU HAD STARTED: NEVER
HOW OFTEN DURING THE LAST YEAR HAVE YOU BEEN UNABLE TO REMEMBER WHAT HAPPENED THE NIGHT BEFORE BECAUSE YOU HAD BEEN DRINKING: NEVER
SKIP TO QUESTIONS 9-10: 1
AUDIT-C TOTAL SCORE: 0
AUDIT TOTAL SCORE: 0
HOW OFTEN DURING THE LAST YEAR HAVE YOU NEEDED AN ALCOHOLIC DRINK FIRST THING IN THE MORNING TO GET YOURSELF GOING AFTER A NIGHT OF HEAVY DRINKING: NEVER
HOW OFTEN DURING THE LAST YEAR HAVE YOU FAILED TO DO WHAT WAS NORMALLY EXPECTED FROM YOU BECAUSE OF DRINKING: NEVER
HOW OFTEN DO YOU HAVE SIX OR MORE DRINKS ON ONE OCCASION: NEVER
HOW OFTEN DO YOU HAVE A DRINK CONTAINING ALCOHOL: NEVER

## 2024-06-25 ASSESSMENT — ENCOUNTER SYMPTOMS
FEVER: 0
ABDOMINAL PAIN: 0
DIZZINESS: 0
FATIGUE: 0
VOMITING: 0
SEIZURES: 1
CHILLS: 0
HEADACHES: 0
PSYCHIATRIC NEGATIVE: 1
CONSTIPATION: 0
COUGH: 1
NUMBNESS: 0
SORE THROAT: 0
MUSCULOSKELETAL NEGATIVE: 1
SHORTNESS OF BREATH: 1
DIARRHEA: 0
WEAKNESS: 0
NAUSEA: 0
PALPITATIONS: 0

## 2024-06-25 ASSESSMENT — ANXIETY QUESTIONNAIRES
5. BEING SO RESTLESS THAT IT IS HARD TO SIT STILL: NOT AT ALL
3. WORRYING TOO MUCH ABOUT DIFFERENT THINGS: NOT AT ALL
2. NOT BEING ABLE TO STOP OR CONTROL WORRYING: MORE THAN HALF THE DAYS
1. FEELING NERVOUS, ANXIOUS, OR ON EDGE: MORE THAN HALF THE DAYS
6. BECOMING EASILY ANNOYED OR IRRITABLE: SEVERAL DAYS
7. FEELING AFRAID AS IF SOMETHING AWFUL MIGHT HAPPEN: SEVERAL DAYS
GAD7 TOTAL SCORE: 6
IF YOU CHECKED OFF ANY PROBLEMS ON THIS QUESTIONNAIRE, HOW DIFFICULT HAVE THESE PROBLEMS MADE IT FOR YOU TO DO YOUR WORK, TAKE CARE OF THINGS AT HOME, OR GET ALONG WITH OTHER PEOPLE: SOMEWHAT DIFFICULT
4. TROUBLE RELAXING: NOT AT ALL

## 2024-06-25 ASSESSMENT — ACTIVITIES OF DAILY LIVING (ADL)
MANAGING_FINANCES: TOTAL CARE
DOING_HOUSEWORK: TOTAL CARE
GROCERY_SHOPPING: TOTAL CARE
BATHING: INDEPENDENT
DRESSING: INDEPENDENT
TAKING_MEDICATION: TOTAL CARE

## 2024-06-25 NOTE — PROGRESS NOTES
Patient identification verified with 2 identifiers.    Location: [facility] - [address] [place], [place] [address] [phone]      Referring Physician: Pricila Beavers MD  Enrollment/ Re-enrollment date:  2024.  INR Goal: 2.5-3.5  INR monitoring is per [facility] protocol.  Anticoagulation Medication: warfarin  Indication: Aortic Valve Replacement    Subjective   Bleeding signs/symptoms: No    Bruising: No   Major bleeding event: No  Thrombosis signs/symptoms: No  Thromboembolic event: No  Missed doses: No  Extra doses: No  Medication changes: No  Dietary changes: No  Change in health: No  Change in activity: No  Alcohol: No  Other concerns: No    Upcoming Procedures:  Does the Patient Have any upcoming procedures that require interruption in anticoagulation therapy? no  Does the patient require bridging? no      Anticoagulation Summary  As of 2024      INR goal:  2.5-3.5   TTR:  35.5% (8.3 mo)   INR used for dosin.40 (2024)   Weekly warfarin total:  33 mg               Assessment/Plan   Supratherapeutic     1. New dose:  Hold today's dose and Decrease TWD approximately 10% per protocol     2. Next INR: 1 week      Education provided to patient during the visit:  Patient instructed to call in interim with questions, concerns and changes.   Patient educated on interactions between medications and warfarin.   Patient educated on dietary consistency in vitamin k consumption.   Patient educated on signs of bleeding/clotting.   Patient educated on compliance with dosing, follow up appointments, and prescribed plan of care.

## 2024-06-25 NOTE — ASSESSMENT & PLAN NOTE
Bone health support: Make sure you are getting at least 1,200 mg daily of calcium (preferred via dietary intake, okay for supplements if needed), and 2,000 IU of vitamin D3 daily.   Encourage weight bearing exercise as able, along with balance and strength training  Reduce fall risk in the home

## 2024-06-25 NOTE — PROGRESS NOTES
Patient ID: Suze Najera is a 69 y.o. female.    Ear Cerumen Removal    Date/Time: 6/25/2024 1:39 PM    Performed by: Pratima El CMA  Authorized by: Jo Catalan DO    Consent:     Consent obtained:  Verbal    Consent given by:  Patient  Universal protocol:     Patient identity confirmed:  Verbally with patient  Procedure details:     Location:  L ear    Procedure type: irrigation      Procedure outcomes: cerumen removed    Post-procedure details:     Inspection:  Ear canal clear    Hearing quality:  Improved    Procedure completion:  Tolerated

## 2024-06-25 NOTE — PROGRESS NOTES
Subjective   Reason for Visit: Suze Najera is an 69 y.o. female here for a Medicare Wellness visit.     Past Medical, Surgical, and Family History reviewed and updated in chart.    Reviewed all medications by prescribing practitioner or clinical pharmacist (such as prescriptions, OTCs, herbal therapies and supplements) and documented in the medical record.    HPI    HPOA- Daughter Veronica     Here for follow up-    Living with daughter, grandson    Memory loss- MOCA 20/30 July 2023   Referred to geriatrics- saw Brent Diaz NP at [facility] 7/19/23- then switched to Dr. Clifton at [place] [facility] - diagnosis moderate mixed vascular and neurodegenerative dementia - started on Exelon patch, remeron, seroquel prn - daughter states she is doing much better-  Daughter manages pills with pill box   Labs neg   CT head neg      DM, type 2-  Last A1c 6.6 in March 2024   Meds include metformin 500 mg BID  She had side effects to jardiance (UTIs)   Eye doctor- Dr. Hughes- seen 5/14/24     Depression and anxiety- on prozac 40 mg daily   She had formal neuropsychological testing done by Dr. Natty Foreman in 2017 which confirmed major depression, anxiety, anger management issues - and recommended psychotherapy in addition to medication management.    [facility] to [facility] now and seeing psychiatry- she doesn't want them managing meds   Also doing counseling     She restarted smoking - not sure why- now at 3 cig/day   Wellbutrin was stopped with the recent seizure      She has a hx of stroke/ruptured aneurysm / SAH with craniotomy 2014, ventriculostomy placed due to hydrocephalus, seizures, aphasia, and memory loss- was seeing Dr. Raymond previously but she failed to follow up thereafter.   EEG was abnormal 2017; she stopped her seizure meds on her own previously   MRI showed right frontal large surgical resection cavity 2018   She ended up in the ER on 6/8/24 for seizures- CT neg- started on Keppra - saw Dr. Vizcaino yesterday - Has MRI, MRA, EEG  pending - scheduled in August      Sleep study showed mild GEOFF- not on PAP      She is on warfarin for mechanical aortic valve replacement for prior bicuspid valve   Cardiology- seeing Pricila Nickerson    NM stress neg Dec 2022   [facility] 10/2/23: LM patent, LAD 20% prox, 50-60% mid, LCx small nondominant, RCA dominant 90% prox, 40% mid   She had an ascending aorta replacement due to aortic aneurysm and CABG x 1 to RCA- in Oct 2023- with Dr. Hayden   INR 3.6 on 24   Repeat echo pending      Osteopenia- Dexa 3/5/24      HTN- on losartan, metoprolol  Patient denies symptoms including headaches, dizziness, vision changes, syncope, chest pain, palpitations, and edema.      HPL- on crestor     OAB, Recurrent UTI ; hx microscopic hematuria  Seeing Dr. Gaming  - had cystoscopy done in Dec 2023- normal   Myrbetriq and gemtasa were both cost prohibitive - recommended botox- was done in May 2024 - helped at first, back to where she was before   CT urogram neg      Saw hematology for leukocytosis and polycythemia- told can follow up prn      Hx melanoma right arm- [facility] dermatology - also has scalp psoriasis and rosacea - going tomorrow      COPD- on trelegy with albuterol prn - uses trelegy about 2 times per week, not using albuterol   Occasional cough and dyspnea   Saw Dr. Russell previously   Restarted smoking, also smokes marijuana      Last pap/cervical cancer screenin20 NILM HPV neg   Last mammogram: 23 neg - declines at this time   Hx of colonoscopy: 1/10/17 Dr. Donaldson- Normal colonoscopy   Hx of DXA: 3/5/24      L ear wax, trouble hearing, some itching       Current Outpatient Medications   Medication Sig Dispense Refill    acetaminophen (Tylenol) 325 mg tablet Take 1-2 tablets (325-650 mg) by mouth every 6 hours if needed for mild pain (1 - 3) or moderate pain (4 - 6). 30 tablet 0    FLUoxetine (PROzac) 40 mg capsule Take 1 capsule (40 mg) by mouth once daily. 90 capsule 0     fluticasone-umeclidin-vilanter (TRELEGY-ELLIPTA) 100-62.5-25 mcg blister with device Inhale 1 puff once daily. 30 each 0    glucosamine sulfate 1,000 mg tablet Take 1 tablet by mouth once daily.      ketoconazole (NIZOral) 2 % cream Apply topically 2 times a day. To affected forehead and facial areas. 30 g 1    levETIRAcetam (Keppra) 500 mg tablet Take 1 tablet (500 mg) by mouth twice a day.      losartan (Cozaar) 25 mg tablet Take 1 tablet (25 mg) by mouth once daily.      metFORMIN  mg 24 hr tablet TAKE 1 TABLET BY MOUTH TWICE DAILY 180 tablet 0    metoprolol succinate XL (Toprol-XL) 50 mg 24 hr tablet Take 1 tablet (50 mg) by mouth once daily. 90 tablet 3    multivitamin with minerals tablet Take 1 tablet by mouth once daily. Do not start before October 19, 2023.  0    OneTouch Ultra Control solution       OneTouch Ultra Test strip       QUEtiapine (SEROquel) 25 mg tablet Take 1 tablet (25 mg) by mouth. PRN      rivastigmine (Exelon) 4.6 mg/24 hour APPLY 1 PATCH AS DIRECTED ONCE DAILY.      rosuvastatin (Crestor) 40 mg tablet TAKE 1 TABLET BY MOUTH EVERY DAY 90 tablet 1    warfarin (Coumadin) 3 mg tablet Take 1.5 tablets by mouth once a day or as directed by  Coumadin Clinic. 180 tablet 2    albuterol (ProAir HFA) 90 mcg/actuation inhaler Inhale 2 puffs every 4 hours if needed for wheezing or shortness of breath. 8.5 g 0    mirtazapine (Remeron) 15 mg tablet Take 1 tablet (15 mg) by mouth once daily at bedtime. 30 tablet 5     No current facility-administered medications for this visit.       Patient Care Team:  Jo Catalan DO as PCP - General  Jo Catalan DO as PCP - [facility] Medicare Advantage PCP  Jo Catalan DO as PCP - [employer] Medicare Advantage PCP  Anna Lombardi APRN-CNP as Nurse Practitioner (Hematology and Oncology)  Elise Gaming MD as Surgeon (Obstetrics and Gynecology)  Darrel Russell MD as Referring Physician (Internal Medicine)  Brent  "DESTINEY Diaz APRN-CNP as Referring Physician  Christopher Hayden MD as Surgeon (Cardiothoracic Surgery)  Martin Cuevas MD as Surgeon (Cardiothoracic Surgery)  Bebe Vizciano MD as Referring Physician (Neurology)  David Clifton DO as Referring Physician (Family Medicine)     Review of Systems   Constitutional:  Negative for chills, fatigue and fever.   HENT:  Negative for congestion, ear pain and sore throat.    Eyes:  Negative for visual disturbance.   Respiratory:  Positive for cough and shortness of breath.    Cardiovascular:  Negative for chest pain, palpitations and leg swelling.   Gastrointestinal:  Negative for abdominal pain, constipation, diarrhea, nausea and vomiting.   Genitourinary: Negative.    Musculoskeletal: Negative.    Skin:  Negative for rash.   Neurological:  Positive for seizures. Negative for dizziness, weakness, numbness and headaches.   Psychiatric/Behavioral: Negative.         Objective   Vitals:  /76 (BP Location: Left arm, Patient Position: Sitting, BP Cuff Size: Adult)   Pulse 75   Temp 36.6 °C (97.8 °F) (Temporal)   Resp 16   Ht 1.575 m (5' 2\")   Wt 77 kg (169 lb 12.8 oz)   SpO2 94%   BMI 31.06 kg/m²       Physical Exam    Constitutional: Well developed, well nourished, alert and in no acute distress.  Head and Face: Normocephalic, atraumatic.  Eyes: Normal external exam. Pupils equally round and reactive to light with normal accommodation and extraocular movements intact.   ENT: External inspection of ears normal, tympanic membrane visualized and normal on right, TM obstructed by cerumen on left. Oral mucosa moist, oropharynx clear.   Neck: Supple, no lymphadenopathy or masses.   Cardiovascular: Regular rate and rhythm, normal S1 and S2, no murmurs, gallops, or rubs. Radial pulses normal. No peripheral edema. No carotid bruits.   Pulmonary: No respiratory distress, lungs clear to auscultation bilaterally. No wheezes, rhonchi, rales.   Skin: Warm, well perfused, normal " skin turgor and color, no lesions or rashes noted.   Neurologic: Cranial nerves II-XII grossly intact.   Psychiatric: Mood calm and affect normal.    Assessment/Plan   Problem List Items Addressed This Visit       Anxiety    Current Assessment & Plan     Continue prozac 40 mg daily, counseling - [facility]          Benign essential hypertension    Current Assessment & Plan     Controlled- continue losartan, metoprolol            Relevant Orders    Follow Up In [facility] - PCP - Established    CAD, multiple vessel    Current Assessment & Plan     Continue statin, coumadin          COPD (chronic obstructive pulmonary disease) (Multi)    Current Assessment & Plan     Smoking cessation encouraged  Resume Trelegy daily and save albuterol for rescue          Relevant Medications    albuterol (ProAir HFA) 90 mcg/actuation inhaler    Diabetes mellitus, type 2 (Multi)    Current Assessment & Plan     Controlled, A1c 6.6 in March- continue Metformin          Aortic valve replaced    Current Assessment & Plan     Continue coumadin- managed by coumadin clinic  Cardiology - P[facility]ila Ruth   Please schedule echocardiogram          Hyperlipidemia    Current Assessment & Plan     Continue crestor          Cigarette nicotine dependence without complication    Osteopenia    Overview     Dexa 3/5/24         Current Assessment & Plan     Bone health support: Make sure you are getting at least 1,200 mg daily of calcium (preferred via dietary intake, okay for supplements if needed), and 2,000 IU of vitamin D3 daily.   Encourage weight bearing exercise as able, along with balance and strength training  Reduce fall risk in the home         Overactive bladder    Current Assessment & Plan     Following with Dr. Gaming          Recurrent moderate major depressive disorder with anxiety (Multi)    Current Assessment & Plan     Continue prozac 40 mg daily, counseling - [facility]          Relevant Medications    mirtazapine  (Remeron) 15 mg tablet    Class 1 obesity with body mass index (BMI) of 31.0 to 31.9 in adult    History of seizure    Current Assessment & Plan     Now back on [facility]- following with Dr. Vizcaino  MRI, MRA, EEG pending          History of stroke    History of melanoma    Current Assessment & Plan     Seeing dermatology tomorrow          Long term (current) use of anticoagulants    Leukocytosis     Other Visit Diagnoses       Routine general medical examination at health care facility    -  Primary Medicare annual wellness visit, subsequent        Impacted cerumen of left ear        Relevant Orders    Ear cerumen removal    Hearing problem of left ear              I was unable to completely remove the cerumen with use of magnification provided by an otoscope and an ear curette.  Cerumen irrigation was performed in the office with water and peroxide with success by the medical assistant.    The patient tolerated the procedure well.  Repeat examination shows that the tympanic membranes are normal without signs of infection.    Jo Catalan, DO  6/25/2024

## 2024-06-25 NOTE — ASSESSMENT & PLAN NOTE
Sy, MOCA 20/30 July 2023  Seeing Dr. Clifton at [facility]- now on exelon patch, remeron, seroquel prn - doing better

## 2024-06-25 NOTE — ASSESSMENT & PLAN NOTE
Continue coumadin- managed by coumadin clinic  Cardiology - [facility] Ruth   Please schedule echocardiogram

## 2024-07-02 ENCOUNTER — ANTICOAGULATION - WARFARIN VISIT (OUTPATIENT)
Dept: CARDIOLOGY | Facility: CLINIC | Age: 69
End: 2024-07-02
Payer: COMMERCIAL

## 2024-07-02 DIAGNOSIS — Z79.01 LONG TERM (CURRENT) USE OF ANTICOAGULANTS: ICD-10-CM

## 2024-07-02 DIAGNOSIS — Z95.2 AORTIC VALVE REPLACED: Primary | ICD-10-CM

## 2024-07-02 DIAGNOSIS — G45.9 TIA (TRANSIENT ISCHEMIC ATTACK): ICD-10-CM

## 2024-07-02 LAB
INR IN PPP BY COAGULATION ASSAY EXTERNAL: 3.2
PROTHROMBIN TIME (PT) IN PPP BY COAGULATION ASSAY EXTERNAL: NORMAL SECONDS

## 2024-07-03 ENCOUNTER — HOSPITAL ENCOUNTER (OUTPATIENT)
Dept: CARDIOLOGY | Facility: CLINIC | Age: 69
Discharge: HOME | End: 2024-07-03
Payer: COMMERCIAL

## 2024-07-03 DIAGNOSIS — I25.10 CAD, MULTIPLE VESSEL: ICD-10-CM

## 2024-07-03 DIAGNOSIS — Z95.2 AORTIC VALVE REPLACED: ICD-10-CM

## 2024-07-03 DIAGNOSIS — I71.20 THORACIC AORTIC ANEURYSM WITHOUT RUPTURE, UNSPECIFIED PART (CMS-HCC): ICD-10-CM

## 2024-07-03 DIAGNOSIS — I10 BENIGN ESSENTIAL HYPERTENSION: ICD-10-CM

## 2024-07-03 LAB
AORTIC VALVE MEAN GRADIENT: 6.4 MMHG
AORTIC VALVE PEAK VELOCITY: 1.8 M/S
AV PEAK GRADIENT: 12.9 MMHG
AVA (PEAK VEL): 1.37 CM2
AVA (VTI): 1.56 CM2
EJECTION FRACTION APICAL 4 CHAMBER: 61.1
EJECTION FRACTION: 63 %
LEFT ATRIUM VOLUME AREA LENGTH INDEX BSA: 23.1 ML/M2
LEFT VENTRICLE INTERNAL DIMENSION DIASTOLE: 4.07 CM (ref 3.5–6)
LEFT VENTRICULAR OUTFLOW TRACT DIAMETER: 1.87 CM
MITRAL VALVE E/A RATIO: 0.65
RIGHT VENTRICLE FREE WALL PEAK S': 11 CM/S
RIGHT VENTRICLE PEAK SYSTOLIC PRESSURE: 22.8 MMHG
TRICUSPID ANNULAR PLANE SYSTOLIC EXCURSION: 1 CM

## 2024-07-03 PROCEDURE — 93306 TTE W/DOPPLER COMPLETE: CPT | Performed by: STUDENT IN AN ORGANIZED HEALTH CARE EDUCATION/TRAINING PROGRAM

## 2024-07-03 PROCEDURE — 93306 TTE W/DOPPLER COMPLETE: CPT

## 2024-07-03 NOTE — PROGRESS NOTES
Patient identification verified with 2 identifiers.    Location: [facility] Patient Self-Testing Program [phone]    Referring Physician: MAURA DOOLEY CNP  Enrollment/ Re-enrollment date: 12/8/2024   INR Goal: 2.5-3.5  INR monitoring is per [facility] protocol.  Anticoagulation Medication: warfarin  Indication: Aortic Valve Replacement    Subjective   Bleeding signs/symptoms: No    Bruising: No   Major bleeding event: No  Thrombosis signs/symptoms: No  Thromboembolic event: No  Missed doses: No  Extra doses: No  Medication changes: No  Dietary changes: No  Change in health: No  Change in activity: No  Alcohol: No  Other concerns: No    Upcoming Procedures:  Does the Patient Have any upcoming procedures that require interruption in anticoagulation therapy? no  Does the patient require bridging? no      Anticoagulation Summary  As of 7/2/2024      INR goal:  2.5-3.5   TTR:  35.3% (8.5 mo)   INR used for dosing:  3.20 (7/2/2024)   Weekly warfarin total:  33 mg               Assessment/Plan   Therapeutic     1. New dose: no change    2. Next INR: 1 week      Education provided to patient during the visit:  Patient instructed to call in interim with questions, concerns and changes.   Patient educated on interactions between medications and warfarin.   Patient educated on dietary consistency in vitamin k consumption.   Patient educated on affects of alcohol consumption while taking warfarin.   Patient educated on signs of bleeding/clotting.   Patient educated on compliance with dosing, follow up appointments, and prescribed plan of care.

## 2024-07-09 ENCOUNTER — ANTICOAGULATION - WARFARIN VISIT (OUTPATIENT)
Dept: CARDIOLOGY | Facility: CLINIC | Age: 69
End: 2024-07-09
Payer: COMMERCIAL

## 2024-07-09 DIAGNOSIS — Z95.2 AORTIC VALVE REPLACED: Primary | ICD-10-CM

## 2024-07-09 DIAGNOSIS — G45.9 TIA (TRANSIENT ISCHEMIC ATTACK): ICD-10-CM

## 2024-07-09 LAB
INR IN PPP BY COAGULATION ASSAY EXTERNAL: 3.5 (ref 2.5–3.5)
PROTHROMBIN TIME (PT) IN PPP BY COAGULATION ASSAY EXTERNAL: NORMAL SECONDS

## 2024-07-16 ENCOUNTER — ANTICOAGULATION - WARFARIN VISIT (OUTPATIENT)
Dept: CARDIOLOGY | Facility: CLINIC | Age: 69
End: 2024-07-16
Payer: COMMERCIAL

## 2024-07-16 DIAGNOSIS — Z95.2 AORTIC VALVE REPLACED: Primary | ICD-10-CM

## 2024-07-16 DIAGNOSIS — G45.9 TIA (TRANSIENT ISCHEMIC ATTACK): ICD-10-CM

## 2024-07-16 LAB
INR IN PPP BY COAGULATION ASSAY EXTERNAL: 3.7
PROTHROMBIN TIME (PT) IN PPP BY COAGULATION ASSAY EXTERNAL: NORMAL

## 2024-07-16 NOTE — PROGRESS NOTES
Patient identification verified with 2 identifiers.    Location: [facility] Patient Self-Testing Program [phone]    Referring Physician: MAURA DOOLEY CNP  Enrollment/ Re-enrollment date: 12/8/2024   INR Goal: 2.5-3.5  INR monitoring is per [facility] protocol.  Anticoagulation Medication: warfarin  Indication: Aortic Valve Replacement    Subjective   Bleeding signs/symptoms: No    Bruising: No   Major bleeding event: No  Thrombosis signs/symptoms: No  Thromboembolic event: No  Missed doses: No  Extra doses: No  Medication changes: No  Dietary changes: Yes  decreased  Change in health: No  Change in activity: No  Alcohol: No  Other concerns: No    Upcoming Procedures:  Does the Patient Have any upcoming procedures that require interruption in anticoagulation therapy? no  Does the patient require bridging? no      Anticoagulation Summary  As of 7/16/2024      INR goal:  2.5-3.5   TTR:  36.0% (9 mo)   INR used for dosing:  3.70 (7/16/2024)   Weekly warfarin total:  33 mg               Assessment/Plan   Supra therapeutic  Spoke with daughter, Veronica.  Dosing schedule confirmed. Patient had less salad since last week.  Will maintain dose.  1 week      Education provided to patient during the visit:  Patient instructed to call in interim with questions, concerns and changes.   Patient educated on interactions between medications and warfarin.   Patient educated on dietary consistency in vitamin k consumption.   Patient educated on affects of alcohol consumption while taking warfarin.   Patient educated on signs of bleeding/clotting.   Patient educated on compliance with dosing, follow up appointments, and prescribed plan of care.

## 2024-07-23 ENCOUNTER — ANTICOAGULATION - WARFARIN VISIT (OUTPATIENT)
Dept: CARDIOLOGY | Facility: HOSPITAL | Age: 69
End: 2024-07-23
Payer: COMMERCIAL

## 2024-07-23 DIAGNOSIS — G45.9 TIA (TRANSIENT ISCHEMIC ATTACK): ICD-10-CM

## 2024-07-23 DIAGNOSIS — Z95.2 AORTIC VALVE REPLACED: Primary | ICD-10-CM

## 2024-07-23 LAB
INR IN PPP BY COAGULATION ASSAY EXTERNAL: 4.5
PROTHROMBIN TIME (PT) IN PPP BY COAGULATION ASSAY EXTERNAL: NORMAL

## 2024-07-23 NOTE — PROGRESS NOTES
Patient identification verified with 2 identifiers.    Location: [facility] Patient Self-Testing Program [phone]    Referring Physician: MAURA DOOLEY CNP  Enrollment/ Re-enrollment date: 2024   INR Goal: 2.5-3.5  INR monitoring is per [facility] protocol.  Anticoagulation Medication: warfarin  Indication: Aortic Valve Replacement    Subjective   Bleeding signs/symptoms: No    Bruising: No   Major bleeding event: No  Thrombosis signs/symptoms: No  Thromboembolic event: No  Missed doses: No  Extra doses: No  Medication changes: No  Dietary changes: Yes  inconsistent with consumption of green vegetables  Change in health: No  Change in activity: No  Alcohol: No  Other concerns: No    Upcoming Procedures:  Does the Patient Have any upcoming procedures that require interruption in anticoagulation therapy? no  Does the patient require bridging? no      Anticoagulation Summary  As of 2024      INR goal:  2.5-3.5   TTR:  35.1% (9.2 mo)   INR used for dosin.50 (2024)   Weekly warfarin total:  31.5 mg               Assessment/Plan   Supra therapeutic  Spoke with daughter, Veronica.  Dosing schedule confirmed. Patient will be more consistent with consumption of green vegetables.  Will HOLD next 2 doses and decrease weekly dose .  1 week      Education provided to patient during the visit:  Patient instructed to call in interim with questions, concerns and changes.   Patient educated on interactions between medications and warfarin.   Patient educated on dietary consistency in vitamin k consumption.   Patient educated on affects of alcohol consumption while taking warfarin.   Patient educated on signs of bleeding/clotting.   Patient educated on compliance with dosing, follow up appointments, and prescribed plan of care.

## 2024-07-30 ENCOUNTER — ANTICOAGULATION - WARFARIN VISIT (OUTPATIENT)
Dept: CARDIOLOGY | Facility: CLINIC | Age: 69
End: 2024-07-30
Payer: COMMERCIAL

## 2024-07-30 DIAGNOSIS — G45.9 TIA (TRANSIENT ISCHEMIC ATTACK): ICD-10-CM

## 2024-07-30 DIAGNOSIS — Z95.2 AORTIC VALVE REPLACED: Primary | ICD-10-CM

## 2024-07-30 LAB
INR IN PPP BY COAGULATION ASSAY EXTERNAL: 2.7
PROTHROMBIN TIME (PT) IN PPP BY COAGULATION ASSAY EXTERNAL: NORMAL

## 2024-07-30 NOTE — PROGRESS NOTES
Patient identification verified with 2 identifiers.    Location: [facility] Patient Self-Testing Program [phone]    Referring Physician: MAURA DOOLEY CNP  Enrollment/ Re-enrollment date: 2024   INR Goal: 2.5-3.5  INR monitoring is per [facility] protocol.  Anticoagulation Medication: warfarin  Indication: Aortic Valve Replacement    Subjective   Bleeding signs/symptoms: No    Bruising: No   Major bleeding event: No  Thrombosis signs/symptoms: No  Thromboembolic event: No  Missed doses: No  Extra doses: No  Medication changes: No  Dietary changes: No  Change in health: No  Change in activity: No  Alcohol: No  Other concerns: No    Upcoming Procedures:  Does the Patient Have any upcoming procedures that require interruption in anticoagulation therapy? no  Does the patient require bridging? no      Anticoagulation Summary  As of 2024      INR goal:  2.5-3.5   TTR:  35.3% (9.5 mo)   INR used for dosin.70 (2024)   Weekly warfarin total:  31.5 mg               Assessment/Plan    therapeutic  Spoke with daughter, Veronica.  Dosing schedule confirmed.   Will maintain dose  1 week      Education provided to patient during the visit:  Patient instructed to call in interim with questions, concerns and changes.   Patient educated on interactions between medications and warfarin.   Patient educated on dietary consistency in vitamin k consumption.   Patient educated on affects of alcohol consumption while taking warfarin.   Patient educated on signs of bleeding/clotting.   Patient educated on compliance with dosing, follow up appointments, and prescribed plan of care.

## 2024-07-31 ENCOUNTER — APPOINTMENT (OUTPATIENT)
Dept: CARDIAC SURGERY | Facility: CLINIC | Age: 69
End: 2024-07-31
Payer: COMMERCIAL

## 2024-08-06 ENCOUNTER — ANTICOAGULATION - WARFARIN VISIT (OUTPATIENT)
Dept: CARDIOLOGY | Facility: CLINIC | Age: 69
End: 2024-08-06
Payer: COMMERCIAL

## 2024-08-06 DIAGNOSIS — G45.9 TIA (TRANSIENT ISCHEMIC ATTACK): ICD-10-CM

## 2024-08-06 DIAGNOSIS — Z95.2 AORTIC VALVE REPLACED: Primary | ICD-10-CM

## 2024-08-06 LAB
INR IN PPP BY COAGULATION ASSAY EXTERNAL: 4.3
PROTHROMBIN TIME (PT) IN PPP BY COAGULATION ASSAY EXTERNAL: NORMAL

## 2024-08-06 NOTE — PROGRESS NOTES
Patient identification verified with 2 identifiers.    Location: [facility] Patient Self-Testing Program [phone]    Referring Physician: MAURA DOOLEY CNP  Enrollment/ Re-enrollment date: 2024   INR Goal: 2.5-3.5  INR monitoring is per [facility] protocol.  Anticoagulation Medication: warfarin  Indication: Aortic Valve Replacement    Subjective   Bleeding signs/symptoms: No    Bruising: No   Major bleeding event: No  Thrombosis signs/symptoms: No  Thromboembolic event: No  Missed doses: No  Extra doses: No  Medication changes: No  Dietary changes: No  Change in health: No  Change in activity: No  Alcohol: Yes  Patient had a family reunion on Saturday and had 1 mixed drink  Other concerns: No    Upcoming Procedures:  Does the Patient Have any upcoming procedures that require interruption in anticoagulation therapy? no  Does the patient require bridging? no      Anticoagulation Summary  As of 2024      INR goal:  2.5-3.5   TTR:  35.7% (9.7 mo)   INR used for dosin.30 (2024)   Weekly warfarin total:  31.5 mg               Assessment/Plan   Supratherapeutic     1. New dose:  Will hold today and maintain dose     2. Next INR: 1 week      Education provided to patient during the visit:  Patient instructed to call in interim with questions, concerns and changes.   Patient educated on interactions between medications and warfarin.   Patient educated on affects of alcohol consumption while taking warfarin.   Patient educated on compliance with dosing, follow up appointments, and prescribed plan of care.

## 2024-08-13 ENCOUNTER — ANTICOAGULATION - WARFARIN VISIT (OUTPATIENT)
Dept: CARDIOLOGY | Facility: CLINIC | Age: 69
End: 2024-08-13
Payer: COMMERCIAL

## 2024-08-13 DIAGNOSIS — G45.9 TIA (TRANSIENT ISCHEMIC ATTACK): ICD-10-CM

## 2024-08-13 DIAGNOSIS — Z95.2 AORTIC VALVE REPLACED: Primary | ICD-10-CM

## 2024-08-13 PROBLEM — G40.209 PARTIAL SYMPTOMATIC EPILEPSY WITH COMPLEX PARTIAL SEIZURES, NOT INTRACTABLE, WITHOUT STATUS EPILEPTICUS (MULTI): Status: ACTIVE | Noted: 2024-06-24

## 2024-08-13 PROBLEM — M79.89 SWELLING OF LOWER LEG: Status: ACTIVE | Noted: 2024-08-13

## 2024-08-13 LAB
INR IN PPP BY COAGULATION ASSAY EXTERNAL: 4.7
PROTHROMBIN TIME (PT) IN PPP BY COAGULATION ASSAY EXTERNAL: NORMAL

## 2024-08-13 NOTE — PROGRESS NOTES
Patient identification verified with 2 identifiers.    Location: [facility] Patient Self-Testing Program [phone]    Referring Physician: Pricila Beavers CNP  Enrollment/ Re-enrollment date: 24   INR Goal: 2.5-3.5  INR monitoring is per [facility] protocol.  Anticoagulation Medication: warfarin  Indication: Aortic Valve Replacement    Subjective   Bleeding signs/symptoms: No    Bruising: No   Major bleeding event: No  Thrombosis signs/symptoms: No  Thromboembolic event: No  Missed doses: No  Extra doses: No  Medication changes: Yes  Patient took Coricidin once this week  Dietary changes: No  Change in health: No  Change in activity: No  Alcohol: No  Other concerns: No    Upcoming Procedures:  Does the Patient Have any upcoming procedures that require interruption in anticoagulation therapy? no  Does the patient require bridging? no      Anticoagulation Summary  As of 2024      INR goal:  2.5-3.5   TTR:  34.9% (9.9 mo)   INR used for dosin.70 (2024)   Weekly warfarin total:  31.5 mg               Assessment/Plan   Supratherapeutic     1. New dose:  will hold dose x 2 and decrease weekly dose per protocol.      2. Next INR: 1 week      Education provided to patient during the visit:  Patient instructed to call in interim with questions, concerns and changes.   Patient educated on interactions between medications and warfarin.   Patient educated on dietary consistency in vitamin k consumption.   Patient educated on affects of alcohol consumption while taking warfarin.   Patient educated on signs of bleeding/clotting.   Patient educated on compliance with dosing, follow up appointments, and prescribed plan of care.

## 2024-08-20 ENCOUNTER — ANTICOAGULATION - WARFARIN VISIT (OUTPATIENT)
Dept: CARDIOLOGY | Facility: CLINIC | Age: 69
End: 2024-08-20
Payer: COMMERCIAL

## 2024-08-20 DIAGNOSIS — G45.9 TIA (TRANSIENT ISCHEMIC ATTACK): ICD-10-CM

## 2024-08-20 DIAGNOSIS — Z95.2 AORTIC VALVE REPLACED: Primary | ICD-10-CM

## 2024-08-20 LAB
INR IN PPP BY COAGULATION ASSAY EXTERNAL: 2.1
PROTHROMBIN TIME (PT) IN PPP BY COAGULATION ASSAY EXTERNAL: NORMAL

## 2024-08-20 NOTE — PROGRESS NOTES
Patient identification verified with 2 identifiers.    Location: [facility] Patient Self-Testing Program [phone]    Referring Physician: Pricila Beavers CNP  Enrollment/ Re-enrollment date: 24   INR Goal: 2.5-3.5  INR monitoring is per [facility] protocol.  Anticoagulation Medication: warfarin  Indication: Aortic Valve Replacement    Subjective   Bleeding signs/symptoms: No    Bruising: No   Major bleeding event: No  Thrombosis signs/symptoms: No  Thromboembolic event: No  Missed doses: No  Extra doses: No  Medication changes: No  Dietary changes: No  Change in health: No  Change in activity: No  Alcohol: No  Other concerns: No    Upcoming Procedures:  Does the Patient Have any upcoming procedures that require interruption in anticoagulation therapy? no  Does the patient require bridging? no      Anticoagulation Summary  As of 2024      INR goal:  2.5-3.5   TTR:  34.9% (10.2 mo)   INR used for dosin.10 (2024)   Weekly warfarin total:  31.5 mg               Assessment/Plan   Subtherapeutic     1. New dose: no change    2. Next INR:  3days       Education provided to patient during the visit:  Patient instructed to call in interim with questions, concerns and changes.   Patient educated on interactions between medications and warfarin.   Patient educated on dietary consistency in vitamin k consumption.   Patient educated on affects of alcohol consumption while taking warfarin.   Patient educated on signs of bleeding/clotting.   Patient educated on compliance with dosing, follow up appointments, and prescribed plan of care.

## 2024-08-23 LAB
INR IN PPP BY COAGULATION ASSAY EXTERNAL: 3
PROTHROMBIN TIME (PT) IN PPP BY COAGULATION ASSAY EXTERNAL: NORMAL

## 2024-08-26 ENCOUNTER — ANTICOAGULATION - WARFARIN VISIT (OUTPATIENT)
Dept: CARDIOLOGY | Facility: CLINIC | Age: 69
End: 2024-08-26
Payer: COMMERCIAL

## 2024-08-26 DIAGNOSIS — L21.9 SEBORRHEIC DERMATITIS: ICD-10-CM

## 2024-08-26 DIAGNOSIS — G45.9 TIA (TRANSIENT ISCHEMIC ATTACK): ICD-10-CM

## 2024-08-26 DIAGNOSIS — Z95.2 AORTIC VALVE REPLACED: Primary | ICD-10-CM

## 2024-08-26 RX ORDER — KETOCONAZOLE 20 MG/G
CREAM TOPICAL 2 TIMES DAILY
Qty: 30 G | Refills: 1 | Status: SHIPPED | OUTPATIENT
Start: 2024-08-26

## 2024-08-26 NOTE — PROGRESS NOTES
Patient identification verified with 2 identifiers.    Location: [facility] Patient Self-Testing Program [phone]    Referring Physician: MAURA DOOLEY CNP  Enrollment/ Re-enrollment date: 12/8/2024   INR Goal: 2.5-3.5  INR monitoring is per [facility] protocol.  Anticoagulation Medication: warfarin  Indication: Aortic Valve Replacement    Subjective   Bleeding signs/symptoms:      Bruising:     Major bleeding event:    Thrombosis signs/symptoms:    Thromboembolic event:    Missed doses:    Extra doses:    Medication changes:    Dietary changes:    Change in health:    Change in activity:    Alcohol:    Other concerns:      Upcoming Procedures:  Does the Patient Have any upcoming procedures that require interruption in anticoagulation therapy?   Does the patient require bridging?       Anticoagulation Summary  As of 8/26/2024      INR goal:  2.5-3.5   TTR:  35.1% (10.3 mo)   INR used for dosing:  3.00 (8/23/2024)   Weekly warfarin total:  31.5 mg               Assessment/Plan   Therapeutic     1. New dose: no change  RECEIVED FAXED INR RESULT FROM 8/23/24. CALLED PT'S DAUGHTER VANESA KENYON TO CONTINUE CURRENT DOSING INSTRUCTIONS AND RETEST 8/30/24 AND TO CALL [phone] WITH ANY QUESTIONS/CHANGES.  2. Next INR:  8/30/24      Education provided to patient during the visit:  Patient instructed to call in interim with questions, concerns and changes.   Patient educated on compliance with dosing, follow up appointments, and prescribed plan of care.

## 2024-08-30 ENCOUNTER — ANTICOAGULATION - WARFARIN VISIT (OUTPATIENT)
Dept: CARDIOLOGY | Facility: CLINIC | Age: 69
End: 2024-08-30
Payer: COMMERCIAL

## 2024-08-30 DIAGNOSIS — G45.9 TIA (TRANSIENT ISCHEMIC ATTACK): ICD-10-CM

## 2024-08-30 DIAGNOSIS — Z95.2 AORTIC VALVE REPLACED: Primary | ICD-10-CM

## 2024-08-30 LAB
INR IN PPP BY COAGULATION ASSAY EXTERNAL: 1.2 (ref 2.5–3.5)
PROTHROMBIN TIME (PT) IN PPP BY COAGULATION ASSAY EXTERNAL: ABNORMAL

## 2024-08-30 NOTE — PROGRESS NOTES
Patient identification verified with 2 identifiers.    Location: [facility] Patient Self-Testing Program [phone]    Referring Physician: MAURA BISHOP CNP  Enrollment/ Re-enrollment date: 12/8/2024   INR Goal: 2.5-3.5  INR monitoring is per [facility] protocol.  Anticoagulation Medication: warfarin  Indication: Aortic Valve Replacement    Subjective   Bleeding signs/symptoms: No    Bruising: No   Major bleeding event: No  Thrombosis signs/symptoms: No  Thromboembolic event: No  Missed doses: No  Extra doses: No  Medication changes: No  Dietary changes: No  Change in health: No  Change in activity: No  Alcohol: No  Other concerns: No    Upcoming Procedures:  Does the Patient Have any upcoming procedures that require interruption in anticoagulation therapy? no  Does the patient require bridging? no      Anticoagulation Summary  As of 8/30/2024      INR goal:  2.5-3.5   TTR:  35.1% (10.3 mo)   INR used for dosing:  --   Weekly warfarin total:  37.5 mg               Assessment/Plan   Subtherapeutic  Maura Bishop CNP notified of INR result via secure chat.    1. New dose:  TWD increased approximately 20%.  Spoke with pt 's daughter, no known reason for INR 1.2.  Denies missed doses.  Daughter states 1.2 result was from yesterday and she increased pt's dose last night.   2. Next INR:  9/3/24      Education provided to patient during the visit:  Patient instructed to call in interim with questions, concerns and changes.   Patient educated on interactions between medications and warfarin.   Patient educated on dietary consistency in vitamin k consumption.   Patient educated on affects of alcohol consumption while taking warfarin.   Patient educated on signs of bleeding/clotting.   Patient educated on compliance with dosing, follow up appointments, and prescribed plan of care.

## 2024-08-30 NOTE — PROGRESS NOTES
No response received from DESTINEY Bishop CNP.  Call placed to answering service notifying Dr. Ramicone of pt's INR of 1.2

## 2024-09-02 LAB
INR IN PPP BY COAGULATION ASSAY EXTERNAL: 2.4
PROTHROMBIN TIME (PT) IN PPP BY COAGULATION ASSAY EXTERNAL: NORMAL

## 2024-09-03 ENCOUNTER — ANTICOAGULATION - WARFARIN VISIT (OUTPATIENT)
Dept: CARDIOLOGY | Facility: CLINIC | Age: 69
End: 2024-09-03
Payer: COMMERCIAL

## 2024-09-03 ENCOUNTER — APPOINTMENT (OUTPATIENT)
Dept: CARDIOLOGY | Facility: CLINIC | Age: 69
End: 2024-09-03
Payer: COMMERCIAL

## 2024-09-03 DIAGNOSIS — Z95.2 AORTIC VALVE REPLACED: Primary | ICD-10-CM

## 2024-09-03 DIAGNOSIS — G45.9 TIA (TRANSIENT ISCHEMIC ATTACK): ICD-10-CM

## 2024-09-03 NOTE — PROGRESS NOTES
Patient identification verified with 2 identifiers.    Location: [facility] Patient Self-Testing Program [phone]    Referring Physician: CORWIN Bunch   Enrollment/ Re-enrollment date: 2024   INR Goal: 2.5-3.5  INR monitoring is per [facility] protocol.  Anticoagulation Medication: warfarin  Indication: Aortic Valve Replacement    Subjective   Bleeding signs/symptoms: No    Bruising: No   Major bleeding event: No  Thrombosis signs/symptoms: No  Thromboembolic event: No  Missed doses: No  Extra doses: No  Medication changes: No  Dietary changes: No  Change in health: No  Change in activity: No  Alcohol: No  Other concerns: No    Upcoming Procedures:  Does the Patient Have any upcoming procedures that require interruption in anticoagulation therapy? no  Does the patient require bridging? no      Anticoagulation Summary  As of 9/3/2024      INR goal:  2.5-3.5   TTR:  34.6% (10.6 mo)   INR used for dosin.40 (2024)   Weekly warfarin total:  37.5 mg               Assessment/Plan   Subtherapeutic     1. New dose: no change    2. Next INR:  Wed 24      Education provided to patient during the visit:  Patient instructed to call in interim with questions, concerns and changes.

## 2024-09-04 ENCOUNTER — ANTICOAGULATION - WARFARIN VISIT (OUTPATIENT)
Dept: CARDIOLOGY | Facility: CLINIC | Age: 69
End: 2024-09-04
Payer: COMMERCIAL

## 2024-09-04 DIAGNOSIS — Z95.2 AORTIC VALVE REPLACED: Primary | ICD-10-CM

## 2024-09-04 DIAGNOSIS — G45.9 TIA (TRANSIENT ISCHEMIC ATTACK): ICD-10-CM

## 2024-09-04 LAB
INR IN PPP BY COAGULATION ASSAY EXTERNAL: 2.6
PROTHROMBIN TIME (PT) IN PPP BY COAGULATION ASSAY EXTERNAL: NORMAL

## 2024-09-04 NOTE — PROGRESS NOTES
Subjective   Patient ID: Suze Najera is a 69 y.o. female who presents for Foot Swelling (Left foot x 3-4 days ) and Immunizations (flu).    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 30 tablet 0    albuterol (ProAir HFA) 90 mcg/actuation inhaler Inhale 2 puffs every 4 hours if needed for wheezing or shortness of breath. 8.5 g 0    FLUoxetine (PROzac) 40 mg capsule Take 1 capsule (40 mg) by mouth once daily. 90 capsule 0    fluticasone-umeclidin-vilanter (TRELEGY-ELLIPTA) 100-62.5-25 mcg blister with device Inhale 1 puff once daily. 30 each 0    glucosamine sulfate 1,000 mg tablet Take 1 tablet by mouth once daily.      ketoconazole (NIZOral) 2 % cream Apply topically 2 times a day. To affected forehead and facial areas. 30 g 1    levETIRAcetam (Keppra) 500 mg tablet Take 1 tablet (500 mg) by mouth twice a day.      losartan (Cozaar) 25 mg tablet Take 1 tablet (25 mg) by mouth once daily.      metFORMIN  mg 24 hr tablet TAKE 1 TABLET BY MOUTH TWICE DAILY 180 tablet 0    metoprolol succinate XL (Toprol-XL) 50 mg 24 hr tablet Take 1 tablet (50 mg) by mouth once daily. 90 tablet 3    mirtazapine (Remeron) 15 mg tablet Take 1 tablet (15 mg) by mouth once daily at bedtime. 30 tablet 5    multivitamin with minerals tablet Take 1 tablet by mouth once daily. Do not  start before October 19, 2023.  0    OneTouch Ultra Control solution       OneTouch Ultra Test strip       QUEtiapine (SEROquel) 25 mg tablet Take 1 tablet (25 mg) by mouth. PRN      rivastigmine (Exelon) 4.6 mg/24 hour APPLY 1 PATCH AS DIRECTED ONCE DAILY.      rosuvastatin (Crestor) 40 mg tablet TAKE 1 TABLET BY MOUTH EVERY DAY 90 tablet 1    warfarin (Coumadin) 3 mg tablet Take 1.5 tablets by mouth once a day or as directed by  [facility]. 180 tablet 2    methylPREDNISolone (Medrol Dospak) 4 mg tablets Take as directed on package. 21 tablet 0     No current facility-administered medications for this visit.       Review of Systems   Respiratory:  Negative for shortness of breath.    Cardiovascular:  Negative for chest pain and leg swelling.   Skin:  Negative for color change and wound.       Objective   /79 (BP Location: Right arm, Patient Position: Sitting, BP Cuff Size: Adult)   Pulse 90   Temp 36.6 °C (97.8 °F) (Temporal)   Resp 16   Wt 77.2 kg (170 lb 1.6 oz)   SpO2 94%   BMI 31.11 kg/m²     Physical Exam    Constitutional: Well developed, well nourished, alert and in no acute distress   Eyes: Normal external exam.   Extremities: Trace edema left dorsal foot, no erythema, warmth, tenderness.  Edema stops at level of ankle.    Skin: Warm, well perfused, normal skin turgor and color.   Neurologic: Cranial nerves II-XII grossly intact.   Psychiatric: Mood calm and affect normal.    Assessment/Plan     Problem List Items Addressed This Visit    None  Visit Diagnoses       Localized swelling of left foot    -  Primary    Relevant Medications    methylPREDNISolone (Medrol Dospak) 4 mg tablets    Other Relevant Orders    Referral to Podiatry    Immunization due        Relevant Orders    Flu vaccine, trivalent, preservative free, HIGH-DOSE, age 65y+ (Fluzone) (Completed)    Onychomycosis        Relevant Orders    Referral to Podiatry          Unclear etiology of swelling - may be arthritis flare-  trial medrol dose saravanan since can't take NSAIDS   See podiatrist   Jo Catalan,   9/5/2024

## 2024-09-04 NOTE — PROGRESS NOTES
Patient identification verified with 2 identifiers.    Location: [facility] Patient Self-Testing Program [phone]    Referring Physician: MAURA DOOLEY  Enrollment/ Re-enrollment date: 2024   INR Goal: 2.5-3.5  INR monitoring is per [facility] protocol.  Anticoagulation Medication: warfarin  Indication: Aortic Valve Replacement    Subjective   Bleeding signs/symptoms: No    Bruising: No   Major bleeding event: No  Thrombosis signs/symptoms: No  Thromboembolic event: No  Missed doses: No  Extra doses: No  Medication changes: No  Dietary changes: No  Change in health: No  Change in activity: No  Alcohol: No  Other concerns: No    Upcoming Procedures:  Does the Patient Have any upcoming procedures that require interruption in anticoagulation therapy? no  Does the patient require bridging? no      Anticoagulation Summary  As of 2024      INR goal:  2.5-3.5   TTR:  34.7% (10.7 mo)   INR used for dosin.60 (2024)   Weekly warfarin total:  37.5 mg               Assessment/Plan   Therapeutic     1. New dose: no change    2. Next INR: 1 week      Education provided to patient during the visit:  Patient instructed to call in interim with questions, concerns and changes.   Patient educated on interactions between medications and warfarin.   Patient educated on dietary consistency in vitamin k consumption.   Patient educated on affects of alcohol consumption while taking warfarin.   Patient educated on signs of bleeding/clotting.   Patient educated on compliance with dosing, follow up appointments, and prescribed plan of care.

## 2024-09-05 ENCOUNTER — OFFICE VISIT (OUTPATIENT)
Dept: PRIMARY CARE | Facility: CLINIC | Age: 69
End: 2024-09-05
Payer: COMMERCIAL

## 2024-09-05 VITALS
BODY MASS INDEX: 31.11 KG/M2 | WEIGHT: 170.1 LBS | DIASTOLIC BLOOD PRESSURE: 79 MMHG | OXYGEN SATURATION: 94 % | RESPIRATION RATE: 16 BRPM | TEMPERATURE: 97.8 F | HEART RATE: 90 BPM | SYSTOLIC BLOOD PRESSURE: 149 MMHG

## 2024-09-05 DIAGNOSIS — Z23 IMMUNIZATION DUE: ICD-10-CM

## 2024-09-05 DIAGNOSIS — R22.42 LOCALIZED SWELLING OF LEFT FOOT: Primary | ICD-10-CM

## 2024-09-05 DIAGNOSIS — B35.1 ONYCHOMYCOSIS: ICD-10-CM

## 2024-09-05 PROBLEM — M79.89 SWELLING OF LOWER LEG: Status: RESOLVED | Noted: 2024-08-13 | Resolved: 2024-09-05

## 2024-09-05 PROCEDURE — 3044F HG A1C LEVEL LT 7.0%: CPT | Performed by: FAMILY MEDICINE

## 2024-09-05 PROCEDURE — 1123F ACP DISCUSS/DSCN MKR DOCD: CPT | Performed by: FAMILY MEDICINE

## 2024-09-05 PROCEDURE — 1157F ADVNC CARE PLAN IN RCRD: CPT | Performed by: FAMILY MEDICINE

## 2024-09-05 PROCEDURE — 90662 IIV NO PRSV INCREASED AG IM: CPT | Performed by: FAMILY MEDICINE

## 2024-09-05 PROCEDURE — 1160F RVW MEDS BY RX/DR IN RCRD: CPT | Performed by: FAMILY MEDICINE

## 2024-09-05 PROCEDURE — G0008 ADMIN INFLUENZA VIRUS VAC: HCPCS | Performed by: FAMILY MEDICINE

## 2024-09-05 PROCEDURE — G2211 COMPLEX E/M VISIT ADD ON: HCPCS | Performed by: FAMILY MEDICINE

## 2024-09-05 PROCEDURE — 3061F NEG MICROALBUMINURIA REV: CPT | Performed by: FAMILY MEDICINE

## 2024-09-05 PROCEDURE — 3077F SYST BP >= 140 MM HG: CPT | Performed by: FAMILY MEDICINE

## 2024-09-05 PROCEDURE — 3078F DIAST BP <80 MM HG: CPT | Performed by: FAMILY MEDICINE

## 2024-09-05 PROCEDURE — 4010F ACE/ARB THERAPY RXD/TAKEN: CPT | Performed by: FAMILY MEDICINE

## 2024-09-05 PROCEDURE — 99213 OFFICE O/P EST LOW 20 MIN: CPT | Performed by: FAMILY MEDICINE

## 2024-09-05 PROCEDURE — 1159F MED LIST DOCD IN RCRD: CPT | Performed by: FAMILY MEDICINE

## 2024-09-05 RX ORDER — METHYLPREDNISOLONE 4 MG/1
TABLET ORAL
Qty: 21 TABLET | Refills: 0 | Status: SHIPPED | OUTPATIENT
Start: 2024-09-05 | End: 2024-09-12

## 2024-09-05 ASSESSMENT — ENCOUNTER SYMPTOMS
SHORTNESS OF BREATH: 0
COLOR CHANGE: 0
WOUND: 0

## 2024-09-11 ENCOUNTER — APPOINTMENT (OUTPATIENT)
Dept: CARDIAC SURGERY | Facility: HOSPITAL | Age: 69
End: 2024-09-11
Payer: COMMERCIAL

## 2024-09-11 ENCOUNTER — ANTICOAGULATION - WARFARIN VISIT (OUTPATIENT)
Dept: CARDIOLOGY | Facility: CLINIC | Age: 69
End: 2024-09-11
Payer: COMMERCIAL

## 2024-09-11 DIAGNOSIS — G45.9 TIA (TRANSIENT ISCHEMIC ATTACK): ICD-10-CM

## 2024-09-11 DIAGNOSIS — Z95.2 AORTIC VALVE REPLACED: Primary | ICD-10-CM

## 2024-09-16 LAB
INR IN PPP BY COAGULATION ASSAY EXTERNAL: 1.2
PROTHROMBIN TIME (PT) IN PPP BY COAGULATION ASSAY EXTERNAL: NORMAL

## 2024-09-16 NOTE — PROGRESS NOTES
Patient identification verified with 2 identifiers.    Location: [facility] Patient Self-Testing Program [phone]    Referring Physician: MAURA DONOHUE  Enrollment/ Re-enrollment date: 2024   INR Goal: 2.5-3.5  INR monitoring is per [facility] protocol.  Anticoagulation Medication: warfarin  Indication: Aortic Valve Replacement    Subjective   Bleeding signs/symptoms: No    Bruising: No   Major bleeding event: No  Thrombosis signs/symptoms: No  Thromboembolic event: No  Missed doses: No  SEE NOTE  Extra doses: No  Medication changes: No  Dietary changes: No  Change in health: No  Change in activity: No  Alcohol: No  Other concerns: No    Upcoming Procedures:  Does the Patient Have any upcoming procedures that require interruption in anticoagulation therapy? no  Does the patient require bridging? no      Anticoagulation Summary  As of 2024      INR goal:  2.5-3.5   TTR:  34.7% (10.7 mo)   INR used for dosin.20 (2024)   Weekly warfarin total:  37.5 mg               Assessment/Plan   Subtherapeutic     1. New dose:  EXTRA WARFARIN ON  AND      2. Next INR: 1 week      Education provided to patient during the visit:  Patient instructed to call in interim with questions, concerns and changes.   Patient educated on interactions between medications and warfarin.   Patient educated on dietary consistency in vitamin k consumption.   Patient educated on affects of alcohol consumption while taking warfarin.   Patient educated on signs of bleeding/clotting.   Patient educated on compliance with dosing, follow up appointments, and prescribed plan of care.

## 2024-09-19 DIAGNOSIS — Z79.4 TYPE 2 DIABETES MELLITUS WITH OTHER SPECIFIED COMPLICATION, WITH LONG-TERM CURRENT USE OF INSULIN: ICD-10-CM

## 2024-09-19 DIAGNOSIS — E11.69 TYPE 2 DIABETES MELLITUS WITH OTHER SPECIFIED COMPLICATION, WITH LONG-TERM CURRENT USE OF INSULIN: ICD-10-CM

## 2024-09-20 RX ORDER — METFORMIN HYDROCHLORIDE 500 MG/1
500 TABLET, EXTENDED RELEASE ORAL 2 TIMES DAILY
Qty: 180 TABLET | Refills: 1 | Status: SHIPPED | OUTPATIENT
Start: 2024-09-20

## 2024-09-23 ENCOUNTER — ANTICOAGULATION - WARFARIN VISIT (OUTPATIENT)
Dept: CARDIOLOGY | Facility: CLINIC | Age: 69
End: 2024-09-23
Payer: COMMERCIAL

## 2024-09-23 DIAGNOSIS — Z95.2 AORTIC VALVE REPLACED: Primary | ICD-10-CM

## 2024-09-23 DIAGNOSIS — G45.9 TIA (TRANSIENT ISCHEMIC ATTACK): ICD-10-CM

## 2024-09-23 LAB
INR IN PPP BY COAGULATION ASSAY EXTERNAL: 2.3
PROTHROMBIN TIME (PT) IN PPP BY COAGULATION ASSAY EXTERNAL: NORMAL

## 2024-09-24 NOTE — PROGRESS NOTES
Patient identification verified with 2 identifiers.    Location: [facility] Patient Self-Testing Program [phone]    Referring Physician: MAURA DONOHUE  Enrollment/ Re-enrollment date: 2024   INR Goal: 2.5-3.5  INR monitoring is per [facility] protocol.  Anticoagulation Medication: warfarin  Indication: Aortic Valve Replacement    Subjective   Bleeding signs/symptoms: No    Bruising: No   Major bleeding event: No  Thrombosis signs/symptoms: No  Thromboembolic event: No  Missed doses: Yes  MISSED DOSE   Extra doses: No  Medication changes: Yes  OFF PREDNISONE  Dietary changes: No  Change in health: No  Change in activity: No  Alcohol: No  Other concerns: No    Upcoming Procedures:  Does the Patient Have any upcoming procedures that require interruption in anticoagulation therapy? no  Does the patient require bridging? no      Anticoagulation Summary  As of 2024      INR goal:  2.5-3.5   TTR:  33.0% (11.3 mo)   INR used for dosin.30 (2024)   Weekly warfarin total:  37.5 mg               Assessment/Plan   Subtherapeutic     1. New dose: One time dose increase then maintain weekly dose.  2. Next INR: 1 week      Education provided to patient during the visit:  Patient instructed to call in interim with questions, concerns and changes.   Patient educated on interactions between medications and warfarin.   Patient educated on dietary consistency in vitamin k consumption.   Patient educated on affects of alcohol consumption while taking warfarin.   Patient educated on signs of bleeding/clotting.   Patient educated on compliance with dosing, follow up appointments, and prescribed plan of care.

## 2024-09-30 ENCOUNTER — ANTICOAGULATION - WARFARIN VISIT (OUTPATIENT)
Dept: CARDIOLOGY | Facility: CLINIC | Age: 69
End: 2024-09-30
Payer: COMMERCIAL

## 2024-09-30 DIAGNOSIS — Z95.2 AORTIC VALVE REPLACED: Primary | ICD-10-CM

## 2024-09-30 DIAGNOSIS — G45.9 TIA (TRANSIENT ISCHEMIC ATTACK): ICD-10-CM

## 2024-09-30 LAB
INR IN PPP BY COAGULATION ASSAY EXTERNAL: 4.4 (ref 2.5–3.5)
PROTHROMBIN TIME (PT) IN PPP BY COAGULATION ASSAY EXTERNAL: ABNORMAL

## 2024-09-30 NOTE — PROGRESS NOTES
Patient identification verified with 2 identifiers.    Location: [facility] Patient Self-Testing Program [phone]    Referring Physician: MAURA DONOHUE  Enrollment/ Re-enrollment date: 2024   INR Goal: 2.5-3.5  INR monitoring is per [facility] protocol.  Anticoagulation Medication: warfarin  Indication: Aortic Valve Replacement    Subjective   Bleeding signs/symptoms: No    Bruising: No   Major bleeding event: No  Thrombosis signs/symptoms: No  Thromboembolic event: No  Missed doses: No  Extra doses: No  Medication changes: No  Dietary changes: No  Change in health: No  Change in activity: No  Alcohol: No  Other concerns: No    Upcoming Procedures:  Does the Patient Have any upcoming procedures that require interruption in anticoagulation therapy? no  Does the patient require bridging? no      Anticoagulation Summary  As of 2024      INR goal:  2.5-3.5   TTR:  33.3% (11.5 mo)   INR used for dosin.40 (2024)   Weekly warfarin total:  37.5 mg               Assessment/Plan   Supratherapeutic  Called pt's daughter who verbally reported INR of 4.4.  Reminded her that it must be called in to the monitoring company.    1. New dose:  Hold today's dose and resume dosing schedule tomorrow.  Spoke with pt's daughter.  She states understanding.    2. Next INR: 1 week      Education provided to patient during the visit:  Patient instructed to call in interim with questions, concerns and changes.   Patient educated on interactions between medications and warfarin.   Patient educated on dietary consistency in vitamin k consumption.   Patient educated on affects of alcohol consumption while taking warfarin.   Patient educated on signs of bleeding/clotting.   Patient educated on compliance with dosing, follow up appointments, and prescribed plan of care.

## 2024-10-07 ENCOUNTER — ANTICOAGULATION - WARFARIN VISIT (OUTPATIENT)
Dept: CARDIOLOGY | Facility: CLINIC | Age: 69
End: 2024-10-07
Payer: COMMERCIAL

## 2024-10-07 DIAGNOSIS — Z95.2 AORTIC VALVE REPLACED: Primary | ICD-10-CM

## 2024-10-07 DIAGNOSIS — G45.9 TIA (TRANSIENT ISCHEMIC ATTACK): ICD-10-CM

## 2024-10-07 LAB
INR IN PPP BY COAGULATION ASSAY EXTERNAL: 4.6
PROTHROMBIN TIME (PT) IN PPP BY COAGULATION ASSAY EXTERNAL: NORMAL

## 2024-10-07 NOTE — PROGRESS NOTES
Patient identification verified with 2 identifiers.    Location: [facility] Patient Self-Testing Program [phone]    Referring Physician: CORWIN Bunch   Enrollment/ Re-enrollment date: 24   INR Goal: 2.5-3.5  INR monitoring is per [facility] protocol.  Anticoagulation Medication: warfarin  Indication: Aortic Valve Replacement    Subjective   Bleeding signs/symptoms:      Bruising:     Major bleeding event:    Thrombosis signs/symptoms:    Thromboembolic event:    Missed doses:    Extra doses:    Medication changes:    Dietary changes:    Change in health:    Change in activity:    Alcohol:    Other concerns:      Upcoming Procedures:  Does the Patient Have any upcoming procedures that require interruption in anticoagulation therapy? no  Does the patient require bridging? no      Anticoagulation Summary  As of 10/7/2024      INR goal:  2.5-3.5   TTR:  32.7% (11.8 mo)   INR used for dosin.60 (10/7/2024)   Weekly warfarin total:  34.5 mg               Assessment/Plan   Supratherapeutic     1. New dose:  dose was held last evening when test taken     2. Next INR:  Friday 10/11/24      Education provided to patient during the visit:  Patient instructed to call in interim with questions, concerns and changes.      INR due and not rec'd; LM to test in am

## 2024-10-08 NOTE — PROGRESS NOTES
Received VM from [employer] reporting pt's INR result of 5.4 from yesterday 10/7. Called pt's daughter's number and left VM stating we need to speak with her regarding this result. Phone number [phone] provided for call back.

## 2024-10-11 ENCOUNTER — ANTICOAGULATION - WARFARIN VISIT (OUTPATIENT)
Dept: CARDIOLOGY | Facility: CLINIC | Age: 69
End: 2024-10-11
Payer: COMMERCIAL

## 2024-10-11 DIAGNOSIS — Z95.2 AORTIC VALVE REPLACED: Primary | ICD-10-CM

## 2024-10-11 DIAGNOSIS — G45.9 TIA (TRANSIENT ISCHEMIC ATTACK): ICD-10-CM

## 2024-10-11 DIAGNOSIS — I10 BENIGN ESSENTIAL HYPERTENSION: ICD-10-CM

## 2024-10-11 DIAGNOSIS — E66.811 CLASS 1 OBESITY WITH BODY MASS INDEX (BMI) OF 31.0 TO 31.9 IN ADULT, UNSPECIFIED OBESITY TYPE, UNSPECIFIED WHETHER SERIOUS COMORBIDITY PRESENT: ICD-10-CM

## 2024-10-11 LAB
INR IN PPP BY COAGULATION ASSAY EXTERNAL: 1.6
PROTHROMBIN TIME (PT) IN PPP BY COAGULATION ASSAY EXTERNAL: NORMAL

## 2024-10-11 NOTE — PROGRESS NOTES
Patient identification verified with 2 identifiers.    Location: [facility] Patient Self-Testing Program [phone]    Referring Physician: MAURA DOOLEY  Enrollment/ Re-enrollment date: 2024   INR Goal: 2.5-3.5  INR monitoring is per [facility] protocol.  Anticoagulation Medication: warfarin  Indication: Aortic Valve Replacement    Subjective   Bleeding signs/symptoms: No    Bruising: No   Major bleeding event: No  Thrombosis signs/symptoms: No  Thromboembolic event: No  Missed doses: Yes  HELD EXTRA DOSE BY MISTAKE  Extra doses: No  Medication changes: No  Dietary changes: No  Change in health: No  Change in activity: No  Alcohol: No  Other concerns: No    Upcoming Procedures:  Does the Patient Have any upcoming procedures that require interruption in anticoagulation therapy? no  Does the patient require bridging? no      Anticoagulation Summary  As of 10/11/2024      INR goal:  2.5-3.5   TTR:  32.7% (11.9 mo)   INR used for dosin.60 (10/11/2024)   Weekly warfarin total:  34.5 mg               Assessment/Plan   Subtherapeutic     1. New dose:  EXTRA HALF TABLET ON 10/11     2. Next INR:  5 DAYS      Education provided to patient during the visit:  Patient instructed to call in interim with questions, concerns and changes.   Patient educated on interactions between medications and warfarin.   Patient educated on dietary consistency in vitamin k consumption.   Patient educated on affects of alcohol consumption while taking warfarin.   Patient educated on signs of bleeding/clotting.   Patient educated on compliance with dosing, follow up appointments, and prescribed plan of care.

## 2024-10-12 RX ORDER — ROSUVASTATIN CALCIUM 40 MG/1
40 TABLET, COATED ORAL DAILY
Qty: 90 TABLET | Refills: 1 | Status: SHIPPED | OUTPATIENT
Start: 2024-10-12

## 2024-10-14 LAB
INR IN PPP BY COAGULATION ASSAY EXTERNAL: 2.5
PROTHROMBIN TIME (PT) IN PPP BY COAGULATION ASSAY EXTERNAL: NORMAL

## 2024-10-15 ENCOUNTER — ANTICOAGULATION - WARFARIN VISIT (OUTPATIENT)
Dept: CARDIOLOGY | Facility: CLINIC | Age: 69
End: 2024-10-15
Payer: COMMERCIAL

## 2024-10-15 DIAGNOSIS — G45.9 TIA (TRANSIENT ISCHEMIC ATTACK): ICD-10-CM

## 2024-10-15 DIAGNOSIS — Z95.2 AORTIC VALVE REPLACED: Primary | ICD-10-CM

## 2024-10-15 NOTE — PROGRESS NOTES
Patient identification verified with 2 identifiers.    Location: [facility] Patient Self-Testing Program [phone]    Referring Physician: MAURA DOOLEY  Enrollment/ Re-enrollment date: 10/8/2024   INR Goal: 2.5-3.5  INR monitoring is per [facility] protocol.  Anticoagulation Medication: warfarin  Indication: Aortic Valve Replacement    Subjective   Bleeding signs/symptoms: No    Bruising: No   Major bleeding event: No  Thrombosis signs/symptoms: No  Thromboembolic event: No  Missed doses: No  Extra doses: No  Medication changes: No  Dietary changes: No  Change in health: No  Change in activity: No  Alcohol: No  Other concerns: No    Upcoming Procedures:  Does the Patient Have any upcoming procedures that require interruption in anticoagulation therapy? no  Does the patient require bridging? no      Anticoagulation Summary  As of 10/15/2024      INR goal:  2.5-3.5   TTR:  32.4% (1 y)   INR used for dosin.50 (10/14/2024)   Weekly warfarin total:  34.5 mg               Assessment/Plan   Therapeutic     1. New dose:  DOSING REARRANGED, NO CHANGE IN TWD     2. Next INR:  4 DAYS      Education provided to patient during the visit:  Patient instructed to call in interim with questions, concerns and changes.   Patient educated on interactions between medications and warfarin.   Patient educated on dietary consistency in vitamin k consumption.   Patient educated on affects of alcohol consumption while taking warfarin.   Patient educated on signs of bleeding/clotting.   Patient educated on compliance with dosing, follow up appointments, and prescribed plan of care.

## 2024-10-17 DIAGNOSIS — I10 BENIGN ESSENTIAL HYPERTENSION: Primary | ICD-10-CM

## 2024-10-18 ENCOUNTER — ANTICOAGULATION - WARFARIN VISIT (OUTPATIENT)
Dept: CARDIOLOGY | Facility: CLINIC | Age: 69
End: 2024-10-18
Payer: COMMERCIAL

## 2024-10-18 DIAGNOSIS — Z95.2 AORTIC VALVE REPLACED: Primary | ICD-10-CM

## 2024-10-18 DIAGNOSIS — G45.9 TIA (TRANSIENT ISCHEMIC ATTACK): ICD-10-CM

## 2024-10-18 LAB
INR IN PPP BY COAGULATION ASSAY EXTERNAL: 2.9
PROTHROMBIN TIME (PT) IN PPP BY COAGULATION ASSAY EXTERNAL: NORMAL

## 2024-10-18 NOTE — PROGRESS NOTES
Patient identification verified with 2 identifiers.    Location: [facility] Patient Self-Testing Program [phone]    Referring Physician: MAURA DOOLEY  Enrollment/ Re-enrollment date: 10/8/2024   INR Goal: 2.5-3.5  INR monitoring is per [facility] protocol.  Anticoagulation Medication: warfarin  Indication: Aortic Valve Replacement    Subjective   Bleeding signs/symptoms: No    Bruising: No   Major bleeding event: No  Thrombosis signs/symptoms: No  Thromboembolic event: No  Missed doses: No  Extra doses: No  Medication changes: No  Dietary changes: No  Change in health: No  Change in activity: No  Alcohol: No  Other concerns: No    Upcoming Procedures:  Does the Patient Have any upcoming procedures that require interruption in anticoagulation therapy? no  Does the patient require bridging? no      Anticoagulation Summary  As of 10/18/2024      INR goal:  2.5-3.5   TTR:  33.2% (1 y)   INR used for dosin.90 (10/18/2024)   Weekly warfarin total:  34.5 mg               Assessment/Plan   Therapeutic     1. New dose: Spoke to Veronica with dosing instructions and next testing date.  Will maintain dose and patient will retest in 4 days.    2. Next INR:  4 days      Education provided to patient during the visit:  Patient instructed to call in interim with questions, concerns and changes.

## 2024-10-22 ENCOUNTER — ANTICOAGULATION - WARFARIN VISIT (OUTPATIENT)
Dept: CARDIOLOGY | Facility: CLINIC | Age: 69
End: 2024-10-22
Payer: COMMERCIAL

## 2024-10-22 DIAGNOSIS — Z95.2 AORTIC VALVE REPLACED: Primary | ICD-10-CM

## 2024-10-22 DIAGNOSIS — G45.9 TIA (TRANSIENT ISCHEMIC ATTACK): ICD-10-CM

## 2024-10-22 LAB
INR IN PPP BY COAGULATION ASSAY EXTERNAL: 4.4
PROTHROMBIN TIME (PT) IN PPP BY COAGULATION ASSAY EXTERNAL: NORMAL

## 2024-10-22 NOTE — PROGRESS NOTES
Patient identification verified with 2 identifiers.    Location: [facility] Patient Self-Testing Program [phone]    Referring Physician: Dr. Pricila Beavers  Enrollment/ Re-enrollment date: 10/8/2024   INR Goal: 2.5-3.5  INR monitoring is per [facility] protocol.  Anticoagulation Medication: warfarin  Indication: Aortic Valve Replacement    Subjective   Bleeding signs/symptoms: No    Bruising: No   Major bleeding event: No  Thrombosis signs/symptoms: No  Thromboembolic event: No  Missed doses: No  Extra doses: No  Medication changes: No  Dietary changes: No  Change in health: No  Change in activity: No  Alcohol: No  Other concerns: No    Upcoming Procedures:  Does the Patient Have any upcoming procedures that require interruption in anticoagulation therapy? no  Does the patient require bridging? no      Anticoagulation Summary  As of 10/22/2024      INR goal:  2.5-3.5   TTR:  33.2% (1 y)   INR used for dosin.40 (10/22/2024)   Weekly warfarin total:  33 mg               Assessment/Plan   Supratherapeutic     1. New dose:  Hold Warfarin 10/22.  Reduce TWD     2. Next INR: 1 week      Education provided to patient during the visit:  Patient instructed to call in interim with questions, concerns and changes.   Patient educated on interactions between medications and warfarin.   Patient educated on dietary consistency in vitamin k consumption.   Patient educated on affects of alcohol consumption while taking warfarin.   Patient educated on signs of bleeding/clotting.   Patient educated on compliance with dosing, follow up appointments, and prescribed plan of care.

## 2024-10-26 RX ORDER — LOSARTAN POTASSIUM 25 MG/1
25 TABLET ORAL DAILY
Qty: 90 TABLET | Refills: 1 | Status: SHIPPED | OUTPATIENT
Start: 2024-10-26 | End: 2025-04-24

## 2024-10-26 RX ORDER — METOPROLOL SUCCINATE 50 MG/1
50 TABLET, EXTENDED RELEASE ORAL DAILY
Qty: 90 TABLET | Refills: 0 | Status: SHIPPED | OUTPATIENT
Start: 2024-10-26

## 2024-10-29 ENCOUNTER — ANTICOAGULATION - WARFARIN VISIT (OUTPATIENT)
Dept: CARDIOLOGY | Facility: CLINIC | Age: 69
End: 2024-10-29
Payer: COMMERCIAL

## 2024-10-29 DIAGNOSIS — G45.9 TIA (TRANSIENT ISCHEMIC ATTACK): ICD-10-CM

## 2024-10-29 DIAGNOSIS — Z95.2 AORTIC VALVE REPLACED: Primary | ICD-10-CM

## 2024-10-30 ENCOUNTER — ANTICOAGULATION - WARFARIN VISIT (OUTPATIENT)
Dept: CARDIOLOGY | Facility: CLINIC | Age: 69
End: 2024-10-30
Payer: COMMERCIAL

## 2024-10-30 DIAGNOSIS — Z95.2 AORTIC VALVE REPLACED: Primary | ICD-10-CM

## 2024-10-30 DIAGNOSIS — G45.9 TIA (TRANSIENT ISCHEMIC ATTACK): ICD-10-CM

## 2024-10-30 LAB
INR IN PPP BY COAGULATION ASSAY EXTERNAL: 4.1
INR IN PPP BY COAGULATION ASSAY EXTERNAL: 5.7
PROTHROMBIN TIME (PT) IN PPP BY COAGULATION ASSAY EXTERNAL: NORMAL
PROTHROMBIN TIME (PT) IN PPP BY COAGULATION ASSAY EXTERNAL: NORMAL

## 2024-10-31 ENCOUNTER — ANTICOAGULATION - WARFARIN VISIT (OUTPATIENT)
Dept: CARDIOLOGY | Facility: CLINIC | Age: 69
End: 2024-10-31
Payer: COMMERCIAL

## 2024-10-31 DIAGNOSIS — G45.9 TIA (TRANSIENT ISCHEMIC ATTACK): ICD-10-CM

## 2024-10-31 DIAGNOSIS — Z95.2 AORTIC VALVE REPLACED: Primary | ICD-10-CM

## 2024-11-01 ENCOUNTER — APPOINTMENT (OUTPATIENT)
Dept: CARDIOLOGY | Facility: CLINIC | Age: 69
End: 2024-11-01
Payer: COMMERCIAL

## 2024-11-01 DIAGNOSIS — Z95.2 AORTIC VALVE REPLACED: Primary | ICD-10-CM

## 2024-11-01 DIAGNOSIS — G45.9 TIA (TRANSIENT ISCHEMIC ATTACK): ICD-10-CM

## 2024-11-01 DIAGNOSIS — T82.09XS: Primary | ICD-10-CM

## 2024-11-01 LAB
POC INR: 1.7
POC PROTHROMBIN TIME: NORMAL

## 2024-11-01 RX ORDER — ENOXAPARIN SODIUM 100 MG/ML
80 INJECTION SUBCUTANEOUS EVERY 12 HOURS
Qty: 10 EACH | Refills: 0 | Status: SHIPPED | OUTPATIENT
Start: 2024-11-01

## 2024-11-04 ENCOUNTER — ANTICOAGULATION - WARFARIN VISIT (OUTPATIENT)
Dept: CARDIOLOGY | Facility: CLINIC | Age: 69
End: 2024-11-04
Payer: COMMERCIAL

## 2024-11-04 DIAGNOSIS — Z95.2 AORTIC VALVE REPLACED: Primary | ICD-10-CM

## 2024-11-04 DIAGNOSIS — G45.9 TIA (TRANSIENT ISCHEMIC ATTACK): ICD-10-CM

## 2024-11-04 NOTE — PROGRESS NOTES
Patient identification verified with 2 identifiers.    Location: [facility] Patient Self-Testing Program [phone]    Referring Physician: Brendon Cox PA-C  Enrollment/ Re-enrollment date: 24   INR Goal: 2.5-3.5  INR monitoring is per [facility] protocol.  Anticoagulation Medication: warfarin  Indication: Aortic Valve Replacement    Subjective   Bleeding signs/symptoms: No    Bruising: No   Major bleeding event: No  Thrombosis signs/symptoms: No  Thromboembolic event: No  Missed doses: No  Extra doses: No  Medication changes: No  Dietary changes: No  Change in health: No  Change in activity: No  Alcohol: No  Other concerns: No    Upcoming Procedures:  Does the Patient Have any upcoming procedures that require interruption in anticoagulation therapy? no  Does the patient require bridging? no      Anticoagulation Summary  As of 2024      INR goal:  2.5-3.5   TTR:  32.5% (1 y)   INR used for dosin.70 (2024)   Weekly warfarin total:  33 mg               Assessment/Plan   Therapeutic  Called and spoke to daughter.  Patient currently being bridged with Lovenox.  Will maintain warfarin and Lovenox and retest INR tomorrow. If therapeutic again, can discontinue Lovenox.     1. New dose: no change    2. Next INR:  1 day.      Education provided to patient during the visit:  Patient instructed to call in interim with questions, concerns and changes.   Patient educated on interactions between medications and warfarin.   Patient educated on dietary consistency in vitamin k consumption.   Patient educated on affects of alcohol consumption while taking warfarin.   Patient educated on signs of bleeding/clotting.   Patient educated on compliance with dosing, follow up appointments, and prescribed plan of care.

## 2024-11-05 ENCOUNTER — ANTICOAGULATION - WARFARIN VISIT (OUTPATIENT)
Dept: CARDIOLOGY | Facility: CLINIC | Age: 69
End: 2024-11-05
Payer: COMMERCIAL

## 2024-11-05 DIAGNOSIS — Z95.2 AORTIC VALVE REPLACED: Primary | ICD-10-CM

## 2024-11-05 DIAGNOSIS — G45.9 TIA (TRANSIENT ISCHEMIC ATTACK): ICD-10-CM

## 2024-11-05 NOTE — PROGRESS NOTES
Spoke with daughter Veronica.  She forgot to test her mom today.  She states she will test around 430.  She was notified that she should still test and send it in but will not hear back from nursing staff until tomorrow morning.  We need to have results by 4 pm in order to respond.

## 2024-11-11 NOTE — PROGRESS NOTES
Patient identification verified with 2 identifiers.    Location: [facility] Patient Self-Testing Program [phone]    Referring Physician: MAURA DOOLEY  Enrollment/ Re-enrollment date: 2024   INR Goal: 2.5-3.5  INR monitoring is per [facility] protocol.  Anticoagulation Medication: warfarin  Indication: Aortic Valve Replacement and Stroke/TIA    Subjective   Bleeding signs/symptoms: No    Bruising: No   Major bleeding event: No  Thrombosis signs/symptoms: No  Thromboembolic event: No  Missed doses: Yes  MISSED DOSE   Extra doses: No  Medication changes: No  Dietary changes: No  Change in health: No  Change in activity: No  Alcohol: No  Other concerns: No    Upcoming Procedures:  Does the Patient Have any upcoming procedures that require interruption in anticoagulation therapy? no  Does the patient require bridging? no      Anticoagulation Summary  As of 2024      INR goal:  2.5-3.5   TTR:  32.5% (1 y)   INR used for dosin.40 (2024)   Weekly warfarin total:  33 mg               Assessment/Plan   Subtherapeutic     1. New dose WILL TAKE EXTRA HALF TABLET (1.5 MG) ON    2. Next INR: 1 week      Education provided to patient during the visit:  Patient instructed to call in interim with questions, concerns and changes.   Patient educated on interactions between medications and warfarin.   Patient educated on dietary consistency in vitamin k consumption.   Patient educated on affects of alcohol consumption while taking warfarin.   Patient educated on signs of bleeding/clotting.   Patient educated on compliance with dosing, follow up appointments, and prescribed plan of care.

## 2024-11-12 ENCOUNTER — APPOINTMENT (OUTPATIENT)
Dept: PODIATRY | Facility: CLINIC | Age: 69
End: 2024-11-12
Payer: COMMERCIAL

## 2024-11-18 ENCOUNTER — TELEPHONE (OUTPATIENT)
Dept: PRIMARY CARE | Facility: CLINIC | Age: 69
End: 2024-11-18
Payer: COMMERCIAL

## 2024-11-18 DIAGNOSIS — F17.210 CIGARETTE NICOTINE DEPENDENCE WITHOUT COMPLICATION: Primary | ICD-10-CM

## 2024-11-18 NOTE — TELEPHONE ENCOUNTER
Patient is asking if there is anything she can be prescribed aside from Wellbutrin to quit smoking    Pharmacy would not fill Wellbutrin due to being on Keppra for seizures     Prefers oral medication

## 2024-11-18 NOTE — TELEPHONE ENCOUNTER
Nicotine patches?  Has she tried those?  How much is she currently smoking?  Is she also still using marijuana?

## 2024-11-18 NOTE — TELEPHONE ENCOUNTER
PT stated she has used those in the past and did not help stop  She is smoking 1 pack a day   She does still smoke  Mariajuana

## 2024-11-19 ENCOUNTER — TELEPHONE (OUTPATIENT)
Dept: CARDIOLOGY | Facility: CLINIC | Age: 69
End: 2024-11-19
Payer: COMMERCIAL

## 2024-11-19 ENCOUNTER — ANTICOAGULATION - WARFARIN VISIT (OUTPATIENT)
Dept: CARDIOLOGY | Facility: CLINIC | Age: 69
End: 2024-11-19
Payer: COMMERCIAL

## 2024-11-19 DIAGNOSIS — G45.9 TIA (TRANSIENT ISCHEMIC ATTACK): ICD-10-CM

## 2024-11-19 DIAGNOSIS — Z95.2 AORTIC VALVE REPLACED: Primary | ICD-10-CM

## 2024-11-19 NOTE — TELEPHONE ENCOUNTER
INR 1.8. VM BOX IS FULL. I ALSO CALLED PT DAUGHTER AND CALL WILL NOT GO THROUGH. WILL KEEP TRYING TO REACH PT.

## 2024-11-22 ENCOUNTER — ANTICOAGULATION - WARFARIN VISIT (OUTPATIENT)
Dept: CARDIOLOGY | Facility: CLINIC | Age: 69
End: 2024-11-22
Payer: COMMERCIAL

## 2024-11-22 DIAGNOSIS — Z95.2 AORTIC VALVE REPLACED: Primary | ICD-10-CM

## 2024-11-22 DIAGNOSIS — G45.9 TIA (TRANSIENT ISCHEMIC ATTACK): ICD-10-CM

## 2024-11-22 LAB
INR IN PPP BY COAGULATION ASSAY EXTERNAL: 2.7 (ref 2.5–3.5)
PROTHROMBIN TIME (PT) IN PPP BY COAGULATION ASSAY EXTERNAL: NORMAL

## 2024-11-22 NOTE — PROGRESS NOTES
Patient identification verified with 2 identifiers.    Location: [facility] Patient Self-Testing Program [phone]    Referring Physician: Brendon Cox PA-C  Enrollment/ Re-enrollment date: 24   INR Goal: 2.5-3.5  INR monitoring is per [facility] protocol.  Anticoagulation Medication: warfarin  Indication: Aortic Valve Replacement    Subjective   Bleeding signs/symptoms: No    Bruising: No   Major bleeding event: No  Thrombosis signs/symptoms: No  Thromboembolic event: No  Missed doses: Yes  Extra doses: Yes  Medication changes: No  Dietary changes: No  Change in health: No  Change in activity: No  Alcohol: No  Other concerns: No    Upcoming Procedures:  Does the Patient Have any upcoming procedures that require interruption in anticoagulation therapy? no  Does the patient require bridging? no      Anticoagulation Summary  As of 2024      INR goal:  2.5-3.5   TTR:  34.0% (1.1 y)   INR used for dosin.70 (2024)   Weekly warfarin total:  33 mg               Assessment/Plan   Therapeutic  Called and spoke to daughter.       1. New dose: no change    2. Next INR: 1 week      Education provided to patient during the visit:  Patient instructed to call in interim with questions, concerns and changes.   Patient educated on interactions between medications and warfarin.   Patient educated on dietary consistency in vitamin k consumption.   Patient educated on affects of alcohol consumption while taking warfarin.   Patient educated on signs of bleeding/clotting.   Patient educated on compliance with dosing, follow up appointments, and prescribed plan of care.

## 2024-11-22 NOTE — PROGRESS NOTES
Spoke with pt's daughter Veronica.  Veronica states pt missed her dose on Sun 11/17.  She tested 11/18 and gave pt 2 tablets on Mon 11/18.  Requested she test pt again today.  Veronica agreed and stated she will test and send it right over.

## 2024-12-02 ENCOUNTER — ANTICOAGULATION - WARFARIN VISIT (OUTPATIENT)
Dept: CARDIOLOGY | Facility: CLINIC | Age: 69
End: 2024-12-02
Payer: COMMERCIAL

## 2024-12-02 DIAGNOSIS — G45.9 TIA (TRANSIENT ISCHEMIC ATTACK): ICD-10-CM

## 2024-12-02 DIAGNOSIS — Z95.2 AORTIC VALVE REPLACED: Primary | ICD-10-CM

## 2024-12-02 LAB
INR IN PPP BY COAGULATION ASSAY EXTERNAL: 3.5
PROTHROMBIN TIME (PT) IN PPP BY COAGULATION ASSAY EXTERNAL: NORMAL

## 2024-12-02 NOTE — PROGRESS NOTES
Patient identification verified with 2 identifiers.    Location: [facility] Patient Self-Testing Program [phone]    Referring Physician: MAURA DOOLEY CNP  Enrollment/ Re-enrollment date: 12/08/2024   INR Goal: 2.5-3.5  INR monitoring is per [facility] protocol.  Anticoagulation Medication: warfarin  Indication: Aortic Valve Replacement    Subjective   Bleeding signs/symptoms: No    Bruising: No   Major bleeding event: No  Thrombosis signs/symptoms: No  Thromboembolic event: No  Missed doses: No  Extra doses: No  Medication changes: No  Dietary changes: No  Change in health: No  Change in activity: No  Alcohol: No  Other concerns: No    Upcoming Procedures:  Does the Patient Have any upcoming procedures that require interruption in anticoagulation therapy? no  Does the patient require bridging? no      Anticoagulation Summary  As of 12/2/2024      INR goal:  2.5-3.5   TTR:  35.6% (1.1 y)   INR used for dosing:  3.50 (12/2/2024)   Weekly warfarin total:  33 mg               Assessment/Plan   Therapeutic     1. New dose:  SPOKE TO DAUGHTER CHANTALE. WILL MAINTAIN TWD. CONFIRMED INSTRUCTIONS AND VERBALIZED CORRECTLY  2. Next INR: 1 week      Education provided to patient during the visit:  Patient instructed to call in interim with questions, concerns and changes.   Patient educated on compliance with dosing, follow up appointments, and prescribed plan of care.

## 2024-12-09 ENCOUNTER — ANTICOAGULATION - WARFARIN VISIT (OUTPATIENT)
Dept: CARDIOLOGY | Facility: CLINIC | Age: 69
End: 2024-12-09
Payer: COMMERCIAL

## 2024-12-09 DIAGNOSIS — G45.9 TIA (TRANSIENT ISCHEMIC ATTACK): ICD-10-CM

## 2024-12-09 DIAGNOSIS — Z95.2 AORTIC VALVE REPLACED: Primary | ICD-10-CM

## 2024-12-10 ENCOUNTER — ANTICOAGULATION - WARFARIN VISIT (OUTPATIENT)
Dept: CARDIOLOGY | Facility: CLINIC | Age: 69
End: 2024-12-10
Payer: COMMERCIAL

## 2024-12-10 DIAGNOSIS — Z95.2 AORTIC VALVE REPLACED: Primary | ICD-10-CM

## 2024-12-10 DIAGNOSIS — G45.9 TIA (TRANSIENT ISCHEMIC ATTACK): ICD-10-CM

## 2024-12-10 NOTE — PROGRESS NOTES
Patient identification verified with 2 identifiers.    Location: [facility] Patient Self-Testing Program [phone]    Referring Physician: Pricila Beavers CNP  Enrollment/ Re-enrollment date: 24. Renewal pended 12/10/24.    INR Goal: 2.5-3.5  INR monitoring is per [facility] protocol.  Anticoagulation Medication: warfarin  Indication: Aortic Valve Replacement    Subjective   Bleeding signs/symptoms: No    Bruising: No   Major bleeding event: No  Thrombosis signs/symptoms: No  Thromboembolic event: No  Missed doses: No  Extra doses: No  Medication changes: No  Dietary changes: No  Change in health: No  Change in activity: No  Alcohol: No  Other concerns: No    Upcoming Procedures:  Does the Patient Have any upcoming procedures that require interruption in anticoagulation therapy? no  Does the patient require bridging? no      Anticoagulation Summary  As of 12/10/2024      INR goal:  2.5-3.5   TTR:  36.9% (1.1 y)   INR used for dosin.60 (12/10/2024)   Weekly warfarin total:  33 mg               Assessment/Plan   Therapeutic     1. New dose: no change    2. Next INR:  10 days.       Education provided to patient during the visit:  Patient instructed to call in interim with questions, concerns and changes.   Patient educated on interactions between medications and warfarin.   Patient educated on dietary consistency in vitamin k consumption.   Patient educated on affects of alcohol consumption while taking warfarin.   Patient educated on signs of bleeding/clotting.

## 2024-12-17 ENCOUNTER — APPOINTMENT (OUTPATIENT)
Dept: CARDIOLOGY | Facility: CLINIC | Age: 69
End: 2024-12-17
Payer: COMMERCIAL

## 2024-12-21 LAB
INR IN PPP BY COAGULATION ASSAY EXTERNAL: 3.1
PROTHROMBIN TIME (PT) IN PPP BY COAGULATION ASSAY EXTERNAL: NORMAL

## 2024-12-23 ENCOUNTER — ANTICOAGULATION - WARFARIN VISIT (OUTPATIENT)
Dept: CARDIOLOGY | Facility: CLINIC | Age: 69
End: 2024-12-23
Payer: COMMERCIAL

## 2024-12-23 DIAGNOSIS — I10 BENIGN ESSENTIAL HYPERTENSION: ICD-10-CM

## 2024-12-23 DIAGNOSIS — Z79.4 TYPE 2 DIABETES MELLITUS WITH OTHER SPECIFIED COMPLICATION, WITH LONG-TERM CURRENT USE OF INSULIN: ICD-10-CM

## 2024-12-23 DIAGNOSIS — E11.69 TYPE 2 DIABETES MELLITUS WITH OTHER SPECIFIED COMPLICATION, WITH LONG-TERM CURRENT USE OF INSULIN: ICD-10-CM

## 2024-12-23 DIAGNOSIS — G45.9 TIA (TRANSIENT ISCHEMIC ATTACK): ICD-10-CM

## 2024-12-23 DIAGNOSIS — Z95.2 AORTIC VALVE REPLACED: Primary | ICD-10-CM

## 2024-12-23 RX ORDER — METFORMIN HYDROCHLORIDE 500 MG/1
500 TABLET, EXTENDED RELEASE ORAL 2 TIMES DAILY
Qty: 180 TABLET | Refills: 1 | Status: SHIPPED | OUTPATIENT
Start: 2024-12-23

## 2024-12-23 RX ORDER — LOSARTAN POTASSIUM 25 MG/1
25 TABLET ORAL DAILY
Qty: 90 TABLET | Refills: 1 | Status: SHIPPED | OUTPATIENT
Start: 2024-12-23 | End: 2025-06-21

## 2024-12-23 NOTE — PROGRESS NOTES
Patient identification verified with 2 identifiers.    Location: [facility] Patient Self-Testing Program [phone]    Referring Physician: Pricila Beavers CNP  Enrollment/ Re-enrollment date: 12/8/24. Renewal pended 12/2 and 12/10 secure messaged Pricila to sign renewal   INR Goal: 2.5-3.5  INR monitoring is per [facility] protocol.  Anticoagulation Medication: warfarin  Indication: Aortic Valve Replacement    Subjective   Bleeding signs/symptoms: No    Bruising: No   Major bleeding event: No  Thrombosis signs/symptoms: No  Thromboembolic event: No  Missed doses: No  Extra doses: No  Medication changes: No  Dietary changes: No  Change in health: No  Change in activity: No  Alcohol: No  Other concerns: No    Upcoming Procedures:  Does the Patient Have any upcoming procedures that require interruption in anticoagulation therapy? no  Does the patient require bridging? no    Received faxed INR self test result and called patients daughter Veronica Jernigan voicemail message with dosing schedule and instructions to retest on Friday 1/3/2025 2 weeks from last inr on 12/2 1   Call [facility] at [phone] with any complications related to ACT therapy, changes in diet, medications, social habits or general health or any questions, concerns and changes.reminder not to test or report inr results on holidays , weekends, and test before 3 pm on Friday       Anticoagulation Summary  As of 12/23/2024      INR goal:  2.5-3.5   TTR:  38.5% (1.2 y)   INR used for dosing:  3.10 (12/21/2024)   Weekly warfarin total:  33 mg               Assessment/Plan   Therapeutic     1. New dose: no change    2. Next INR: 2 weeks      Education provided to patient during the visit:  Patient instructed to call in interim with questions, concerns and changes.   Patient educated on compliance with dosing, follow up appointments, and prescribed plan of care.

## 2025-01-03 ENCOUNTER — ANTICOAGULATION - WARFARIN VISIT (OUTPATIENT)
Dept: CARDIOLOGY | Facility: CLINIC | Age: 70
End: 2025-01-03
Payer: COMMERCIAL

## 2025-01-03 DIAGNOSIS — G45.9 TIA (TRANSIENT ISCHEMIC ATTACK): ICD-10-CM

## 2025-01-03 DIAGNOSIS — Z95.2 AORTIC VALVE REPLACED: Primary | ICD-10-CM

## 2025-01-06 ENCOUNTER — ANTICOAGULATION - WARFARIN VISIT (OUTPATIENT)
Dept: CARDIOLOGY | Facility: CLINIC | Age: 70
End: 2025-01-06
Payer: COMMERCIAL

## 2025-01-06 DIAGNOSIS — G45.9 TIA (TRANSIENT ISCHEMIC ATTACK): ICD-10-CM

## 2025-01-06 DIAGNOSIS — Z95.2 AORTIC VALVE REPLACED: Primary | ICD-10-CM

## 2025-01-07 ENCOUNTER — APPOINTMENT (OUTPATIENT)
Dept: PRIMARY CARE | Facility: CLINIC | Age: 70
End: 2025-01-07
Payer: COMMERCIAL

## 2025-01-07 DIAGNOSIS — Z95.2 AORTIC VALVE REPLACED: Primary | ICD-10-CM

## 2025-01-07 NOTE — PROGRESS NOTES
Patient identification verified with 2 identifiers.    Location: [facility] Patient Self-Testing Program [phone]    Referring Physician:  Brendon Cox PA-C   Enrollment/ Re-enrollment date: sent again 25   INR Goal: 2.5-3.5  INR monitoring is per [facility] protocol.  Anticoagulation Medication: warfarin  Indication: Aortic Valve Replacement and Stroke/TIA    Subjective   Bleeding signs/symptoms: No    Bruising: No   Major bleeding event: No  Thrombosis signs/symptoms: No  Thromboembolic event: No  Missed doses: No  Extra doses: No  Medication changes: No  Dietary changes: No  Change in health: No  Change in activity: No  Alcohol: No  Other concerns: No    Upcoming Procedures:  Does the Patient Have any upcoming procedures that require interruption in anticoagulation therapy? no  Does the patient require bridging? no      Anticoagulation Summary  As of 2025      INR goal:  2.5-3.5   TTR:  38.3% (1.2 y)   INR used for dosin.30 (2025)   Weekly warfarin total:  33 mg               Assessment/Plan   Supratherapeutic     1. New dose:  hold dose and rearrange dose   2. Next INR: 1 week      Education provided to patient during the visit:  Patient instructed to call in interim with questions, concerns and changes.

## 2025-01-13 ENCOUNTER — ANTICOAGULATION - WARFARIN VISIT (OUTPATIENT)
Dept: CARDIOLOGY | Facility: CLINIC | Age: 70
End: 2025-01-13
Payer: COMMERCIAL

## 2025-01-13 DIAGNOSIS — Z95.2 AORTIC VALVE REPLACED: Primary | ICD-10-CM

## 2025-01-13 DIAGNOSIS — G45.9 TIA (TRANSIENT ISCHEMIC ATTACK): ICD-10-CM

## 2025-01-13 LAB
INR IN PPP BY COAGULATION ASSAY EXTERNAL: 2.6 (ref 2.5–3.5)
PROTHROMBIN TIME (PT) IN PPP BY COAGULATION ASSAY EXTERNAL: NORMAL

## 2025-01-13 NOTE — PROGRESS NOTES
Patient identification verified with 2 identifiers.    Location: [facility] Patient Self-Testing Program [phone]    Referring Physician: Pricila Beavers CNP  Enrollment/ Re-enrollment date: 24. Renewal pended 12/10/24.    INR Goal: 2.5-3.5  INR monitoring is per [facility] protocol.  Anticoagulation Medication: warfarin  Indication: Aortic Valve Replacement    Subjective   Bleeding signs/symptoms: No    Bruising: No   Major bleeding event: No  Thrombosis signs/symptoms: No  Thromboembolic event: No  Missed doses: No  Extra doses: No  Medication changes: No  Dietary changes: No  Change in health: No  Change in activity: No  Alcohol: No  Other concerns: No    Upcoming Procedures:  Does the Patient Have any upcoming procedures that require interruption in anticoagulation therapy? no  Does the patient require bridging? no      Anticoagulation Summary  As of 2025      INR goal:  2.5-3.5   TTR:  38.5% (1.2 y)   INR used for dosin.60 (2025)   Weekly warfarin total:  33 mg               Assessment/Plan   Therapeutic     1. New dose: no change  Spoke with daughter Veronica.  Confirmed dosing schedule.  2. Next INR: 2 weeks      Education provided to patient during the visit:  Patient instructed to call in interim with questions, concerns and changes.   Patient educated on compliance with dosing, follow up appointments, and prescribed plan of care.

## 2025-01-22 ENCOUNTER — APPOINTMENT (OUTPATIENT)
Dept: PRIMARY CARE | Facility: CLINIC | Age: 70
End: 2025-01-22
Payer: MEDICARE

## 2025-01-22 VITALS
SYSTOLIC BLOOD PRESSURE: 152 MMHG | HEART RATE: 83 BPM | DIASTOLIC BLOOD PRESSURE: 80 MMHG | WEIGHT: 167.1 LBS | RESPIRATION RATE: 14 BRPM | OXYGEN SATURATION: 96 % | TEMPERATURE: 98.1 F | BODY MASS INDEX: 30.56 KG/M2

## 2025-01-22 DIAGNOSIS — F03.C3 SEVERE DEMENTIA WITH MOOD DISTURBANCE, UNSPECIFIED DEMENTIA TYPE (MULTI): Primary | ICD-10-CM

## 2025-01-22 DIAGNOSIS — I60.8 SUBARACHNOID HEMORRHAGE DUE TO RUPTURED ANEURYSM (MULTI): ICD-10-CM

## 2025-01-22 PROCEDURE — 4010F ACE/ARB THERAPY RXD/TAKEN: CPT | Performed by: STUDENT IN AN ORGANIZED HEALTH CARE EDUCATION/TRAINING PROGRAM

## 2025-01-22 PROCEDURE — 1157F ADVNC CARE PLAN IN RCRD: CPT | Performed by: STUDENT IN AN ORGANIZED HEALTH CARE EDUCATION/TRAINING PROGRAM

## 2025-01-22 PROCEDURE — 3079F DIAST BP 80-89 MM HG: CPT | Performed by: STUDENT IN AN ORGANIZED HEALTH CARE EDUCATION/TRAINING PROGRAM

## 2025-01-22 PROCEDURE — 99214 OFFICE O/P EST MOD 30 MIN: CPT | Performed by: STUDENT IN AN ORGANIZED HEALTH CARE EDUCATION/TRAINING PROGRAM

## 2025-01-22 PROCEDURE — 1159F MED LIST DOCD IN RCRD: CPT | Performed by: STUDENT IN AN ORGANIZED HEALTH CARE EDUCATION/TRAINING PROGRAM

## 2025-01-22 PROCEDURE — 1123F ACP DISCUSS/DSCN MKR DOCD: CPT | Performed by: STUDENT IN AN ORGANIZED HEALTH CARE EDUCATION/TRAINING PROGRAM

## 2025-01-22 PROCEDURE — 3077F SYST BP >= 140 MM HG: CPT | Performed by: STUDENT IN AN ORGANIZED HEALTH CARE EDUCATION/TRAINING PROGRAM

## 2025-01-22 PROCEDURE — 1160F RVW MEDS BY RX/DR IN RCRD: CPT | Performed by: STUDENT IN AN ORGANIZED HEALTH CARE EDUCATION/TRAINING PROGRAM
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
## 2025-01-22 NOTE — PATIENT INSTRUCTIONS
- You need to schedule a follow-up with [facility] their number is: [phone].   - I want you guys to look as a family at independent, assisted living facilities in the area.   - Call [facility] to see if she qualifies for Disability or Medicaid. You likely would be eligible for Dual Enrollment.   - I strongly advise you NO LONGER DRIVE.

## 2025-01-24 ENCOUNTER — ANTICOAGULATION - WARFARIN VISIT (OUTPATIENT)
Dept: CARDIOLOGY | Facility: CLINIC | Age: 70
End: 2025-01-24
Payer: MEDICARE

## 2025-01-24 DIAGNOSIS — Z95.2 AORTIC VALVE REPLACED: Primary | ICD-10-CM

## 2025-01-24 DIAGNOSIS — G45.9 TIA (TRANSIENT ISCHEMIC ATTACK): ICD-10-CM

## 2025-01-24 NOTE — PROGRESS NOTES
Patient identification verified with 2 identifiers.    Location: [facility] Patient Self-Testing Program [phone]    Referring Physician: Pricila Beavers CNP  Enrollment/ Re-enrollment date: 24. Renewal pended 12/10/24.    INR Goal: 2.5-3.5  INR monitoring is per [facility] protocol.  Anticoagulation Medication: warfarin  Indication: Aortic Valve Replacement    Subjective   Bleeding signs/symptoms: No    Bruising: No   Major bleeding event: No  Thrombosis signs/symptoms: No  Thromboembolic event: No  Missed doses: No  Extra doses: No  Medication changes: No  Dietary changes: No  Change in health: No  Change in activity: No  Alcohol: No  Other concerns: No    Upcoming Procedures:  Does the Patient Have any upcoming procedures that require interruption in anticoagulation therapy? no  Does the patient require bridging? no      Anticoagulation Summary  As of 2025      INR goal:  2.5-3.5   TTR:  40.0% (1.3 y)   INR used for dosin.50 (2025)   Weekly warfarin total:  33 mg               Assessment/Plan   Therapeutic     1. New dose: Spoke to daughter with dosing instructions and next testing date.    2. Next INR: 2 weeks      Education provided to patient during the visit:  Patient instructed to call in interim with questions, concerns and changes.

## 2025-01-30 ENCOUNTER — TELEPHONE (OUTPATIENT)
Dept: PRIMARY CARE | Facility: CLINIC | Age: 70
End: 2025-01-30
Payer: MEDICARE

## 2025-01-30 DIAGNOSIS — Z95.2 H/O AORTIC VALVE REPLACEMENT: ICD-10-CM

## 2025-01-30 RX ORDER — WARFARIN 3 MG/1
TABLET ORAL
Qty: 180 TABLET | Refills: 0 | Status: SHIPPED | OUTPATIENT
Start: 2025-01-30

## 2025-02-04 ENCOUNTER — APPOINTMENT (OUTPATIENT)
Dept: PODIATRY | Facility: CLINIC | Age: 70
End: 2025-02-04
Payer: COMMERCIAL

## 2025-02-07 ENCOUNTER — ANTICOAGULATION - WARFARIN VISIT (OUTPATIENT)
Dept: CARDIOLOGY | Facility: CLINIC | Age: 70
End: 2025-02-07
Payer: MEDICARE

## 2025-02-07 DIAGNOSIS — G45.9 TIA (TRANSIENT ISCHEMIC ATTACK): ICD-10-CM

## 2025-02-07 DIAGNOSIS — Z95.2 AORTIC VALVE REPLACED: Primary | ICD-10-CM

## 2025-02-07 LAB
INR IN PPP BY COAGULATION ASSAY EXTERNAL: 6
PROTHROMBIN TIME (PT) IN PPP BY COAGULATION ASSAY EXTERNAL: NORMAL

## 2025-02-07 NOTE — PROGRESS NOTES
Patient identification verified with 2 identifiers.    Location:  [facility] Patient Self-Testing Program [phone]    Referring Physician: MAURA DOOLEY CNP  Enrollment/ Re-enrollment date: 25   RENEWAL NEEDS TO UPDATED ONCE PT FINDS NEW CARDIOLOGIST-PER PREVIOUS NOTE  INR Goal: 2.5-3.5  INR monitoring is per [facility] protocol.  Anticoagulation Medication: warfarin  Indication: Aortic Valve Replacement    Subjective   Bleeding signs/symptoms:   DAUGHTER STATES THAT SHE HAS NOT NOTICED ANY BLEEDING AND PT HASN'T MENTIONED ANYTHING ABOUT BLEEDING.  DAUGHTER WILL MONITOR FOR BLEEDING AND GO TO ER IF ANY OCCURS.    Bruising: No   Major bleeding event: No  Thrombosis signs/symptoms: No  Thromboembolic event: No  Missed doses: No  Extra doses: No  Medication changes: Yes  DAUGHTER STATES THAT PT HAS BEEN TAKING DAYQUIL AND TYLENOL FOR COLD SYMPTOMS.  SHE WILL STOP TAKING.  Dietary changes: No  Change in health: No  Change in activity: No  Alcohol: No  Other concerns: No    Upcoming Procedures:  Does the Patient Have any upcoming procedures that require interruption in anticoagulation therapy? no  Does the patient require bridging? no      Anticoagulation Summary  As of 2025      INR goal:  2.5-3.5   TTR:  39.6% (1.3 y)   INR used for dosin.00 (2025)   Weekly warfarin total:  33 mg               Assessment/Plan   Supratherapeutic   UNABLE TO REACH REFERRING CNP OR PCP.  DR. HU PAGED AND GIVEN INR VALUE OF 6.0, BRIEF PMH, POTENTIAL CONTRIBUTING FACTOR OF ELEVATED INR.   PLAN OF CARE IS TO HOLD TODAY AND TOMORROW'S WARFARIN DOSE, RESUME REGULAR DOSE ON 25 AND RECHECK 02/10/25 MORNING.   1. New dose:        2. Next INR:  3 DAYS    I SPOKE TO PT'S DAUGHTER CONFIRMING CURRENT WARFARIN DOSING.   PT WILL  HOLD TODAY AND TOMORROW'S DOSE OF WARFARIN, TAKE USUAL DOSE ON 25.  PT 'S DAUGHTER VERBALIZED UNDERSTANDING OF THESE DOSING INSTRUCTIONS.  DAUGHTER STATES THAT HER SON WILL KEEP AN EYE ON PT BECAUSE  DAUGHTER IS ON THE WAY TO WORK.  FAMILY WILL MONITOR FOR BLEEDING AND GO TO ER IF ANY OCCURS.    Education provided to patient during the visit:  Patient instructed to call in interim with questions, concerns and changes.   Patient educated on interactions between medications and warfarin.   Patient educated on dietary consistency in vitamin k consumption.   Patient educated on signs of bleeding/clotting.

## 2025-02-10 ENCOUNTER — ANTICOAGULATION - WARFARIN VISIT (OUTPATIENT)
Dept: CARDIOLOGY | Facility: CLINIC | Age: 70
End: 2025-02-10
Payer: MEDICARE

## 2025-02-10 DIAGNOSIS — G45.9 TIA (TRANSIENT ISCHEMIC ATTACK): ICD-10-CM

## 2025-02-10 DIAGNOSIS — Z95.2 AORTIC VALVE REPLACED: Primary | ICD-10-CM

## 2025-02-10 LAB
INR IN PPP BY COAGULATION ASSAY EXTERNAL: 1.7
PROTHROMBIN TIME (PT) IN PPP BY COAGULATION ASSAY EXTERNAL: NORMAL

## 2025-02-10 NOTE — PROGRESS NOTES
Patient identification verified with 2 identifiers.     Location: [facility] - [address]  [place], [place] [address] [phone]      Referring Physician: Pricila Beavers MD  Patient to establish care with DAMIÁN Keys-CNP, on 2025 - will need UPDATED order at that time.  Enrollment/ Re-enrollment date:  2024.  Signed [facility] Physician Order Form (Renewal) received from Alex gudino MD, on 2025; however, patient was a NO SHOW for appointment to establish care.  INR Goal: 2.5-3.5  INR monitoring is per [facility] protocol.  Anticoagulation Medication: warfarin  Indication: Aortic Valve Replacement    Received faxed INR self-test results and called patient's daughter, Veronica, who manages patient's warfarin therapy for her.  No answer.  Voicemail message was left detailing plan of care (see below).    Subjective   Bleeding signs/symptoms: No    Bruising: No   Major bleeding event: No  Thrombosis signs/symptoms: No  Thromboembolic event: No  Missed doses: No  Extra doses: No  Medication changes: No  Dietary changes: No  Change in health: No  Change in activity: No  Alcohol: No  Other concerns: No    Upcoming Procedures:  Does the Patient Have any upcoming procedures that require interruption in anticoagulation therapy? no  Does the patient require bridging? no    TWO doses of warfarin were HELD and TWD was MAINTAINED at time of last PST on 2025.    Voicemail message left for patient (daughter Veronica) requesting a return call to report any changes in diet, medications, social habits, or general health.       Anticoagulation Summary  As of 2/10/2025      INR goal:  2.5-3.5   TTR:  39.5% (1.3 y)   INR used for dosin.70 (2/10/2025)   Weekly warfarin total:  33 mg               Assessment/Plan   Subtherapeutic, INR less than 2, mechanical valve: Dr. Wolf notified of INR results via telephone at 1548hrs; results read back correctly by  physician.     Per Dr. Wolf's orders:  1. New dose:  will have patient take 6mg of warfarin today (as scheduled), give an extra 1.5mg of warfarin tomorrow, February 11, 2025, as a one-time dose adjustment (total dose: 6mg instead of 4.5mg), and take scheduled dose of 4.5mg on Wednesday, February 12, 2025.      2. Next INR: Thursday, February 13, 2025.    Current dose schedule left as a part of message on [employer] voicemail and I requested a return call to [phone] and [phone] to confirm receipt of this message.    Education provided to patient during the visit:  Patient instructed to call in interim with questions, concerns and changes.   Patient educated on interactions between medications and warfarin.   Patient educated on dietary consistency in vitamin k consumption.   Patient educated on affects of alcohol consumption while taking warfarin.   Patient educated on signs of bleeding/clotting.   Patient educated on compliance with dosing, follow up appointments, and prescribed plan of care.

## 2025-02-12 DIAGNOSIS — I10 BENIGN ESSENTIAL HYPERTENSION: ICD-10-CM

## 2025-02-13 RX ORDER — METOPROLOL SUCCINATE 50 MG/1
50 TABLET, EXTENDED RELEASE ORAL DAILY
Qty: 90 TABLET | Refills: 3 | Status: SHIPPED | OUTPATIENT
Start: 2025-02-13

## 2025-02-14 ENCOUNTER — TELEPHONE (OUTPATIENT)
Dept: PRIMARY CARE | Facility: CLINIC | Age: 70
End: 2025-02-14
Payer: MEDICARE
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
## 2025-02-17 ENCOUNTER — ANTICOAGULATION - WARFARIN VISIT (OUTPATIENT)
Dept: CARDIOLOGY | Facility: CLINIC | Age: 70
End: 2025-02-17
Payer: MEDICARE

## 2025-02-17 DIAGNOSIS — G45.9 TIA (TRANSIENT ISCHEMIC ATTACK): ICD-10-CM

## 2025-02-17 DIAGNOSIS — Z95.2 AORTIC VALVE REPLACED: Primary | ICD-10-CM

## 2025-02-17 NOTE — PROGRESS NOTES
Patient identification verified with 2 identifiers.    Location: [facility] Patient Self-Testing Program [phone]    Referring Physician: Alex Graves MD PhD   Enrollment/ Re-enrollment date: 2025   INR Goal: 2.5-3.5  INR monitoring is per [facility] protocol.  Anticoagulation Medication: warfarin  Indication: Aortic Valve Replacement and Stroke/TIA    Subjective   Bleeding signs/symptoms: No    Bruising: No   Major bleeding event: No  Thrombosis signs/symptoms: No  Thromboembolic event: No  Missed doses: Yes  pt missed 1/2 dose  Extra doses: No  Medication changes: No  Dietary changes: No  Change in health: No  Change in activity: No  Alcohol: No  Other concerns: No    Upcoming Procedures:  Does the Patient Have any upcoming procedures that require interruption in anticoagulation therapy? no  Does the patient require bridging? no      Anticoagulation Summary  As of 2025      INR goal:  2.5-3.5   TTR:  38.9% (1.3 y)   INR used for dosin.40 (2025)   Weekly warfarin total:  33 mg               Assessment/Plan   Subtherapeutic     1. New dose: no change    2. Next INR:  Thur       Education provided to patient during the visit:  Patient instructed to call in interim with questions, concerns and changes.

## 2025-02-20 ENCOUNTER — ANTICOAGULATION - WARFARIN VISIT (OUTPATIENT)
Dept: CARDIOLOGY | Facility: CLINIC | Age: 70
End: 2025-02-20
Payer: MEDICARE

## 2025-02-20 DIAGNOSIS — Z95.2 AORTIC VALVE REPLACED: Primary | ICD-10-CM

## 2025-02-20 DIAGNOSIS — G45.9 TIA (TRANSIENT ISCHEMIC ATTACK): ICD-10-CM

## 2025-02-20 LAB
INR IN PPP BY COAGULATION ASSAY EXTERNAL: 2.2
PROTHROMBIN TIME (PT) IN PPP BY COAGULATION ASSAY EXTERNAL: NORMAL

## 2025-02-21 ENCOUNTER — ANTICOAGULATION - WARFARIN VISIT (OUTPATIENT)
Dept: CARDIOLOGY | Facility: CLINIC | Age: 70
End: 2025-02-21
Payer: MEDICARE

## 2025-02-21 DIAGNOSIS — G45.9 TIA (TRANSIENT ISCHEMIC ATTACK): ICD-10-CM

## 2025-02-21 DIAGNOSIS — Z95.2 AORTIC VALVE REPLACED: Primary | ICD-10-CM

## 2025-02-21 NOTE — PROGRESS NOTES
Patient identification verified with 2 identifiers.    Location: [facility] Patient Self-Testing Program [phone]    Referring Physician: Alex Graves MD PhD   Enrollment/ Re-enrollment date: 2025   INR Goal: 2.5-3.5  INR monitoring is per [facility] protocol.  Anticoagulation Medication: warfarin  Indication: Aortic Valve Replacement and Stroke/TIA    Subjective   Bleeding signs/symptoms: No    Bruising: No   Major bleeding event: No  Thrombosis signs/symptoms: No  Thromboembolic event: No  Missed doses: No  Extra doses: No  Medication changes: No  Dietary changes: No  Change in health: No  Change in activity: No  Alcohol: No  Other concerns: No    Upcoming Procedures:  Does the Patient Have any upcoming procedures that require interruption in anticoagulation therapy? no  Does the patient require bridging? no      Anticoagulation Summary  As of 2025      INR goal:  2.5-3.5   TTR:  38.6% (1.3 y)   INR used for dosin.40 (2025)   Weekly warfarin total:  33 mg               Assessment/Plan   Subtherapeutic     1. New dose: no change    2. Next INR:  6 days      Education provided to patient during the visit:  Patient instructed to call in interim with questions, concerns and changes.   Patient educated on compliance with dosing, follow up appointments, and prescribed plan of care.

## 2025-02-23 DIAGNOSIS — F41.9 RECURRENT MODERATE MAJOR DEPRESSIVE DISORDER WITH ANXIETY (MULTI): ICD-10-CM

## 2025-02-23 DIAGNOSIS — F33.1 RECURRENT MODERATE MAJOR DEPRESSIVE DISORDER WITH ANXIETY (MULTI): ICD-10-CM

## 2025-02-24 RX ORDER — MIRTAZAPINE 15 MG/1
15 TABLET, FILM COATED ORAL NIGHTLY
Qty: 90 TABLET | Refills: 1 | Status: SHIPPED | OUTPATIENT
Start: 2025-02-24

## 2025-02-27 LAB
INR IN PPP BY COAGULATION ASSAY EXTERNAL: 1.3
PROTHROMBIN TIME (PT) IN PPP BY COAGULATION ASSAY EXTERNAL: NORMAL

## 2025-02-28 ENCOUNTER — ANTICOAGULATION - WARFARIN VISIT (OUTPATIENT)
Dept: CARDIOLOGY | Facility: CLINIC | Age: 70
End: 2025-02-28
Payer: MEDICARE

## 2025-02-28 DIAGNOSIS — Z95.2 AORTIC VALVE REPLACED: Primary | ICD-10-CM

## 2025-02-28 DIAGNOSIS — T82.09XS: ICD-10-CM

## 2025-02-28 DIAGNOSIS — L21.9 SEBORRHEIC DERMATITIS: ICD-10-CM

## 2025-02-28 DIAGNOSIS — G45.9 TIA (TRANSIENT ISCHEMIC ATTACK): ICD-10-CM

## 2025-02-28 RX ORDER — ENOXAPARIN SODIUM 100 MG/ML
80 INJECTION SUBCUTANEOUS EVERY 12 HOURS
Qty: 10 EACH | Refills: 0 | Status: SHIPPED | OUTPATIENT
Start: 2025-02-28

## 2025-02-28 RX ORDER — KETOCONAZOLE 20 MG/G
CREAM TOPICAL 2 TIMES DAILY
Qty: 30 G | Refills: 0 | Status: SHIPPED | OUTPATIENT
Start: 2025-02-28

## 2025-02-28 NOTE — PROGRESS NOTES
Patient identification verified with 2 identifiers.    Location: [place] [facility] Patient Self-Testing Program [phone]    Referring Physician: Alex Graves MD PhD   Enrollment/ Re-enrollment date: 2025   INR Goal: 2.5-3.5  INR monitoring is per [facility] protocol.  Anticoagulation Medication: warfarin  Indication: Aortic Valve Replacement and Stroke/TIA    Subjective   Bleeding signs/symptoms: No    Bruising: No   Major bleeding event: No  Thrombosis signs/symptoms: No  Thromboembolic event: No  Missed doses: Yes  Extra doses: No  Medication changes: No  Dietary changes: No  Change in health: No  Change in activity: No  Alcohol: No  Other concerns: No    Upcoming Procedures:  Does the Patient Have any upcoming procedures that require interruption in anticoagulation therapy? no  Does the patient require bridging? yes      Anticoagulation Summary  As of 2025      INR goal:  2.5-3.5   TTR:  38.2% (1.4 y)   INR used for dosin.30 (2025)   Weekly warfarin total:  36 mg             Dr. Cox notified of today's INR and he has ordered LOVENOX BID. New dosing, Lovenox and next testing date reviewed with Veronica and she verbalizes understanding.     Assessment/Plan   Subtherapeutic     1. New dose: dose increase, start LOVENOX BID    2. Next INR:  3 days      Education provided to patient during the visit:  Patient instructed to call in interim with questions, concerns and changes.   Patient educated on compliance with dosing, follow up appointments, and prescribed plan of care.

## 2025-03-03 ENCOUNTER — ANTICOAGULATION - WARFARIN VISIT (OUTPATIENT)
Dept: CARDIOLOGY | Facility: CLINIC | Age: 70
End: 2025-03-03
Payer: MEDICARE

## 2025-03-03 DIAGNOSIS — Z95.2 AORTIC VALVE REPLACED: Primary | ICD-10-CM

## 2025-03-03 DIAGNOSIS — G45.9 TIA (TRANSIENT ISCHEMIC ATTACK): ICD-10-CM

## 2025-03-03 NOTE — PROGRESS NOTES
Patient identification verified with 2 identifiers.    Location: [facility] Patient Self-Testing Program [phone]    Referring Physician: Alex Graves MD PhD   Enrollment/ Re-enrollment date: 2026   INR Goal: 2.5-3.5  INR monitoring is per [facility] protocol.  Anticoagulation Medication: warfarin  Indication: Aortic Valve Replacement and Stroke/TIA    Subjective   Bleeding signs/symptoms: No    Bruising: No   Major bleeding event: No  Thrombosis signs/symptoms: No  Thromboembolic event: No  Missed doses: No  Extra doses: No  Medication changes: No  Dietary changes: No  Change in health: No  Change in activity: No  Alcohol: No  Other concerns: No    Upcoming Procedures:  Does the Patient Have any upcoming procedures that require interruption in anticoagulation therapy? no  Does the patient require bridging? no      Anticoagulation Summary  As of 3/3/2025      INR goal:  2.5-3.5   TTR:  38.0% (1.4 y)   INR used for dosin.70 (3/3/2025)   Weekly warfarin total:  36 mg               Called daughter, Veronica to speak about 2.7 INR. Patient was currently on Lovenox. Reached out to Dr. Graves who signed the Coumadin referral order and informed him of the therapeutic INR today. The patient has had 6 missed cardiology appointments in the past  6 months. The patient needs to be established with cardiology, the NP she was seeing left the clinic and she has not reestablished care as of yet. This nurse reached out to the scheduling line at [facility] and had the patient scheduled for next  at 1130 with Brendon Cox. The patients daughter, Veronica, was informed that this appointment was mandatory for cardiology to continue monitoring her mother, Rayne's INR. Veronica wrote down the appointment time and date and read it back. Dr. Graves stated that the patient can go off of Lovenox. Will retest in 4 days. Veronica stated that they are going to look at a facility on Thursday 3/6/2025 for permanent placement for  Suze. This nurse stated with encouraging words to make sure that she comes to this cardiology appointment incase anything with placement falls through and she needs someone to monitor her coumadin moving forward. Veronica verbalized understanding.       Assessment/Plan   Therapeutic     1. New dose: no change    2. Next INR:  4 days      Education provided to patient during the visit:  Patient instructed to call in interim with questions, concerns and changes.   Patient educated on interactions between medications and warfarin.

## 2025-03-05 ENCOUNTER — APPOINTMENT (OUTPATIENT)
Dept: PODIATRY | Facility: CLINIC | Age: 70
End: 2025-03-05
Payer: MEDICARE

## 2025-03-10 ENCOUNTER — ANTICOAGULATION - WARFARIN VISIT (OUTPATIENT)
Dept: CARDIOLOGY | Facility: CLINIC | Age: 70
End: 2025-03-10

## 2025-03-10 ENCOUNTER — OFFICE VISIT (OUTPATIENT)
Dept: CARDIOLOGY | Facility: CLINIC | Age: 70
End: 2025-03-10
Payer: MEDICARE

## 2025-03-10 VITALS
DIASTOLIC BLOOD PRESSURE: 75 MMHG | BODY MASS INDEX: 29.08 KG/M2 | WEIGHT: 158 LBS | HEART RATE: 90 BPM | HEIGHT: 62 IN | SYSTOLIC BLOOD PRESSURE: 125 MMHG | OXYGEN SATURATION: 94 %

## 2025-03-10 DIAGNOSIS — I10 BENIGN ESSENTIAL HYPERTENSION: Primary | ICD-10-CM

## 2025-03-10 DIAGNOSIS — G45.9 TIA (TRANSIENT ISCHEMIC ATTACK): ICD-10-CM

## 2025-03-10 DIAGNOSIS — Z86.79 HISTORY OF THORACIC AORTIC ANEURYSM REPAIR: ICD-10-CM

## 2025-03-10 DIAGNOSIS — Z95.2 AORTIC VALVE REPLACED: Primary | ICD-10-CM

## 2025-03-10 DIAGNOSIS — Z98.890 HISTORY OF THORACIC AORTIC ANEURYSM REPAIR: ICD-10-CM

## 2025-03-10 DIAGNOSIS — I25.10 CAD, MULTIPLE VESSEL: ICD-10-CM

## 2025-03-10 DIAGNOSIS — T82.09XS: ICD-10-CM

## 2025-03-10 LAB
ATRIAL RATE: 91 BPM
P AXIS: 68 DEGREES
P OFFSET: 186 MS
P ONSET: 134 MS
PR INTERVAL: 178 MS
Q ONSET: 223 MS
QRS COUNT: 15 BEATS
QRS DURATION: 114 MS
QT INTERVAL: 404 MS
QTC CALCULATION(BAZETT): 496 MS
QTC FREDERICIA: 464 MS
R AXIS: 83 DEGREES
T AXIS: 52 DEGREES
T OFFSET: 425 MS
VENTRICULAR RATE: 91 BPM

## 2025-03-10 PROCEDURE — 3008F BODY MASS INDEX DOCD: CPT | Performed by: PHYSICIAN ASSISTANT

## 2025-03-10 PROCEDURE — 4010F ACE/ARB THERAPY RXD/TAKEN: CPT | Performed by: PHYSICIAN ASSISTANT

## 2025-03-10 PROCEDURE — 99214 OFFICE O/P EST MOD 30 MIN: CPT | Performed by: PHYSICIAN ASSISTANT

## 2025-03-10 PROCEDURE — 1157F ADVNC CARE PLAN IN RCRD: CPT | Performed by: PHYSICIAN ASSISTANT

## 2025-03-10 PROCEDURE — 1159F MED LIST DOCD IN RCRD: CPT | Performed by: PHYSICIAN ASSISTANT

## 2025-03-10 PROCEDURE — 3074F SYST BP LT 130 MM HG: CPT | Performed by: PHYSICIAN ASSISTANT

## 2025-03-10 PROCEDURE — 1123F ACP DISCUSS/DSCN MKR DOCD: CPT | Performed by: PHYSICIAN ASSISTANT

## 2025-03-10 PROCEDURE — 3078F DIAST BP <80 MM HG: CPT | Performed by: PHYSICIAN ASSISTANT

## 2025-03-10 PROCEDURE — 1160F RVW MEDS BY RX/DR IN RCRD: CPT | Performed by: PHYSICIAN ASSISTANT

## 2025-03-10 PROCEDURE — 93005 ELECTROCARDIOGRAM TRACING: CPT | Performed by: PHYSICIAN ASSISTANT

## 2025-03-10 NOTE — PROGRESS NOTES
"Chief Complaint:   Follow-up, mechanical AVR     History Of Present Illness:    Suze Najera is a 69 y.o. female presenting with history of mechanical AVR on indefinite warfarin for CVA prophylaxis (mixed compliance) with most recent INR 2.7 after Lovenox bridging.  Overall she is feeling well from a functional standpoint without TIA/CVA symptoms.  Patient denies chest pain, chest pressure, palpitations, dyspnea on exertion, shortness of breath at rest, diaphoresis, nausea/vomiting, back pain, headache, lightheadedness, dizziness, syncope or presyncopal episodes, active bleeding signs or symptoms, excessive weight gain, muscle or joint pain, claudication.       Last Recorded Vitals:  Vitals:    03/10/25 1143   BP: 125/75   BP Location: Left arm   Patient Position: Sitting   BP Cuff Size: Adult   Pulse: 90   SpO2: 94%   Weight: 71.7 kg (158 lb)   Height: 1.575 m (5' 2\")       Past Medical History:  She has a past medical history of Aortic aneurysm without rupture (CMS-[facility]) (03/18/2023), Aphasia of unknown origin (03/18/2023), Bicuspid aortic valve (08/02/2018), Concussion with loss of consciousness status unknown, initial encounter (08/10/2018), Conjunctival hemorrhage, left eye (01/04/2021), COPD (chronic obstructive pulmonary disease) (Multi), Diabetes mellitus (Multi), History of colonoscopy (01/10/2017), History of mammogram (09/09/2023), Major depressive disorder, single episode, moderate (Multi) (12/23/2019), 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), Nicotine dependence (03/18/2023), Nontraumatic subarachnoid hemorrhage, unspecified (Multi) (09/20/2017), Other amnesia (10/18/2017), Other hypersomnia (11/13/2017), Other specified disorders of adrenal gland (06/15/2017), Personal history of (corrected) 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, Personal history of COVID-19 (04/22/2021), Personal history of diseases of the skin and subcutaneous tissue (06/26/2017), Personal history of malignant " melanoma of skin (01/18/2018), Personal history of other benign neoplasm (08/21/2018), Personal history of other diseases of the circulatory system, Personal history of other diseases of urinary system (01/04/2021), Personal history of other diseases of urinary system (01/26/2018), Personal history of other infectious and parasitic diseases (05/21/2022), Personal history of other specified conditions (12/15/2022), Personal history of transient ischemic attack (TIA), and cerebral infarction without residual deficits (01/18/2018), Personal history of urinary (tract) infections (03/27/2018), Polycythemia (03/18/2023), Screening for cervical cancer (07/29/2020), Sleep apnea, Unspecified speech disturbances (09/20/2017), and Urinary tract infection, site not specified (09/29/2020).    Past Surgical History:  She has a past surgical history that includes Cardiac surgery (01/09/2017); Other surgical history (03/13/2017); Other surgical history (01/28/2019); Aortic valve replacement (04/20/2017); Tubal ligation (03/13/2017); Other surgical history (03/13/2017); Cardiac catheterization (N/A, 10/02/2023); and Ankle surgery.      Social History:  She reports that she has been smoking cigarettes. She has a 51 pack-year smoking history. She has been exposed to tobacco smoke. She has never used smokeless tobacco. She reports that she does not currently use alcohol. She reports current drug use. Drug: Marijuana.    Family History:  Family History   Problem Relation Name Age of Onset    Alzheimer's disease Mother      Breast cancer Mother          age 52    Other (Brain tumor) Father      Breast cancer Mother's Sister          Allergies:  Ceftizoxime, Cefuroxime, Erythromycin, Ibuprofen, Penicillins, Sulfa (sulfonamide antibiotics), and Acetazolamide    Outpatient Medications:  Current Outpatient Medications   Medication Instructions    acetaminophen (TYLENOL) 325-650 mg, oral, Every 6 hours PRN    albuterol (ProAir HFA) 90  mcg/actuation inhaler 2 puffs, inhalation, Every 4 hours PRN    enoxaparin (LOVENOX) 80 mg, subcutaneous, Every 12 hours    FLUoxetine (PROZAC) 40 mg, oral, Daily    fluticasone-umeclidin-vilanter (TRELEGY-ELLIPTA) 100-62.5-25 mcg blister with device 1 puff, inhalation, Daily RT    glucosamine sulfate 1,000 mg tablet 1 tablet, Daily    ketoconazole (NIZOral) 2 % cream Topical, 2 times daily, To affected forehead and facial areas.    levETIRAcetam (KEPPRA) 500 mg, 2 times daily    losartan (COZAAR) 25 mg, oral, Daily    metFORMIN XR (GLUCOPHAGE-XR) 500 mg, oral, 2 times daily    metoprolol succinate XL (TOPROL-XL) 50 mg, oral, Daily    mirtazapine (REMERON) 15 mg, oral, Nightly    OneTouch Ultra Control solution     OneTouch Ultra Test strip     QUEtiapine (SEROQUEL) 25 mg    rivastigmine (Exelon) 4.6 mg/24 hour APPLY 1 PATCH AS DIRECTED ONCE DAILY.    rosuvastatin (CRESTOR) 40 mg, oral, Daily    warfarin (Coumadin) 3 mg tablet Take 2 tabs every Monday. Take 1.5 tablets by mouth all other days or as directed by  Coumadin Clinic.       Physical Exam:  Constitutional: awake and alert, oriented ×3, no apparent distress  Skin: warm, dry, good turgor no obvious lesions  Eyes: pupils equal, round, reactive to light, conjunctiva pink and noninjected, no discharge  HENT: normocephalic and atraumatic, mucous membranes moist, trachea midline with no masses/goiter  Cardiovascular: S1/S2 regular, +crisp closure of mechanical AV, no murmur no rubs/gallops, no carotid bruits, no JVD  Pulmonary: symmetrical chest expansion, lungs are clear to auscultation bilaterally, no wheezes/rales/rhonchi, normal effort  Abdomen: nontender, nondistended, active bowel sounds, no ascites  Extremities: no cyanosis, clubbing, no LE edema no lesions; palpable pedal pulses  Neurologic: cranial nerves II - XII grossly intact, stable gait, no tremor       Last Labs:  CBC -  Lab Results   Component Value Date    WBC 10.8 03/04/2024    HGB 13.5  03/04/2024    HCT 46.5 (H) 03/04/2024    MCV 90 03/04/2024     03/04/2024       CMP -  Lab Results   Component Value Date    CALCIUM 9.2 05/01/2024    PHOS 2.5 10/18/2023    PROT 7.0 12/06/2023    ALBUMIN 4.1 12/06/2023    AST 30 12/06/2023    ALT 28 12/06/2023    ALKPHOS 67 12/06/2023    BILITOT 0.4 12/06/2023       LIPID PANEL -   Lab Results   Component Value Date    CHOL 126 12/06/2023    TRIG 244 (H) 12/06/2023    HDL 43.0 12/06/2023    CHHDL 2.9 12/06/2023    LDLF 83 10/28/2022    VLDL 49 (H) 12/06/2023    NHDL 83 12/06/2023       RENAL FUNCTION PANEL -   Lab Results   Component Value Date    GLUCOSE 195 (H) 05/01/2024     05/01/2024    K 4.2 05/01/2024     05/01/2024    CO2 26 05/01/2024    ANIONGAP 12 05/01/2024    BUN 20 05/01/2024    CREATININE 0.96 05/01/2024    GFRMALE CANCELED 10/04/2023    CALCIUM 9.2 05/01/2024    PHOS 2.5 10/18/2023    ALBUMIN 4.1 12/06/2023        Lab Results   Component Value Date    HGBA1C 6.6 (H) 03/04/2024       Last Cardiology Tests:  ECG:  ECG 12 Lead       Echo:  Transthoracic Echo (TTE) Complete 07/03/2024      Ejection Fractions:  EF   Date/Time Value Ref Range Status   07/03/2024 04:20 PM 63 %        Cath:  Cardiac catheterization - coronary 10/02/2023      Stress Test:  Nuclear Stress Test 12/02/2022      Cardiac Imaging:  No results found for this or any previous visit from the past 1095 days.      Assessment/Plan   Problem List Items Addressed This Visit             ICD-10-CM       Cardiac and Vasculature    Benign essential hypertension - Primary I10    CAD, multiple vessel I25.10    Relevant Orders    ECG 12 lead (Clinic Performed)    History of thoracic aortic aneurysm repair Z98.890, Z86.79    Mechanical complication of heart valve prosthesis T82.09XA       -Maintain INR between 2.5 - 3.5 for adequate CVA prophylaxis    -Normal LVSF on most recent 2D echo    -RTC in 6 months sooner if issues arise in the interim      Brendon Cox PA-C

## 2025-03-11 ENCOUNTER — TELEPHONE (OUTPATIENT)
Dept: PRIMARY CARE | Facility: CLINIC | Age: 70
End: 2025-03-11
Payer: MEDICARE

## 2025-03-11 NOTE — TELEPHONE ENCOUNTER
[facility] called in stating they will be sending over a waiver for pcp to sign off on for her to continue treatment in the care and wanted to know would it be signed since your her pcp

## 2025-03-13 LAB
INR IN PPP BY COAGULATION ASSAY EXTERNAL: 4.8
PROTHROMBIN TIME (PT) IN PPP BY COAGULATION ASSAY EXTERNAL: NORMAL

## 2025-03-13 NOTE — PROGRESS NOTES
Patient identification verified with 2 identifiers.    Location: [facility] Patient Self-Testing Program [phone]    Referring Physician: Alex Graves MD PhD   Enrollment/ Re-enrollment date: 2026   INR Goal: 2.5-3.5  INR monitoring is per [facility] protocol.  Anticoagulation Medication: warfarin  Indication: Aortic Valve Replacement and Stroke/TIA    Subjective   Bleeding signs/symptoms: No    Bruising: No   Major bleeding event: No  Thrombosis signs/symptoms: No  Thromboembolic event: No  Missed doses: No  Extra doses: No  Medication changes: No  Dietary changes: No  Change in health: No  Change in activity: No  Alcohol: No  Other concerns: No    Upcoming Procedures:  Does the Patient Have any upcoming procedures that require interruption in anticoagulation therapy? no  Does the patient require bridging? no      Anticoagulation Summary  As of 3/10/2025      INR goal:  2.5-3.5   TTR:  38.0% (1.4 y)   INR used for dosin.80 (3/13/2025)   Weekly warfarin total:  34.5 mg               Assessment/Plan   Supratherapeutic     1. New dose: Will hold 2 days, decrease weekly dose and Veronica will test patient in 1 week.    2. Next INR: 1 week      Education provided to patient during the visit:  Patient instructed to call in interim with questions, concerns and changes.

## 2025-03-20 ENCOUNTER — ANTICOAGULATION - WARFARIN VISIT (OUTPATIENT)
Dept: CARDIOLOGY | Facility: CLINIC | Age: 70
End: 2025-03-20
Payer: MEDICARE

## 2025-03-20 DIAGNOSIS — G45.9 TIA (TRANSIENT ISCHEMIC ATTACK): ICD-10-CM

## 2025-03-20 DIAGNOSIS — Z95.2 AORTIC VALVE REPLACED: Primary | ICD-10-CM

## 2025-03-26 NOTE — PROGRESS NOTES
Patient identification verified with 2 identifiers.    Location: [facility] Patient Self-Testing Program [phone]    Referring Physician: Alex Graves MD PhD   Enrollment/ Re-enrollment date: 1/16/2026   INR Goal: 2.5-3.5  INR monitoring is per [facility] protocol.  Anticoagulation Medication: warfarin  Indication: Aortic Valve Replacement and Stroke/TIA    Subjective   Bleeding signs/symptoms: No    Bruising: No   Major bleeding event: No  Thrombosis signs/symptoms: No  Thromboembolic event: No  Missed doses: No  Extra doses: No  Medication changes: No  Dietary changes: No  Change in health: No  Change in activity: No  Alcohol: No  Other concerns: No    Upcoming Procedures:  Does the Patient Have any upcoming procedures that require interruption in anticoagulation therapy? no  Does the patient require bridging? no      Anticoagulation Summary  As of 3/20/2025      INR goal:  2.5-3.5   TTR:  38.0% (1.4 y)   INR used for dosing:  3.50 (3/26/2025)   Weekly warfarin total:  34.5 mg               Assessment/Plan   Therapeutic     1. New dose: Spoke to Veronica with dosing instructions and next testing date    2. Next INR: 1 week      Education provided to patient during the visit:  Patient instructed to call in interim with questions, concerns and changes.

## 2025-03-28 ENCOUNTER — TELEPHONE (OUTPATIENT)
Dept: PRIMARY CARE | Facility: CLINIC | Age: 70
End: 2025-03-28

## 2025-03-28 NOTE — TELEPHONE ENCOUNTER
Pt called in stating daughter is no longer power of  and just wanted to let you be aware of this going forward and no information she be released to daughter

## 2025-03-31 DIAGNOSIS — J44.9 CHRONIC OBSTRUCTIVE PULMONARY DISEASE, UNSPECIFIED COPD TYPE (MULTI): ICD-10-CM

## 2025-04-01 ENCOUNTER — PATIENT OUTREACH (OUTPATIENT)
Dept: PRIMARY CARE | Facility: CLINIC | Age: 70
End: 2025-04-01
Payer: MEDICARE

## 2025-04-01 RX ORDER — ALBUTEROL SULFATE 90 UG/1
2 INHALANT RESPIRATORY (INHALATION) EVERY 4 HOURS PRN
Qty: 8.5 G | Refills: 1 | Status: SHIPPED | OUTPATIENT
Start: 2025-04-01 | End: 2026-04-01

## 2025-04-02 ENCOUNTER — ANTICOAGULATION - WARFARIN VISIT (OUTPATIENT)
Dept: CARDIOLOGY | Facility: CLINIC | Age: 70
End: 2025-04-02
Payer: MEDICARE

## 2025-04-02 ENCOUNTER — DOCUMENTATION (OUTPATIENT)
Dept: PRIMARY CARE | Facility: CLINIC | Age: 70
End: 2025-04-02
Payer: MEDICARE

## 2025-04-02 DIAGNOSIS — G45.9 TIA (TRANSIENT ISCHEMIC ATTACK): ICD-10-CM

## 2025-04-02 DIAGNOSIS — Z95.2 AORTIC VALVE REPLACED: Primary | ICD-10-CM

## 2025-04-03 NOTE — PROGRESS NOTES
Patient identification verified with 2 identifiers.    Location: [facility] Patient Self-Testing Program [phone]    Referring Physician: Alex Graves MD PhD   Enrollment/ Re-enrollment date: 1/16/2026   INR Goal: 2.5-3.5  INR monitoring is per [facility] protocol.  Anticoagulation Medication: warfarin  Indication: Aortic Valve Replacement and Stroke/TIA    Subjective   Bleeding signs/symptoms: No    Bruising: No   Major bleeding event: No  Thrombosis signs/symptoms: No  Thromboembolic event: No  Missed doses: No  Extra doses: No  Medication changes: No  Dietary changes: No  Change in health: No  Change in activity: No  Alcohol: No  Other concerns: No    Upcoming Procedures:  Does the Patient Have any upcoming procedures that require interruption in anticoagulation therapy? no  Does the patient require bridging? no      Anticoagulation Summary  As of 4/2/2025      INR goal:  2.5-3.5   TTR:  38.0% (1.5 y)   INR used for dosing:  3.10 (4/3/2025)   Weekly warfarin total:  34.5 mg               Assessment/Plan   Therapeutic     1. New dose: Spoke to Veronica with dosing instructions and next testing date.    2. Next INR: 1 week      Education provided to patient during the visit:  Patient instructed to call in interim with questions, concerns and changes.

## 2025-04-04 NOTE — TELEPHONE ENCOUNTER
Pt called in wanting to know do you believes she should stop driving due to her dementia and if so should she have her license revoked if that's the case ?

## 2025-04-07 NOTE — TELEPHONE ENCOUNTER
Prior task states:   Pt called in stating daughter is no longer power of  and just wanted to let you be aware of this going forward and no information she be released to daughter        I recommend the patient come in for an office visit to discuss concerns with me and she will need to bring her son if her daughter is no longer involved in her care.

## 2025-04-07 NOTE — TELEPHONE ENCOUNTER
Spoke with pt's daughter. She no longer lives with the pt due to how bad it has become between the two.  She is asking that there be a letter typed up that we mail the pt stating this about her driving.  I explained I would ask the provider and confirmed the address on file is correct.

## 2025-04-08 DIAGNOSIS — I10 BENIGN ESSENTIAL HYPERTENSION: ICD-10-CM

## 2025-04-08 DIAGNOSIS — Z79.4 TYPE 2 DIABETES MELLITUS WITH OTHER SPECIFIED COMPLICATION, WITH LONG-TERM CURRENT USE OF INSULIN: ICD-10-CM

## 2025-04-08 DIAGNOSIS — E11.69 TYPE 2 DIABETES MELLITUS WITH OTHER SPECIFIED COMPLICATION, WITH LONG-TERM CURRENT USE OF INSULIN: ICD-10-CM

## 2025-04-08 RX ORDER — METFORMIN HYDROCHLORIDE 500 MG/1
500 TABLET, EXTENDED RELEASE ORAL 2 TIMES DAILY
Qty: 180 TABLET | Refills: 1 | Status: SHIPPED | OUTPATIENT
Start: 2025-04-08

## 2025-04-08 RX ORDER — LOSARTAN POTASSIUM 25 MG/1
25 TABLET ORAL DAILY
Qty: 90 TABLET | Refills: 1 | Status: SHIPPED | OUTPATIENT
Start: 2025-04-08

## 2025-04-10 ENCOUNTER — ANTICOAGULATION - WARFARIN VISIT (OUTPATIENT)
Dept: CARDIOLOGY | Facility: CLINIC | Age: 70
End: 2025-04-10
Payer: MEDICARE

## 2025-04-10 DIAGNOSIS — Z95.2 AORTIC VALVE REPLACED: Primary | ICD-10-CM

## 2025-04-10 DIAGNOSIS — G45.9 TIA (TRANSIENT ISCHEMIC ATTACK): ICD-10-CM

## 2025-04-10 DIAGNOSIS — Z79.01 LONG TERM (CURRENT) USE OF ANTICOAGULANTS: ICD-10-CM

## 2025-04-10 NOTE — PROGRESS NOTES
Patient identification verified with 2 identifiers.    Location: [facility] Patient Self-Testing Program [phone]    Referring Physician: Alex Graves MD  Enrollment/ Re-enrollment date: January 16, 2026   INR Goal: 2.5-3.5  INR monitoring is per [facility] protocol.  Anticoagulation Medication: warfarin  Indication: Aortic Valve Replacement    Received faxed INR self-test results and called patient at her listed home telephone number. Per telephone note from Dr. Catalan on April 7, 2025, patient's daughter, Veronica, is NO LONGER DPOA.    Subjective   Bleeding signs/symptoms: No    Bruising: No   Major bleeding event: No  Thrombosis signs/symptoms: No  Thromboembolic event: No  Missed doses: No  Extra doses: No  Medication changes: No  Dietary changes: No  Change in health: No  Change in activity: No  Alcohol: No  Other concerns: No    Upcoming Procedures:  Does the Patient Have any upcoming procedures that require interruption in anticoagulation therapy? no  Does the patient require bridging? no      Anticoagulation Summary  As of 4/10/2025      INR goal:  2.5-3.5   TTR:  38.7% (1.5 y)   INR used for dosing:  3.10 (4/9/2025)   Weekly warfarin total:  34.5 mg               Assessment/Plan   Therapeutic     1. New dose: no change    2. Next INR: 1 week  Current dose schedule left as a part of message and I requested a return call to [phone] if this dose is different than what patient is currently taking. I also requested that patient call to confirm that Veronica is no longer DPOA.    Education provided to patient during the visit:  Patient instructed to call in interim with questions, concerns and changes.   Patient educated on interactions between medications and warfarin.   Patient educated on dietary consistency in vitamin k consumption.   Patient educated on affects of alcohol consumption while taking warfarin.   Patient educated on signs of bleeding/clotting.   Patient educated on compliance with dosing, follow  up appointments, and prescribed plan of care.

## 2025-04-16 LAB
INR IN PPP BY COAGULATION ASSAY EXTERNAL: 2
PROTHROMBIN TIME (PT) IN PPP BY COAGULATION ASSAY EXTERNAL: NORMAL

## 2025-04-17 ENCOUNTER — ANTICOAGULATION - WARFARIN VISIT (OUTPATIENT)
Dept: CARDIOLOGY | Facility: CLINIC | Age: 70
End: 2025-04-17
Payer: MEDICARE

## 2025-04-17 DIAGNOSIS — Z95.2 AORTIC VALVE REPLACED: Primary | ICD-10-CM

## 2025-04-17 DIAGNOSIS — Z79.01 LONG TERM (CURRENT) USE OF ANTICOAGULANTS: ICD-10-CM

## 2025-04-17 DIAGNOSIS — G45.9 TIA (TRANSIENT ISCHEMIC ATTACK): ICD-10-CM

## 2025-04-18 ENCOUNTER — TELEPHONE (OUTPATIENT)
Dept: PRIMARY CARE | Facility: CLINIC | Age: 70
End: 2025-04-18
Payer: MEDICARE

## 2025-04-18 NOTE — TELEPHONE ENCOUNTER
Daughter (Veronica Najera) called to get information regarding her mother.  She claims to be POA; however, the patient called a while back (note in system-4.8.25) and said that she no longer wanted her daughter to have access to her medical information.  She states that the son is now POA.  Call was transferred to me (Supervisor).      Daughter explained that mom has dementia and doesn't know what she is asking.  She states that she is POA, not her brother.  I asked the patient to please send me the most recent copy of the POA (see attachment) and that I would investigate this to ensure we are covering our basis.    When I receive the documentation, I will forward to legal for advice.

## 2025-04-18 NOTE — TELEPHONE ENCOUNTER
Daughter (Veronica Najera) called to get information regarding her mother.  She claims to be POA; however, the patient called a while back (note in system) and said that she no longer wanted her daughter to have access to her medical information.  Call was transferred to me (Supervisor).  Daughter explained that mom has dementia and doesn't know what she is asking.  I asked the patient to please send me the most recent copy of the POA and that I would investigate this to ensure we are covering our basis.  Once received, I will reach out to legal for advise.

## 2025-04-18 NOTE — TELEPHONE ENCOUNTER
Noted and agree.  We may also need to talk with geriatrics as well- she sees Dr. Connie Granado    Never

## 2025-04-18 NOTE — PROGRESS NOTES
Patient identification verified with 2 identifiers.    Location: [facility] Patient Self-Testing Program [phone]    Referring Physician: Alex Graves MD  Enrollment/ Re-enrollment date: 2026   INR Goal: 2.5-3.5  INR monitoring is per [facility] protocol.  Anticoagulation Medication: warfarin  Indication: Aortic Valve Replacement     - The patients phone number is updated in the chart as favorite. Patient would like to be called for everything. Dosing, and reminder texts. She stated that she has a family member living with her (Daiana) that they are trying to train her on taking the patients INR. The patient does not want Veronica called for anything. Her number was removed from the chart. The patient stated she is meeting with a  next week to remove her from her DPOA.     Per telephone note from Dr. Catalan on 2025, patient's daughter, Veronica, is NO LONGER DPOA.          Subjective   Bleeding signs/symptoms: No    Bruising: No   Major bleeding event: No  Thrombosis signs/symptoms: No  Thromboembolic event: No  Missed doses: Yes  Patient missed her dose   Extra doses: No  Medication changes: No  Dietary changes: No  Change in health: No  Change in activity: No  Alcohol: No  Other concerns: No    Upcoming Procedures:  Does the Patient Have any upcoming procedures that require interruption in anticoagulation therapy? no  Does the patient require bridging? no      Anticoagulation Summary  As of 2025      INR goal:  2.5-3.5   TTR:  38.9% (1.5 y)   INR used for dosin.00 (2025)   Weekly warfarin total:  34.5 mg               Assessment/Plan   Subtherapeutic     1. New dose: no change    2. Next INR:  5 days      Education provided to patient during the visit:  Patient instructed to call in interim with questions, concerns and changes.   Patient educated on compliance with dosing, follow up appointments, and prescribed plan of care.

## 2025-04-25 ENCOUNTER — ANTICOAGULATION - WARFARIN VISIT (OUTPATIENT)
Dept: CARDIOLOGY | Facility: CLINIC | Age: 70
End: 2025-04-25
Payer: MEDICARE

## 2025-04-25 DIAGNOSIS — Z95.2 H/O AORTIC VALVE REPLACEMENT: ICD-10-CM

## 2025-04-25 DIAGNOSIS — Z79.01 LONG TERM (CURRENT) USE OF ANTICOAGULANTS: ICD-10-CM

## 2025-04-25 DIAGNOSIS — G45.9 TIA (TRANSIENT ISCHEMIC ATTACK): ICD-10-CM

## 2025-04-25 DIAGNOSIS — Z95.2 AORTIC VALVE REPLACED: Primary | ICD-10-CM

## 2025-04-25 RX ORDER — WARFARIN 3 MG/1
TABLET ORAL
Qty: 180 TABLET | Refills: 0 | Status: SHIPPED | OUTPATIENT
Start: 2025-04-25

## 2025-05-05 NOTE — PROGRESS NOTES
Patient identification verified with 2 identifiers.    Location: [facility] Patient Self-Testing Program [phone]    Referring Physician: Alex Graves MD  Enrollment/ Re-enrollment date: January 16, 2026   INR Goal: 2.5-3.5  INR monitoring is per [facility] protocol.  Anticoagulation Medication: warfarin  Indication: Aortic Valve Replacement      Subjective   Bleeding signs/symptoms: No    Bruising: No   Major bleeding event: No  Thrombosis signs/symptoms: No  Thromboembolic event: No  Missed doses: No  Extra doses: No  Medication changes: No  Dietary changes: Yes  INCREASED VIT C CONSUMPTION  Change in health: No  Change in activity: No  Alcohol: No  Other concerns: No    Upcoming Procedures:  Does the Patient Have any upcoming procedures that require interruption in anticoagulation therapy? no  Does the patient require bridging? no      Anticoagulation Summary  As of 4/25/2025      INR goal:  2.5-3.5   TTR:  38.9% (1.5 y)   INR used for dosing:  3.70 (5/5/2025)   Weekly warfarin total:  34.5 mg               Assessment/Plan   SUPRA THERAPEUTIC IN SETTING OF INCREASED CONSUMPTION OF VITAMIN C WHICH PT WILL NOT CONTINUE  WILL MAINTAIN WEEKLY DOSE  REPEAT INR IN 1 WEEK    Education provided to patient during the visit:  Patient instructed to call in interim with questions, concerns and changes.   Patient educated on compliance with dosing, follow up appointments, and prescribed plan of care.

## 2025-05-12 ENCOUNTER — ANTICOAGULATION - WARFARIN VISIT (OUTPATIENT)
Dept: CARDIOLOGY | Facility: CLINIC | Age: 70
End: 2025-05-12
Payer: MEDICARE

## 2025-05-12 DIAGNOSIS — Z95.2 AORTIC VALVE REPLACED: Primary | ICD-10-CM

## 2025-05-12 DIAGNOSIS — Z79.01 LONG TERM (CURRENT) USE OF ANTICOAGULANTS: ICD-10-CM

## 2025-05-12 DIAGNOSIS — G45.9 TIA (TRANSIENT ISCHEMIC ATTACK): ICD-10-CM

## 2025-05-13 NOTE — PROGRESS NOTES
Received a fax result for pt with an INR of 4.5. Left pt a message to call for instructions at [phone]

## 2025-05-13 NOTE — PROGRESS NOTES
Patient identification verified with 2 identifiers.    Location: [facility] Patient Self-Testing Program [phone]    Referring Physician: Alex Graves MD  Enrollment/ Re-enrollment date: 2026   INR Goal: 2.5-3.5  INR monitoring is per [facility] protocol.  Anticoagulation Medication: warfarin  Indication: Aortic Valve Replacement      Subjective   Bleeding signs/symptoms: No    Bruising: No   Major bleeding event: No  Thrombosis signs/symptoms: No  Thromboembolic event: No  Missed doses: No  Extra doses: No  Medication changes: No  Dietary changes: No  Change in health: No  Change in activity: No  Alcohol: No  Other concerns: No    Upcoming Procedures:  Does the Patient Have any upcoming procedures that require interruption in anticoagulation therapy? no  Does the patient require bridging? no      Anticoagulation Summary  As of 2025      INR goal:  2.5-3.5   TTR:  39.0% (1.6 y)   INR used for dosin.50 (2025)   Weekly warfarin total:  34.5 mg               Assessment/Plan   SUPRA THERAPEUTIC. Possibly due to some consumption of Kamaljit D. Will Hold x2 days per protocol and test in 3 days.    Education provided to patient during the visit:  Patient instructed to call in interim with questions, concerns and changes.   Patient educated on compliance with dosing, follow up appointments, and prescribed plan of care.

## 2025-05-14 ENCOUNTER — TELEPHONE (OUTPATIENT)
Dept: PRIMARY CARE | Facility: CLINIC | Age: 70
End: 2025-05-14
Payer: MEDICARE

## 2025-05-14 NOTE — TELEPHONE ENCOUNTER
Called patient back and gave her the information. Patient then just told me that she had accidentally canceled her medicare. Medicare is telling her that she will need a new letter to get back on medicare.       Please and thank you.

## 2025-05-14 NOTE — TELEPHONE ENCOUNTER
Will need  to assist-    Please provide clarification of her health care power of  (son or daughter)- see prior phone messages on this     We are going to need to talk to family about this and her cognitive status in general but I am not sure who we can legally talk to

## 2025-05-14 NOTE — TELEPHONE ENCOUNTER
Patient called in and asked if she could have a letter written up stating that she has dementia so she can stay on medicare.         Please and thank you.

## 2025-05-15 NOTE — TELEPHONE ENCOUNTER
According to the POA documentation dated on 10.23.23 (scanned into system-most recent), Veronica Najera (daughter) is her POA.  We have no documentation of change since.  I called the patient who said she was having a day and that her daughter is still the POA and we can reach out to her to discuss.    Veronica Najera can be reached at: [phone].

## 2025-05-16 NOTE — TELEPHONE ENCOUNTER
Veronica came in to  note but it was not done.  Per Veronica, the phone number we had for her is wrong, I corrected it in the chart, [phone], after verifying her ID.  I spoke wit Dr. Catalan and she signed a letter for her, if that is not enough, she would have to get something from her geriatrics doctor.  Left message for Veronica to  letter, she said she will come in today

## 2025-05-21 ENCOUNTER — ANTICOAGULATION - WARFARIN VISIT (OUTPATIENT)
Dept: CARDIOLOGY | Facility: CLINIC | Age: 70
End: 2025-05-21
Payer: MEDICARE

## 2025-05-21 DIAGNOSIS — G45.9 TIA (TRANSIENT ISCHEMIC ATTACK): ICD-10-CM

## 2025-05-21 DIAGNOSIS — Z79.01 LONG TERM (CURRENT) USE OF ANTICOAGULANTS: ICD-10-CM

## 2025-05-21 DIAGNOSIS — Z95.2 AORTIC VALVE REPLACED: Primary | ICD-10-CM

## 2025-05-21 LAB
INR IN PPP BY COAGULATION ASSAY EXTERNAL: 1.8
PROTHROMBIN TIME (PT) IN PPP BY COAGULATION ASSAY EXTERNAL: NORMAL

## 2025-05-22 NOTE — PROGRESS NOTES
Patient identification verified with 2 identifiers.    Location: [facility] Patient Self-Testing Program [phone]    Referring Physician: Alex Graves MD  Enrollment/ Re-enrollment date: 2026   INR Goal: 2.5-3.5  INR monitoring is per [facility] protocol.  Anticoagulation Medication: warfarin  Indication: Aortic Valve Replacement    Subjective   Bleeding signs/symptoms: No    Bruising: No   Major bleeding event: No  Thrombosis signs/symptoms: No  Thromboembolic event: No  Missed doses: No  Extra doses: No  Medication changes: No  Dietary changes: No  Change in health: No  Change in activity: No  Alcohol: No  Other concerns: No    Upcoming Procedures:  Does the Patient Have any upcoming procedures that require interruption in anticoagulation therapy? no  Does the patient require bridging? no      Anticoagulation Summary  As of 2025      INR goal:  2.5-3.5   TTR:  39.0% (1.6 y)   INR used for dosin.80 (2025)   Weekly warfarin total:  34.5 mg              - Patient stated that she did not miss any doses this past week. For the 2 INR that were recorded prior she was supratherapeutic on the same weekly dose. Her daughter self dosed a one time dose on 25. Will maintain dose and have patient retest in 6 days.     Assessment/Plan   Subtherapeutic     1. New dose: no change    2. Next INR: 1 week      Education provided to patient during the visit:  Patient instructed to call in interim with questions, concerns and changes.   Patient educated on compliance with dosing, follow up appointments, and prescribed plan of care.

## 2025-05-25 DIAGNOSIS — E66.811 CLASS 1 OBESITY WITH BODY MASS INDEX (BMI) OF 31.0 TO 31.9 IN ADULT, UNSPECIFIED OBESITY TYPE, UNSPECIFIED WHETHER SERIOUS COMORBIDITY PRESENT: ICD-10-CM

## 2025-05-27 RX ORDER — ROSUVASTATIN CALCIUM 40 MG/1
40 TABLET, COATED ORAL DAILY
Qty: 90 TABLET | Refills: 1 | Status: SHIPPED | OUTPATIENT
Start: 2025-05-27

## 2025-05-28 ENCOUNTER — ANTICOAGULATION - WARFARIN VISIT (OUTPATIENT)
Dept: CARDIOLOGY | Facility: CLINIC | Age: 70
End: 2025-05-28
Payer: MEDICARE

## 2025-05-28 DIAGNOSIS — G45.9 TIA (TRANSIENT ISCHEMIC ATTACK): ICD-10-CM

## 2025-05-28 DIAGNOSIS — Z79.01 LONG TERM (CURRENT) USE OF ANTICOAGULANTS: ICD-10-CM

## 2025-05-28 DIAGNOSIS — Z95.2 AORTIC VALVE REPLACED: Primary | ICD-10-CM

## 2025-05-28 LAB
INR IN PPP BY COAGULATION ASSAY EXTERNAL: 2.8
PROTHROMBIN TIME (PT) IN PPP BY COAGULATION ASSAY EXTERNAL: NORMAL

## 2025-05-28 NOTE — PROGRESS NOTES
Patient identification verified with 2 identifiers.    Location: [facility] Patient Self-Testing Program [phone]    Referring Physician: DR. OLIVERIO MOBLEY  Enrollment/ Re-enrollment date: 2026   INR Goal: 2.5-3.5  INR monitoring is per [facility] protocol.  Anticoagulation Medication: warfarin  Indication: Atrial Fibrillation/Atrial Flutter    Subjective   Bleeding signs/symptoms:      Bruising:     Major bleeding event:    Thrombosis signs/symptoms:    Thromboembolic event:    Missed doses:    Extra doses:    Medication changes:    Dietary changes:    Change in health:    Change in activity:    Alcohol:    Other concerns:      Upcoming Procedures:  Does the Patient Have any upcoming procedures that require interruption in anticoagulation therapy?   Does the patient require bridging?       Anticoagulation Summary  As of 2025      INR goal:  2.5-3.5   TTR:  38.9% (1.6 y)   INR used for dosin.80 (2025)   Weekly warfarin total:  34.5 mg               Assessment/Plan   Therapeutic     1. New dose: no change  LVM WITH CURRENT DOSING INSTRUCTIONS AND TO CALL [phone] WITH CHANGES/QUESTIONS. ALSO ON VM LEFT INSTRUCTIONS TO CALL COUMADIN CLINIC TO SCHEDULE ANNUAL METER REVIEW WHICH WAS DUE 3/20/2025  2. Next INR: 1 week      Education provided to patient during the visit:  Patient instructed to call in interim with questions, concerns and changes.   Patient educated on compliance with dosing, follow up appointments, and prescribed plan of care.

## 2025-05-30 ENCOUNTER — TELEPHONE (OUTPATIENT)
Dept: PRIMARY CARE | Facility: CLINIC | Age: 70
End: 2025-05-30

## 2025-05-30 NOTE — TELEPHONE ENCOUNTER
Patient call and stated that a form was sent from [facility] a [facility] facility for pcp to be filled out and it has not been done and she needs it asap

## 2025-06-02 ENCOUNTER — TELEPHONE (OUTPATIENT)
Dept: PRIMARY CARE | Facility: CLINIC | Age: 70
End: 2025-06-02

## 2025-06-02 NOTE — TELEPHONE ENCOUNTER
Patient called in and stated that a form was sent from [facility] for pcp to be filled out and it has not been done and she needs it sent back soon as possible or she will not be able to move in

## 2025-06-02 NOTE — TELEPHONE ENCOUNTER
I have not received form and she no showed her last 2 appts with me     Need form and she needs appt scheduled for form to be completed

## 2025-06-04 ENCOUNTER — ANTICOAGULATION - WARFARIN VISIT (OUTPATIENT)
Dept: CARDIOLOGY | Facility: HOSPITAL | Age: 70
End: 2025-06-04

## 2025-06-04 DIAGNOSIS — G45.9 TIA (TRANSIENT ISCHEMIC ATTACK): ICD-10-CM

## 2025-06-04 DIAGNOSIS — Z95.2 AORTIC VALVE REPLACED: Primary | ICD-10-CM

## 2025-06-04 DIAGNOSIS — Z79.01 LONG TERM (CURRENT) USE OF ANTICOAGULANTS: ICD-10-CM

## 2025-06-05 ENCOUNTER — ANTICOAGULATION - WARFARIN VISIT (OUTPATIENT)
Dept: CARDIOLOGY | Facility: CLINIC | Age: 70
End: 2025-06-05

## 2025-06-05 DIAGNOSIS — G45.9 TIA (TRANSIENT ISCHEMIC ATTACK): ICD-10-CM

## 2025-06-05 DIAGNOSIS — Z95.2 AORTIC VALVE REPLACED: Primary | ICD-10-CM

## 2025-06-05 DIAGNOSIS — Z79.01 LONG TERM (CURRENT) USE OF ANTICOAGULANTS: ICD-10-CM

## 2025-06-05 NOTE — TELEPHONE ENCOUNTER
Called Terri Mccall at [facility] to see if we can get the paper work faxed over. Patient has an appt on 6.6.25.

## 2025-06-05 NOTE — PROGRESS NOTES
Subjective   Patient ID: Suze Najera is a 70 y.o. female who presents for ASSISTED LIVING FORMS (Pt presents to have assisted living forms filled out.).    HPI     She is here today for form completion -  Moving in tomorrow to assisted living home- [facility]   She recently broke up with her boyfriend   Her daughter is here with her today     HPOA- daughter Veronica;  son Travon is backup   States her daughter has had issues with methamphetamine use but she doesn't believe she is currently using.      Mild/moderate dementia- seeing [facility] - Dr. Loy PATEL 13 in Jan 2025   Also has seen Dr. Clifton at [facility] - diagnosis moderate mixed vascular and neurodegenerative dementia - started on Exelon patch, remeron, uses seroquel prn   Daughter manages pills with pill box currently      DM, type 2-  Last A1c 6.6 in March 2024   Meds include metformin 500 mg BID  She had side effects to jardiance (UTIs)   Eye doctor- Dr. Hughes- 5/14/24     Depression and anxiety- on prozac 40 mg daily - thinks may have come off this sometime in past year, thinks rx ran out and never got refilled.  Would like to resume.    She had formal neuropsychological testing done by Dr. Natty Foreman in 2017 which confirmed major depression, anxiety, anger management issues - and recommended psychotherapy in addition to medication management.    She was going to [facility] for care but then just stopped going.      She restarted smoking 1 ppd   She occasionally smokes marijuana  Denies alcohol use      She has a hx of stroke/ruptured aneurysm / SAH with craniotomy 2014, ventriculostomy placed due to hydrocephalus, seizures, aphasia, and memory loss- previously seeing Dr. Raymond and then Dr. Vizcaino  EEG was abnormal 2017- on Keppra now   MRI showed right frontal large surgical resection cavity 2018   She had MRA head/neck done in Aug 2024     Sleep study showed mild GEOFF- not on PAP      She is on warfarin for  mechanical aortic valve replacement for prior bicuspid valve   Cardiology- seeing Brendon Cox  NM stress neg Dec 2022   [facility] 10/2/23: LM patent, LAD 20% prox, 50-60% mid, LCx small nondominant, RCA dominant 90% prox, 40% mid   She had an ascending aorta replacement due to aortic aneurysm and CABG x 1 to RCA- in Oct 2023- with Dr. Hayden   INR 2.8 on 25   Repeat echo done 2024- EF 60-65%      Osteopenia- Dexa 3/5/24      HTN- on losartan, metoprolol     HPL- on crestor     OAB, Recurrent UTI ; hx microscopic hematuria  Seeing Dr. Gaming  - had cystoscopy done in - normal   Myrbetriq and gemtasa were both cost prohibitive - recommended botox- was done in May 2024- still having some issues with urinary incontinence   CT urogram neg      Hx melanoma right arm- [facility] dermatology - also has scalp psoriasis and rosacea      COPD- on trelegy with albuterol prn - seldom uses trelegy; using albuterol once a week   Occasional cough and dyspnea - at baseline   Saw Dr. Russell previously      Last pap/cervical cancer screenin20 NILM HPV neg   Last mammogram: 23 neg  Hx of colonoscopy: 1/10/17 Dr. Donaldson- Normal colonoscopy      NEW CONCERN: Left elbow pain x 3 days - woke up with it   Having difficulty with ROM - cannot fully extend it   Very painful, red, warm, swollen   No fever   No known injury     Current Medications[1]    Review of Systems   Constitutional:  Negative for chills, fatigue and fever.   HENT:  Negative for congestion, ear pain and sore throat.    Eyes:  Negative for visual disturbance.   Respiratory:  Negative for cough and shortness of breath.    Cardiovascular:  Negative for chest pain, palpitations and leg swelling.   Gastrointestinal:  Negative for abdominal pain, constipation, diarrhea, nausea and vomiting.   Genitourinary: Negative.    Musculoskeletal:  Positive for arthralgias and joint swelling.   Skin:  Positive for color change. Negative for rash and wound.    Neurological:  Negative for dizziness, weakness, numbness and headaches.   Psychiatric/Behavioral: Negative.           Scales reviewed    CAROLINE-7 Total Score: 10 (06/06/25 0842)  Patient Health Questionnaire-9 Score: 14 (06/06/25 0840)  Patient Health Questionnaire-2 Score: 4 (06/06/25 0840)       Objective   /86 (BP Location: Left arm, Patient Position: Sitting, BP Cuff Size: Adult)   Pulse 109   Temp 36.2 °C (97.1 °F) (Temporal)   Resp 12   Wt 68.5 kg (151 lb 1.6 oz)   SpO2 94%   BMI 27.64 kg/m²     Physical Exam    Constitutional: Well developed, well nourished, alert and in no acute distress.  Head and Face: Normocephalic, atraumatic.  Eyes: Normal external exam. Pupils equally round and reactive to light with normal accommodation and extraocular movements intact.   ENT: External inspection of ears normal, tympanic membranes visualized and normal.   Cardiovascular: Regular rate and rhythm, normal S1 and S2, no murmurs, gallops, or rubs.    Pulmonary: No respiratory distress, lungs clear to auscultation bilaterally, diffusely diminished. No wheezes, rhonchi, rales.   ELBOW, LEFT: Arm held in flexed position, unable to fully extend.  Patch of erythema noted laterally, warm, very tender to the touch.  Sensation intact. Radial pulses intact.   Skin: Warm, well perfused, normal skin turgor.  Neurologic: Cranial nerves II-XII grossly intact.   Psychiatric: Mood calm and affect normal.    Assessment/Plan     Problem List Items Addressed This Visit       Anxiety    Current Assessment & Plan   Restart prozac at 20 mg dosage          Benign essential hypertension - Primary    Current Assessment & Plan   Controlled- continue losartan, metoprolol             Relevant Orders    CBC and Auto Differential    Comprehensive Metabolic Panel    CAD, multiple vessel    Current Assessment & Plan   Continue statin, coumadin          COPD (chronic obstructive pulmonary disease) (Multi)    Current Assessment & Plan    Smoking cessation encouraged  She is not currently needing Trelegy   Using albuterol once a week            Diabetes mellitus, type 2 (Multi)    Current Assessment & Plan   Needs labs updated   Continue metformin          Relevant Orders    Hemoglobin A1C    Albumin-Creatinine Ratio, Urine Random    Female stress incontinence    Aortic valve replaced    Current Assessment & Plan   Continue coumadin- managed by coumadin clinic  Follows with cardiology          Hyperlipidemia    Current Assessment & Plan   Continue crestor          Relevant Orders    Lipid Panel    TSH with reflex to Free T4 if abnormal    Cigarette nicotine dependence without complication    Current Assessment & Plan   Cessation encouraged          GEOFF (obstructive sleep apnea)    Overview   Mild- sleep specialist did not recommend CPAP         Osteopenia    Overview   Dexa 3/5/24         Current Assessment & Plan   Bone health support: Make sure you are getting at least 1,200 mg daily of calcium (preferred via dietary intake, okay for supplements if needed), and 2,000 IU of vitamin D3 daily.   Encourage weight bearing exercise as able, along with balance and strength training  Reduce fall risk in the home         Overactive bladder    Recurrent moderate major depressive disorder with anxiety (Multi)    Relevant Medications    FLUoxetine (PROzac) 20 mg capsule    Vitamin D deficiency    Relevant Orders    Vitamin D 25-Hydroxy,Total (for eval of Vitamin D levels)    History of seizure    Current Assessment & Plan   Continue Keppra- follow up with Dr. Vizcaino          History of stroke    History of melanoma    Current Assessment & Plan   Following with dermatology          Long term (current) use of anticoagulants    Dementia    Current Assessment & Plan   Mild to moderate dementia- mixed vascular and neurodegenerative- she is on exelon patch, remeron, and seroquel prn - follows with Dr. Granado          Overweight with body mass index (BMI) of 27 to  27.9 in adult     Other Visit Diagnoses         Left elbow pain          Elbow swelling, left              Her daughter will take her to [place] ER for evaluation of left elbow, concern for infection.  They were called and notified.   Forms completed for assisted living facility.     A total of 50 or more minutes were spent on this patient encounter.    Follow up in 6 months.      Jo Catalan DO  6/6/2025         [1]   Current Outpatient Medications   Medication Sig Dispense Refill    acetaminophen (Tylenol) 325 mg tablet Take 1-2 tablets (325-650 mg) by mouth every 6 hours if needed for mild pain (1 - 3) or moderate pain (4 - 6). 30 tablet 0    albuterol 90 mcg/actuation inhaler INHALE 2 PUFFS EVERY 4 HOURS IF NEEDED FOR WHEEZING OR SHORTNESS OF BREATH. 8.5 g 1    fluticasone-umeclidin-vilanter (TRELEGY-ELLIPTA) 100-62.5-25 mcg blister with device Inhale 1 puff once daily. 30 each 0    glucosamine sulfate 1,000 mg tablet Take 1 tablet by mouth once daily.      ketoconazole (NIZOral) 2 % cream APPLY TOPICALLY 2 TIMES A DAY. TO AFFECTED FOREHEAD AND FACIAL AREAS. 30 g 0    levETIRAcetam (Keppra) 500 mg tablet Take 1 tablet (500 mg) by mouth twice a day.      losartan (Cozaar) 25 mg tablet TAKE 1 TABLET BY MOUTH EVERY DAY 90 tablet 1    metFORMIN  mg 24 hr tablet TAKE 1 TABLET BY MOUTH TWICE A  tablet 1    metoprolol succinate XL (Toprol-XL) 50 mg 24 hr tablet TAKE 1 TABLET BY MOUTH EVERY DAY 90 tablet 3    mirtazapine (Remeron) 15 mg tablet TAKE 1 TABLET (15 MG) BY MOUTH ONCE DAILY AT BEDTIME. 90 tablet 1    OneTouch Ultra Control solution       OneTouch Ultra Test strip       QUEtiapine (SEROquel) 25 mg tablet Take 1 tablet (25 mg) by mouth. PRN      rivastigmine (Exelon) 4.6 mg/24 hour APPLY 1 PATCH AS DIRECTED ONCE DAILY.      rosuvastatin (Crestor) 40 mg tablet TAKE 1 TABLET BY MOUTH EVERY DAY 90 tablet 1    warfarin (Coumadin) 3 mg tablet 6 mg (3 mg x 2) every Tue, Fri;   4.5 mg (3 mg x  1.5) all other days 180 tablet 0    FLUoxetine (PROzac) 20 mg capsule Take 1 capsule (20 mg) by mouth once daily. 90 capsule 3     No current facility-administered medications for this visit.

## 2025-06-06 ENCOUNTER — OFFICE VISIT (OUTPATIENT)
Dept: PRIMARY CARE | Facility: CLINIC | Age: 70
End: 2025-06-06

## 2025-06-06 VITALS
SYSTOLIC BLOOD PRESSURE: 148 MMHG | WEIGHT: 151.1 LBS | OXYGEN SATURATION: 94 % | TEMPERATURE: 97.1 F | HEART RATE: 109 BPM | DIASTOLIC BLOOD PRESSURE: 86 MMHG | BODY MASS INDEX: 27.64 KG/M2 | RESPIRATION RATE: 12 BRPM

## 2025-06-06 DIAGNOSIS — F41.9 ANXIETY: ICD-10-CM

## 2025-06-06 DIAGNOSIS — M25.522 LEFT ELBOW PAIN: ICD-10-CM

## 2025-06-06 DIAGNOSIS — E78.2 MIXED HYPERLIPIDEMIA: ICD-10-CM

## 2025-06-06 DIAGNOSIS — E55.9 VITAMIN D DEFICIENCY: ICD-10-CM

## 2025-06-06 DIAGNOSIS — E11.9 TYPE 2 DIABETES MELLITUS WITHOUT COMPLICATION, WITHOUT LONG-TERM CURRENT USE OF INSULIN: ICD-10-CM

## 2025-06-06 DIAGNOSIS — Z87.898 HISTORY OF SEIZURE: ICD-10-CM

## 2025-06-06 DIAGNOSIS — J44.9 CHRONIC OBSTRUCTIVE PULMONARY DISEASE, UNSPECIFIED COPD TYPE (MULTI): ICD-10-CM

## 2025-06-06 DIAGNOSIS — F33.1 RECURRENT MODERATE MAJOR DEPRESSIVE DISORDER WITH ANXIETY (MULTI): ICD-10-CM

## 2025-06-06 DIAGNOSIS — Z95.2 AORTIC VALVE REPLACED: ICD-10-CM

## 2025-06-06 DIAGNOSIS — E66.3 OVERWEIGHT WITH BODY MASS INDEX (BMI) OF 27 TO 27.9 IN ADULT: ICD-10-CM

## 2025-06-06 DIAGNOSIS — Z86.73 HISTORY OF STROKE: ICD-10-CM

## 2025-06-06 DIAGNOSIS — F41.9 RECURRENT MODERATE MAJOR DEPRESSIVE DISORDER WITH ANXIETY (MULTI): ICD-10-CM

## 2025-06-06 DIAGNOSIS — Z85.820 HISTORY OF MELANOMA: ICD-10-CM

## 2025-06-06 DIAGNOSIS — G47.33 OSA (OBSTRUCTIVE SLEEP APNEA): ICD-10-CM

## 2025-06-06 DIAGNOSIS — Z79.01 LONG TERM (CURRENT) USE OF ANTICOAGULANTS: ICD-10-CM

## 2025-06-06 DIAGNOSIS — I25.10 CAD, MULTIPLE VESSEL: ICD-10-CM

## 2025-06-06 DIAGNOSIS — I10 BENIGN ESSENTIAL HYPERTENSION: Primary | ICD-10-CM

## 2025-06-06 DIAGNOSIS — M25.422 ELBOW SWELLING, LEFT: ICD-10-CM

## 2025-06-06 DIAGNOSIS — N39.3 FEMALE STRESS INCONTINENCE: ICD-10-CM

## 2025-06-06 DIAGNOSIS — N32.81 OVERACTIVE BLADDER: ICD-10-CM

## 2025-06-06 DIAGNOSIS — F17.210 CIGARETTE NICOTINE DEPENDENCE WITHOUT COMPLICATION: ICD-10-CM

## 2025-06-06 DIAGNOSIS — M85.80 OSTEOPENIA, UNSPECIFIED LOCATION: ICD-10-CM

## 2025-06-06 DIAGNOSIS — F03.A3 MILD DEMENTIA WITH MOOD DISTURBANCE, UNSPECIFIED DEMENTIA TYPE (MULTI): ICD-10-CM

## 2025-06-06 PROBLEM — E66.811 CLASS 1 OBESITY WITH BODY MASS INDEX (BMI) OF 30.0 TO 30.9 IN ADULT: Status: RESOLVED | Noted: 2023-03-18 | Resolved: 2025-06-06

## 2025-06-06 PROBLEM — M25.552 LEFT HIP PAIN: Status: RESOLVED | Noted: 2023-03-18 | Resolved: 2025-06-06

## 2025-06-06 PROBLEM — D72.829 LEUKOCYTOSIS: Status: RESOLVED | Noted: 2024-03-06 | Resolved: 2025-06-06

## 2025-06-06 PROBLEM — M25.572 ARTHRALGIA OF LEFT FOOT: Status: RESOLVED | Noted: 2024-01-09 | Resolved: 2025-06-06

## 2025-06-06 PROBLEM — A49.8 CLOSTRIDIOIDES DIFFICILE INFECTION: Status: RESOLVED | Noted: 2024-03-06 | Resolved: 2025-06-06

## 2025-06-06 PROBLEM — R94.2 ABNORMAL PULMONARY FUNCTION TEST: Status: RESOLVED | Noted: 2023-03-18 | Resolved: 2025-06-06

## 2025-06-06 PROBLEM — M25.569 KNEE PAIN: Status: RESOLVED | Noted: 2023-03-18 | Resolved: 2025-06-06

## 2025-06-06 PROBLEM — R40.0 DAYTIME SOMNOLENCE: Status: RESOLVED | Noted: 2024-03-06 | Resolved: 2025-06-06

## 2025-06-06 PROCEDURE — 3077F SYST BP >= 140 MM HG: CPT | Performed by: FAMILY MEDICINE

## 2025-06-06 PROCEDURE — 99215 OFFICE O/P EST HI 40 MIN: CPT | Performed by: FAMILY MEDICINE

## 2025-06-06 PROCEDURE — 1159F MED LIST DOCD IN RCRD: CPT | Performed by: FAMILY MEDICINE

## 2025-06-06 PROCEDURE — 4010F ACE/ARB THERAPY RXD/TAKEN: CPT | Performed by: FAMILY MEDICINE

## 2025-06-06 PROCEDURE — 1160F RVW MEDS BY RX/DR IN RCRD: CPT | Performed by: FAMILY MEDICINE

## 2025-06-06 PROCEDURE — 3079F DIAST BP 80-89 MM HG: CPT | Performed by: FAMILY MEDICINE

## 2025-06-06 RX ORDER — FLUOXETINE 20 MG/1
20 CAPSULE ORAL DAILY
Qty: 90 CAPSULE | Refills: 3 | Status: SHIPPED | OUTPATIENT
Start: 2025-06-06

## 2025-06-06 ASSESSMENT — ENCOUNTER SYMPTOMS
ABDOMINAL PAIN: 0
ARTHRALGIAS: 1
WEAKNESS: 0
FATIGUE: 0
COLOR CHANGE: 1
PSYCHIATRIC NEGATIVE: 1
DIARRHEA: 0
PALPITATIONS: 0
DIZZINESS: 0
NUMBNESS: 0
FEVER: 0
CONSTIPATION: 0
WOUND: 0
VOMITING: 0
COUGH: 0
CHILLS: 0
NAUSEA: 0
SORE THROAT: 0
HEADACHES: 0
JOINT SWELLING: 1
SHORTNESS OF BREATH: 0

## 2025-06-06 ASSESSMENT — PATIENT HEALTH QUESTIONNAIRE - PHQ9
9. THOUGHTS THAT YOU WOULD BE BETTER OFF DEAD, OR OF HURTING YOURSELF: SEVERAL DAYS
7. TROUBLE CONCENTRATING ON THINGS, SUCH AS READING THE NEWSPAPER OR WATCHING TELEVISION: MORE THAN HALF THE DAYS
3. TROUBLE FALLING OR STAYING ASLEEP OR SLEEPING TOO MUCH: SEVERAL DAYS
1. LITTLE INTEREST OR PLEASURE IN DOING THINGS: MORE THAN HALF THE DAYS
4. FEELING TIRED OR HAVING LITTLE ENERGY: MORE THAN HALF THE DAYS
SUM OF ALL RESPONSES TO PHQ QUESTIONS 1-9: 14
8. MOVING OR SPEAKING SO SLOWLY THAT OTHER PEOPLE COULD HAVE NOTICED. OR THE OPPOSITE, BEING SO FIGETY OR RESTLESS THAT YOU HAVE BEEN MOVING AROUND A LOT MORE THAN USUAL: SEVERAL DAYS
5. POOR APPETITE OR OVEREATING: MORE THAN HALF THE DAYS
6. FEELING BAD ABOUT YOURSELF - OR THAT YOU ARE A FAILURE OR HAVE LET YOURSELF OR YOUR FAMILY DOWN: SEVERAL DAYS
SUM OF ALL RESPONSES TO PHQ9 QUESTIONS 1 AND 2: 4
2. FEELING DOWN, DEPRESSED OR HOPELESS: MORE THAN HALF THE DAYS

## 2025-06-06 ASSESSMENT — ANXIETY QUESTIONNAIRES
7. FEELING AFRAID AS IF SOMETHING AWFUL MIGHT HAPPEN: SEVERAL DAYS
6. BECOMING EASILY ANNOYED OR IRRITABLE: MORE THAN HALF THE DAYS
4. TROUBLE RELAXING: SEVERAL DAYS
GAD7 TOTAL SCORE: 10
1. FEELING NERVOUS, ANXIOUS, OR ON EDGE: SEVERAL DAYS
3. WORRYING TOO MUCH ABOUT DIFFERENT THINGS: MORE THAN HALF THE DAYS
IF YOU CHECKED OFF ANY PROBLEMS ON THIS QUESTIONNAIRE, HOW DIFFICULT HAVE THESE PROBLEMS MADE IT FOR YOU TO DO YOUR WORK, TAKE CARE OF THINGS AT HOME, OR GET ALONG WITH OTHER PEOPLE: SOMEWHAT DIFFICULT
2. NOT BEING ABLE TO STOP OR CONTROL WORRYING: MORE THAN HALF THE DAYS
5. BEING SO RESTLESS THAT IT IS HARD TO SIT STILL: SEVERAL DAYS

## 2025-06-06 NOTE — ASSESSMENT & PLAN NOTE
Smoking cessation encouraged  She is not currently needing Trelegy   Using albuterol once a week

## 2025-06-06 NOTE — ASSESSMENT & PLAN NOTE
Mild to moderate dementia- mixed vascular and neurodegenerative- she is on exelon patch, remeron, and seroquel prn - follows with Dr. Granado

## 2025-06-09 ENCOUNTER — DOCUMENTATION (OUTPATIENT)
Dept: PRIMARY CARE | Facility: CLINIC | Age: 70
End: 2025-06-09

## 2025-06-09 ENCOUNTER — PATIENT OUTREACH (OUTPATIENT)
Dept: PRIMARY CARE | Facility: CLINIC | Age: 70
End: 2025-06-09

## 2025-06-09 NOTE — PROGRESS NOTES
Discharge Facility:[facility] ED  Discharge Diagnosis:L Elbow Cellulitis  Admission Date:6/6/2025  Discharge Date: 6/8/2025    PCP Appointment Date:7/1/2025, declined sooner  Specialist Appointment Date: TBD  Hospital Encounter and Summary Linked: Yes   Hospital Encounter with Rosa Colon MD; ROSA COLON     See discharge assessment below for further details  Wrap Up  Wrap Up Additional Comments: -- (Rayne left Hospital AMA. She wanted to return to [facility] but was told that she needed Release to return. She states she went to [place] ED to obtain. Wound was wrapped and she is now back at [facility]. Elbow is improved.) (6/9/2025  6:05 PM)    Engagement  Call Start Time: 1242 (6/9/2025  6:05 PM)    Medications  Medications reviewed with patient/caregiver?: Yes (6/9/2025  6:05 PM)  Is the patient having any side effects they believe may be caused by any medication additions or changes?: No (6/9/2025  6:05 PM)  Does the patient have all medications ordered at discharge?: Not applicable (6/9/2025  6:05 PM)  Care Management Interventions: No intervention needed (6/9/2025  6:05 PM)  Prescription Comments: -- (No Medications given) (6/9/2025  6:05 PM)  Is the patient taking all medications as directed (includes completed medication regime)?: Yes (6/9/2025  6:05 PM)  Medication Comments: -- (na) (6/9/2025  6:05 PM)    Appointments  Does the patient have a primary care provider?: Yes (6/9/2025  6:05 PM)  Care Management Interventions: Verified appointment date/time/provider (7/1/2025, Patient rescheduled one that was 6/12/25, declined sooner.) (6/9/2025  6:05 PM)  Has the patient kept scheduled appointments due by today?: Yes (6/9/2025  6:05 PM)    Self Management  What is the home health agency?: -- (na) (6/9/2025  6:05 PM)  What Durable Medical Equipment (DME) was ordered?: -- (na) (6/9/2025  6:05 PM)    Patient Teaching  Does the patient have access to their discharge instructions?: Yes (6/9/2025   6:05 PM)  What is the patient's perception of their health status since discharge?: Improving (6/9/2025  6:05 PM)  Is the patient/caregiver able to teach back the hierarchy of who to call/visit for symptoms/problems? PCP, Specialist, Home Health nurse, Urgent Care, ED, 911: Yes (6/9/2025  6:05 PM)

## 2025-06-12 ENCOUNTER — ANTICOAGULATION - WARFARIN VISIT (OUTPATIENT)
Dept: CARDIOLOGY | Facility: CLINIC | Age: 70
End: 2025-06-12

## 2025-06-12 ENCOUNTER — APPOINTMENT (OUTPATIENT)
Dept: PRIMARY CARE | Facility: CLINIC | Age: 70
End: 2025-06-12

## 2025-06-12 DIAGNOSIS — G45.9 TIA (TRANSIENT ISCHEMIC ATTACK): ICD-10-CM

## 2025-06-12 DIAGNOSIS — Z79.01 LONG TERM (CURRENT) USE OF ANTICOAGULANTS: ICD-10-CM

## 2025-06-12 DIAGNOSIS — Z95.2 AORTIC VALVE REPLACED: Primary | ICD-10-CM

## 2025-06-12 LAB
INR IN PPP BY COAGULATION ASSAY EXTERNAL: 1.5
PROTHROMBIN TIME (PT) IN PPP BY COAGULATION ASSAY EXTERNAL: NORMAL

## 2025-06-12 NOTE — PROGRESS NOTES
Patient identification verified with 2 identifiers.    Location:  [facility] Patient Self-Testing Program [phone]    Referring Physician: Brendon Cxo PA-C   Enrollment/ Re-enrollment date: 2025   INR Goal: 2.5-3.5  INR monitoring is per [facility] protocol.  Anticoagulation Medication: warfarin  Indication: Aortic Valve Replacement and Stroke/TIA    Subjective   Bleeding signs/symptoms:      Bruising:     Major bleeding event:    Thrombosis signs/symptoms:    Thromboembolic event:    Missed doses:    Extra doses:    Medication changes:    Dietary changes:    Change in health:    Change in activity:    Alcohol:    Other concerns:      Upcoming Procedures:  Does the Patient Have any upcoming procedures that require interruption in anticoagulation therapy? no  Does the patient require bridging? no    :  spoke with pt who reports that she is now residing at [facility] in [facility] [phone] and they are overseeing all of her medical care.  Called facility and confirmed this with Halle Elder, Director of Wellness who reports that they have an in house MD that will be managing her INRs.  I did report that her INR is subtherapeutic at 1.5 and Halle states she will be sure that their doc is aware.  Will resolve this episode.    Anticoagulation Summary  As of 2025      INR goal:  2.5-3.5   TTR:  38.9% (1.6 y)   INR used for dosin.50 (2025)   Weekly warfarin total:  34.5 mg               Assessment/Plan   Subtherapeutic     1. New dose: no change    2. Next INR: NA      Education provided to patient during the visit:  Patient instructed to call in interim with questions, concerns and changes.

## 2025-06-16 ENCOUNTER — APPOINTMENT (OUTPATIENT)
Dept: PRIMARY CARE | Facility: CLINIC | Age: 70
End: 2025-06-16

## 2025-06-16 VITALS
TEMPERATURE: 97.9 F | RESPIRATION RATE: 12 BRPM | SYSTOLIC BLOOD PRESSURE: 142 MMHG | BODY MASS INDEX: 27.76 KG/M2 | DIASTOLIC BLOOD PRESSURE: 77 MMHG | OXYGEN SATURATION: 95 % | WEIGHT: 151.8 LBS | HEART RATE: 69 BPM

## 2025-06-16 DIAGNOSIS — M25.422 ELBOW SWELLING, LEFT: ICD-10-CM

## 2025-06-16 DIAGNOSIS — R70.0 ELEVATED ERYTHROCYTE SEDIMENTATION RATE: ICD-10-CM

## 2025-06-16 DIAGNOSIS — Z09 HOSPITAL DISCHARGE FOLLOW-UP: Primary | ICD-10-CM

## 2025-06-16 DIAGNOSIS — M25.522 LEFT ELBOW PAIN: ICD-10-CM

## 2025-06-16 DIAGNOSIS — R79.82 ELEVATED C-REACTIVE PROTEIN (CRP): ICD-10-CM

## 2025-06-16 PROCEDURE — 1160F RVW MEDS BY RX/DR IN RCRD: CPT | Performed by: FAMILY MEDICINE

## 2025-06-16 PROCEDURE — 99495 TRANSJ CARE MGMT MOD F2F 14D: CPT | Performed by: FAMILY MEDICINE

## 2025-06-16 PROCEDURE — 3078F DIAST BP <80 MM HG: CPT | Performed by: FAMILY MEDICINE

## 2025-06-16 PROCEDURE — 4010F ACE/ARB THERAPY RXD/TAKEN: CPT | Performed by: FAMILY MEDICINE

## 2025-06-16 PROCEDURE — 1159F MED LIST DOCD IN RCRD: CPT | Performed by: FAMILY MEDICINE

## 2025-06-16 PROCEDURE — 3077F SYST BP >= 140 MM HG: CPT | Performed by: FAMILY MEDICINE

## 2025-06-16 ASSESSMENT — ENCOUNTER SYMPTOMS
FEVER: 0
WOUND: 0
COLOR CHANGE: 0
CHILLS: 0
JOINT SWELLING: 0
ARTHRALGIAS: 0
MYALGIAS: 0

## 2025-06-16 NOTE — PROGRESS NOTES
Subjective   Patient ID: Suze Najera is a 70 y.o. female who presents for HOSPITAL DISCHARGE (Pt presents for hospital discharge visit for left arm cellulitis, improved. Swelling down and pt can use arm.).    HPI     The patient is here for a hospital follow up.  Hospital records were reviewed prior to visit, including relevant labs, imaging findings, consultant notes, and discharge summary.  Medications were reconciled and are current, reviewed today.    She was admitted 6/6 - 6/8 for possible left elbow cellulitis.  She ended up leaving AMA, then returned to ER later that day.  Left elbow swelling thought to be more inflammatory than infectious but she was still treated with vanc/cipro initially.  Seen by ID and orthopedics.  Discharged from ER with rx for cipro 500 mg BID x 5 days- she never picked it up.  She was not treated with steroids or NSAIDs due to diabetes and coumadin.    ESR was 76 and CRP was 54.9.   She improved on her own with rest and ice.      Today she tells me she was able to move into her assisted living home- [facility].  But had to go back to ER for discharge paperwork as they wouldn't initially accept her since she left hospital AMA first time.    [facility] the new facility   Here with her daughter today   Swelling and pain have improved  She is able to use her arm   Left elbow back to normal  No concerns       Current Medications[1]    Review of Systems   Constitutional:  Negative for chills and fever.   Musculoskeletal:  Negative for arthralgias, joint swelling and myalgias.   Skin:  Negative for color change, rash and wound.           Objective   /77 (BP Location: Left arm, Patient Position: Sitting, BP Cuff Size: Large adult)   Pulse 69   Temp 36.6 °C (97.9 °F) (Temporal)   Resp 12   Wt 68.9 kg (151 lb 12.8 oz)   SpO2 95%   BMI 27.76 kg/m²     Physical Exam    Constitutional: Well developed, well nourished, alert and in no acute distress   Eyes: Normal external  exam.   ELBOW: Visual inspection normal, no erythema, warmth, or swelling noted. No tenderness to palpation of medial and lateral epicondyles. Full range of motion. Strength +5/5. No pain with wrist movement. Sensation intact. Radial pulses intact.   Skin: Warm, well perfused, normal skin turgor and color.   Neurologic: Cranial nerves II-XII grossly intact.   Psychiatric: Mood calm and affect normal.      Assessment/Plan     Problem List Items Addressed This Visit    None  Visit Diagnoses         Hospital discharge follow-up    -  Primary      Left elbow pain          Elbow swelling, left          Elevated erythrocyte sedimentation rate        Relevant Orders    Sedimentation Rate      Elevated C-reactive protein (CRP)        Relevant Orders    C-Reactive Protein          Symptoms have since resolved with ice and rest  She never started the additional cipro rx and symptoms resolved on own- doubt infectious etiology   Suspect inflammatory process vs overuse injury / tendonitis   Recheck inflammatory markers next week   Return as needed    Jo Catalan DO  6/16/2025         [1]   Current Outpatient Medications   Medication Sig Dispense Refill    acetaminophen (Tylenol) 325 mg tablet Take 1-2 tablets (325-650 mg) by mouth every 6 hours if needed for mild pain (1 - 3) or moderate pain (4 - 6). 30 tablet 0    albuterol 90 mcg/actuation inhaler INHALE 2 PUFFS EVERY 4 HOURS IF NEEDED FOR WHEEZING OR SHORTNESS OF BREATH. 8.5 g 1    FLUoxetine (PROzac) 20 mg capsule Take 1 capsule (20 mg) by mouth once daily. 90 capsule 3    glucosamine sulfate 1,000 mg tablet Take 1 tablet by mouth once daily.      ketoconazole (NIZOral) 2 % cream APPLY TOPICALLY 2 TIMES A DAY. TO AFFECTED FOREHEAD AND FACIAL AREAS. 30 g 0    levETIRAcetam (Keppra) 500 mg tablet Take 1 tablet (500 mg) by mouth twice a day.      losartan (Cozaar) 25 mg tablet TAKE 1 TABLET BY MOUTH EVERY DAY 90 tablet 1    metFORMIN  mg 24 hr tablet TAKE 1  TABLET BY MOUTH TWICE A  tablet 1    metoprolol succinate XL (Toprol-XL) 50 mg 24 hr tablet TAKE 1 TABLET BY MOUTH EVERY DAY 90 tablet 3    mirtazapine (Remeron) 15 mg tablet TAKE 1 TABLET (15 MG) BY MOUTH ONCE DAILY AT BEDTIME. 90 tablet 1    OneTouch Ultra Control solution       OneTouch Ultra Test strip       QUEtiapine (SEROquel) 25 mg tablet Take 1 tablet (25 mg) by mouth. PRN      rivastigmine (Exelon) 4.6 mg/24 hour APPLY 1 PATCH AS DIRECTED ONCE DAILY.      rosuvastatin (Crestor) 40 mg tablet TAKE 1 TABLET BY MOUTH EVERY DAY 90 tablet 1    warfarin (Coumadin) 3 mg tablet 6 mg (3 mg x 2) every Tue, Fri;   4.5 mg (3 mg x 1.5) all other days 180 tablet 0    fluticasone-umeclidin-vilanter (TRELEGY-ELLIPTA) 100-62.5-25 mcg blister with device Inhale 1 puff once daily. 30 each 0     No current facility-administered medications for this visit.

## 2025-06-18 ENCOUNTER — TELEPHONE (OUTPATIENT)
Dept: PRIMARY CARE | Facility: CLINIC | Age: 70
End: 2025-06-18

## 2025-06-18 NOTE — TELEPHONE ENCOUNTER
----- Message from Nurse Clarisa SHELL sent at 6/18/2025  1:49 PM EDT -----  Regarding: [facility] - Home Testing  Dr. Catalan,    Good afternoon.    We have been monitoring patient's INRs via the [facility] Home Testing Program.  Patient's referring provider to our Program was Alex Graves MD; however, patient failed to keep a clinical relationship with provider and canceled/no-showed multiple appointments.    Additionally, patient has recently transitioned to an assisted living facility and will have nursing support with her INR testing; therefore, no longer qualifies for monitoring via our services. She was discharged from our services on June 12, 2025, given above.     I am working with Sara from [facility] to transfer patient's care to their House Doc - Antolin Vides MD - however, he has not seen patient yet.  Sara anticipates that Dr. Vides will see patient for the first time next week and then will assume care.      Patient and team at [facility] is requesting that patient keep her home INR monitor, which I can help facilitate one Dr. Vides agrees to manage - I will send orders to him for signature and then Remote IN[facility] will begin sending INR results directly to the facility for management.  Sara stated that patient's INR was 2.4 today ([facility] INR 2.5-3.5) and results had been faxed to your office. Patient is taking 6mg (3 mg x 2) of warfarin every Tue, Fri; and 4.5mg (3 mg x 1.5) all other days of the week - plan to maintain dose. I did notify Dr. Graves's office (via [facility]) today of INR results and plan but wanted to update you as well since it looks like you are patient's primary provider of care and facility said they sent you patient's INR results from today.     Prior to admit to [facility], patient had been noncompliant with INR testing and in taking her warfarin as directed.  Given this, INR monitoring and medication management via the House Doc at [facility]  appears to be safest option for patient.  I am following up with [place] next week to confirm transition of care and will update you once one is available.    Thank you.

## 2025-06-19 ENCOUNTER — TELEPHONE (OUTPATIENT)
Dept: CARDIOLOGY | Facility: CLINIC | Age: 70
End: 2025-06-19

## 2025-06-19 NOTE — TELEPHONE ENCOUNTER
----- Message from Jo Catalan sent at 6/18/2025  2:53 PM EDT -----  Regarding: RE: [facility] - Home Testing  Thank you Clarisa for the update.   She had also been noncompliant when we were managing her INR before cardiology was.  Her daughter told me they are transitioning to the house doc and no longer plan on seeing me in the office.  Just wanted you to be aware of that as well.  Happy to help in interim if any issues arise in transition.    Thanks!   Jo Catalan DO  ----- Message -----  From: Clarisa Brice RN  Sent: 6/18/2025   2:24 PM EDT  To: Jo Catalan DO; Clarisa Brice#  Subject: [facility] - Home Testing               Dr. Catalan,    Good afternoon.    We have been monitoring patient's INRs via the [facility] Home Testing Program.  Patient's referring provider to our Program was Alex Graves MD; however, patient failed to keep a clinical relationship with provider and canceled/no-showed multiple appointments.    Additionally, patient has recently transitioned to an assisted living facility and will have nursing support with her INR testing; therefore, no longer qualifies for monitoring via our services. She was discharged from our services on June 12, 2025, given above.     I am working with Sara from [facility] to transfer patient's care to their House Doc - Antolin Vides MD - however, he has not seen patient yet.  Sara anticipates that Dr. Vides will see patient for the first time next week and then will assume care.      [facility] and team [facility] is requesting that patient keep her home INR monitor, which I can help facilitate one Dr. Vides agrees to manage - I will send orders to him for signature and then Remote IN[facility] will begin sending INR results directly to the facility for management.  Sara stated that patient's INR was 2.4 today ([facility] INR 2.5-3.5) and results had been faxed to your office. Patient is taking 6mg  (3 mg x 2) of warfarin every Tue, Fri; and 4.5mg (3 mg x 1.5) all other days of the week - plan to maintain dose. I did notify Dr. Graves's office (via [facility]) today of INR results and plan but wanted to update you as well since it looks like you are patient's primary provider of care and facility said they sent you patient's INR results from today.     Prior to admit to [facility], patient had been noncompliant with INR testing and in taking her warfarin as directed.  Given this, INR monitoring and medication management via the House Doc at [facility] appears to be safest option for patient.  I am following up with [facility] next week to confirm transition of care and will update you once one is available.    Thank you.

## 2025-06-24 ENCOUNTER — PATIENT OUTREACH (OUTPATIENT)
Dept: PRIMARY CARE | Facility: CLINIC | Age: 70
End: 2025-06-24

## 2025-07-01 ENCOUNTER — APPOINTMENT (OUTPATIENT)
Dept: PRIMARY CARE | Facility: CLINIC | Age: 70
End: 2025-07-01
Payer: MEDICARE

## 2025-07-02 DIAGNOSIS — E66.811 CLASS 1 OBESITY WITH BODY MASS INDEX (BMI) OF 31.0 TO 31.9 IN ADULT, UNSPECIFIED OBESITY TYPE, UNSPECIFIED WHETHER SERIOUS COMORBIDITY PRESENT: ICD-10-CM

## 2025-07-02 DIAGNOSIS — F33.1 RECURRENT MODERATE MAJOR DEPRESSIVE DISORDER WITH ANXIETY (MULTI): ICD-10-CM

## 2025-07-02 DIAGNOSIS — F41.9 RECURRENT MODERATE MAJOR DEPRESSIVE DISORDER WITH ANXIETY (MULTI): ICD-10-CM

## 2025-07-02 RX ORDER — LEVETIRACETAM 500 MG/1
500 TABLET ORAL 2 TIMES DAILY
Qty: 14 TABLET | Refills: 51 | OUTPATIENT
Start: 2025-07-02

## 2025-07-02 RX ORDER — ROSUVASTATIN CALCIUM 40 MG/1
40 TABLET, COATED ORAL DAILY
Qty: 30 TABLET | Refills: 0 | Status: SHIPPED | OUTPATIENT
Start: 2025-07-02

## 2025-07-02 RX ORDER — MIRTAZAPINE 15 MG/1
15 TABLET, FILM COATED ORAL NIGHTLY
Qty: 30 TABLET | Refills: 0 | Status: SHIPPED | OUTPATIENT
Start: 2025-07-02

## 2025-07-02 NOTE — TELEPHONE ENCOUNTER
Dr Catalan, you had me cancel patients 6 mth fuv appt yesterday because you had just see Suze recently and she was going to be seeing a physician at the facility she now lives at so pt has no future appointments scheduled with you. Are you able to refill this current refill request?  7-2-25

## 2025-07-24 ENCOUNTER — PATIENT OUTREACH (OUTPATIENT)
Dept: PRIMARY CARE | Facility: CLINIC | Age: 70
End: 2025-07-24

## 2025-07-28 RX ORDER — RIVASTIGMINE 4.6 MG/24H
PATCH, EXTENDED RELEASE TRANSDERMAL
Qty: 30 PATCH | Refills: 0 | OUTPATIENT
Start: 2025-07-28

## 2025-08-05 DIAGNOSIS — Z12.31 ENCOUNTER FOR SCREENING MAMMOGRAM FOR BREAST CANCER: ICD-10-CM

## 2025-08-07 NOTE — TELEPHONE ENCOUNTER
Received a fax med refill request Mirtazapine 15 mg from [facility]. Pt has notified us on 7-2-25 that she was moving to a facility and would be under the care of the [facility] physician who would be prescribing pts meds.    Called [facility] and notified them of this and they advised me to call [facility] in [place] - [phone] to advise them of this information. I called [facility] and spoke with Connie and advised her of above information and she said she would update pts record for being under the care of the [facility] physician and request med refills from that doctor in the future. MW 8-7-25

## 2025-08-25 PROBLEM — E66.3 OVERWEIGHT WITH BODY MASS INDEX (BMI) OF 27 TO 27.9 IN ADULT: Status: RESOLVED | Noted: 2025-06-06 | Resolved: 2025-08-25

## 2025-08-25 PROBLEM — N39.0 RECURRENT UTI (URINARY TRACT INFECTION): Status: RESOLVED | Noted: 2023-03-18 | Resolved: 2025-08-25

## 2025-08-25 PROBLEM — M25.422 ELBOW SWELLING, LEFT: Status: ACTIVE | Noted: 2025-06-06

## 2025-08-25 PROBLEM — R31.21 ASYMPTOMATIC MICROSCOPIC HEMATURIA: Status: RESOLVED | Noted: 2023-03-18 | Resolved: 2025-08-25

## 2025-08-25 PROBLEM — Z86.73 HISTORY OF STROKE: Status: RESOLVED | Noted: 2023-07-27 | Resolved: 2025-08-25

## 2025-08-27 ENCOUNTER — PATIENT OUTREACH (OUTPATIENT)
Dept: PRIMARY CARE | Facility: CLINIC | Age: 70
End: 2025-08-27

## (undated) DEVICE — ACCESS KIT, S-MAK MINI, 4FR 10CM 0.018IN 40CM, NT/PT, ECHO ENHANCE NEEDLE

## (undated) DEVICE — CANNULA, AVID DUAL STAGE VENOUS DRAINAGE

## (undated) DEVICE — CATHETER, DRAINAGE, NASOGASTRIC, DOUBLE LUMEN, FUNNEL END, SUMP, SALEM, 18 FR, 48 IN, PVC, STERILE

## (undated) DEVICE — SUTURE, VICRYL, 4-0, 27 IN, KS, UNDYED

## (undated) DEVICE — SUTURE, PROLENE, 3-0, 36 IN, SH, DA, BLUE

## (undated) DEVICE — SPONGE, GAUZE, XRAY DECT, 16 PLY, 4 X 4, W/MASTER DMT,STERILE

## (undated) DEVICE — SYRINGE, 60 CC, IRRIGATION, BULB, CONTRO-BULB, PAPER POUCH

## (undated) DEVICE — KIT, TOURNIQUET, 7"

## (undated) DEVICE — TUBING, SUCTION, CARDIAC, 6 FR

## (undated) DEVICE — TOWEL, OR, XRAY DETECT 5 PK, WHITE, 17X26, W/DMT TAG, ST

## (undated) DEVICE — ADAPTER, CORONARY, PERFUSION, Y, 34.3 CM

## (undated) DEVICE — MANIFOLD, 4 PORT NEPTUNE STANDARD

## (undated) DEVICE — CONNECTOR, Y, 0.375 X 0.375 X 0.5 IN

## (undated) DEVICE — TOWEL, SURGICAL, NEURO, O/R, 16 X 26, BLUE, STERILE

## (undated) DEVICE — DRESSING, MEPILEX, BORDER, HEEL, 8.7 X 9.1 IN

## (undated) DEVICE — SET, SIZER GRAFT

## (undated) DEVICE — SUTURE, SURGICAL STEEL, STERNUM 7, 18 IN, KV40, SINGL-WIRE

## (undated) DEVICE — INSERT, CLAMP, SURGICAL, SOFT/TRACTION, STEALTH, 1 MM

## (undated) DEVICE — INTRODUCER, GLIDESHEATH SLENDER A-KIT, 6FR 10CM

## (undated) DEVICE — SPONGE, LAP, XRAY DECT, 18IN X 18IN, W/MASTER DMT, STERILE

## (undated) DEVICE — TUBING, 0.375 X 3/32 IN X 6 FT

## (undated) DEVICE — CATHETER, THORACIC, STRAIGHT, ADULT, 28 FR, PVC

## (undated) DEVICE — INSERT, EVERGRIP 61

## (undated) DEVICE — SUMP, INTRACARDIAC, MULTI PORT, VENT TIP, PEDIATRIC, 12 FR X 30 CM

## (undated) DEVICE — DEVICE, ENDOSCOPIC VESSEL HARVESTING, SAPHENA VENAPAX

## (undated) DEVICE — Device

## (undated) DEVICE — CLIPPER, SURGICAL BLADE ASSEMBLY, GENERAL PURPOSE, SINGLE USE

## (undated) DEVICE — SUTURE, ETHIBOND, XTRA, 30 IN, 0, CT-1, GREEN

## (undated) DEVICE — CONNECTOR, STRAIGHT, 0.375 X 0.375 IN

## (undated) DEVICE — TUBING, SUCTION, CONNECTING, NON-CONDUCTIVE, SURE GRIP CONNECTORS, 3/16 X 18 IN, PVC

## (undated) DEVICE — CLEANER, ELECTROSURGICAL, TIP, 5 X 5 CM, LF

## (undated) DEVICE — ADAPTER, CARDIOPLEGIA, VENTING, Y, 19.1 CM

## (undated) DEVICE — TRAY, MINOR, SINGLE BASIN, STERILE

## (undated) DEVICE — CANNULA, CARDIAC SUMP

## (undated) DEVICE — DRESSING, MEPILEX, BORDER, SACRUM, 8.7 X 9.8 IN

## (undated) DEVICE — SUTURE, PROLENE, 5-0, 36 IN, RB1, DA, BLUE

## (undated) DEVICE — TAPE, POLYESTER, BRAIDED, PRE-CUT 2 X 30, WHITE"

## (undated) DEVICE — INSERT, CLAMP EVERGRIP 33

## (undated) DEVICE — INSERT, CLAMP, SURGICAL, SOFT/TRACTION, STEALTH, 5 MM

## (undated) DEVICE — LOOP, VESSEL, MAXI, BLUE

## (undated) DEVICE — CONNECTOR, STRAIGHT, 0.5 X 0.375 IN

## (undated) DEVICE — KIT, COR-KNOT MINI COMBO

## (undated) DEVICE — LOOP, VESSEL, MAXI, RED

## (undated) DEVICE — CAUTERY, SURGICAL, BATTERY OPERATED, HIGH TEMPERATURE, FINE TIP, ACCU-TEMP

## (undated) DEVICE — PITCHER, GRADUATE, 32 OZ (1200CC), STERILE

## (undated) DEVICE — SHEATH, PINNACLE, 10 CM,  6FR INTRODUCER, 6FR DIA, 2.5 CM DIALATOR

## (undated) DEVICE — SYSTEM KIT, INCISION, PREVENA PEEL AND PLACE, 20CM

## (undated) DEVICE — SENSOR, OXYGEN, ADULT, INVOS KIT

## (undated) DEVICE — DRAPE, SHEET, CARDIOVASCULAR, ANTIMICROBIAL, W/ANESTHESIA SCREEN, IOBAN 2, STERI DRAPE, 107 X 133 IN, DISPOSABLE, FABRIC, BLUE, STERILE

## (undated) DEVICE — SUTURE, PROLENE, 4-0, 36 IN, RB1, DA, BLUE

## (undated) DEVICE — PACING CABLE, EXTENSION, 12 FT BLUE, DISPOSABLE

## (undated) DEVICE — SUTURE, PROLENE, 5-0, 30 IN, RB2, DA, BLUE

## (undated) DEVICE — PUNCH, AORTIC 4MM, BULLET TIP

## (undated) DEVICE — DRESSING, ISLAND, TELFA, 4 X 5 IN

## (undated) DEVICE — YANKAUER, RIGID, STRAIGHT, OPEN TIP, NO VENT

## (undated) DEVICE — MARKER, SKIN, DUAL TIP INK W/9 LABEL AND REMOVABLE TIME OUT SLEEVE

## (undated) DEVICE — GUIDEWIRE, INQWIRE, 3MM J, .035 X 210CM, FIXED

## (undated) DEVICE — ELECTRODE, ELECTROSURGICAL, BLADE, INSULATED, ENT/IMA, STERILE

## (undated) DEVICE — DRAPE, INSTRUMENT, W/POUCH, STERI DRAPE, 7 X 11 IN, DISPOSABLE, STERILE

## (undated) DEVICE — PLEDGET, PTFE, SOFT, LARGE, 3/8 X 3/16 X 1/16 IN

## (undated) DEVICE — DRESSING, QUICKCLOT, CONTROL PLUS 3-PLY PAD, 12X12

## (undated) DEVICE — SUTURE, PROLENE, 4-0, TAPER POINT, SH/SH BLUE 36IN

## (undated) DEVICE — SUTURE, SILK, 5-0, 18 IN TF, BLACK

## (undated) DEVICE — GOWN, SURGICAL, SMARTGOWN, XLARGE, STERILE

## (undated) DEVICE — COVER, CART, 45 X 27 X 48 IN, CLEAR

## (undated) DEVICE — CATHETER, SUCTION, SAFE-T-VAC VALVE, COIL PACKED, 14 FR

## (undated) DEVICE — MAYO TRAY, SMALL

## (undated) DEVICE — SYRINGE, LUER LOCK, 12ML

## (undated) DEVICE — SUTURE, PROLENE 4-0, TAPER POINT, SH-1 BLUE 30 INCH

## (undated) DEVICE — CATHETER, ANGIO, IMPULSE, FR4, 5 FR X 100 CM